# Patient Record
Sex: FEMALE | Race: WHITE | Employment: UNEMPLOYED | URBAN - METROPOLITAN AREA
[De-identification: names, ages, dates, MRNs, and addresses within clinical notes are randomized per-mention and may not be internally consistent; named-entity substitution may affect disease eponyms.]

---

## 2017-02-13 ENCOUNTER — HOSPITAL ENCOUNTER (OUTPATIENT)
Dept: OTHER | Age: 37
Discharge: HOME | End: 2017-02-13
Attending: EMERGENCY MEDICINE | Admitting: EMERGENCY MEDICINE

## 2017-02-13 LAB
ALBUMIN SERPL-MCNC: 4.2 GM/DL (ref 3.4–5)
ALP BLD-CCNC: 173 IU/L (ref 40–129)
ALT SERPL-CCNC: 2429 U/L (ref 10–40)
ANION GAP SERPL CALCULATED.3IONS-SCNC: 11 MMOL/L (ref 4–16)
AST SERPL-CCNC: 897 IU/L (ref 15–37)
BACTERIA: NEGATIVE /HPF
BASOPHILS ABSOLUTE: 0 K/CU MM
BASOPHILS RELATIVE PERCENT: 0.7 % (ref 0–1)
BILIRUB SERPL-MCNC: 4.7 MG/DL (ref 0–1)
BILIRUBIN URINE: ABNORMAL MG/DL
BLOOD, URINE: NEGATIVE
BUN BLDV-MCNC: 8 MG/DL (ref 6–23)
CALCIUM OXALATE CRYSTALS: ABNORMAL /HPF
CALCIUM SERPL-MCNC: 9.4 MG/DL (ref 8.3–10.6)
CHLORIDE BLD-SCNC: 103 MMOL/L (ref 99–110)
CLARITY: ABNORMAL
CO2: 24 MMOL/L (ref 21–32)
COLOR: ABNORMAL
CREAT SERPL-MCNC: 0.6 MG/DL (ref 0.6–1.1)
DIFFERENTIAL TYPE: ABNORMAL
EOSINOPHILS ABSOLUTE: 0.1 K/CU MM
EOSINOPHILS RELATIVE PERCENT: 2 % (ref 0–3)
GFR AFRICAN AMERICAN: >60 ML/MIN/1.73M2
GFR NON-AFRICAN AMERICAN: >60 ML/MIN/1.73M2
GLUCOSE FASTING: 107 MG/DL (ref 70–99)
GLUCOSE, URINE: NEGATIVE MG/DL
HCT VFR BLD CALC: 41.2 % (ref 37–47)
HEMOGLOBIN: 13.5 GM/DL (ref 12.5–16)
ICTOTEST: POSITIVE
IMMATURE NEUTROPHIL %: 0 % (ref 0–0.43)
KETONES, URINE: NEGATIVE MG/DL
LEUKOCYTE ESTERASE, URINE: NEGATIVE
LYMPHOCYTES ABSOLUTE: 1.1 K/CU MM
LYMPHOCYTES RELATIVE PERCENT: 37.1 % (ref 24–44)
MCH RBC QN AUTO: 31 PG (ref 27–31)
MCHC RBC AUTO-ENTMCNC: 32.8 % (ref 32–36)
MCV RBC AUTO: 94.5 FL (ref 78–100)
MONOCYTES ABSOLUTE: 0.5 K/CU MM
MONOCYTES RELATIVE PERCENT: 16.7 % (ref 0–4)
MUCUS: ABNORMAL HPF
NITRITE URINE, QUANTITATIVE: NEGATIVE
NUCLEATED RBC %: 0 %
PDW BLD-RTO: 14.7 % (ref 11.7–14.9)
PH, URINE: 5 (ref 5–8)
PLATELET # BLD: 159 K/CU MM (ref 140–440)
PMV BLD AUTO: 11.9 FL (ref 7.5–11.1)
POTASSIUM SERPL-SCNC: 4.2 MMOL/L (ref 3.5–5.1)
PROTEIN UA: NEGATIVE MG/DL
RBC # BLD: 4.36 M/CU MM (ref 4.2–5.4)
RBC URINE: <1 /HPF (ref 0–6)
SEGMENTED NEUTROPHILS ABSOLUTE COUNT: 1.3 K/CU MM
SEGMENTED NEUTROPHILS RELATIVE PERCENT: 43.5 % (ref 36–66)
SODIUM BLD-SCNC: 138 MMOL/L (ref 135–145)
SPECIFIC GRAVITY UA: 1.02 (ref 1–1.03)
SQUAMOUS EPITHELIAL: 14 /HPF
TOTAL IMMATURE NEUTOROPHIL: 0 K/CU MM
TOTAL NUCLEATED RBC: 0 K/CU MM
TOTAL PROTEIN: 7.7 GM/DL (ref 6.4–8.2)
UROBILINOGEN, URINE: <2 EU/DL (ref 0.2–1)
WBC # BLD: 2.9 K/CU MM (ref 4–10.5)
WBC UA: 3 /HPF (ref 0–5)

## 2017-02-15 ENCOUNTER — TELEPHONE (OUTPATIENT)
Dept: GASTROENTEROLOGY | Age: 37
End: 2017-02-15

## 2017-02-15 LAB
CULTURE: NORMAL
REPORT STATUS: NORMAL
REQUEST PROBLEM: NORMAL
SPECIMEN: NORMAL

## 2017-02-16 ENCOUNTER — TELEPHONE (OUTPATIENT)
Dept: GASTROENTEROLOGY | Age: 37
End: 2017-02-16

## 2017-02-16 ENCOUNTER — OFFICE VISIT (OUTPATIENT)
Dept: GASTROENTEROLOGY | Age: 37
End: 2017-02-16

## 2017-02-16 VITALS
HEIGHT: 65 IN | DIASTOLIC BLOOD PRESSURE: 78 MMHG | WEIGHT: 190 LBS | BODY MASS INDEX: 31.65 KG/M2 | HEART RATE: 69 BPM | SYSTOLIC BLOOD PRESSURE: 108 MMHG | OXYGEN SATURATION: 98 %

## 2017-02-16 DIAGNOSIS — R17 JAUNDICE: ICD-10-CM

## 2017-02-16 DIAGNOSIS — R10.11 RIGHT UPPER QUADRANT ABDOMINAL PAIN: ICD-10-CM

## 2017-02-16 DIAGNOSIS — R74.8 ELEVATED LIVER ENZYMES: ICD-10-CM

## 2017-02-16 DIAGNOSIS — K75.9 HEPATITIS: Primary | ICD-10-CM

## 2017-02-16 PROCEDURE — 99204 OFFICE O/P NEW MOD 45 MIN: CPT | Performed by: NURSE PRACTITIONER

## 2017-02-16 RX ORDER — LACTULOSE 10 G/15ML
10 SOLUTION ORAL 2 TIMES DAILY
Qty: 1 BOTTLE | Refills: 1 | Status: SHIPPED | OUTPATIENT
Start: 2017-02-16 | End: 2019-09-28 | Stop reason: ALTCHOICE

## 2017-02-16 RX ORDER — LACTULOSE 10 G/15ML
10 SOLUTION ORAL 2 TIMES DAILY
Qty: 1 BOTTLE | Refills: 3 | Status: SHIPPED | OUTPATIENT
Start: 2017-02-16 | End: 2017-02-16 | Stop reason: SDUPTHER

## 2017-02-16 ASSESSMENT — ENCOUNTER SYMPTOMS
CONSTIPATION: 0
ABDOMINAL PAIN: 0
BLURRED VISION: 0
HEMOPTYSIS: 0
BLOOD IN STOOL: 0
NAUSEA: 0
HEARTBURN: 0
DIARRHEA: 0
COUGH: 0
ROS SKIN COMMENTS: JAUNDICED
ORTHOPNEA: 0
SHORTNESS OF BREATH: 0
SPUTUM PRODUCTION: 0
DOUBLE VISION: 0
PHOTOPHOBIA: 0
BACK PAIN: 0
EYE PAIN: 0
VOMITING: 0

## 2017-02-20 ENCOUNTER — HOSPITAL ENCOUNTER (OUTPATIENT)
Dept: OTHER | Age: 37
Discharge: HOME | End: 2017-02-20
Attending: EMERGENCY MEDICINE | Admitting: EMERGENCY MEDICINE

## 2017-02-20 LAB
ALBUMIN SERPL-MCNC: 4 GM/DL (ref 3.4–5)
ALP BLD-CCNC: 151 IU/L (ref 40–129)
ALT SERPL-CCNC: 1844 U/L (ref 10–40)
AST SERPL-CCNC: 772 IU/L (ref 15–37)
BILIRUB SERPL-MCNC: 5.9 MG/DL (ref 0–1)
BILIRUBIN DIRECT: 4.1 MG/DL (ref 0–0.3)
BILIRUBIN, INDIRECT: 1.8 MG/DL (ref 0–0.7)
HAV IGM SER IA-ACNC: NON REACTIVE
HEPATITIS B CORE IGM ANTIBODY: ABNORMAL
HEPATITIS B SURFACE ANTIGEN: ABNORMAL
HEPATITIS C ANTIBODY: ABNORMAL
INR BLD: 1.08 INDEX
PROTHROMBIN TIME: 12.4 SECONDS (ref 9.12–12.5)
TOTAL PROTEIN: 7.6 GM/DL (ref 6.4–8.2)

## 2017-02-22 LAB — HEPATITIS B SURFACE ANTIGEN CONFIRMATION: POSITIVE

## 2017-02-23 ENCOUNTER — TELEPHONE (OUTPATIENT)
Dept: GASTROENTEROLOGY | Age: 37
End: 2017-02-23

## 2019-09-28 ENCOUNTER — HOSPITAL ENCOUNTER (EMERGENCY)
Age: 39
Discharge: HOME | End: 2019-09-28
Payer: MEDICAID

## 2019-09-28 ENCOUNTER — APPOINTMENT (OUTPATIENT)
Dept: GENERAL RADIOLOGY | Age: 39
End: 2019-09-28
Payer: MEDICAID

## 2019-09-28 VITALS
OXYGEN SATURATION: 100 % | TEMPERATURE: 98.7 F | SYSTOLIC BLOOD PRESSURE: 108 MMHG | HEIGHT: 65 IN | RESPIRATION RATE: 14 BRPM | HEART RATE: 89 BPM | BODY MASS INDEX: 29.99 KG/M2 | DIASTOLIC BLOOD PRESSURE: 81 MMHG | WEIGHT: 180 LBS

## 2019-09-28 DIAGNOSIS — S61.419A LACERATION OF MULTIPLE SITES OF HAND AND FINGERS WITHOUT COMPLICATION, INITIAL ENCOUNTER: ICD-10-CM

## 2019-09-28 DIAGNOSIS — S61.219A LACERATION OF MULTIPLE SITES OF HAND AND FINGERS WITHOUT COMPLICATION, INITIAL ENCOUNTER: ICD-10-CM

## 2019-09-28 DIAGNOSIS — S61.421A LACERATION OF RIGHT HAND WITH FOREIGN BODY, INITIAL ENCOUNTER: Primary | ICD-10-CM

## 2019-09-28 PROCEDURE — 73130 X-RAY EXAM OF HAND: CPT

## 2019-09-28 PROCEDURE — 6360000002 HC RX W HCPCS: Performed by: PHYSICIAN ASSISTANT

## 2019-09-28 PROCEDURE — 4500000027

## 2019-09-28 PROCEDURE — 99283 EMERGENCY DEPT VISIT LOW MDM: CPT

## 2019-09-28 PROCEDURE — 2500000003 HC RX 250 WO HCPCS: Performed by: PHYSICIAN ASSISTANT

## 2019-09-28 PROCEDURE — 6370000000 HC RX 637 (ALT 250 FOR IP): Performed by: PHYSICIAN ASSISTANT

## 2019-09-28 PROCEDURE — 90715 TDAP VACCINE 7 YRS/> IM: CPT | Performed by: PHYSICIAN ASSISTANT

## 2019-09-28 PROCEDURE — 90471 IMMUNIZATION ADMIN: CPT | Performed by: PHYSICIAN ASSISTANT

## 2019-09-28 RX ORDER — CEPHALEXIN 500 MG/1
500 CAPSULE ORAL 2 TIMES DAILY
Qty: 14 CAPSULE | Refills: 0 | Status: SHIPPED | OUTPATIENT
Start: 2019-09-28 | End: 2019-10-05

## 2019-09-28 RX ORDER — TRAMADOL HYDROCHLORIDE 50 MG/1
50 TABLET ORAL EVERY 6 HOURS PRN
Qty: 12 TABLET | Refills: 0 | Status: SHIPPED | OUTPATIENT
Start: 2019-09-28 | End: 2019-10-03

## 2019-09-28 RX ORDER — BUPIVACAINE HYDROCHLORIDE 5 MG/ML
10 INJECTION, SOLUTION EPIDURAL; INTRACAUDAL ONCE
Status: COMPLETED | OUTPATIENT
Start: 2019-09-28 | End: 2019-09-28

## 2019-09-28 RX ORDER — HYDROCODONE BITARTRATE AND ACETAMINOPHEN 5; 325 MG/1; MG/1
2 TABLET ORAL ONCE
Status: COMPLETED | OUTPATIENT
Start: 2019-09-28 | End: 2019-09-28

## 2019-09-28 RX ADMIN — BUPIVACAINE HYDROCHLORIDE 50 MG: 5 INJECTION, SOLUTION EPIDURAL; INTRACAUDAL; PERINEURAL at 15:16

## 2019-09-28 RX ADMIN — Medication 3 ML: at 14:27

## 2019-09-28 RX ADMIN — TETANUS TOXOID, REDUCED DIPHTHERIA TOXOID AND ACELLULAR PERTUSSIS VACCINE, ADSORBED 0.5 ML: 5; 2.5; 8; 8; 2.5 SUSPENSION INTRAMUSCULAR at 14:26

## 2019-09-28 RX ADMIN — HYDROCODONE BITARTRATE AND ACETAMINOPHEN 2 TABLET: 5; 325 TABLET ORAL at 14:19

## 2019-09-28 ASSESSMENT — PAIN SCALES - GENERAL
PAINLEVEL_OUTOF10: 9
PAINLEVEL_OUTOF10: 10

## 2019-09-28 ASSESSMENT — PAIN DESCRIPTION - LOCATION: LOCATION: FINGER (COMMENT WHICH ONE)

## 2019-09-28 ASSESSMENT — PAIN DESCRIPTION - PAIN TYPE: TYPE: ACUTE PAIN

## 2019-09-29 ENCOUNTER — HOSPITAL ENCOUNTER (EMERGENCY)
Age: 39
Discharge: HOME | End: 2019-09-29
Payer: MEDICAID

## 2019-09-29 VITALS
RESPIRATION RATE: 16 BRPM | SYSTOLIC BLOOD PRESSURE: 116 MMHG | TEMPERATURE: 98.4 F | OXYGEN SATURATION: 99 % | DIASTOLIC BLOOD PRESSURE: 81 MMHG | WEIGHT: 180 LBS | BODY MASS INDEX: 29.99 KG/M2 | HEART RATE: 87 BPM | HEIGHT: 65 IN

## 2019-09-29 DIAGNOSIS — S31.811A LACERATION OF RIGHT BUTTOCK, INITIAL ENCOUNTER: Primary | ICD-10-CM

## 2019-09-29 PROCEDURE — 6370000000 HC RX 637 (ALT 250 FOR IP): Performed by: PHYSICIAN ASSISTANT

## 2019-09-29 PROCEDURE — 99283 EMERGENCY DEPT VISIT LOW MDM: CPT

## 2019-09-29 RX ORDER — CEPHALEXIN 250 MG/1
500 CAPSULE ORAL ONCE
Status: COMPLETED | OUTPATIENT
Start: 2019-09-29 | End: 2019-09-29

## 2019-09-29 RX ORDER — IBUPROFEN 600 MG/1
600 TABLET ORAL EVERY 6 HOURS PRN
Qty: 30 TABLET | Refills: 0 | Status: ON HOLD | OUTPATIENT
Start: 2019-09-29 | End: 2020-08-14 | Stop reason: HOSPADM

## 2019-09-29 RX ORDER — SULFAMETHOXAZOLE AND TRIMETHOPRIM 800; 160 MG/1; MG/1
1 TABLET ORAL ONCE
Status: COMPLETED | OUTPATIENT
Start: 2019-09-29 | End: 2019-09-29

## 2019-09-29 RX ORDER — SULFAMETHOXAZOLE AND TRIMETHOPRIM 800; 160 MG/1; MG/1
1 TABLET ORAL 2 TIMES DAILY
Qty: 20 TABLET | Refills: 0 | Status: SHIPPED | OUTPATIENT
Start: 2019-09-29 | End: 2019-10-09

## 2019-09-29 RX ORDER — IBUPROFEN 200 MG
TABLET ORAL ONCE
Status: COMPLETED | OUTPATIENT
Start: 2019-09-29 | End: 2019-09-29

## 2019-09-29 RX ORDER — IBUPROFEN 600 MG/1
600 TABLET ORAL ONCE
Status: COMPLETED | OUTPATIENT
Start: 2019-09-29 | End: 2019-09-29

## 2019-09-29 RX ADMIN — CEPHALEXIN 500 MG: 250 CAPSULE ORAL at 18:19

## 2019-09-29 RX ADMIN — IBUPROFEN 600 MG: 600 TABLET, FILM COATED ORAL at 18:20

## 2019-09-29 RX ADMIN — SULFAMETHOXAZOLE AND TRIMETHOPRIM 1 TABLET: 800; 160 TABLET ORAL at 18:20

## 2019-09-29 RX ADMIN — BACITRACIN ZINC, NEOMYCIN SULFATE, POLYMYXIN B SULFATE 1 G: 3.5; 5000; 4 OINTMENT TOPICAL at 18:19

## 2019-09-29 ASSESSMENT — PAIN DESCRIPTION - ORIENTATION: ORIENTATION: LEFT

## 2019-09-29 ASSESSMENT — PAIN DESCRIPTION - FREQUENCY: FREQUENCY: CONTINUOUS

## 2019-09-29 ASSESSMENT — PAIN SCALES - GENERAL
PAINLEVEL_OUTOF10: 7
PAINLEVEL_OUTOF10: 8

## 2019-09-29 ASSESSMENT — PAIN DESCRIPTION - LOCATION: LOCATION: HAND

## 2019-09-29 ASSESSMENT — PAIN DESCRIPTION - DESCRIPTORS: DESCRIPTORS: THROBBING

## 2019-09-29 ASSESSMENT — PAIN DESCRIPTION - PAIN TYPE: TYPE: ACUTE PAIN

## 2019-11-16 ENCOUNTER — HOSPITAL ENCOUNTER (EMERGENCY)
Age: 39
Discharge: LEFT BEFORE DISCHARGE | End: 2019-11-17
Attending: EMERGENCY MEDICINE
Payer: MEDICAID

## 2019-11-16 ENCOUNTER — APPOINTMENT (OUTPATIENT)
Dept: GENERAL RADIOLOGY | Age: 39
End: 2019-11-16
Payer: MEDICAID

## 2019-11-16 VITALS
HEART RATE: 108 BPM | DIASTOLIC BLOOD PRESSURE: 85 MMHG | SYSTOLIC BLOOD PRESSURE: 128 MMHG | BODY MASS INDEX: 29.99 KG/M2 | OXYGEN SATURATION: 98 % | HEIGHT: 65 IN | RESPIRATION RATE: 18 BRPM | WEIGHT: 180 LBS | TEMPERATURE: 98.7 F

## 2019-11-16 DIAGNOSIS — M86.9 OSTEOMYELITIS OF RIGHT HAND, UNSPECIFIED TYPE (HCC): Primary | ICD-10-CM

## 2019-11-16 DIAGNOSIS — R10.31 RIGHT LOWER QUADRANT ABDOMINAL PAIN: ICD-10-CM

## 2019-11-16 LAB
BASOPHILS ABSOLUTE: 0 K/CU MM
BASOPHILS RELATIVE PERCENT: 0.4 % (ref 0–1)
DIFFERENTIAL TYPE: ABNORMAL
EOSINOPHILS ABSOLUTE: 0.2 K/CU MM
EOSINOPHILS RELATIVE PERCENT: 2.4 % (ref 0–3)
HCT VFR BLD CALC: 36.7 % (ref 37–47)
HEMOGLOBIN: 11.8 GM/DL (ref 12.5–16)
IMMATURE NEUTROPHIL %: 0.4 % (ref 0–0.43)
LACTATE: 1.6 MMOL/L (ref 0.4–2)
LYMPHOCYTES ABSOLUTE: 1.7 K/CU MM
LYMPHOCYTES RELATIVE PERCENT: 22.9 % (ref 24–44)
MCH RBC QN AUTO: 28.8 PG (ref 27–31)
MCHC RBC AUTO-ENTMCNC: 32.2 % (ref 32–36)
MCV RBC AUTO: 89.5 FL (ref 78–100)
MONOCYTES ABSOLUTE: 0.6 K/CU MM
MONOCYTES RELATIVE PERCENT: 7.8 % (ref 0–4)
NUCLEATED RBC %: 0 %
PDW BLD-RTO: 12.3 % (ref 11.7–14.9)
PLATELET # BLD: 209 K/CU MM (ref 140–440)
PMV BLD AUTO: 9.6 FL (ref 7.5–11.1)
RBC # BLD: 4.1 M/CU MM (ref 4.2–5.4)
REASON FOR REJECTION: NORMAL
REJECTED TEST: NORMAL
REJECTED TEST: NORMAL
SEGMENTED NEUTROPHILS ABSOLUTE COUNT: 5 K/CU MM
SEGMENTED NEUTROPHILS RELATIVE PERCENT: 66.1 % (ref 36–66)
TOTAL IMMATURE NEUTOROPHIL: 0.03 K/CU MM
TOTAL NUCLEATED RBC: 0 K/CU MM
WBC # BLD: 7.6 K/CU MM (ref 4–10.5)

## 2019-11-16 PROCEDURE — 96365 THER/PROPH/DIAG IV INF INIT: CPT

## 2019-11-16 PROCEDURE — 2580000003 HC RX 258: Performed by: PHYSICIAN ASSISTANT

## 2019-11-16 PROCEDURE — 84703 CHORIONIC GONADOTROPIN ASSAY: CPT

## 2019-11-16 PROCEDURE — 73130 X-RAY EXAM OF HAND: CPT

## 2019-11-16 PROCEDURE — 87040 BLOOD CULTURE FOR BACTERIA: CPT

## 2019-11-16 PROCEDURE — 6360000002 HC RX W HCPCS: Performed by: PHYSICIAN ASSISTANT

## 2019-11-16 PROCEDURE — 85025 COMPLETE CBC W/AUTO DIFF WBC: CPT

## 2019-11-16 PROCEDURE — 83605 ASSAY OF LACTIC ACID: CPT

## 2019-11-16 PROCEDURE — 99284 EMERGENCY DEPT VISIT MOD MDM: CPT

## 2019-11-16 RX ORDER — VANCOMYCIN HYDROCHLORIDE 1 G/200ML
1000 INJECTION, SOLUTION INTRAVENOUS ONCE
Status: COMPLETED | OUTPATIENT
Start: 2019-11-16 | End: 2019-11-17

## 2019-11-16 RX ADMIN — CEFTRIAXONE SODIUM 2 G: 2 INJECTION, POWDER, FOR SOLUTION INTRAMUSCULAR; INTRAVENOUS at 23:40

## 2019-11-16 ASSESSMENT — PAIN DESCRIPTION - PAIN TYPE: TYPE: ACUTE PAIN

## 2019-11-16 ASSESSMENT — PAIN DESCRIPTION - LOCATION: LOCATION: ABDOMEN

## 2019-11-16 ASSESSMENT — PAIN SCALES - GENERAL: PAINLEVEL_OUTOF10: 8

## 2019-11-17 ENCOUNTER — APPOINTMENT (OUTPATIENT)
Dept: CT IMAGING | Age: 39
End: 2019-11-17
Payer: MEDICAID

## 2019-11-17 LAB
ALBUMIN SERPL-MCNC: 3.4 GM/DL (ref 3.4–5)
ALP BLD-CCNC: 72 IU/L (ref 40–129)
ALT SERPL-CCNC: 20 U/L (ref 10–40)
ANION GAP SERPL CALCULATED.3IONS-SCNC: 11 MMOL/L (ref 4–16)
AST SERPL-CCNC: 19 IU/L (ref 15–37)
BACTERIA: ABNORMAL /HPF
BILIRUB SERPL-MCNC: 0.2 MG/DL (ref 0–1)
BILIRUBIN URINE: NEGATIVE MG/DL
BLOOD, URINE: NEGATIVE
BUN BLDV-MCNC: 9 MG/DL (ref 6–23)
CALCIUM SERPL-MCNC: 9 MG/DL (ref 8.3–10.6)
CHLORIDE BLD-SCNC: 99 MMOL/L (ref 99–110)
CLARITY: CLEAR
CO2: 25 MMOL/L (ref 21–32)
COLOR: YELLOW
CREAT SERPL-MCNC: 0.7 MG/DL (ref 0.6–1.1)
GFR AFRICAN AMERICAN: >60 ML/MIN/1.73M2
GFR NON-AFRICAN AMERICAN: >60 ML/MIN/1.73M2
GLUCOSE BLD-MCNC: 114 MG/DL (ref 70–99)
GLUCOSE, URINE: NEGATIVE MG/DL
HCG QUALITATIVE: NEGATIVE
KETONES, URINE: NEGATIVE MG/DL
LEUKOCYTE ESTERASE, URINE: ABNORMAL
LIPASE: 25 IU/L (ref 13–60)
MUCUS: ABNORMAL HPF
NITRITE URINE, QUANTITATIVE: NEGATIVE
NON SQUAM EPI CELLS: <1 /HPF
PH, URINE: 6 (ref 5–8)
POTASSIUM SERPL-SCNC: 3.9 MMOL/L (ref 3.5–5.1)
PROTEIN UA: NEGATIVE MG/DL
RBC URINE: 3 /HPF (ref 0–6)
SODIUM BLD-SCNC: 135 MMOL/L (ref 135–145)
SPECIFIC GRAVITY UA: 1.01 (ref 1–1.03)
SQUAMOUS EPITHELIAL: <1 /HPF
TOTAL PROTEIN: 7 GM/DL (ref 6.4–8.2)
TRICHOMONAS: ABNORMAL /HPF
UROBILINOGEN, URINE: NORMAL MG/DL (ref 0.2–1)
WBC UA: 3 /HPF (ref 0–5)

## 2019-11-17 PROCEDURE — 74177 CT ABD & PELVIS W/CONTRAST: CPT

## 2019-11-17 PROCEDURE — 81001 URINALYSIS AUTO W/SCOPE: CPT

## 2019-11-17 PROCEDURE — 96366 THER/PROPH/DIAG IV INF ADDON: CPT

## 2019-11-17 PROCEDURE — 6360000004 HC RX CONTRAST MEDICATION: Performed by: EMERGENCY MEDICINE

## 2019-11-17 PROCEDURE — 6360000002 HC RX W HCPCS: Performed by: PHYSICIAN ASSISTANT

## 2019-11-17 PROCEDURE — 96367 TX/PROPH/DG ADDL SEQ IV INF: CPT

## 2019-11-17 PROCEDURE — 83690 ASSAY OF LIPASE: CPT

## 2019-11-17 PROCEDURE — 2580000003 HC RX 258: Performed by: EMERGENCY MEDICINE

## 2019-11-17 PROCEDURE — 84703 CHORIONIC GONADOTROPIN ASSAY: CPT

## 2019-11-17 PROCEDURE — 80053 COMPREHEN METABOLIC PANEL: CPT

## 2019-11-17 RX ORDER — NICOTINE 21 MG/24HR
1 PATCH, TRANSDERMAL 24 HOURS TRANSDERMAL ONCE
Status: DISCONTINUED | OUTPATIENT
Start: 2019-11-17 | End: 2019-11-17 | Stop reason: HOSPADM

## 2019-11-17 RX ORDER — SODIUM CHLORIDE 0.9 % (FLUSH) 0.9 %
10 SYRINGE (ML) INJECTION 2 TIMES DAILY
Status: DISCONTINUED | OUTPATIENT
Start: 2019-11-17 | End: 2019-11-17 | Stop reason: HOSPADM

## 2019-11-17 RX ADMIN — IOPAMIDOL 80 ML: 755 INJECTION, SOLUTION INTRAVENOUS at 01:30

## 2019-11-17 RX ADMIN — VANCOMYCIN HYDROCHLORIDE 1000 MG: 1 INJECTION, SOLUTION INTRAVENOUS at 00:15

## 2019-11-17 RX ADMIN — Medication 10 ML: at 01:30

## 2019-11-22 LAB
CULTURE: NORMAL
CULTURE: NORMAL
Lab: NORMAL
Lab: NORMAL
SPECIMEN: NORMAL
SPECIMEN: NORMAL

## 2020-01-22 ENCOUNTER — HOSPITAL ENCOUNTER (EMERGENCY)
Age: 40
Discharge: HOME | End: 2020-01-22
Payer: MEDICAID

## 2020-01-22 VITALS
SYSTOLIC BLOOD PRESSURE: 121 MMHG | OXYGEN SATURATION: 100 % | DIASTOLIC BLOOD PRESSURE: 62 MMHG | RESPIRATION RATE: 14 BRPM | WEIGHT: 190 LBS | TEMPERATURE: 98.4 F | HEIGHT: 65 IN | HEART RATE: 108 BPM | BODY MASS INDEX: 31.65 KG/M2

## 2020-01-22 PROCEDURE — 93005 ELECTROCARDIOGRAM TRACING: CPT | Performed by: PHYSICIAN ASSISTANT

## 2020-01-22 PROCEDURE — 93010 ELECTROCARDIOGRAM REPORT: CPT | Performed by: INTERNAL MEDICINE

## 2020-01-22 PROCEDURE — 99284 EMERGENCY DEPT VISIT MOD MDM: CPT

## 2020-01-22 NOTE — ED PROVIDER NOTES
12 lead EKG as interpreted by me reveals sinus tachycardia. Axis is normal. There are no ischemic ST elevations or other suspicious ST changes;  QRS interval is narrow, QT interval is not prolonged.  Final interpretation: Sinus tachycardia        Selam Castillo MD  01/22/20 3996

## 2020-01-22 NOTE — ED PROVIDER NOTES
Triage Chief Complaint:   Drug Overdose    [place]:  Chris Orr is a 42 y.o. female that presents after opiate overdose. The overdose was not intentional.  Pt snorted what she believed to be methamphetamine. She the woke up with her boyfriend standing over her. Per ems her boryfriend stated she was apneic and he gave her 14 narcan intranasal before she began breathing again spontaneously. uppon arrival of ems pt was sitting on the porch a&ox4.      ROS:  General:  No fevers, no chills, no weakness  Eyes:  No recent vison changes, no discharge  ENT:  No sore throat, no nasal congestion, no hearing changes  Cardiovascular:  No chest pain, no palpitations  Respiratory:  No shortness of breath, no cough  Gastrointestinal:  No pain, no nausea, no vomiting  Musculoskeletal:  No muscle pain, no joint pain  Skin:  No rash, no pruritis, no easy bruising  Neurologic:  No speech problems, no headache  Psychiatric:  No anxiety  Genitourinary:  No dysuria, no hematuria  Extremities:  no edema, no pain    Past Medical History:   Diagnosis Date    Liver disease     hepatitis    No significant medical problems      Past Surgical History:   Procedure Laterality Date     SECTION  , 2007    x 2    CHOLECYSTECTOMY      HYSTERECTOMY  2008    MARLIN    OTHER SURGICAL HISTORY  2013    lap sera     Family History   Problem Relation Age of Onset    Obesity Brother     Mental Illness Maternal Aunt     COPD Paternal Grandmother     Dementia Paternal Grandfather     Depression Son     Mental Illness Son         Bipole, ADHD, Adjustment disorder    Colon Cancer Neg Hx      Social History     Socioeconomic History    Marital status: Single     Spouse name: Not on file    Number of children: 2    Years of education: Not on file    Highest education level: Not on file   Occupational History    Occupation:    Social Needs    Financial resource strain: Not on file    Food insecurity:     Worry: Not on file Inability: Not on file    Transportation needs:     Medical: Not on file     Non-medical: Not on file   Tobacco Use    Smoking status: Former Smoker     Packs/day: 1.00     Years: 12.00     Pack years: 12.00     Types: Cigarettes    Smokeless tobacco: Never Used    Tobacco comment: last smoked Nov 23, 2016   Substance and Sexual Activity    Alcohol use: No    Drug use: Yes     Types: IV, Cocaine    Sexual activity: Yes     Partners: Male   Lifestyle    Physical activity:     Days per week: Not on file     Minutes per session: Not on file    Stress: Not on file   Relationships    Social connections:     Talks on phone: Not on file     Gets together: Not on file     Attends Jain service: Not on file     Active member of club or organization: Not on file     Attends meetings of clubs or organizations: Not on file     Relationship status: Not on file    Intimate partner violence:     Fear of current or ex partner: Not on file     Emotionally abused: Not on file     Physically abused: Not on file     Forced sexual activity: Not on file   Other Topics Concern    Not on file   Social History Narrative    Do you donate blood or plasma? Yes    Caffeine intake? Moderate    Advance directive? No    Is blood transfusion acceptable in an emergency? Yes             No current facility-administered medications for this encounter. Current Outpatient Medications   Medication Sig Dispense Refill    ibuprofen (ADVIL;MOTRIN) 600 MG tablet Take 1 tablet by mouth every 6 hours as needed for Pain 30 tablet 0     No Known Allergies    Nursing Notes Reviewed    Physical Exam:  ED Triage Vitals [01/22/20 0299]   Enc Vitals Group      BP       Pulse       Resp       Temp       Temp src       SpO2       Weight 190 lb (86.2 kg)      Height 5' 5\" (1.651 m)      Head Circumference       Peak Flow       Pain Score       Pain Loc       Pain Edu? Excl. in [address]?         My pulse ox interpretation is - normal    General appearance:  No acute distress. Skin:  Warm. Dry. Eye:  Extraocular movements intact. Pupils equal round react to light. Ears, nose, mouth and throat:  Oral mucosa moist.  No cephalohematoma, coto sign or raccoon eyes. Neck:  Trachea midline. No midline bony cervical tenderness palpation. Extremity:  Normal ROM. No gross deformity ×4 extremities. Heart:  Regular  Perfusion:  intact  Respiratory:  Respirations nonlabored. Speaking clearly in full sentences. No respiratory distress. Abdominal:   Non distended. Neurological:  Alert and oriented times 3. Sensation intact to light touch to distal upper/lower extremities; 5/5 and symmetric  and dorsi/plantar flexion. And the toilet steady gait. I have reviewed and interpreted all of the currently available lab results from this visit (if applicable):  Results for orders placed or performed during the hospital encounter of 01/22/20   EKG 12 Lead   Result Value Ref Range    Ventricular Rate 108 BPM    Atrial Rate 108 BPM    P-R Interval 124 ms    QRS Duration 78 ms    Q-T Interval 360 ms    QTc Calculation (Bazett) 482 ms    P Axis 62 degrees    R Axis 62 degrees    T Axis 37 degrees    Diagnosis       Sinus tachycardia  Otherwise normal ECG  No previous ECGs available        Radiographs (if obtained):  [] The following radiograph was interpreted by myself in the absence of a radiologist:   [] Radiologist's Report Reviewed:  No orders to display         EKG (if obtained): (All EKG's are interpreted by myself in the absence of a cardiologist)    Chart review shows recent radiographs:  No results found. MDM:  Patient here for opiate overdose, received Narcan in route was awake on arrival, has no complaints. Patient was monitored for approximately one hour and continued to have no symptoms. Patient will be discharged to follow-up as an outpatient, patient referrals given, they understand and agree, return warnings given.     Clinical Impression:  1. Opiate overdose, undetermined intent, initial encounter [facility])      Disposition referral (if applicable):  [place], [address] [facility]  [phone]  In 1 day      Disposition medications (if applicable):  New Prescriptions    No medications on file       Comment: Please note this report has been produced using speech recognition software and may contain errors related to that system including errors in grammar, punctuation, and spelling, as well as words and phrases that may be inappropriate. If there are any questions or concerns please feel free to contact the dictating provider for clarification.         Santi Flaherty PA-C  01/22/20 0534

## 2020-01-27 LAB
EKG ATRIAL RATE: 108 BPM
EKG DIAGNOSIS: NORMAL
EKG P AXIS: 62 DEGREES
EKG P-R INTERVAL: 124 MS
EKG Q-T INTERVAL: 360 MS
EKG QRS DURATION: 78 MS
EKG QTC CALCULATION (BAZETT): 482 MS
EKG R AXIS: 62 DEGREES
EKG T AXIS: 37 DEGREES
EKG VENTRICULAR RATE: 108 BPM

## 2020-06-01 ENCOUNTER — APPOINTMENT (OUTPATIENT)
Dept: CT IMAGING | Age: 40
End: 2020-06-01
Payer: MEDICAID

## 2020-06-01 ENCOUNTER — HOSPITAL ENCOUNTER (EMERGENCY)
Age: 40
Discharge: LEFT BEFORE DISCHARGE | End: 2020-06-01
Attending: EMERGENCY MEDICINE
Payer: MEDICAID

## 2020-06-01 VITALS
HEART RATE: 68 BPM | DIASTOLIC BLOOD PRESSURE: 66 MMHG | TEMPERATURE: 98.1 F | WEIGHT: 190 LBS | OXYGEN SATURATION: 96 % | SYSTOLIC BLOOD PRESSURE: 101 MMHG | BODY MASS INDEX: 31.65 KG/M2 | RESPIRATION RATE: 16 BRPM | HEIGHT: 65 IN

## 2020-06-01 PROCEDURE — 72128 CT CHEST SPINE W/O DYE: CPT

## 2020-06-01 PROCEDURE — 70450 CT HEAD/BRAIN W/O DYE: CPT

## 2020-06-01 PROCEDURE — 99284 EMERGENCY DEPT VISIT MOD MDM: CPT

## 2020-06-01 PROCEDURE — 72131 CT LUMBAR SPINE W/O DYE: CPT

## 2020-06-01 PROCEDURE — 72125 CT NECK SPINE W/O DYE: CPT

## 2020-06-01 RX ORDER — BUPRENORPHINE AND NALOXONE 8; 2 MG/1; MG/1
1 FILM, SOLUBLE BUCCAL; SUBLINGUAL 2 TIMES DAILY
Status: ON HOLD | COMMUNITY
End: 2020-10-21

## 2020-06-01 ASSESSMENT — PAIN SCALES - GENERAL: PAINLEVEL_OUTOF10: 8

## 2020-06-01 ASSESSMENT — PAIN DESCRIPTION - PAIN TYPE: TYPE: ACUTE PAIN

## 2020-06-01 ASSESSMENT — PAIN DESCRIPTION - FREQUENCY: FREQUENCY: CONTINUOUS

## 2020-06-01 ASSESSMENT — PAIN DESCRIPTION - LOCATION: LOCATION: BACK

## 2020-06-01 ASSESSMENT — PAIN DESCRIPTION - DESCRIPTORS: DESCRIPTORS: ACHING

## 2020-06-01 ASSESSMENT — PAIN DESCRIPTION - ORIENTATION: ORIENTATION: LOWER
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
## 2020-06-01 NOTE — ED NOTES
Cassi Mary from lab called out stating pt was climbing out of bed, trying to get dressed. Dr. Kavita Juarez notified. Deepti Jaquez.  MONROE Hart  06/01/20 4022

## 2020-08-04 ENCOUNTER — APPOINTMENT (OUTPATIENT)
Dept: CT IMAGING | Age: 40
DRG: 710 | End: 2020-08-04
Payer: MEDICAID

## 2020-08-04 ENCOUNTER — HOSPITAL ENCOUNTER (INPATIENT)
Age: 40
LOS: 12 days | Discharge: ANOTHER HOSPITAL | DRG: 710 | End: 2020-08-16
Attending: EMERGENCY MEDICINE | Admitting: INTERNAL MEDICINE
Payer: MEDICAID

## 2020-08-04 ENCOUNTER — ANESTHESIA (OUTPATIENT)
Dept: OPERATING ROOM | Age: 40
DRG: 710 | End: 2020-08-04
Payer: MEDICAID

## 2020-08-04 ENCOUNTER — APPOINTMENT (OUTPATIENT)
Dept: GENERAL RADIOLOGY | Age: 40
DRG: 710 | End: 2020-08-04
Payer: MEDICAID

## 2020-08-04 ENCOUNTER — ANESTHESIA EVENT (OUTPATIENT)
Dept: OPERATING ROOM | Age: 40
DRG: 710 | End: 2020-08-04
Payer: MEDICAID

## 2020-08-04 VITALS
TEMPERATURE: 96.3 F | OXYGEN SATURATION: 100 % | SYSTOLIC BLOOD PRESSURE: 89 MMHG | RESPIRATION RATE: 12 BRPM | DIASTOLIC BLOOD PRESSURE: 60 MMHG

## 2020-08-04 PROBLEM — A41.9 SEPSIS (HCC): Status: ACTIVE | Noted: 2020-08-04

## 2020-08-04 LAB
ABO/RH: NORMAL
ACETAMINOPHEN LEVEL: <5 UG/ML (ref 15–30)
ALBUMIN SERPL-MCNC: 2 GM/DL (ref 3.4–5)
ALBUMIN SERPL-MCNC: 2.3 GM/DL (ref 3.4–5)
ALCOHOL SCREEN SERUM: <0.01 %WT/VOL
ALP BLD-CCNC: 127 IU/L (ref 40–129)
ALT SERPL-CCNC: 32 U/L (ref 10–40)
AMPHETAMINES: ABNORMAL
ANION GAP SERPL CALCULATED.3IONS-SCNC: 19 MMOL/L (ref 4–16)
ANION GAP SERPL CALCULATED.3IONS-SCNC: 20 MMOL/L (ref 4–16)
ANION GAP SERPL CALCULATED.3IONS-SCNC: 25 MMOL/L (ref 4–16)
ANTIBODY SCREEN: NEGATIVE
APTT: 34.6 SECONDS (ref 25.1–37.1)
AST SERPL-CCNC: 28 IU/L (ref 15–37)
BANDED NEUTROPHILS ABSOLUTE COUNT: 1.79 K/CU MM
BANDED NEUTROPHILS RELATIVE PERCENT: 17 % (ref 5–11)
BARBITURATE SCREEN URINE: NEGATIVE
BENZODIAZEPINE SCREEN, URINE: NEGATIVE
BILIRUB SERPL-MCNC: 0.7 MG/DL (ref 0–1)
BUN BLDV-MCNC: 71 MG/DL (ref 6–23)
BUN BLDV-MCNC: 72 MG/DL (ref 6–23)
BUN BLDV-MCNC: 73 MG/DL (ref 6–23)
CALCIUM SERPL-MCNC: 7.5 MG/DL (ref 8.3–10.6)
CALCIUM SERPL-MCNC: 7.8 MG/DL (ref 8.3–10.6)
CALCIUM SERPL-MCNC: 8.1 MG/DL (ref 8.3–10.6)
CANNABINOID SCREEN URINE: NEGATIVE
CHLORIDE BLD-SCNC: 80 MMOL/L (ref 99–110)
CHLORIDE BLD-SCNC: 84 MMOL/L (ref 99–110)
CHLORIDE BLD-SCNC: 87 MMOL/L (ref 99–110)
CO2: 14 MMOL/L (ref 21–32)
CO2: 19 MMOL/L (ref 21–32)
CO2: 19 MMOL/L (ref 21–32)
COCAINE METABOLITE: NEGATIVE
CREAT SERPL-MCNC: 3.7 MG/DL (ref 0.6–1.1)
CREAT SERPL-MCNC: 4 MG/DL (ref 0.6–1.1)
CREAT SERPL-MCNC: 4.2 MG/DL (ref 0.6–1.1)
D DIMER: 3980 NG/ML(DDU)
DIFFERENTIAL TYPE: ABNORMAL
DOSE AMOUNT: ABNORMAL
DOSE AMOUNT: ABNORMAL
DOSE TIME: ABNORMAL
DOSE TIME: ABNORMAL
ERYTHROCYTE SEDIMENTATION RATE: 99 MM/HR (ref 0–20)
FIBRINOGEN LEVEL: 447 MG/DL (ref 196.9–442.1)
GFR AFRICAN AMERICAN: 14 ML/MIN/1.73M2
GFR AFRICAN AMERICAN: 15 ML/MIN/1.73M2
GFR AFRICAN AMERICAN: 17 ML/MIN/1.73M2
GFR NON-AFRICAN AMERICAN: 12 ML/MIN/1.73M2
GFR NON-AFRICAN AMERICAN: 13 ML/MIN/1.73M2
GFR NON-AFRICAN AMERICAN: 14 ML/MIN/1.73M2
GLUCOSE BLD-MCNC: 101 MG/DL (ref 70–99)
GLUCOSE BLD-MCNC: 113 MG/DL (ref 70–99)
GLUCOSE BLD-MCNC: 119 MG/DL (ref 70–99)
HCT VFR BLD CALC: 30.2 % (ref 37–47)
HEMOGLOBIN: 10.1 GM/DL (ref 12.5–16)
HIGH SENSITIVE C-REACTIVE PROTEIN: 201.7 MG/L
INR BLD: 1.32 INDEX
LACTATE: 1.7 MMOL/L (ref 0.4–2)
LACTIC ACID, SEPSIS: 0.8 MMOL/L (ref 0.5–1.9)
LYMPHOCYTES ABSOLUTE: 0.2 K/CU MM
LYMPHOCYTES RELATIVE PERCENT: 2 % (ref 24–44)
MAGNESIUM: 1.9 MG/DL (ref 1.8–2.4)
MAGNESIUM: 2 MG/DL (ref 1.8–2.4)
MCH RBC QN AUTO: 27.6 PG (ref 27–31)
MCHC RBC AUTO-ENTMCNC: 33.4 % (ref 32–36)
MCV RBC AUTO: 82.5 FL (ref 78–100)
MONOCYTES ABSOLUTE: 0.5 K/CU MM
MONOCYTES RELATIVE PERCENT: 5 % (ref 0–4)
OPIATES, URINE: NEGATIVE
OXYCODONE: NEGATIVE
PDW BLD-RTO: 13.9 % (ref 11.7–14.9)
PHENCYCLIDINE, URINE: NEGATIVE
PHOSPHORUS: 3.7 MG/DL (ref 2.5–4.9)
PLATELET # BLD: 169 K/CU MM (ref 140–440)
PMV BLD AUTO: 11.4 FL (ref 7.5–11.1)
POTASSIUM SERPL-SCNC: 3.2 MMOL/L (ref 3.5–5.1)
POTASSIUM SERPL-SCNC: 3.4 MMOL/L (ref 3.5–5.1)
POTASSIUM SERPL-SCNC: 3.5 MMOL/L (ref 3.5–5.1)
PRO-BNP: 3359 PG/ML
PROTHROMBIN TIME: 16 SECONDS (ref 11.7–14.5)
RBC # BLD: 3.66 M/CU MM (ref 4.2–5.4)
SALICYLATE LEVEL: <0.3 MG/DL (ref 15–30)
SARS-COV-2, NAAT: NOT DETECTED
SEGMENTED NEUTROPHILS ABSOLUTE COUNT: 8 K/CU MM
SEGMENTED NEUTROPHILS RELATIVE PERCENT: 76 % (ref 36–66)
SODIUM BLD-SCNC: 119 MMOL/L (ref 135–145)
SODIUM BLD-SCNC: 123 MMOL/L (ref 135–145)
SODIUM BLD-SCNC: 125 MMOL/L (ref 135–145)
TOTAL CK: 83 IU/L (ref 26–140)
TOTAL PROTEIN: 7.3 GM/DL (ref 6.4–8.2)
TOXIC GRANULATION: PRESENT
TROPONIN T: <0.01 NG/ML
WBC # BLD: 10.5 K/CU MM (ref 4–10.5)

## 2020-08-04 PROCEDURE — 2000000000 HC ICU R&B

## 2020-08-04 PROCEDURE — 96367 TX/PROPH/DG ADDL SEQ IV INF: CPT

## 2020-08-04 PROCEDURE — 74176 CT ABD & PELVIS W/O CONTRAST: CPT

## 2020-08-04 PROCEDURE — 0J9R0ZZ DRAINAGE OF LEFT FOOT SUBCUTANEOUS TISSUE AND FASCIA, OPEN APPROACH: ICD-10-PCS | Performed by: SURGERY

## 2020-08-04 PROCEDURE — 87040 BLOOD CULTURE FOR BACTERIA: CPT

## 2020-08-04 PROCEDURE — 6360000002 HC RX W HCPCS: Performed by: INTERNAL MEDICINE

## 2020-08-04 PROCEDURE — 2580000003 HC RX 258: Performed by: SURGERY

## 2020-08-04 PROCEDURE — 73700 CT LOWER EXTREMITY W/O DYE: CPT

## 2020-08-04 PROCEDURE — 71045 X-RAY EXAM CHEST 1 VIEW: CPT

## 2020-08-04 PROCEDURE — 6370000000 HC RX 637 (ALT 250 FOR IP): Performed by: SURGERY

## 2020-08-04 PROCEDURE — 87070 CULTURE OTHR SPECIMN AEROBIC: CPT

## 2020-08-04 PROCEDURE — 83880 ASSAY OF NATRIURETIC PEPTIDE: CPT

## 2020-08-04 PROCEDURE — 86901 BLOOD TYPING SEROLOGIC RH(D): CPT

## 2020-08-04 PROCEDURE — 96375 TX/PRO/DX INJ NEW DRUG ADDON: CPT

## 2020-08-04 PROCEDURE — 87205 SMEAR GRAM STAIN: CPT

## 2020-08-04 PROCEDURE — 0JBP0ZZ EXCISION OF LEFT LOWER LEG SUBCUTANEOUS TISSUE AND FASCIA, OPEN APPROACH: ICD-10-PCS | Performed by: SURGERY

## 2020-08-04 PROCEDURE — U0002 COVID-19 LAB TEST NON-CDC: HCPCS

## 2020-08-04 PROCEDURE — 3700000000 HC ANESTHESIA ATTENDED CARE: Performed by: SURGERY

## 2020-08-04 PROCEDURE — 96365 THER/PROPH/DIAG IV INF INIT: CPT

## 2020-08-04 PROCEDURE — G0480 DRUG TEST DEF 1-7 CLASSES: HCPCS

## 2020-08-04 PROCEDURE — 83010 ASSAY OF HAPTOGLOBIN QUANT: CPT

## 2020-08-04 PROCEDURE — 99231 SBSQ HOSP IP/OBS SF/LOW 25: CPT | Performed by: SURGERY

## 2020-08-04 PROCEDURE — 87075 CULTR BACTERIA EXCEPT BLOOD: CPT

## 2020-08-04 PROCEDURE — 94761 N-INVAS EAR/PLS OXIMETRY MLT: CPT

## 2020-08-04 PROCEDURE — 11403 EXC TR-EXT B9+MARG 2.1-3CM: CPT | Performed by: SURGERY

## 2020-08-04 PROCEDURE — 85027 COMPLETE CBC AUTOMATED: CPT

## 2020-08-04 PROCEDURE — 2720000010 HC SURG SUPPLY STERILE: Performed by: SURGERY

## 2020-08-04 PROCEDURE — 99285 EMERGENCY DEPT VISIT HI MDM: CPT

## 2020-08-04 PROCEDURE — 80048 BASIC METABOLIC PNL TOTAL CA: CPT

## 2020-08-04 PROCEDURE — 0JBP0ZX EXCISION OF LEFT LOWER LEG SUBCUTANEOUS TISSUE AND FASCIA, OPEN APPROACH, DIAGNOSTIC: ICD-10-PCS | Performed by: SURGERY

## 2020-08-04 PROCEDURE — 2580000003 HC RX 258: Performed by: EMERGENCY MEDICINE

## 2020-08-04 PROCEDURE — 83605 ASSAY OF LACTIC ACID: CPT

## 2020-08-04 PROCEDURE — 0J9Q0ZZ DRAINAGE OF RIGHT FOOT SUBCUTANEOUS TISSUE AND FASCIA, OPEN APPROACH: ICD-10-PCS | Performed by: SURGERY

## 2020-08-04 PROCEDURE — 93010 ELECTROCARDIOGRAM REPORT: CPT | Performed by: INTERNAL MEDICINE

## 2020-08-04 PROCEDURE — 0JBM0ZZ EXCISION OF LEFT UPPER LEG SUBCUTANEOUS TISSUE AND FASCIA, OPEN APPROACH: ICD-10-PCS | Performed by: SURGERY

## 2020-08-04 PROCEDURE — 85384 FIBRINOGEN ACTIVITY: CPT

## 2020-08-04 PROCEDURE — 85610 PROTHROMBIN TIME: CPT

## 2020-08-04 PROCEDURE — 85652 RBC SED RATE AUTOMATED: CPT

## 2020-08-04 PROCEDURE — 3600000003 HC SURGERY LEVEL 3 BASE: Performed by: SURGERY

## 2020-08-04 PROCEDURE — 51702 INSERT TEMP BLADDER CATH: CPT

## 2020-08-04 PROCEDURE — 93005 ELECTROCARDIOGRAM TRACING: CPT | Performed by: EMERGENCY MEDICINE

## 2020-08-04 PROCEDURE — 7100000000 HC PACU RECOVERY - FIRST 15 MIN

## 2020-08-04 PROCEDURE — 87186 SC STD MICRODIL/AGAR DIL: CPT

## 2020-08-04 PROCEDURE — 2709999900 HC NON-CHARGEABLE SUPPLY: Performed by: SURGERY

## 2020-08-04 PROCEDURE — 84484 ASSAY OF TROPONIN QUANT: CPT

## 2020-08-04 PROCEDURE — 83735 ASSAY OF MAGNESIUM: CPT

## 2020-08-04 PROCEDURE — 86141 C-REACTIVE PROTEIN HS: CPT

## 2020-08-04 PROCEDURE — 2580000003 HC RX 258: Performed by: NURSE PRACTITIONER

## 2020-08-04 PROCEDURE — 86900 BLOOD TYPING SEROLOGIC ABO: CPT

## 2020-08-04 PROCEDURE — 80069 RENAL FUNCTION PANEL: CPT

## 2020-08-04 PROCEDURE — 3700000001 HC ADD 15 MINUTES (ANESTHESIA): Performed by: SURGERY

## 2020-08-04 PROCEDURE — 02HV33Z INSERTION OF INFUSION DEVICE INTO SUPERIOR VENA CAVA, PERCUTANEOUS APPROACH: ICD-10-PCS | Performed by: SURGERY

## 2020-08-04 PROCEDURE — 84145 PROCALCITONIN (PCT): CPT

## 2020-08-04 PROCEDURE — 87147 CULTURE TYPE IMMUNOLOGIC: CPT

## 2020-08-04 PROCEDURE — 82550 ASSAY OF CK (CPK): CPT

## 2020-08-04 PROCEDURE — 6360000002 HC RX W HCPCS: Performed by: NURSE ANESTHETIST, CERTIFIED REGISTERED

## 2020-08-04 PROCEDURE — 85730 THROMBOPLASTIN TIME PARTIAL: CPT

## 2020-08-04 PROCEDURE — 0JBN0ZZ EXCISION OF RIGHT LOWER LEG SUBCUTANEOUS TISSUE AND FASCIA, OPEN APPROACH: ICD-10-PCS | Performed by: SURGERY

## 2020-08-04 PROCEDURE — 6360000002 HC RX W HCPCS: Performed by: EMERGENCY MEDICINE

## 2020-08-04 PROCEDURE — 88305 TISSUE EXAM BY PATHOLOGIST: CPT

## 2020-08-04 PROCEDURE — 86850 RBC ANTIBODY SCREEN: CPT

## 2020-08-04 PROCEDURE — 85007 BL SMEAR W/DIFF WBC COUNT: CPT

## 2020-08-04 PROCEDURE — 85379 FIBRIN DEGRADATION QUANT: CPT

## 2020-08-04 PROCEDURE — 0JBN0ZX EXCISION OF RIGHT LOWER LEG SUBCUTANEOUS TISSUE AND FASCIA, OPEN APPROACH, DIAGNOSTIC: ICD-10-PCS | Performed by: SURGERY

## 2020-08-04 PROCEDURE — 6360000002 HC RX W HCPCS: Performed by: NURSE PRACTITIONER

## 2020-08-04 PROCEDURE — 2580000003 HC RX 258: Performed by: INTERNAL MEDICINE

## 2020-08-04 PROCEDURE — 3600000013 HC SURGERY LEVEL 3 ADDTL 15MIN: Performed by: SURGERY

## 2020-08-04 PROCEDURE — 80053 COMPREHEN METABOLIC PANEL: CPT

## 2020-08-04 PROCEDURE — 6360000002 HC RX W HCPCS: Performed by: SURGERY

## 2020-08-04 PROCEDURE — 6370000000 HC RX 637 (ALT 250 FOR IP): Performed by: EMERGENCY MEDICINE

## 2020-08-04 PROCEDURE — 80307 DRUG TEST PRSMV CHEM ANLYZR: CPT

## 2020-08-04 PROCEDURE — 11042 DBRDMT SUBQ TIS 1ST 20SQCM/<: CPT | Performed by: SURGERY

## 2020-08-04 PROCEDURE — 99223 1ST HOSP IP/OBS HIGH 75: CPT | Performed by: INTERNAL MEDICINE

## 2020-08-04 PROCEDURE — 87077 CULTURE AEROBIC IDENTIFY: CPT

## 2020-08-04 PROCEDURE — P9045 ALBUMIN (HUMAN), 5%, 250 ML: HCPCS | Performed by: EMERGENCY MEDICINE

## 2020-08-04 PROCEDURE — 2500000003 HC RX 250 WO HCPCS: Performed by: NURSE ANESTHETIST, CERTIFIED REGISTERED

## 2020-08-04 RX ORDER — CALCIUM GLUCONATE 94 MG/ML
2 INJECTION, SOLUTION INTRAVENOUS ONCE
Status: DISCONTINUED | OUTPATIENT
Start: 2020-08-04 | End: 2020-08-04 | Stop reason: CLARIF

## 2020-08-04 RX ORDER — PROMETHAZINE HYDROCHLORIDE 25 MG/ML
6.25 INJECTION, SOLUTION INTRAMUSCULAR; INTRAVENOUS EVERY 6 HOURS PRN
Status: DISCONTINUED | OUTPATIENT
Start: 2020-08-04 | End: 2020-08-16 | Stop reason: HOSPADM

## 2020-08-04 RX ORDER — FENTANYL CITRATE 50 UG/ML
50 INJECTION, SOLUTION INTRAMUSCULAR; INTRAVENOUS EVERY 5 MIN PRN
Status: DISCONTINUED | OUTPATIENT
Start: 2020-08-04 | End: 2020-08-04 | Stop reason: HOSPADM

## 2020-08-04 RX ORDER — ACETAMINOPHEN 650 MG/1
650 SUPPOSITORY RECTAL EVERY 6 HOURS PRN
Status: DISCONTINUED | OUTPATIENT
Start: 2020-08-04 | End: 2020-08-16 | Stop reason: HOSPADM

## 2020-08-04 RX ORDER — KETAMINE HYDROCHLORIDE 10 MG/ML
INJECTION, SOLUTION INTRAMUSCULAR; INTRAVENOUS PRN
Status: DISCONTINUED | OUTPATIENT
Start: 2020-08-04 | End: 2020-08-04 | Stop reason: SDUPTHER

## 2020-08-04 RX ORDER — SODIUM CHLORIDE, SODIUM LACTATE, POTASSIUM CHLORIDE, AND CALCIUM CHLORIDE .6; .31; .03; .02 G/100ML; G/100ML; G/100ML; G/100ML
1000 INJECTION, SOLUTION INTRAVENOUS ONCE
Status: COMPLETED | OUTPATIENT
Start: 2020-08-04 | End: 2020-08-04

## 2020-08-04 RX ORDER — SODIUM CHLORIDE AND POTASSIUM CHLORIDE .9; .15 G/100ML; G/100ML
SOLUTION INTRAVENOUS CONTINUOUS
Status: DISCONTINUED | OUTPATIENT
Start: 2020-08-04 | End: 2020-08-07

## 2020-08-04 RX ORDER — TRAZODONE HYDROCHLORIDE 50 MG/1
50 TABLET ORAL NIGHTLY PRN
Status: DISCONTINUED | OUTPATIENT
Start: 2020-08-04 | End: 2020-08-16 | Stop reason: HOSPADM

## 2020-08-04 RX ORDER — POTASSIUM CHLORIDE 7.45 MG/ML
10 INJECTION INTRAVENOUS PRN
Status: DISCONTINUED | OUTPATIENT
Start: 2020-08-04 | End: 2020-08-05

## 2020-08-04 RX ORDER — SODIUM CHLORIDE AND POTASSIUM CHLORIDE .9; .15 G/100ML; G/100ML
SOLUTION INTRAVENOUS
Status: DISPENSED
Start: 2020-08-04 | End: 2020-08-05

## 2020-08-04 RX ORDER — SODIUM CHLORIDE 0.9 % (FLUSH) 0.9 %
10 SYRINGE (ML) INJECTION PRN
Status: DISCONTINUED | OUTPATIENT
Start: 2020-08-04 | End: 2020-08-16 | Stop reason: HOSPADM

## 2020-08-04 RX ORDER — 0.9 % SODIUM CHLORIDE 0.9 %
1000 INTRAVENOUS SOLUTION INTRAVENOUS ONCE
Status: COMPLETED | OUTPATIENT
Start: 2020-08-04 | End: 2020-08-04

## 2020-08-04 RX ORDER — CLONIDINE HYDROCHLORIDE 0.1 MG/1
0.1 TABLET ORAL PRN
Status: DISCONTINUED | OUTPATIENT
Start: 2020-08-04 | End: 2020-08-16 | Stop reason: HOSPADM

## 2020-08-04 RX ORDER — ONDANSETRON 2 MG/ML
4 INJECTION INTRAMUSCULAR; INTRAVENOUS
Status: DISCONTINUED | OUTPATIENT
Start: 2020-08-04 | End: 2020-08-04 | Stop reason: HOSPADM

## 2020-08-04 RX ORDER — PHENYLEPHRINE HYDROCHLORIDE 10 MG/ML
INJECTION INTRAVENOUS PRN
Status: DISCONTINUED | OUTPATIENT
Start: 2020-08-04 | End: 2020-08-04 | Stop reason: SDUPTHER

## 2020-08-04 RX ORDER — POLYETHYLENE GLYCOL 3350 17 G/17G
17 POWDER, FOR SOLUTION ORAL DAILY PRN
Status: DISCONTINUED | OUTPATIENT
Start: 2020-08-04 | End: 2020-08-16 | Stop reason: HOSPADM

## 2020-08-04 RX ORDER — SODIUM CHLORIDE 9 MG/ML
INJECTION, SOLUTION INTRAVENOUS CONTINUOUS
Status: DISCONTINUED | OUTPATIENT
Start: 2020-08-04 | End: 2020-08-04

## 2020-08-04 RX ORDER — NICOTINE 21 MG/24HR
1 PATCH, TRANSDERMAL 24 HOURS TRANSDERMAL DAILY
Status: DISCONTINUED | OUTPATIENT
Start: 2020-08-04 | End: 2020-08-16 | Stop reason: HOSPADM

## 2020-08-04 RX ORDER — PROMETHAZINE HYDROCHLORIDE 12.5 MG/1
12.5 TABLET ORAL EVERY 6 HOURS PRN
Status: DISCONTINUED | OUTPATIENT
Start: 2020-08-04 | End: 2020-08-16 | Stop reason: HOSPADM

## 2020-08-04 RX ORDER — TRAMADOL HYDROCHLORIDE 50 MG/1
25 TABLET ORAL PRN
Status: DISPENSED | OUTPATIENT
Start: 2020-08-04 | End: 2020-08-07

## 2020-08-04 RX ORDER — LABETALOL HYDROCHLORIDE 5 MG/ML
5 INJECTION, SOLUTION INTRAVENOUS EVERY 10 MIN PRN
Status: DISCONTINUED | OUTPATIENT
Start: 2020-08-04 | End: 2020-08-04 | Stop reason: HOSPADM

## 2020-08-04 RX ORDER — SODIUM CHLORIDE, SODIUM LACTATE, POTASSIUM CHLORIDE, CALCIUM CHLORIDE 600; 310; 30; 20 MG/100ML; MG/100ML; MG/100ML; MG/100ML
INJECTION, SOLUTION INTRAVENOUS ONCE
Status: DISCONTINUED | OUTPATIENT
Start: 2020-08-04 | End: 2020-08-04

## 2020-08-04 RX ORDER — ROCURONIUM BROMIDE 10 MG/ML
INJECTION, SOLUTION INTRAVENOUS PRN
Status: DISCONTINUED | OUTPATIENT
Start: 2020-08-04 | End: 2020-08-04 | Stop reason: SDUPTHER

## 2020-08-04 RX ORDER — MORPHINE SULFATE 2 MG/ML
2 INJECTION, SOLUTION INTRAMUSCULAR; INTRAVENOUS EVERY 4 HOURS PRN
Status: DISCONTINUED | OUTPATIENT
Start: 2020-08-04 | End: 2020-08-09

## 2020-08-04 RX ORDER — SODIUM CHLORIDE 0.9 % (FLUSH) 0.9 %
10 SYRINGE (ML) INJECTION EVERY 12 HOURS SCHEDULED
Status: DISCONTINUED | OUTPATIENT
Start: 2020-08-04 | End: 2020-08-16 | Stop reason: HOSPADM

## 2020-08-04 RX ORDER — FENTANYL CITRATE 50 UG/ML
25 INJECTION, SOLUTION INTRAMUSCULAR; INTRAVENOUS EVERY 5 MIN PRN
Status: DISCONTINUED | OUTPATIENT
Start: 2020-08-04 | End: 2020-08-04 | Stop reason: HOSPADM

## 2020-08-04 RX ORDER — ALBUMIN, HUMAN INJ 5% 5 %
25 SOLUTION INTRAVENOUS ONCE
Status: COMPLETED | OUTPATIENT
Start: 2020-08-04 | End: 2020-08-04

## 2020-08-04 RX ORDER — LINEZOLID 2 MG/ML
600 INJECTION, SOLUTION INTRAVENOUS ONCE
Status: COMPLETED | OUTPATIENT
Start: 2020-08-04 | End: 2020-08-04

## 2020-08-04 RX ORDER — PROPOFOL 10 MG/ML
INJECTION, EMULSION INTRAVENOUS PRN
Status: DISCONTINUED | OUTPATIENT
Start: 2020-08-04 | End: 2020-08-04 | Stop reason: SDUPTHER

## 2020-08-04 RX ORDER — ONDANSETRON 2 MG/ML
4 INJECTION INTRAMUSCULAR; INTRAVENOUS EVERY 6 HOURS PRN
Status: DISCONTINUED | OUTPATIENT
Start: 2020-08-04 | End: 2020-08-04 | Stop reason: ALTCHOICE

## 2020-08-04 RX ORDER — HYDRALAZINE HYDROCHLORIDE 20 MG/ML
5 INJECTION INTRAMUSCULAR; INTRAVENOUS EVERY 10 MIN PRN
Status: DISCONTINUED | OUTPATIENT
Start: 2020-08-04 | End: 2020-08-04 | Stop reason: HOSPADM

## 2020-08-04 RX ORDER — HYDROMORPHONE HCL 110MG/55ML
0.5 PATIENT CONTROLLED ANALGESIA SYRINGE INTRAVENOUS ONCE
Status: COMPLETED | OUTPATIENT
Start: 2020-08-04 | End: 2020-08-04

## 2020-08-04 RX ORDER — HEPARIN SODIUM 5000 [USP'U]/ML
5000 INJECTION, SOLUTION INTRAVENOUS; SUBCUTANEOUS EVERY 8 HOURS SCHEDULED
Status: DISCONTINUED | OUTPATIENT
Start: 2020-08-04 | End: 2020-08-16 | Stop reason: HOSPADM

## 2020-08-04 RX ORDER — POTASSIUM CHLORIDE 7.45 MG/ML
10 INJECTION INTRAVENOUS ONCE
Status: COMPLETED | OUTPATIENT
Start: 2020-08-04 | End: 2020-08-04

## 2020-08-04 RX ORDER — ONDANSETRON 2 MG/ML
INJECTION INTRAMUSCULAR; INTRAVENOUS PRN
Status: DISCONTINUED | OUTPATIENT
Start: 2020-08-04 | End: 2020-08-04 | Stop reason: SDUPTHER

## 2020-08-04 RX ORDER — LINEZOLID 2 MG/ML
600 INJECTION, SOLUTION INTRAVENOUS EVERY 12 HOURS
Status: DISCONTINUED | OUTPATIENT
Start: 2020-08-04 | End: 2020-08-04

## 2020-08-04 RX ORDER — DEXAMETHASONE SODIUM PHOSPHATE 4 MG/ML
INJECTION, SOLUTION INTRA-ARTICULAR; INTRALESIONAL; INTRAMUSCULAR; INTRAVENOUS; SOFT TISSUE PRN
Status: DISCONTINUED | OUTPATIENT
Start: 2020-08-04 | End: 2020-08-04 | Stop reason: SDUPTHER

## 2020-08-04 RX ORDER — FENTANYL CITRATE 50 UG/ML
25 INJECTION, SOLUTION INTRAMUSCULAR; INTRAVENOUS ONCE
Status: COMPLETED | OUTPATIENT
Start: 2020-08-04 | End: 2020-08-04

## 2020-08-04 RX ORDER — LIDOCAINE HYDROCHLORIDE 20 MG/ML
INJECTION, SOLUTION INTRAVENOUS PRN
Status: DISCONTINUED | OUTPATIENT
Start: 2020-08-04 | End: 2020-08-04 | Stop reason: SDUPTHER

## 2020-08-04 RX ORDER — FENTANYL CITRATE 50 UG/ML
INJECTION, SOLUTION INTRAMUSCULAR; INTRAVENOUS PRN
Status: DISCONTINUED | OUTPATIENT
Start: 2020-08-04 | End: 2020-08-04 | Stop reason: SDUPTHER

## 2020-08-04 RX ORDER — ACETAMINOPHEN 325 MG/1
650 TABLET ORAL EVERY 6 HOURS PRN
Status: DISCONTINUED | OUTPATIENT
Start: 2020-08-04 | End: 2020-08-16 | Stop reason: HOSPADM

## 2020-08-04 RX ORDER — PROMETHAZINE HYDROCHLORIDE 25 MG/1
12.5 TABLET ORAL EVERY 6 HOURS PRN
Status: DISCONTINUED | OUTPATIENT
Start: 2020-08-04 | End: 2020-08-04 | Stop reason: ALTCHOICE

## 2020-08-04 RX ORDER — ACETAMINOPHEN 500 MG
1000 TABLET ORAL ONCE
Status: COMPLETED | OUTPATIENT
Start: 2020-08-04 | End: 2020-08-04

## 2020-08-04 RX ADMIN — POTASSIUM CHLORIDE 10 MEQ: 7.46 INJECTION, SOLUTION INTRAVENOUS at 15:20

## 2020-08-04 RX ADMIN — ALBUMIN (HUMAN) 25 G: 12.5 INJECTION, SOLUTION INTRAVENOUS at 06:01

## 2020-08-04 RX ADMIN — TRAMADOL HYDROCHLORIDE 25 MG: 50 TABLET, FILM COATED ORAL at 16:46

## 2020-08-04 RX ADMIN — MORPHINE SULFATE 2 MG: 2 INJECTION, SOLUTION INTRAMUSCULAR; INTRAVENOUS at 14:22

## 2020-08-04 RX ADMIN — PHENYLEPHRINE HYDROCHLORIDE 100 MCG: 10 INJECTION INTRAVENOUS at 09:35

## 2020-08-04 RX ADMIN — SODIUM CHLORIDE: 900 INJECTION INTRAVENOUS at 10:03

## 2020-08-04 RX ADMIN — SUGAMMADEX 200 MG: 100 INJECTION, SOLUTION INTRAVENOUS at 09:54

## 2020-08-04 RX ADMIN — FENTANYL CITRATE 100 MCG: 50 INJECTION INTRAMUSCULAR; INTRAVENOUS at 09:55

## 2020-08-04 RX ADMIN — TRAMADOL HYDROCHLORIDE 25 MG: 50 TABLET, FILM COATED ORAL at 20:33

## 2020-08-04 RX ADMIN — PROPOFOL 120 MG: 10 INJECTION, EMULSION INTRAVENOUS at 09:16

## 2020-08-04 RX ADMIN — ACETAMINOPHEN 1000 MG: 500 TABLET ORAL at 04:59

## 2020-08-04 RX ADMIN — PHENYLEPHRINE HYDROCHLORIDE 100 MCG: 10 INJECTION INTRAVENOUS at 09:48

## 2020-08-04 RX ADMIN — POTASSIUM CHLORIDE 10 MEQ: 7.46 INJECTION, SOLUTION INTRAVENOUS at 06:08

## 2020-08-04 RX ADMIN — PHENYLEPHRINE HYDROCHLORIDE 100 MCG: 10 INJECTION INTRAVENOUS at 09:54

## 2020-08-04 RX ADMIN — MEROPENEM 1 G: 1 INJECTION, POWDER, FOR SOLUTION INTRAVENOUS at 10:37

## 2020-08-04 RX ADMIN — CALCIUM GLUCONATE 2 G: 98 INJECTION, SOLUTION INTRAVENOUS at 06:07

## 2020-08-04 RX ADMIN — VANCOMYCIN HYDROCHLORIDE 1750 MG: 5 INJECTION, POWDER, LYOPHILIZED, FOR SOLUTION INTRAVENOUS at 12:36

## 2020-08-04 RX ADMIN — POTASSIUM CHLORIDE 10 MEQ: 7.46 INJECTION, SOLUTION INTRAVENOUS at 18:00

## 2020-08-04 RX ADMIN — MORPHINE SULFATE 2 MG: 2 INJECTION, SOLUTION INTRAMUSCULAR; INTRAVENOUS at 23:16

## 2020-08-04 RX ADMIN — POTASSIUM CHLORIDE 10 MEQ: 7.46 INJECTION, SOLUTION INTRAVENOUS at 17:00

## 2020-08-04 RX ADMIN — LINEZOLID 600 MG: 600 INJECTION, SOLUTION INTRAVENOUS at 01:40

## 2020-08-04 RX ADMIN — FENTANYL CITRATE 100 MCG: 50 INJECTION INTRAMUSCULAR; INTRAVENOUS at 09:12

## 2020-08-04 RX ADMIN — ALBUMIN (HUMAN) 25 G: 12.5 INJECTION, SOLUTION INTRAVENOUS at 04:26

## 2020-08-04 RX ADMIN — PHENYLEPHRINE HYDROCHLORIDE 100 MCG: 10 INJECTION INTRAVENOUS at 09:29

## 2020-08-04 RX ADMIN — LIDOCAINE HYDROCHLORIDE 100 MG: 20 INJECTION, SOLUTION INTRAVENOUS at 09:16

## 2020-08-04 RX ADMIN — CLONIDINE HYDROCHLORIDE 0.1 MG: 0.1 TABLET ORAL at 16:46

## 2020-08-04 RX ADMIN — HYDROMORPHONE HYDROCHLORIDE 0.5 MG: 2 INJECTION INTRAMUSCULAR; INTRAVENOUS; SUBCUTANEOUS at 04:59

## 2020-08-04 RX ADMIN — HEPARIN SODIUM 5000 UNITS: 5000 INJECTION, SOLUTION INTRAVENOUS; SUBCUTANEOUS at 10:37

## 2020-08-04 RX ADMIN — KETAMINE HYDROCHLORIDE 30 MG: 10 INJECTION INTRAMUSCULAR; INTRAVENOUS at 09:16

## 2020-08-04 RX ADMIN — CEFEPIME HYDROCHLORIDE 1 G: 2 INJECTION, POWDER, FOR SOLUTION INTRAVENOUS at 09:51

## 2020-08-04 RX ADMIN — FENTANYL CITRATE 25 MCG: 50 INJECTION INTRAMUSCULAR; INTRAVENOUS at 03:12

## 2020-08-04 RX ADMIN — SODIUM CHLORIDE, POTASSIUM CHLORIDE, SODIUM LACTATE AND CALCIUM CHLORIDE 1000 ML: 600; 310; 30; 20 INJECTION, SOLUTION INTRAVENOUS at 03:12

## 2020-08-04 RX ADMIN — POTASSIUM CHLORIDE 10 MEQ: 7.46 INJECTION, SOLUTION INTRAVENOUS at 16:11

## 2020-08-04 RX ADMIN — SODIUM CHLORIDE, PRESERVATIVE FREE 10 ML: 5 INJECTION INTRAVENOUS at 20:36

## 2020-08-04 RX ADMIN — SODIUM CHLORIDE: 900 INJECTION INTRAVENOUS at 05:08

## 2020-08-04 RX ADMIN — PHENYLEPHRINE HYDROCHLORIDE 100 MCG: 10 INJECTION INTRAVENOUS at 09:59

## 2020-08-04 RX ADMIN — ONDANSETRON 4 MG: 2 INJECTION INTRAMUSCULAR; INTRAVENOUS at 09:52

## 2020-08-04 RX ADMIN — ROCURONIUM BROMIDE 50 MG: 10 INJECTION INTRAVENOUS at 09:16

## 2020-08-04 RX ADMIN — PROMETHAZINE HYDROCHLORIDE 6.25 MG: 25 INJECTION INTRAMUSCULAR; INTRAVENOUS at 14:22

## 2020-08-04 RX ADMIN — HEPARIN SODIUM 5000 UNITS: 5000 INJECTION, SOLUTION INTRAVENOUS; SUBCUTANEOUS at 14:30

## 2020-08-04 RX ADMIN — TRAZODONE HYDROCHLORIDE 50 MG: 50 TABLET ORAL at 20:33

## 2020-08-04 RX ADMIN — SODIUM CHLORIDE 1000 ML: 9 INJECTION, SOLUTION INTRAVENOUS at 01:18

## 2020-08-04 RX ADMIN — CLONIDINE HYDROCHLORIDE 0.1 MG: 0.1 TABLET ORAL at 20:33

## 2020-08-04 RX ADMIN — MEROPENEM 1 G: 1 INJECTION, POWDER, FOR SOLUTION INTRAVENOUS at 23:17

## 2020-08-04 RX ADMIN — MEROPENEM 1 G: 1 INJECTION, POWDER, FOR SOLUTION INTRAVENOUS at 01:20

## 2020-08-04 RX ADMIN — PHENYLEPHRINE HYDROCHLORIDE 100 MCG: 10 INJECTION INTRAVENOUS at 09:16

## 2020-08-04 RX ADMIN — PHENYLEPHRINE HYDROCHLORIDE 100 MCG: 10 INJECTION INTRAVENOUS at 09:43

## 2020-08-04 RX ADMIN — DEXAMETHASONE SODIUM PHOSPHATE 8 MG: 4 INJECTION, SOLUTION INTRAMUSCULAR; INTRAVENOUS at 09:21

## 2020-08-04 ASSESSMENT — PULMONARY FUNCTION TESTS
PIF_VALUE: 22
PIF_VALUE: 8
PIF_VALUE: 23
PIF_VALUE: 1
PIF_VALUE: 23
PIF_VALUE: 22
PIF_VALUE: 23
PIF_VALUE: 22
PIF_VALUE: 22
PIF_VALUE: 14
PIF_VALUE: 1
PIF_VALUE: 21
PIF_VALUE: 22
PIF_VALUE: 23
PIF_VALUE: 22
PIF_VALUE: 23
PIF_VALUE: 1
PIF_VALUE: 23
PIF_VALUE: 6
PIF_VALUE: 1
PIF_VALUE: 23
PIF_VALUE: 20
PIF_VALUE: 17
PIF_VALUE: 23
PIF_VALUE: 22
PIF_VALUE: 23
PIF_VALUE: 23
PIF_VALUE: 4
PIF_VALUE: 1
PIF_VALUE: 23
PIF_VALUE: 23
PIF_VALUE: 1
PIF_VALUE: 0
PIF_VALUE: 23
PIF_VALUE: 25
PIF_VALUE: 4
PIF_VALUE: 23
PIF_VALUE: 23
PIF_VALUE: 1
PIF_VALUE: 6
PIF_VALUE: 31
PIF_VALUE: 2
PIF_VALUE: 22
PIF_VALUE: 23
PIF_VALUE: 1
PIF_VALUE: 23
PIF_VALUE: 22
PIF_VALUE: 23
PIF_VALUE: 22
PIF_VALUE: 23

## 2020-08-04 ASSESSMENT — PAIN SCALES - GENERAL
PAINLEVEL_OUTOF10: 10
PAINLEVEL_OUTOF10: 7
PAINLEVEL_OUTOF10: 10
PAINLEVEL_OUTOF10: 6
PAINLEVEL_OUTOF10: 0
PAINLEVEL_OUTOF10: 9
PAINLEVEL_OUTOF10: 10
PAINLEVEL_OUTOF10: 10
PAINLEVEL_OUTOF10: 5
PAINLEVEL_OUTOF10: 10
PAINLEVEL_OUTOF10: 5

## 2020-08-04 ASSESSMENT — ENCOUNTER SYMPTOMS
COUGH: 0
CHEST TIGHTNESS: 0
ABDOMINAL PAIN: 0
ABDOMINAL DISTENTION: 0
SHORTNESS OF BREATH: 0
BACK PAIN: 1
COLOR CHANGE: 0

## 2020-08-04 ASSESSMENT — PAIN DESCRIPTION - LOCATION
LOCATION: LEG
LOCATION: GENERALIZED

## 2020-08-04 ASSESSMENT — PAIN DESCRIPTION - PAIN TYPE
TYPE: ACUTE PAIN
TYPE: ACUTE PAIN;SURGICAL PAIN
TYPE: ACUTE PAIN
TYPE: ACUTE PAIN

## 2020-08-04 ASSESSMENT — LIFESTYLE VARIABLES: SMOKING_STATUS: 1

## 2020-08-04 ASSESSMENT — PAIN DESCRIPTION - ORIENTATION
ORIENTATION: RIGHT
ORIENTATION: RIGHT;LEFT
ORIENTATION: RIGHT;LEFT

## 2020-08-04 NOTE — PROGRESS NOTES
Patient admitted to 2124. Patient up dated on ICU routine and call light system. Admission questions reviewed. Patient's belongings reviewed and cataloged. Patient repositioned for comfort. Assessment per head to toe  flow-sheet. Admitting physician called and orders reviewed.      Electronically signed by Aarti Avilez RN on 8/4/2020 at 8:41 AM

## 2020-08-04 NOTE — PROGRESS NOTES
Patient was seen and evaluated bedside. Agree with current management. Surgery and ID on board. Additional recommendation as per hospital course.

## 2020-08-04 NOTE — ED PROVIDER NOTES
[facility]    OTHER SURGICAL HISTORY  07 03 2013    lap sera     CURRENT MEDICATIONS  Previous Medications    BUPRENORPHINE-NALOXONE (SUBOXONE) 8-2 MG FILM SL FILM    Place 1 Film under the tongue daily. IBUPROFEN (ADVIL;MOTRIN) 600 MG TABLET    Take 1 tablet by mouth every 6 hours as needed for Pain     ALLERGIES  No Known Allergies  PHYSICAL EXAM  VITAL SIGNS:   ED Triage Vitals   Enc Vitals Group      BP 08/04/20 0045 121/79      Pulse 08/04/20 0045 135      Resp 08/04/20 0045 20      Temp --       Temp Source 08/04/20 0045 Oral      SpO2 08/04/20 0045 97 %      Weight --       Height 08/04/20 0039 5' 5\" (1.651 m)      Head Circumference --       Peak Flow --       Pain Score --       Pain Loc --       Pain Edu? --       Excl. in [address]? --      Constitutional: Well developed, disheveled nontoxic-appearing  HENT: Normocephalic, Atraumatic, Bilateral external ears normal, Oropharynx moist, No oral exudates, Nose normal.   Eyes: PERRL, EOMI, Conjunctiva normal, No discharge. No scleral icterus. Neck: Normal range of motion, No tenderness, Supple. Lymphatic: No lymphadenopathy noted. Cardiovascular: Tachycardic, Normal rhythm, No murmurs, gallops or rubs. Thorax & Lungs: Normal breath sounds, No respiratory distress, No wheezing. Abdomen: Soft, No tenderness, No masses, No pulsatile masses, No distention, Normal bowel sounds  Skin: Warm, Dry   Back: No tenderness, No CVA tenderness. Extremities: Symmetrical 1+ edema to bilateral lower extremities with coalescing petechial/purpuric rash to bilateral lower extremities. She has 2 small circular areas of necrosis to the left lower extremity below the level of the knee and to the right posterior lateral thigh she has an ulcerated area of soft tissue defect with surrounding necrosis as well as surrounding erythema. The area is foul-smelling. Musculoskeletal: Good range of motion in all major joints as observed.    Neurologic: Alert & oriented x 3, Normal motor GFR Non- 13 (*)     GFR  15 (*)     Alb 2.0 (*)     All other components within normal limits   BRAIN NATRIURETIC PEPTIDE - Abnormal; Notable for the following components:    Pro-BNP 3,359 (*)     All other components within normal limits   CULTURE, BLOOD 1   CULTURE, BLOOD 2   CULTURE, WOUND   LACTIC ACID, PLASMA   APTT   TROPONIN   CK   MAGNESIUM   ETHANOL   C-REACTIVE PROTEIN   URINE DRUG SCREEN   HAPTOGLOBIN   TYPE AND SCREEN     I personally reviewed the images. The radiologist's interpretation reveals:  Last Imaging results   XR CHEST PORTABLE   Final Result   Moderate pulmonary interstitial edema with suggestion of multifocal bilateral   lung overlying alveolar edema. Differential diagnostic considerations   include overlying pneumonia. CT FEMUR RIGHT WO CONTRAST   Final Result   1. Ulceration in the posterolateral proximal right thigh with minimal   adjacent cellulitis. No associated findings of abscess, necrotizing   fasciitis, myositis, or osteomyelitis. 2. Patchy stranding deep to the skin in the lateral, anterior, in the medial   right thigh potentially related to additional injection sites. 3. Moderate right hip joint effusion potentially related to mild to moderate   osteoarthritic changes of the right hip. Procedures  Central Line Placement Procedure Note    Indication: vascular access and need for frequent blood draws    Consent: The patient provided verbal consent for this procedure. Procedure: The patient was positioned appropriately and the skin over the right internal jugular vein was prepped with Chloraprep. Local anesthesia was obtained by infiltration using 1% Lidocaine without epinephrine. A large bore needle was used to identify the vein. A guide wire was then inserted into the vein through the needle. A triple lumen catheter was then inserted into the vessel over the guide wire using the Seldinger technique.   All ports showed good, free flowing blood return and were flushed with saline solution. The catheter was then securely fastened to the skin with sutures and with an adhesive dressing and covered with a sterile dressing. A post procedure X-ray was ordered and showed good line position. The patient tolerated the procedure well. Complications: None        MEDS GIVEN IN ED:  Medications   potassium chloride 10 mEq/100 mL IVPB (Peripheral Line) (has no administration in time range)   0.9 % sodium chloride infusion ( Intravenous New Bag 8/4/20 0508)   calcium gluconate 10 % injection 2 g (has no administration in time range)   albumin human 5 % IV solution 25 g (has no administration in time range)   calcium gluconate 2 g in dextrose 5 % 100 mL IVPB (has no administration in time range)   linezolid (ZYVOX) IVPB 600 mg (0 mg Intravenous Stopped 8/4/20 0240)   0.9 % sodium chloride bolus (0 mLs Intravenous Stopped 8/4/20 0148)   meropenem (MERREM) 1 g in sodium chloride 0.9 % 100 mL IVPB (mini-bag) (0 g Intravenous Stopped 8/4/20 0150)   lactated ringers bolus (0 mLs Intravenous Stopped 8/4/20 0444)   fentaNYL (SUBLIMAZE) injection 25 mcg (25 mcg Intravenous Given 8/4/20 0312)   albumin human 5 % IV solution 25 g (0 g Intravenous Stopped 8/4/20 0456)   acetaminophen (TYLENOL) tablet 1,000 mg (1,000 mg Oral Given 8/4/20 0459)   HYDROmorphone (DILAUDID) injection 0.5 mg (0.5 mg Intravenous Given 8/4/20 0459)     COURSE & MEDICAL DECISION MAKING  57-year-old female presents the emergency department via EMS with complaints of wound to her right thigh. Admits to IV drug abuse. On exam she is disheveled nontoxic appearing. Initial vital signs remarkable for fever with temperature of 102.2 °F and tachycardic heart rate of 135. Initial blood pressure is reassuring at 121/79. On exam she is tachycardic. She has 2 small areas of ulcerated lesions to the left lower leg.   To the posterior right lateral thigh she has an area of soft tissue defect with surrounding necrosis and erythema that is foul-smelling. She also has petechial/purpuric rash to bilateral lower extremities. At this time I have concern for necrotizing soft tissue infection with potential DIC. I am also concerned for bacteremia in this patient due to her obvious soft tissue infection although she is at high risk for other sources of infection such as endocarditis and epidural abscess/discitis in addition to the obvious soft tissue infection she has. We will provide her with IV fluid bolus and initiate her on Zyvox and meropenem for broad-spectrum coverage and the Zyvox will provide some anti-toxin effect as well. We will obtain CBC, CMP, lactic acid, blood cultures, ESR, CRP, fibrinogen level, haptoglobin level, magnesium level, CK level, and CT of the right lower extremity. In the interim, I do anticipate she will have prolonged hospital stay with need for frequent blood draws and I do worry about possible decompensation while here. Therefore right internal jugular central line was placed. Please see procedure portion of this note for full details. Chest x-ray following this shows appropriate placement of the line but shows evidence of what looks to be bilateral septic pulmonary emboli. I suspect as well that the rash we are seeing in the lower extremities is secondary to micro emboli. I have high suspicion for endocarditis in addition to the patient's obvious soft tissue infection. CBC shows anemia with a hemoglobin of 10.1. Also noted to have 17% bands. ESR is elevated at 99. Fibrinogen level is elevated at 447. Lactic acid is nonelevated. CMP shows hyponatremia with a sodium of 119, hypocalcemia with a calcium of 7.8, and elevated creatinine of 4.2 with BUN of 71. Magnesium level is within normal limits.   CT of the right lower extremity was unfortunately obtained without contrast to the patient's renal function but shows ulceration the posterior lateral proximal right thigh with minimal adjacent cellulitis. No obvious findings of abscess or necrotizing fasciitis in the area although she does have areas of patchy stranding deep to the skin in the lateral, anterior, and medial right thigh. At this time I did call and speak with Dr. Evelyn Escobar of general surgery about my concern for early necrotizing soft tissue infection given the findings today. She agrees with resuscitation with fluids and IV antibiotics and will see the patient in consultation. In addition, Angel catheter was placed with immediate return of 200 cc of urine. Following this I spoke with Dr. Jordan Bonilla on-call for nephrology. We discussed the patient significant acute kidney injury as well as fluid resuscitation preferences. At this time Dr. Jordan Bonilla recommended obtaining a repeat sodium level for further decision making on patient's fluid resuscitation as she is currently hemodynamically stable following 2 L of crystalloid. In the interim she did have a soft blood pressure of 99 systolic and therefore a bolus of albumin was ordered. Renal function panel was ordered as well. I discussed the case with the hospitalist team who agreed to hospitalize the patient for further management. Of note a renal function panel was obtained and the repeat sodium was 123. In speaking with nephrology, they have requested normal saline maintenance fluid at 50 cc/h. Blood pressure did appear somewhat soft and therefore a second bolus of albumin was ordered for the patient for further resuscitation in the setting of sepsis. I also ordered calcium gluconate for hypocalcemia indicated by renal function panel and IV potassium.     Critical Care Time: I have personally provided 60 minutes of critical care time (excluding separately listed billable procedures) through obtaining a history, examining the patient, reviewing relevant medical records, discussing care with family and/or other providers, performing multiple reassessments and directing care. Appropriate PPE utilized as indicated for entire patient encounter? Time of Disposition: See timeline  ? New Prescriptions    No medications on file     FINAL IMPRESSION  1. Sepsis, due to unspecified organism, unspecified whether acute organ dysfunction present ([facility] [address]    2. Infected skin ulcer with necrosis of muscle (HCC)    3. Cellulitis of right lower extremity    4. IV drug abuse ([facility] [address]    5. Acute kidney injury ([facility] [address]    6. Hyponatremia    7. Acute septic pulmonary embolism, unspecified whether acute cor pulmonale present ([facility] [address]    8. Hypoalbuminemia    9. Hypokalemia        Electronically signed by:  [address], DO, 8/4/2020         [address] Jose, DO  08/04/20 Mark 12, DO  08/04/20 [id number]

## 2020-08-04 NOTE — ANESTHESIA PRE PROCEDURE
Department of Anesthesiology  Preprocedure Note       Name:  Orin Santoro   Age:  47 y.o.  :  1980                                          MRN:  [id number]         Date:  2020      Surgeon: Jane Alejandre):  Bridget Han MD    Procedure: Procedure(s):  RIGHT UPPER THIGH INCISION AND DRAINAGE    Medications prior to admission:   Prior to Admission medications    Medication Sig Start Date End Date Taking? Authorizing Provider   buprenorphine-naloxone (SUBOXONE) 8-2 MG FILM SL film Place 1 Film under the tongue daily.     Historical Provider, MD   ibuprofen (ADVIL;MOTRIN) 600 MG tablet Take 1 tablet by mouth every 6 hours as needed for Pain 19   Krystin Bennett PA-C       Current medications:    Current Facility-Administered Medications   Medication Dose Route Frequency Provider Last Rate Last Dose    sodium chloride flush 0.9 % injection 10 mL  10 mL Intravenous 2 times per day Shannan Mckeon APRN - CNP        sodium chloride flush 0.9 % injection 10 mL  10 mL Intravenous PRN Frida Dapilah, APRN - CNP        acetaminophen (TYLENOL) tablet 650 mg  650 mg Oral Q6H PRN Frida Dapilah, APRN - CNP        Or    acetaminophen (TYLENOL) suppository 650 mg  650 mg Rectal Q6H PRN Frida Dapilah, APRN - CNP        polyethylene glycol (GLYCOLAX) packet 17 g  17 g Oral Daily PRN Frida Dapilah, APRN - CNP        promethazine (PHENERGAN) tablet 12.5 mg  12.5 mg Oral Q6H PRN Frida Dapilah, APRN - CNP        Or    ondansetron (ZOFRAN) injection 4 mg  4 mg Intravenous Q6H PRN Frida Dapilah, APRN - CNP        nicotine (NICODERM CQ) 21 MG/24HR 1 patch  1 patch Transdermal Daily Frida Nathanaelh, APRN - CNP        heparin (porcine) injection 5,000 Units  5,000 Units Subcutaneous 3 times per day Frida Jimbo, APRN - CNP        linezolid (ZYVOX) IVPB 600 mg  600 mg Intravenous Q12H Frida Nathanaelh, APRN - CNP        morphine (PF) injection 2 mg  2 mg Intravenous Q4H PRN Frida Dapilah, BMI:   Wt Readings from Last 3 Encounters:   08/04/20 188 lb 4.4 oz (85.4 kg)   06/01/20 190 lb (86.2 kg)   01/22/20 190 lb (86.2 kg)     Body mass index is 31.33 kg/m². CBC:   Lab Results   Component Value Date    WBC 10.5 08/04/2020    RBC 3.66 08/04/2020    HGB 10.1 08/04/2020    HCT 30.2 08/04/2020    MCV 82.5 08/04/2020    RDW 13.9 08/04/2020     08/04/2020       CMP:   Lab Results   Component Value Date     08/04/2020    K 3.2 08/04/2020    CL 84 08/04/2020    CO2 19 08/04/2020    BUN 73 08/04/2020    CREATININE 4.0 08/04/2020    GFRAA 15 08/04/2020    LABGLOM 13 08/04/2020    GLUCOSE 119 08/04/2020    PROT 7.3 08/04/2020    PROT 7.1 10/19/2012    CALCIUM 7.5 08/04/2020    BILITOT 0.7 08/04/2020    ALKPHOS 127 08/04/2020    AST 28 08/04/2020    ALT 32 08/04/2020       POC Tests: No results for input(s): POCGLU, POCNA, POCK, POCCL, POCBUN, POCHEMO, POCHCT in the last 72 hours.     Coags:   Lab Results   Component Value Date    PROTIME 16.0 08/04/2020    INR 1.32 08/04/2020    APTT 34.6 08/04/2020       HCG (If Applicable):   Lab Results   Component Value Date    PREGTESTUR NEGATIVE 06/20/2013        ABGs: No results found for: PHART, PO2ART, ORQ4QQO, SCP7PNX, BEART, W1BCKNUZ     Type & Screen (If Applicable):  No results found for: LABABO, LABRH    Drug/Infectious Status (If Applicable):  Lab Results   Component Value Date    HEPCAB REPEATEDLY REACTIVE 02/18/2017       COVID-19 Screening (If Applicable): No results found for: COVID19      Anesthesia Evaluation  Patient summary reviewed and Nursing notes reviewed no history of anesthetic complications:   Airway: Mallampati: II  TM distance: >3 FB   Neck ROM: full  Mouth opening: > = 3 FB Dental:          Pulmonary:   (+) current smoker                           Cardiovascular:          ECG reviewed               Beta Blocker:  Not on Beta Blocker         Neuro/Psych:                ROS comment: IV drug abuse, UDS + for amphetamines GI/Hepatic/Renal:   (+) hepatitis:, liver disease:,           Endo/Other:    (+) electrolyte abnormalities, . Abdominal:           Vascular:                                        Anesthesia Plan      general     ASA 3 - emergent       Induction: intravenous. MIPS: Postoperative opioids intended, Prophylactic antiemetics administered and Postoperative ventilation. Anesthetic plan and risks discussed with patient.                       LEIGHTON Carson - CRNA   8/4/2020

## 2020-08-04 NOTE — PROGRESS NOTES
-Full note to follow  -Recheck sodium level s/p boluses before maintenance fluid to prevent sodium overcorrection  -Will follow    Thank you

## 2020-08-04 NOTE — CONSULTS
Infectious Disease Consult Note  2020   Patient Name: Magalys Singh : 1980   Impression   Sepsis  o Intravenous drug use, chronic hepatitis B, chronic hepatitis C, with skin petechiae and septic pulmonary emboli. Open wound to the right thigh, and as shown on the left thigh secondary to subcutaneous \"skin popping \"of amphetamine. o Strongly suspected endocarditis   Chronic hepatitis B   Chronic hepatitis C   Multi-morbidity: per PMHx  Plan:   Therapeutic: Discontinue linezolid, cefepime and start vancomycin and meropenem   Diagnostic: Echocardiogram, chlamydia and gonorrhea NAAT, RPR, HIV screen   F/u test: Blood cultures, wound cultures, surgical cultures    Thank you for allowing me to consult in the care of this patient.  ------------------------  REASON FOR CONSULT: Infective syndrome   Requested by: Josey Moore is a 47 y.o.  female intravenous drug use, multiple abscesses, osteomyelitis who was admitted 2020 for further evaluation and management of fever, chills, generalized weakness, joint aches, wound to the right thigh. Currently injected into the right thigh 1 week prior to admission. She continues to have fevers at home. Chest x-ray has shown multiple densities, septic pulmonary emboli and DIC was suspected. Patient received linezolid and meropenem. Evaluated by Dr. Melvi Jain and wound is going to be debrided. ? Infectious diseases service was consulted to evaluate the pt, and recommend further investigative and therapeutic measures. Review and summary of old records:  ROS: Other systems reviewed Including eyes, ENT, respiratory, cardiovascular, GI, , dermatologic, neurologic, psych, hem/lymphatic, musculoskeletal and endocrine were negative other than what is mentioned above.      Patient Active Problem List    Diagnosis Date Noted    Sepsis ([address] 2020    [facility] (chronic calculous cholecystitis) 2013     Past Medical History:   Diagnosis Date    Liver disease     hepatitis    No significant medical problems       Past Surgical History:   Procedure Laterality Date     SECTION  , 2007    x 2    CHOLECYSTECTOMY      HYSTERECTOMY  2008    [facility]    OTHER SURGICAL HISTORY  2013    lap sera      Family History   Problem Relation Age of Onset    Obesity Brother     Mental Illness Maternal Aunt     COPD Paternal Grandmother     Dementia Paternal Grandfather     Depression Son     Mental Illness Son         Bipole, ADHD, Adjustment disorder    Colon Cancer Neg Hx       Infectious disease related family history - not contibutory. SOCIAL HISTORY  Social History     Tobacco Use    Smoking status: Current Every Day Smoker     Packs/day: 0.50     Years: 12.00     Pack years: 6.00     Types: Cigarettes    Smokeless tobacco: Never Used   Substance Use Topics    Alcohol use: No       Born:  [address] Occupation:   No recent travel of significance.  No recent unusual exposures.  NO pets    ? ALLERGIES  No Known Allergies   MEDICATIONS  Reviewed and are per the chart/EMR. ? Antibiotics:  Linezolid  Meropenem  Cefepime  ?  -------------------------------------------------------------------------------------------------------------------    Vital Signs:  Vitals:    20 0826   BP:    Pulse:    Resp: 30   Temp:    SpO2: 98%         Exam:    VS: noted; wt 85.4 kg  Gen: alert and oriented X3, no distress  Skin: Bilateral leg non-blanchable petechiae  Wounds: Right lateral thigh, left medial leg C/D/I  HEMT: AT/NC Oropharynx pink, moist, and without lesions or exudates; dentition in good state of repair  Eyes: PERRLA, EOMI, conjunctiva pink, sclera anicteric. Neck: Supple. Trachea midline. No LAD. Chest: no distress and CTA. Good air movement. Heart: RRR and no MRG. Abd: soft, non-distended, no tenderness, no hepatomegaly. Normoactive bowel sounds.   Ext: no clubbing, cyanosis, or edema  Catheter Site: without erythema or tenderness  Neuro: Mental status intact. CN 2-12 intact and no focal sensory or motor deficits    ? Diagnostic Studies: reviewed  ? ? I have examined this patient and available medical records on this date and have made the above observations, conclusions and recommendations.   Electronically signed by: Electronically signed by Armida Santos MD on 8/4/2020 at 8:36 AM

## 2020-08-04 NOTE — CARE COORDINATION
Chart screened. Patient is from home; independent prior to admission. She has a PCP listed but he is a ER doctor. She has insurance that assists with Rx when needed. PCP is needed for follow up; will add [facility] and [facility] as PCP resources. Will add substance abuse treatment information to AVS for discharge. CM will remain available should any other needs arise.   Adam Wallis RN

## 2020-08-04 NOTE — CONSULTS
Department of Christine Roberson MD   Consult Note      Reason for Consult:  Abscess of right thigh and leg    Referring Physician:  Dr. Kymberly Roberts:    Chief Complaint   Patient presents with    Wound Check     R upper thigh        History Obtained From:  patient    HISTORY OF PRESENTILLNESS:                The patient is a 47 y.o. female who presents with abscess x 2 of the right thigh and leg. 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 She began to get sicker and got to the point she could not move due to pain and came to the ER. A CT of the right thigh shows the abscess with some soft tissue stranding. This is in the thigh. Other areas of stranding are present at old popping sites. She c/o pain all over and especially in her back. She also has a petechial rash over both lower extremities that started a few days ago. Past Medical History:    Past Medical History:   Diagnosis Date    Liver disease     hepatitis    No significant medical problems      Past Surgical History:    Past Surgical History:   Procedure Laterality Date     SECTION  , 2007    x 2    CHOLECYSTECTOMY      HYSTERECTOMY      MARLIN    OTHER SURGICAL HISTORY  2013    lap sera     Current Medications:   Current Facility-Administered Medications   Medication Dose Route Frequency Provider Last Rate Last Dose    0.9 % sodium chloride infusion   Intravenous Continuous Michael Sadlerd, DO 50 mL/hr at 20 0508      calcium gluconate 2 g in dextrose 5 % 100 mL IVPB  2 g Intravenous Once Michael Shaila, DO 60 mL/hr at 20 0607 2 g at 20 0607     Current Outpatient Medications   Medication Sig Dispense Refill    buprenorphine-naloxone (SUBOXONE) 8-2 MG FILM SL film Place 1 Film under the tongue daily.       ibuprofen (ADVIL;MOTRIN) 600 MG tablet Take 1 tablet by mouth every 6 hours as needed for Pain 30 tablet 0      Allergies:  Patient has no known allergies. Social History:   Social History     Socioeconomic History    Marital status: Single     Spouse name: Not on file    Number of children: 2    Years of education: Not on file    Highest education level: Not on file   Occupational History    Occupation:    Social Needs    Financial resource strain: Not on file    Food insecurity     Worry: Not on file     Inability: Not on file   [employer] needs     Medical: Not on file     Non-medical: Not on file   Tobacco Use    Smoking status: Current Every Day Smoker     Packs/day: 0.50     Years: 12.00     Pack years: 6.00     Types: Cigarettes    Smokeless tobacco: Never Used   Substance and Sexual Activity    Alcohol use: No    Drug use: Yes     Types: IV, Cocaine, Opiates      Comment: pt denies any drug use    Sexual activity: Yes     Partners: Male   Lifestyle    Physical activity     Days per week: Not on file     Minutes per session: Not on file    Stress: Not on file   Relationships    Social connections     Talks on phone: Not on file     Gets together: Not on file     Attends Buddhist service: Not on file     Active member of club or organization: Not on file     Attends meetings of clubs or organizations: Not on file     Relationship status: Not on file    Intimate partner violence     Fear of current or ex partner: Not on file     Emotionally abused: Not on file     Physically abused: Not on file     Forced sexual activity: Not on file   Other Topics Concern    Not on file   Social History Narrative    Do you donate blood or plasma? Yes    Caffeine intake? Moderate    Advance directive? No    Is blood transfusion acceptable in an emergency?  Yes             Family History:   Family History   Problem Relation Age of Onset    Obesity Brother     Mental Illness Maternal Aunt     COPD Paternal Grandmother     Dementia Paternal Grandfather     Depression Son     Mental Illness Son         Bipole, ADHD, Adjustment disorder  Colon Cancer Neg Hx         REVIEW OF SYSTEMS:    Review of Systems   Constitutional: Positive for appetite change, chills and fever. Respiratory: Negative for cough, chest tightness and shortness of breath. Cardiovascular: Positive for leg swelling. Negative for chest pain. Gastrointestinal: Negative for abdominal distention and abdominal pain. Musculoskeletal: Positive for arthralgias and back pain. Skin: Positive for rash and wound. Negative for color change and pallor. Neurological: Negative for dizziness and headaches. Hematological: Negative for adenopathy. Does not bruise/bleed easily. Psychiatric/Behavioral: Negative for agitation and confusion. PHYSICALEXAM:    BP (!) 112/59   Pulse 111   Temp 102.2 °F (39 °C) (Oral)   Resp 21   Ht 5' 5\" (1.651 m)   SpO2 97%   BMI 31.62 kg/m²    Physical Exam  Vitals signs reviewed. Constitutional:       General: She is in acute distress. Appearance: Normal appearance. She is ill-appearing. HENT:      Head: Normocephalic and atraumatic. Neck:      Musculoskeletal: Normal range of motion and neck supple. Cardiovascular:      Rate and Rhythm: Regular rhythm. Tachycardia present. Pulmonary:      Effort: Pulmonary effort is normal. No respiratory distress. Abdominal:      General: There is no distension. Palpations: There is no mass. Tenderness: There is no abdominal tenderness. There is no guarding. Hernia: No hernia is present. Musculoskeletal:         General: No swelling or deformity. Skin:     General: Skin is warm and dry. Findings: Rash present. Comments: There is an abscess on the upper outer right thigh that is tender to palpation. There is some cellulitis. She also has an ulcer on the medial right lower leg with some cellulitis. She has a diffuse petechial rash over both lower legs. Neurological:      General: No focal deficit present.       Mental Status: She is alert and oriented to person, place, and time. Psychiatric:         Mood and Affect: Mood normal.         Behavior: Behavior normal.       DATA:    CBC:   Lab Results   Component Value Date    WBC 10.5 08/04/2020    RBC 3.66 08/04/2020    HGB 10.1 08/04/2020    HCT 30.2 08/04/2020    MCV 82.5 08/04/2020    MCH 27.6 08/04/2020    MCHC 33.4 08/04/2020    RDW 13.9 08/04/2020     08/04/2020    MPV 11.4 08/04/2020     CMP:    Lab Results   Component Value Date     08/04/2020    K 3.2 08/04/2020    CL 84 08/04/2020    CO2 19 08/04/2020    BUN 73 08/04/2020    CREATININE 4.0 08/04/2020    GFRAA 15 08/04/2020    LABGLOM 13 08/04/2020    GLUCOSE 119 08/04/2020    PROT 7.3 08/04/2020    PROT 7.1 10/19/2012    LABALBU 2.0 08/04/2020    CALCIUM 7.5 08/04/2020    BILITOT 0.7 08/04/2020    ALKPHOS 127 08/04/2020    AST 28 08/04/2020    ALT 32 08/04/2020       ASSESSMENT/RECOMMENDATIONS:  Abscesses right thigh and leg from skin popping, infected. Petechial rash over both lower legs. History of IVDA and recent skin popping. Hyponatremia. History of hepatitis. Will plan debridement of the abscesses and will biopsy the rash for pathology and culture. Will get Infectious disease consult as well. The patient was counseled at length about the risks of dedra Covid-19 during their perioperative period and any recovery window from their procedure. The patient was made aware that dedra Covid-19  may worsen their prognosis for recovering from their procedure  and lend to a higher morbidity and/or mortality risk. All material risks, benefits, and reasonable alternatives including postponing the procedure were discussed. The patient does wish to proceed with the procedure at this time.     Robinson Smith

## 2020-08-04 NOTE — PROGRESS NOTES
[facility]  consulted by Dr. Gilberto Chiu for monitoring and adjustment. Indication for treatment: Leg abscesses, r/o endocarditis  Goal trough: 15 mcg/mL     Pertinent Laboratory Values:   Temp Readings from Last 3 Encounters:   08/04/20 98.5 °F (36.9 °C) (Oral)   08/04/20 96.3 °F (35.7 °C)   06/01/20 98.1 °F (36.7 °C) (Oral)     Recent Labs     08/04/20  0100 08/04/20  0116   WBC 10.5  --    LACTATE  --  1.7     Recent Labs     08/04/20  0100 08/04/20  0252 08/04/20  0825   BUN 71* 73* 72*   CREATININE 4.2* 4.0* 3.7*     Estimated Creatinine Clearance: 22 mL/min (A) (based on SCr of 3.7 mg/dL (H)). Intake/Output Summary (Last 24 hours) at 8/4/2020 1105  Last data filed at 8/4/2020 1003  Gross per 24 hour   Intake 2890 ml   Output --   Net 2890 ml       Pertinent Cultures:  Date    Source    Results  8/4   Blood    Sent  8/4   Surgical abscess cx  Sent    Vancomycin level:   TROUGH:  No results for input(s): VANCOTROUGH in the last 72 hours. RANDOM:  No results for input(s): VANCORANDOM in the last 72 hours. Assessment:  · WBC and temperature: WNL, afebrile  · SCr, BUN, and urine output: LISBETH   · Day(s) of therapy: 1   · Vancomycin level: to be collected    Plan:  · Intermittent dosing based on levels 2/2 LISBETH   · Vancomycin 1750 mg x 1 (20 mg/kg)   · Random level tomorrow AM, anticipate renal function recovery   · Pharmacy will continue to monitor patient and adjust therapy as indicated    Sy 8/5 @0600    Thank you for the consult.   Andree Dawson RPh  8/4/2020 11:05 AM

## 2020-08-04 NOTE — ANESTHESIA POSTPROCEDURE EVALUATION
Department of Anesthesiology  Postprocedure Note    Patient: Armando Celeste  MRN: [id number]  YOB: 1980  Date of evaluation: 8/4/2020  Time:  10:13 AM     Procedure Summary     Date:  08/04/20 Room / Location:  [address]    Anesthesia Start:  0722 Anesthesia Stop:  9612    Procedure:  RIGHT UPPER THIGH INCISION AND DRAINAGE (Right Leg Upper) Diagnosis:  (RIGHT UPPER THIGH ABSCESS)    Surgeon:  Oriana Hill MD Responsible Provider:  LEIGHTON Kay CRNA    Anesthesia Type:  general ASA Status:  3 - Emergent          Anesthesia Type: general    Dominga Phase I:      Dominga Phase II:      Last vitals: Reviewed and per EMR flowsheets.        Anesthesia Post Evaluation    Patient location during evaluation: ICU  Patient participation: complete - patient participated  Level of consciousness: awake and alert  Pain score: 0  Airway patency: patent  Nausea & Vomiting: no nausea and no vomiting  Complications: no  Cardiovascular status: blood pressure returned to baseline  Respiratory status: acceptable, spontaneous ventilation, nonlabored ventilation and face mask

## 2020-08-04 NOTE — H&P
HISTORY AND PHYSICAL  (Hospitalist, Internal Medicine)  IDENTIFYING INFORMATION   PATIENT:  Cat Cohen  MRN:  [id number]  ADMIT DATE: 8/4/2020  TIME OF EVALUATION: 8/4/2020 4:38 AM    CHIEF COMPLAINT     Worsening right thigh wound with BLE swelling and redness. HISTORY OF PRESENT ILLNESS   Cat Cohen is a 47 y.o. female with a past medical history of IV drug use, hepatitis B/C and nicotine dependence. She presented to the ER with complaints of worsening right thigh wound with bilateral lower extremity edema and redness. Patient stated that she injected heroin on her right thigh about a week ago. She started noticing pain on the right thigh which progressed to her bilateral lower extremities. The patient reported the pain got worse today and she started having ascending redness on her BLE. She described the pain as constant, stabbing and rated it at 9/10. The patient also reported some fever/chills which did not improve with Tylenol and Motrin. She denies chest pain, shortness of breath, cough, nausea, vomiting, diarrhea but reports poor appetite in the past week. The patient was noted to be tachycardic at 125 with a temperature of 102.2 with SBP of 99 at the ER. Lab work significant for sodium of 119, BUN of 71, creatinine of 4.2, WBC of 10.5, hemoglobin of 10.1, PT of 16, with fibrinogen of 447, ESR of 99. Initial lactate normal.  CT of the right femur revealed ulceration in the posterior lateral proximal right thigh with minimal adjacent cellulitis without abscesses necrotizing fasciitis or osteomyelitis, moderate right hip joint effusion with mild to moderate osteoarthritic changes of the right hip. Chest x-ray showed moderate pulmonary interstitial edema suggestive of multifocal bilateral edema/possible pneumonia. The patient started on IV NS, Merrem and Zyvox at the ER. Surgery Dr. Blanca Rivas contacted by ER and no surgical interventions planned.   Nephrology Dr. ADAME also contacted by ER who recommends NS at 50 cc/h to avoid overcorrection of sodium. At time of this assessment, patient lying on bed, appeared to be in severe pain and discomfort. She is maintaining her O2 sats on room air. She rates her pain at 9/10. Patient lives with her boyfriend and denies exposure to sick contacts.       PMH listed below:    PAST MEDICAL, SURGICAL, FAMILY, and SOCIAL HISTORY     Past Medical History:   Diagnosis Date    Liver disease     hepatitis    No significant medical problems      Past Surgical History:   Procedure Laterality Date     SECTION  , 2007    x 2    CHOLECYSTECTOMY      HYSTERECTOMY  2008    MARLIN    OTHER SURGICAL HISTORY  2013    lap sera     Family History   Problem Relation Age of Onset    Obesity Brother     Mental Illness Maternal Aunt     COPD Paternal Grandmother     Dementia Paternal Grandfather     Depression Son     Mental Illness Son         Bipole, ADHD, Adjustment disorder    Colon Cancer Neg Hx      Family Hx of HTN  Family Hx as reviewed above, otherwise non-contributory  Social History     Socioeconomic History    Marital status: Single     Spouse name: Not on file    Number of children: 2    Years of education: Not on file    Highest education level: Not on file   Occupational History    Occupation:    Social Needs    Financial resource strain: Not on file    Food insecurity     Worry: Not on file     Inability: Not on file   [employer] needs     Medical: Not on file     Non-medical: Not on file   Tobacco Use    Smoking status: Current Every Day Smoker     Packs/day: 0.50     Years: 12.00     Pack years: 6.00     Types: Cigarettes    Smokeless tobacco: Never Used   Substance and Sexual Activity    Alcohol use: No    Drug use: Yes     Types: IV, Cocaine, Opiates      Comment: pt denies any drug use    Sexual activity: Yes     Partners: Male   Lifestyle    Physical activity     Days per week: Not on file     Minutes per session: Not on file    Stress: Not on file   Relationships    Social connections     Talks on phone: Not on file     Gets together: Not on file     Attends Jain service: Not on file     Active member of club or organization: Not on file     Attends meetings of clubs or organizations: Not on file     Relationship status: Not on file    Intimate partner violence     Fear of current or ex partner: Not on file     Emotionally abused: Not on file     Physically abused: Not on file     Forced sexual activity: Not on file   Other Topics Concern    Not on file   Social History Narrative    Do you donate blood or plasma? Yes    Caffeine intake? Moderate    Advance directive? No    Is blood transfusion acceptable in an emergency? Yes               MEDICATIONS   Medications Prior to Admission  Not in a hospital admission. Current Medications  Current Facility-Administered Medications   Medication Dose Route Frequency Provider Last Rate Last Dose    lactated ringers bolus  1,000 mL Intravenous Once Michael Sadlerd,  mL/hr at 08/04/20 0312 1,000 mL at 08/04/20 0312    albumin human 5 % IV solution 25 g  25 g Intravenous Once Michael Shaila, DO 1,000 mL/hr at 08/04/20 0426 25 g at 08/04/20 0426     Current Outpatient Medications   Medication Sig Dispense Refill    buprenorphine-naloxone (SUBOXONE) 8-2 MG FILM SL film Place 1 Film under the tongue daily.  ibuprofen (ADVIL;MOTRIN) 600 MG tablet Take 1 tablet by mouth every 6 hours as needed for Pain 30 tablet 0         Allergies  No Known Allergies    REVIEW OF SYSTEMS   Within above limitations. 14 point review of systems reviewed. Pertinent positive or negative as per HPI or otherwise negative per 14 point systems review.       PHYSICAL EXAM     Wt Readings from Last 3 Encounters:   06/01/20 190 lb (86.2 kg)   01/22/20 190 lb (86.2 kg)   11/16/19 180 lb (81.6 kg)       Blood pressure 121/79, pulse 135, temperature 102.2 °F (39 °C), temperature source Oral, resp. rate 20, height 5' 5\" (1.651 m), SpO2 97 %, not currently breastfeeding. General - AAO x 3, appears anxious and in moderate to severe distress. Psych - Appropriate affect/speech. Eyes - Eye lids intact. No scleral icterus  ENT - Lips wnl. External ear clear/dry/intact. No thyromegaly on inspection  Neuro - No gross peripheral or central neuro deficits on inspection  Heart - Sinus. RRR. Tachycardic in 120s. S1 and S2 present. No added HS/murmurs appreciated. No elevated JVD appreciated  Lung - Adequate air entry b/l, No crackles/wheezes appreciated  GI - Soft. No guarding/rigidity. No hepatosplenomegaly/ascites. BS+   - No CVA/suprapubic tenderness or palpable bladder distension  Skin -right posterior thigh deep wound with purulent drainage, multiple BLE scattered open areas with necrotic tissues, BLE ascending petechia/ecchymosis. Warm extremities  MSK - Joints with normal ROM. BLE 1+ nonpitting edema. LABS AND IMAGING   CBC  [unfilled]    Last 3 Hemoglobin  Lab Results   Component Value Date    HGB 10.1 08/04/2020    HGB 11.8 11/16/2019    HGB 13.5 02/13/2017     Last 3 WBC/ANC  Lab Results   Component Value Date    WBC 10.5 08/04/2020    WBC 7.6 11/16/2019    WBC 2.9 02/13/2017     No components found for: GRNLOCTYABS  Last 3 Platelets  No results found for: PLATELET  Chemistry  [unfilled]  [unfilled]  No results found for: LDH  Coagulation Studies  Lab Results   Component Value Date    INR 1.32 08/04/2020     Liver Function Studies  Lab Results   Component Value Date    ALT 32 08/04/2020    AST 28 08/04/2020    ALKPHOS 127 08/04/2020       Recent Imaging    EXAMINATION:    CT OF THE RIGHT FEMUR WITH CONTRAST         8/4/2020 2:10 am         TECHNIQUE:    CT of the right femur was performed with the administration of intravenous    contrast.  Multiplanar reformatted images are provided for review.  Dose    modulation, iterative reconstruction, and/or weight based adjustment of the mA/kV was utilized to reduce the radiation dose to as low as reasonably    achievable.         COMPARISON:    Abdomen and pelvis CT 11/17/2019, pelvis radiograph 09/02/2009         HISTORY    ORDERING SYSTEM PROVIDED HISTORY: Concern for necrotizing soft tissue    infection of posterior lateral thigh, renal failure cannot tolerate contrast    TECHNOLOGIST PROVIDED HISTORY:    Reason for exam:->Concern for necrotizing soft tissue infection of posterior    lateral thigh, renal failure cannot tolerate contrast    Is the patient pregnant?->No    Reason for Exam: concern for necrotizing infection, renal failure         FINDINGS:    BONES:  No acute fractures nor suspicious osseous lesions.  No areas of    abnormal cortical disruption.         JOINTS:  Joints maintain anatomic alignment.  Mild to moderate degenerative    changes of the hip and pubic symphysis.  Moderate hip and trace knee joint    effusions.         SOFT TISSUES:  Surgically absent uterus.  Nonenlarged inguinal lymph nodes,    potentially reactive.  Ulceration in the posterolateral proximal thigh with    minimal adjacent subcutaneous stranding.  No associated loculated fluid. Patchy stranding in the subcutaneous tissues just deep to the skin elsewhere    in the lateral, anterior, and medial thigh.  No loculated fluid nor soft    tissue gas.  Normal appearance of muscle and fasciae.              Impression    1. Ulceration in the posterolateral proximal right thigh with minimal    adjacent cellulitis.  No associated findings of abscess, necrotizing    fasciitis, myositis, or osteomyelitis. 2. Patchy stranding deep to the skin in the lateral, anterior, in the medial    right thigh potentially related to additional injection sites. 3. Moderate right hip joint effusion potentially related to mild to moderate    osteoarthritic changes of the right hip.         Relevant labs and imaging reviewed    ASSESSMENT AND PLAN     Ms. Yao Rodriguez is a 29-year-old female who presented to the ED with worsening right thigh wound,BLE swelling with redness and pain. A/P as follows; Sepsis secondary to multiple skin wounds from IV heroin injections. Noted with ascending petechiae on BLE. Right posterior thigh deep wound with cellulitis-PER ct Rt thigh.    -Admit to ICU  -Temp of 102.2, hr 125, SBP in 90s. -Received 1 L NS bolus at the ER and 50 g albumin. Unable to give IV fluids per sepsis guidelines 2/2 hyponatremia. Nephro recommends to continue with NS at 50 cc/h to avoid overcorrection of sodium.  -Culture sent by ER. -Given Zyvox AND meropenem at the ER.   -Stat vanc, cefepime and flagyl for broad spectrum coverage pending cultures.   -Consult ID. Hyponatremia with Acute kidney injury. BUN/CREA of 71/4.2. Na of 119 then 123 post Ns bolus. Likely 2/2 volume depletion.   -Continue NS at 50 cc/h  -BMP q4hrs  -Nephrology consulted. Moderate pulmonary intestitial edema. DDX: Multifocal Yefri pneumonia. Current therapy not hypoxic but has potential for respiratory compromise. -Gentle hydration  -On broad spectrum coverage. Right hip joint effusion.-Per ct thigh  -Consult ortho    Hypokalemia. K of 3.2  -Replace    Hypocalcemia  -Replace    Opiate dependence. Uses IV heroin. Did not report her last use. -COWS monitoring.  -Urine drug screen pending. History of hepatitis B/C  -Liver enzymes unremarkable. Continue to monitor. No complaints of abdominal pain. -DVT Prophylaxis: hepatin    -GI Prophylaxis: protonix    Prognosis: Patient is very ill. Case d/w ED physician and Attending Dr. Candy Bruno.     Louis Crespo, CNP  8/4/2020 at 4:38 AM

## 2020-08-04 NOTE — CONSULTS
Nephrology Service Consultation        [address], [address] [address]  Phone: [phone]  Office Hours: 8:30AM - 4:30PM  Monday - Friday           Patient:  Sterling Martinez  MRN: [id number]  Consulting physician:  Dean Hope MD  Reason for Consult: elevated cr, electrolyte derangements      PCP: Dorys Valdez DO    HISTORY OF PRESENT ILLNESS:   The patient is a 47 y.o. female with substance abuse hx presented with right thigh wound and erythema. Renal consult for cr 4, it was 0.7 in 2019. Serum sodium was also 119 and serum bicarb was 14. She received 1L NS and 1L LR in the ER then started on NS at 50ml/hr  She is being taken to the OR for I&D of the right thigh wound. Med list shows ibuprofen q6hrs. REVIEW OF SYSTEMS:  Can not obtain    Past Medical History:        Diagnosis Date    Liver disease     hepatitis    No significant medical problems        Past Surgical History:        Procedure Laterality Date     SECTION  , 2007    x 2    CHOLECYSTECTOMY      HYSTERECTOMY      MARLIN    OTHER SURGICAL HISTORY  2013    lap sera       Medications:   Prior to Admission medications    Medication Sig Start Date End Date Taking? Authorizing Provider   buprenorphine-naloxone (SUBOXONE) 8-2 MG FILM SL film Place 1 Film under the tongue daily. Historical Provider, MD   ibuprofen (ADVIL;MOTRIN) 600 MG tablet Take 1 tablet by mouth every 6 hours as needed for Pain 19   Nilson Queen PA-C        Allergies:  Patient has no known allergies. Social History:   TOBACCO:   reports that she has been smoking cigarettes. She has a 6.00 pack-year smoking history. She has never used smokeless tobacco.  ETOH:   reports no history of alcohol use.   OCCUPATION:      Family History:       Problem Relation Age of Onset    Obesity Brother     Mental Illness Maternal Aunt     COPD Paternal Grandmother     Dementia Paternal Grandfather     Depression Son  Mental Illness Son         Bipole, ADHD, Adjustment disorder    Colon Cancer Neg Hx            Physical Exam:    Vitals: /69   Pulse 90   Temp 99.1 °F (37.3 °C) (Oral)   Resp 30   Ht 5' 5\" (1.651 m)   Wt 188 lb 4.4 oz (85.4 kg)   SpO2 98%   BMI 31.33 kg/m²   General appearance: in no acute distress, appears older than stated age  Skin: Skin color, texture, turgor normal. No rashes or lesions  HEENT: normocephalic, atraumatic  Neck: supple, trachea midline  Lungs:  breathing comfortably  Heart[de-identified] regular rate and rhythm,   Abdomen: non distended  Extremities: right thigh redness  Neurologic: Mental status: alert, oriented, interactive, following commands  Psychiatric: mood and affect inappropriate    CBC:   Recent Labs     08/04/20  0100   WBC 10.5   HGB 10.1*        BMP:    Recent Labs     08/04/20  0100 08/04/20  0252 08/04/20  0825   * 123* 125*   K 3.5 3.2* 3.4*   CL 80* 84* 87*   CO2 14* 19* 19*   BUN 71* 73* 72*   CREATININE 4.2* 4.0* 3.7*   GLUCOSE 101* 119* 113*     Hepatic:   Recent Labs     08/04/20  0100   AST 28   ALT 32   BILITOT 0.7   ALKPHOS 127     Troponin: No results for input(s): TROPONINI in the last 72 hours. BNP: No results for input(s): BNP in the last 72 hours. Lipids: No results for input(s): CHOL, HDL in the last 72 hours. Invalid input(s): LDLCALCU  ABGs: No results found for: PHART, PO2ART, OUT0YWJ  INR:   Recent Labs     08/04/20  0115   INR 1.32       I/O last 3 completed shifts: In: 1890 [IV Piggyback:1890]  Out: -       No intake/output data recorded.      -----------------------------------------------------------------      Assessment and Recommendations     Patient Active Problem List   Diagnosis    CCC (chronic calculous cholecystitis)    Sepsis ([facility] [address]     - LISBETH : cr 4 on admission from baseline of 0.9//dx;prerenal vs ATN vs obstruction//ct a/p shows no HN  - Hyponatremia: sodium 119 on admission  - Non anion gap metabolic acidosis; negative salicylate and acetaminophen levels  - Sepsis   - Substance abuse: Amphetamine positive UDS  - Right thigh infection  - elevated probnp, suspected pulm edema    Suggest  Replete K per sliding scale  Give NS at 50ml/hr for now and monitor sodium level, will adjust ivf rate as needed  Avoid nephrotoxins and renally dose meds  Will follow  Critically ill  Will monitor volume status, will benefit from an echo        Electronically signed by Michael Bose DO on 8/4/2020 at 9:33 AM    MD Sandy Graham DO [place] ,  [facility] [address]  PHONE: [phone]  FAX: [phone]

## 2020-08-05 PROBLEM — I26.90 ACUTE SEPTIC PULMONARY EMBOLISM WITHOUT ACUTE COR PULMONALE (HCC): Status: ACTIVE | Noted: 2020-08-05

## 2020-08-05 PROBLEM — B18.1 CHRONIC HEPATITIS B (HCC): Status: ACTIVE | Noted: 2020-08-05

## 2020-08-05 PROBLEM — B95.62 MRSA BACTEREMIA: Status: ACTIVE | Noted: 2020-08-05

## 2020-08-05 PROBLEM — F15.90 AMPHETAMINE USER: Status: ACTIVE | Noted: 2020-08-05

## 2020-08-05 PROBLEM — R78.81 MRSA BACTEREMIA: Status: ACTIVE | Noted: 2020-08-05

## 2020-08-05 PROBLEM — B18.2 CHRONIC HEPATITIS C WITHOUT HEPATIC COMA (HCC): Status: ACTIVE | Noted: 2020-08-05

## 2020-08-05 LAB
ALBUMIN SERPL-MCNC: 2.4 GM/DL (ref 3.4–5)
ALP BLD-CCNC: 83 IU/L (ref 40–128)
ALT SERPL-CCNC: 17 U/L (ref 10–40)
ANION GAP SERPL CALCULATED.3IONS-SCNC: 16 MMOL/L (ref 4–16)
ANION GAP SERPL CALCULATED.3IONS-SCNC: 17 MMOL/L (ref 4–16)
ANION GAP SERPL CALCULATED.3IONS-SCNC: 17 MMOL/L (ref 4–16)
ANION GAP SERPL CALCULATED.3IONS-SCNC: 18 MMOL/L (ref 4–16)
AST SERPL-CCNC: 11 IU/L (ref 15–37)
BASOPHILS ABSOLUTE: 0 K/CU MM
BASOPHILS RELATIVE PERCENT: 0.1 % (ref 0–1)
BILIRUB SERPL-MCNC: 0.5 MG/DL (ref 0–1)
BUN BLDV-MCNC: 64 MG/DL (ref 6–23)
BUN BLDV-MCNC: 66 MG/DL (ref 6–23)
BUN BLDV-MCNC: 67 MG/DL (ref 6–23)
CALCIUM SERPL-MCNC: 8.2 MG/DL (ref 8.3–10.6)
CALCIUM SERPL-MCNC: 8.3 MG/DL (ref 8.3–10.6)
CALCIUM SERPL-MCNC: 8.3 MG/DL (ref 8.3–10.6)
CHLORIDE BLD-SCNC: 89 MMOL/L (ref 99–110)
CHLORIDE BLD-SCNC: 93 MMOL/L (ref 99–110)
CHLORIDE BLD-SCNC: 95 MMOL/L (ref 99–110)
CHLORIDE BLD-SCNC: 95 MMOL/L (ref 99–110)
CO2: 18 MMOL/L (ref 21–32)
CO2: 19 MMOL/L (ref 21–32)
CO2: 20 MMOL/L (ref 21–32)
CO2: 21 MMOL/L (ref 21–32)
CREAT SERPL-MCNC: 2.5 MG/DL (ref 0.6–1.1)
CREAT SERPL-MCNC: 2.6 MG/DL (ref 0.6–1.1)
CREAT SERPL-MCNC: 2.8 MG/DL (ref 0.6–1.1)
DIFFERENTIAL TYPE: ABNORMAL
DOSE AMOUNT: NORMAL
DOSE TIME: NORMAL
EOSINOPHILS ABSOLUTE: 0 K/CU MM
EOSINOPHILS RELATIVE PERCENT: 0 % (ref 0–3)
GFR AFRICAN AMERICAN: 23 ML/MIN/1.73M2
GFR AFRICAN AMERICAN: 25 ML/MIN/1.73M2
GFR AFRICAN AMERICAN: 26 ML/MIN/1.73M2
GFR NON-AFRICAN AMERICAN: 19 ML/MIN/1.73M2
GFR NON-AFRICAN AMERICAN: 21 ML/MIN/1.73M2
GFR NON-AFRICAN AMERICAN: 22 ML/MIN/1.73M2
GLUCOSE BLD-MCNC: 119 MG/DL (ref 70–99)
GLUCOSE BLD-MCNC: 126 MG/DL (ref 70–99)
GLUCOSE BLD-MCNC: 136 MG/DL (ref 70–99)
HAPTOGLOBIN: 322 MG/DL (ref 30–200)
HBV SURFACE AB TITR SER: 120.4 {TITER}
HCT VFR BLD CALC: 23.9 % (ref 37–47)
HEMOGLOBIN: 7.9 GM/DL (ref 12.5–16)
HIGH SENSITIVE C-REACTIVE PROTEIN: 205.9 MG/L
HIV SCREEN: NON REACTIVE
IMMATURE NEUTROPHIL %: 1.6 % (ref 0–0.43)
LV EF: 53 %
LVEF MODALITY: NORMAL
LYMPHOCYTES ABSOLUTE: 0.6 K/CU MM
LYMPHOCYTES RELATIVE PERCENT: 6.3 % (ref 24–44)
MAGNESIUM: 2.2 MG/DL (ref 1.8–2.4)
MAGNESIUM: 2.3 MG/DL (ref 1.8–2.4)
MAGNESIUM: 2.4 MG/DL (ref 1.8–2.4)
MCH RBC QN AUTO: 27 PG (ref 27–31)
MCHC RBC AUTO-ENTMCNC: 33.1 % (ref 32–36)
MCV RBC AUTO: 81.6 FL (ref 78–100)
MONOCYTES ABSOLUTE: 0.3 K/CU MM
MONOCYTES RELATIVE PERCENT: 3 % (ref 0–4)
NUCLEATED RBC %: 0 %
PDW BLD-RTO: 13.9 % (ref 11.7–14.9)
PLATELET # BLD: 170 K/CU MM (ref 140–440)
PMV BLD AUTO: 10.6 FL (ref 7.5–11.1)
POTASSIUM SERPL-SCNC: 3.4 MMOL/L (ref 3.5–5.1)
POTASSIUM SERPL-SCNC: 3.5 MMOL/L (ref 3.5–5.1)
POTASSIUM SERPL-SCNC: 3.5 MMOL/L (ref 3.5–5.1)
POTASSIUM SERPL-SCNC: 3.7 MMOL/L (ref 3.5–5.1)
PROCALCITONIN: 7.73
PROCALCITONIN: 9.12
RBC # BLD: 2.93 M/CU MM (ref 4.2–5.4)
RPR: NON REACTIVE
SEGMENTED NEUTROPHILS ABSOLUTE COUNT: 8.3 K/CU MM
SEGMENTED NEUTROPHILS RELATIVE PERCENT: 89 % (ref 36–66)
SODIUM BLD-SCNC: 125 MMOL/L (ref 135–145)
SODIUM BLD-SCNC: 130 MMOL/L (ref 135–145)
SODIUM BLD-SCNC: 131 MMOL/L (ref 135–145)
SODIUM BLD-SCNC: 132 MMOL/L (ref 135–145)
TOTAL IMMATURE NEUTOROPHIL: 0.15 K/CU MM
TOTAL NUCLEATED RBC: 0 K/CU MM
TOTAL PROTEIN: 6.1 GM/DL (ref 6.4–8.2)
VANCOMYCIN RANDOM: 17.2 UG/ML
WBC # BLD: 9.3 K/CU MM (ref 4–10.5)

## 2020-08-05 PROCEDURE — 93306 TTE W/DOPPLER COMPLETE: CPT

## 2020-08-05 PROCEDURE — 86592 SYPHILIS TEST NON-TREP QUAL: CPT

## 2020-08-05 PROCEDURE — 80048 BASIC METABOLIC PNL TOTAL CA: CPT

## 2020-08-05 PROCEDURE — 84145 PROCALCITONIN (PCT): CPT

## 2020-08-05 PROCEDURE — 86141 C-REACTIVE PROTEIN HS: CPT

## 2020-08-05 PROCEDURE — 85025 COMPLETE CBC W/AUTO DIFF WBC: CPT

## 2020-08-05 PROCEDURE — 6370000000 HC RX 637 (ALT 250 FOR IP): Performed by: SURGERY

## 2020-08-05 PROCEDURE — 2000000000 HC ICU R&B

## 2020-08-05 PROCEDURE — 87389 HIV-1 AG W/HIV-1&-2 AB AG IA: CPT

## 2020-08-05 PROCEDURE — 80051 ELECTROLYTE PANEL: CPT

## 2020-08-05 PROCEDURE — 86706 HEP B SURFACE ANTIBODY: CPT

## 2020-08-05 PROCEDURE — 99233 SBSQ HOSP IP/OBS HIGH 50: CPT | Performed by: INTERNAL MEDICINE

## 2020-08-05 PROCEDURE — 2580000003 HC RX 258: Performed by: INTERNAL MEDICINE

## 2020-08-05 PROCEDURE — 83735 ASSAY OF MAGNESIUM: CPT

## 2020-08-05 PROCEDURE — 2580000003 HC RX 258: Performed by: SURGERY

## 2020-08-05 PROCEDURE — 6360000002 HC RX W HCPCS: Performed by: INTERNAL MEDICINE

## 2020-08-05 PROCEDURE — 94761 N-INVAS EAR/PLS OXIMETRY MLT: CPT

## 2020-08-05 PROCEDURE — 86481 TB AG RESPONSE T-CELL SUSP: CPT

## 2020-08-05 PROCEDURE — 80202 ASSAY OF VANCOMYCIN: CPT

## 2020-08-05 PROCEDURE — 6360000002 HC RX W HCPCS: Performed by: SURGERY

## 2020-08-05 PROCEDURE — 80053 COMPREHEN METABOLIC PANEL: CPT

## 2020-08-05 RX ADMIN — TRAMADOL HYDROCHLORIDE 25 MG: 50 TABLET, FILM COATED ORAL at 21:38

## 2020-08-05 RX ADMIN — HEPARIN SODIUM 5000 UNITS: 5000 INJECTION, SOLUTION INTRAVENOUS; SUBCUTANEOUS at 06:32

## 2020-08-05 RX ADMIN — POTASSIUM CHLORIDE AND SODIUM CHLORIDE: 900; 150 INJECTION, SOLUTION INTRAVENOUS at 14:58

## 2020-08-05 RX ADMIN — CLONIDINE HYDROCHLORIDE 0.1 MG: 0.1 TABLET ORAL at 16:04

## 2020-08-05 RX ADMIN — CLONIDINE HYDROCHLORIDE 0.1 MG: 0.1 TABLET ORAL at 21:37

## 2020-08-05 RX ADMIN — MORPHINE SULFATE 2 MG: 2 INJECTION, SOLUTION INTRAMUSCULAR; INTRAVENOUS at 18:55

## 2020-08-05 RX ADMIN — POTASSIUM CHLORIDE AND SODIUM CHLORIDE: 900; 150 INJECTION, SOLUTION INTRAVENOUS at 00:16

## 2020-08-05 RX ADMIN — TRAMADOL HYDROCHLORIDE 25 MG: 50 TABLET, FILM COATED ORAL at 16:06

## 2020-08-05 RX ADMIN — SODIUM CHLORIDE, PRESERVATIVE FREE 10 ML: 5 INJECTION INTRAVENOUS at 08:44

## 2020-08-05 RX ADMIN — MORPHINE SULFATE 2 MG: 2 INJECTION, SOLUTION INTRAMUSCULAR; INTRAVENOUS at 10:06

## 2020-08-05 RX ADMIN — HEPARIN SODIUM 5000 UNITS: 5000 INJECTION, SOLUTION INTRAVENOUS; SUBCUTANEOUS at 14:59

## 2020-08-05 RX ADMIN — MORPHINE SULFATE 2 MG: 2 INJECTION, SOLUTION INTRAMUSCULAR; INTRAVENOUS at 05:21

## 2020-08-05 RX ADMIN — VANCOMYCIN HYDROCHLORIDE 1500 MG: 5 INJECTION, POWDER, LYOPHILIZED, FOR SOLUTION INTRAVENOUS at 15:57

## 2020-08-05 RX ADMIN — MORPHINE SULFATE 2 MG: 2 INJECTION, SOLUTION INTRAMUSCULAR; INTRAVENOUS at 14:15

## 2020-08-05 RX ADMIN — HEPARIN SODIUM 5000 UNITS: 5000 INJECTION, SOLUTION INTRAVENOUS; SUBCUTANEOUS at 21:29

## 2020-08-05 RX ADMIN — SODIUM CHLORIDE, PRESERVATIVE FREE 10 ML: 5 INJECTION INTRAVENOUS at 21:30

## 2020-08-05 ASSESSMENT — PAIN DESCRIPTION - PAIN TYPE
TYPE: SURGICAL PAIN

## 2020-08-05 ASSESSMENT — PAIN DESCRIPTION - ORIENTATION
ORIENTATION: RIGHT;LEFT
ORIENTATION: LEFT;RIGHT
ORIENTATION: RIGHT;LEFT

## 2020-08-05 ASSESSMENT — PAIN DESCRIPTION - PROGRESSION
CLINICAL_PROGRESSION: NOT CHANGED

## 2020-08-05 ASSESSMENT — PAIN SCALES - GENERAL
PAINLEVEL_OUTOF10: 6
PAINLEVEL_OUTOF10: 9
PAINLEVEL_OUTOF10: 7
PAINLEVEL_OUTOF10: 7
PAINLEVEL_OUTOF10: 6
PAINLEVEL_OUTOF10: 6
PAINLEVEL_OUTOF10: 0

## 2020-08-05 ASSESSMENT — PAIN DESCRIPTION - ONSET
ONSET: ON-GOING

## 2020-08-05 ASSESSMENT — PAIN DESCRIPTION - DESCRIPTORS
DESCRIPTORS: STABBING

## 2020-08-05 ASSESSMENT — PAIN DESCRIPTION - FREQUENCY
FREQUENCY: CONTINUOUS

## 2020-08-05 ASSESSMENT — PAIN DESCRIPTION - LOCATION
LOCATION: LEG;INCISION
LOCATION: INCISION;LEG
LOCATION: LEG;INCISION

## 2020-08-05 NOTE — PROGRESS NOTES
[address] [facility]  consulted by Dr. Damon Lyons for monitoring and adjustment. Indication for treatment: MRSA bacteremia 2/2 leg abscess, r/o endocarditis   Goal trough: 15 mcg/mL     Pertinent Laboratory Values:   Temp Readings from Last 3 Encounters:   08/05/20 98 °F (36.7 °C) (Oral)   08/04/20 96.3 °F (35.7 °C)   06/01/20 98.1 °F (36.7 °C) (Oral)     Recent Labs     08/04/20  0100 08/04/20  0116 08/05/20  0520   WBC 10.5  --  9.3   LACTATE  --  1.7  --      Recent Labs     08/04/20  2040 08/05/20  0010 08/05/20  0520   BUN 64* 66* 67*   CREATININE 2.8* 2.6* 2.5*     Estimated Creatinine Clearance: 33 mL/min (A) (based on SCr of 2.5 mg/dL (H)). Intake/Output Summary (Last 24 hours) at 8/5/2020 1427  Last data filed at 8/5/2020 0920  Gross per 24 hour   Intake 720 ml   Output 2650 ml   Net -1930 ml       Pertinent Cultures:  Date    Source    Results  8/4   Surgical abscess cx  Staph aureus, pseudomonas, e coli  8/4   Surgical abscess cx  MRSA    Vancomycin level:   TROUGH:  No results for input(s): VANCOTROUGH in the last 72 hours. RANDOM:    Recent Labs     08/05/20  0520   VANCORANDOM 17.2       Assessment:  · WBC and temperature: WNL, afebrile  · SCr, BUN, and urine output: LISBETH   · Day(s) of therapy: 2   · Vancomycin level: 17.2, ok to redose     Plan:  · Intermittent dosing based on levels 2/2 LISBETH   · Vancomycin 1500 mg x 1   · Random level tomorrow AM, anticipate renal function recovery   · Pharmacy will continue to monitor patient and adjust therapy as indicated    Sy 8/6 @0600    Thank you for the consult.   Jt Sifuentes  8/5/2020 2:27 PM

## 2020-08-05 NOTE — PROGRESS NOTES
hours. Lipids: No results for input(s): CHOL, HDL in the last 72 hours.     Invalid input(s): LDLCALCU  ABGs: No results found for: PHART, PO2ART, QOL0RWT  INR:   Recent Labs     08/04/20  0115   INR 1.32       Objective:   Vitals: BP 97/63   Pulse 85   Temp 98 °F (36.7 °C) (Oral)   Resp 23   Ht 5' 5\" (1.651 m)   Wt 188 lb 4.4 oz (85.4 kg)   SpO2 95%   BMI 31.33 kg/m²   General appearance: alert and cooperative with exam, in no acute distress  HEENT: normocephalic, atraumatic,   Neck: supple, trachea midline  Lungs: , breathing comfortably   Heart[de-identified] regular rate and rhythm,   Abdomen: ; non distended,   Extremities:left leg dressing,no edema  Neurologic: alert, oriented, follows commands, interactive    Assessment and Plan:     Patient Active Problem List   Diagnosis    CCC (chronic calculous cholecystitis)    [facility]     - LISBETH from volume depletion : cr 4 on admission from baseline of 0.9////ct a/p shows no HN  - Hyponatremia from volume depletion: sodium 119 on admission  - Non anion gap metabolic acidosis; negative salicylate and acetaminophen levels  - Sepsis   - Substance abuse: Amphetamine positive UDS  - Right thigh infection  - elevated probnp, suspected pulm edema  - staph endocarditis     Suggest  Cr down to 2.6 from 4  - sodium up to 132  Continue NS+K  Avoid nephrotoxins and renally dose meds  Will follow  Critically ill  Will monitor volume status, will benefit from an echo                          Electronically signed by Segundo Harvey DO on 8/5/2020 at 9:40 AM    MD Larry Graham DO [address],  [facility]  [address], [place] [address]  PHONE: [phone]  FAX: [phone]

## 2020-08-05 NOTE — PROGRESS NOTES
Nutrition Assessment     Type and Reason for Visit: Initial, Positive Nutrition Screen    Nutrition Assessment:  Pt fell out due to a positive screen for wounds. Per the chart, the pt wounds are now surgical sites following a debridement. Will order wound healing supplements to help with the surgical sites.  Pt weight is stable and she is at low nutritional risk    Electronically signed by Corina Tran RD, LD on 8/8/82 at 11:43 AM EDT    Contact: [phone]

## 2020-08-05 NOTE — PROGRESS NOTES
Hospitalist Progress Note         Admit Date: 8/4/2020    PCP: Adrianna Peterson DO     Chief Complaint   Patient presents with   Garima Zach Wound Check     R upper thigh         Assessment and Plan:      Sepsis (HCC)Abscess of right thigh/MRSA bacteremia  Continue vancomycin. S/p I&D bedside. Rule out endocarditis? ??  RAFI. ID on board   Hyponatremia/acute kidney injury improving creatinine with IVF. Nephrology following.  Anemia likely due to anemia of chronic disease. Check anemia panel. Monitor H&H  Current Hb 7.9 which could be her baseline. No evidence of acute blood loss.  Polysubstance drug abuse including heroine IV counseling provided. On Ultram, clonidine, Restoril to prevent withdrawal symptoms. Checking for RPR, HIV, chlamydia and gonorrhea. ID on board   Chronic hepatitis B ([facility] [address]   Chronic hepatitis C without hepatic coma (HCC)   Acute septic pulmonary embolism without acute cor pulmonale (HCC)  Tobacco abuse smoking cessation counseling provided. On NicoDerm patch        VTE prophylaxis LMWH.     Current Facility-Administered Medications   Medication Dose Route Frequency Provider Last Rate Last Dose    vancomycin (VANCOCIN) 1,500 mg in dextrose 5 % 500 mL IVPB  1,500 mg Intravenous Once Ventura Harrison MD        sodium chloride flush 0.9 % injection 10 mL  10 mL Intravenous 2 times per day Brandi Obando MD   10 mL at 08/05/20 0844    sodium chloride flush 0.9 % injection 10 mL  10 mL Intravenous PRN Brandi Obando MD        acetaminophen (TYLENOL) tablet 650 mg  650 mg Oral Q6H PRN Brandi Obando MD        Or   Garima Serrano acetaminophen (TYLENOL) suppository 650 mg  650 mg Rectal Q6H PRN Brandi Obando MD        polyethylene glycol [facility]) packet 17 g  17 g Oral Daily PRN Brandi Obando MD        nicotine (NICODERM CQ) 21 MG/24HR 1 patch  1 patch Transdermal Daily Brandi Obando MD   1 patch at 08/05/20 0844    heparin (porcine) injection 5,000 Units  5,000 Units Subcutaneous 3 times per day Bridget Han MD   5,000 Units at 08/05/20 1459    morphine (PF) injection 2 mg  2 mg Intravenous Q4H PRN Bridget Han MD   2 mg at 08/05/20 1415    traMADol (ULTRAM) tablet 25 mg  25 mg Oral PRN Bridget Han MD   25 mg at 08/04/20 2033    And    cloNIDine (CATAPRES) tablet 0.1 mg  0.1 mg Oral PRN Bridget Han MD   0.1 mg at 08/04/20 2033    traZODone (DESYREL) tablet 50 mg  50 mg Oral Nightly PRN Bridget Han MD   50 mg at 08/04/20 2033    promethazine (PHENERGAN) tablet 12.5 mg  12.5 mg Oral Q6H PRN Bridget Han MD        Or    promethazine (PHENERGAN) injection 6.25 mg  6.25 mg Intravenous Q6H PRN Bridget Han MD   6.25 mg at 08/04/20 1422    vancomycin (VANCOCIN) intermittent dosing (placeholder)   Other Amna Coelho MD   Stopped at 08/04/20 1233    0.9% NaCl with KCl 20 mEq infusion   Intravenous Continuous Dolores Castro DO 75 mL/hr at 08/05/20 1458         Subjective:     Patient reports generalized body aches and pains. Reports withdrawal symptoms of heroin. Not eating much. Did not sleep well last night      Objective:        Intake/Output Summary (Last 24 hours) at 8/5/2020 1509  Last data filed at 8/5/2020 0920  Gross per 24 hour   Intake 720 ml   Output 2050 ml   Net -1330 ml      Vitals:   Vitals:    08/05/20 1402   BP: 97/67   Pulse: 87   Resp: 24   Temp:    SpO2: 95%     Physical Exam:  General Appearance:    Alert, cooperative, mild distress due to pain  Head:      Normocephalic, without obvious abnormality, atraumatic  Eyes:       Conjunctiva/corneas clear, EOM's intact  Lungs:    Clear to auscultation bilaterally, respirations unlabored  Heart:                Regular rate and rhythm, S1 and S2 normal, no murmur,   rub or gallop  Abdomen:     Soft, non-tender, bowel sounds active, no masses, no organomegaly  Extremities: Extremities normal, atraumatic, no cyanosis or edema  Neurological:   Grossly Intact. Skin                    erythematous rash involving B/L legs up to ankles    Significant Diagnostic Studies:   DATA:    CBC   Recent Labs     08/04/20  0100 08/05/20  0520   WBC 10.5 9.3   HGB 10.1* 7.9*   HCT 30.2* 23.9*    170      BMP   Recent Labs     08/04/20  0252  08/04/20  2040 08/05/20  0010 08/05/20  0520 08/05/20  0952   *   < > 125* 130* 132* 131*   K 3.2*   < > 3.4* 3.5 3.7 3.5   CL 84*   < > 89* 93* 95* 95*   CO2 19*   < > 18* 21 20* 19*   PHOS 3.7  --   --   --   --   --    BUN 73*   < > 64* 66* 67*  --    CREATININE 4.0*   < > 2.8* 2.6* 2.5*  --     < > = values in this interval not displayed. LFT'S   Recent Labs     08/04/20  0100 08/05/20  0520   AST 28 11*   ALT 32 17   BILITOT 0.7 0.5   ALKPHOS 127 83     COAG   Recent Labs     08/04/20  0115   INR 1.32     POC: No results found for: POCGLU  [id number]:No results found for: [address]     08/04/20  0245   CKTOTAL 83     Troponin:   Recent Labs     08/04/20  0255   TROPONINT <0.010     BNP:   Recent Labs     08/04/20  0422   PROBNP 3,359*     U/A:    Lab Results   Component Value Date    COLORU YELLOW 11/17/2019    WBCUA 3 11/17/2019    RBCUA 3 11/17/2019    MUCUS RARE 11/17/2019    BACTERIA RARE 11/17/2019    CLARITYU CLEAR 11/17/2019    SPECGRAV 1.009 11/17/2019    LEUKOCYTESUR TRACE 11/17/2019    BLOODU NEGATIVE 11/17/2019    GLUCOSEU neg 06/20/2013    GLUCOSEU NEGATIVE 06/20/2013       Ct Abdomen Pelvis Wo Contrast Additional Contrast? None    Result Date: 8/4/2020  EXAMINATION: CT OF THE ABDOMEN AND PELVIS WITHOUT CONTRAST 8/4/2020 8:08 am TECHNIQUE: CT of the abdomen and pelvis was performed without the administration of intravenous contrast. Multiplanar reformatted images are provided for review.  Dose modulation, iterative reconstruction, and/or weight based adjustment of the mA/kV was utilized to reduce the radiation dose to as low as reasonably achievable. COMPARISON: None. HISTORY: ORDERING SYSTEM PROVIDED HISTORY: abscess? TECHNOLOGIST PROVIDED HISTORY: Reason for exam:->abscess? Additional Contrast?->None Is the patient pregnant?->No Reason for Exam: abscess? Acuity: Acute Type of Exam: Initial FINDINGS: Lower Chest: There are early patchy infiltrate seen in the lung bases may suggest early pneumonia. There is a small trace right-sided effusion. Organs: The liver appears unremarkable. The gallbladder has been removed. The pancreas is normal.  Spleen is mildly prominent. The left kidney appears enlarged with some mild stranding in the perinephric fat. I do not see any evidence for stone or obstruction this may be secondary to pyelonephritis. The left kidney is normal. GI/Bowel: The large and small bowel of the abdomen is normal. There is no evidence for free air or free fluid noted to be present. Pelvis: A Angel catheter is seen in the bladder is no free air free fluid Peritoneum/Retroperitoneum: The aorta normal in caliber there is no pathologically enlarged retroperitoneal adenopathy. Bones/Soft Tissues: The osseous structures is soft tissues normal.     The right kidney appears mildly enlarged with some stranding around the perinephric fat region. I do not see any evidence for stone or obstruction these findings may be related to a pyelonephritis please clinically correlate     Xr Chest Portable    Result Date: 8/4/2020  EXAMINATION: ONE XRAY VIEW OF THE CHEST 8/4/2020 2:54 am COMPARISON: None. HISTORY: ORDERING SYSTEM PROVIDED HISTORY: Post right IJ placement TECHNOLOGIST PROVIDED HISTORY: Reason for exam:->Post right IJ placement Reason for Exam: Post right IJ placement Acuity: Acute Type of Exam: Initial FINDINGS: Right internal jugular approach central venous catheter is noted with distal tip located within the SVC near the superior atrial caval junction.  The heart is normal in size and configuration. The mediastinal contours are within normal limits. The pulmonary arterial is are prominent and indistinct diffusely. Multifocal mild bilateral lung consolidation is identified. The pleural surfaces are normal and no evidence of a pleural effusion is seen. Bones and soft tissues are unremarkable. Moderate pulmonary interstitial edema with suggestion of multifocal bilateral lung overlying alveolar edema. Differential diagnostic considerations include overlying pneumonia. Ct Femur Right Wo Contrast    Result Date: 8/4/2020  EXAMINATION: CT OF THE RIGHT FEMUR WITH CONTRAST 8/4/2020 2:10 am TECHNIQUE: CT of the right femur was performed with the administration of intravenous contrast.  Multiplanar reformatted images are provided for review. Dose modulation, iterative reconstruction, and/or weight based adjustment of the mA/kV was utilized to reduce the radiation dose to as low as reasonably achievable. COMPARISON: Abdomen and pelvis CT 11/17/2019, pelvis radiograph 09/02/2009 HISTORY ORDERING SYSTEM PROVIDED HISTORY: Concern for necrotizing soft tissue infection of posterior lateral thigh, renal failure cannot tolerate contrast TECHNOLOGIST PROVIDED HISTORY: Reason for exam:->Concern for necrotizing soft tissue infection of posterior lateral thigh, renal failure cannot tolerate contrast Is the patient pregnant?->No Reason for Exam: concern for necrotizing infection, renal failure FINDINGS: BONES:  No acute fractures nor suspicious osseous lesions. No areas of abnormal cortical disruption. JOINTS:  Joints maintain anatomic alignment. Mild to moderate degenerative changes of the hip and pubic symphysis. Moderate hip and trace knee joint effusions. SOFT TISSUES:  Surgically absent uterus. Nonenlarged inguinal lymph nodes, potentially reactive. Ulceration in the posterolateral proximal thigh with minimal adjacent subcutaneous stranding. No associated loculated fluid.  Patchy stranding in the subcutaneous tissues just deep to the skin elsewhere in the lateral, anterior, and medial thigh. No loculated fluid nor soft tissue gas. Normal appearance of muscle and fasciae. 1. Ulceration in the posterolateral proximal right thigh with minimal adjacent cellulitis. No associated findings of abscess, necrotizing fasciitis, myositis, or osteomyelitis. 2. Patchy stranding deep to the skin in the lateral, anterior, in the medial right thigh potentially related to additional injection sites. 3. Moderate right hip joint effusion potentially related to mild to moderate osteoarthritic changes of the right hip.            [place] Hospitalist

## 2020-08-05 NOTE — PROGRESS NOTES
Infectious Disease Progress Note  2020   Patient Name: Xochitl Moreira : 1980       Reason for visit: F/u ? History:? Interval history noted. S/p debridement of right upper thigh, right lower leg, left lower leg abscesses, incisional biopsy of petechial rash of lower leg on 2020  Denies n/v/d/f or untoward effects of antibiotics  Physical Exam:  Vital Signs: BP 96/67   Pulse 77   Temp 97.6 °F (36.4 °C) (Oral)   Resp 22   Ht 5' 5\" (1.651 m)   Wt 188 lb 4.4 oz (85.4 kg)   SpO2 97%   BMI 31.33 kg/m²     Gen: alert and oriented X3,  Skin: Bilateral leg non-blanchable petechiae  Wounds: Right lateral thigh, left medial leg C/D/I  HEMT: AT/NC Oropharynx pink, moist, and without lesions or exudates; dentition in good state of repair  Eyes: PERRLA, EOMI, conjunctiva pink, sclera anicteric. Neck: Supple. Trachea midline. No LAD. Chest: no distress and CTA. Good air movement. Heart: RRR and no MRG. Abd: soft, non-distended, no tenderness, no hepatomegaly. Normoactive bowel sounds. Ext: no clubbing, cyanosis, or edema  Catheter Site: without erythema or tenderness  Neuro: Mental status intact. CN 2-12 intact and no focal sensory or motor deficits     Radiologic / Imaging / TESTING  CT Right femur  1. Ulceration in the posterolateral proximal right thigh with minimal adjacent cellulitis. No associated findings of abscess, necrotizing fasciitis, myositis, or osteomyelitis. 2. Patchy stranding deep to the skin in the lateral, anterior, in the medial right thigh potentially related to additional injection sites. 3. Moderate right hip joint effusion potentially related to mild to moderate osteoarthritic changes of the right hip.          Labs:    Recent Results (from the past 24 hour(s))   D-dimer, quantitative    Collection Time: 20  8:25 AM   Result Value Ref Range    D-Dimer, Quant 3980 (H) <230 NG/mL(DDU)   Lactate, Sepsis    Collection Time: 20  8:25 AM   Result Value Ref Range    Lactic Acid, Sepsis 0.8 0.5 - 1.9 mMOL/L   Basic Metabolic Panel w/ Reflex to MG    Collection Time: 08/04/20  8:25 AM   Result Value Ref Range    Sodium 125 (L) 135 - 145 MMOL/L    Potassium 3.4 (L) 3.5 - 5.1 MMOL/L    Chloride 87 (L) 99 - 110 mMol/L    CO2 19 (L) 21 - 32 MMOL/L    Anion Gap 19 (H) 4 - 16    BUN 72 (H) 6 - 23 MG/DL    CREATININE 3.7 (H) 0.6 - 1.1 MG/DL    Glucose 113 (H) 70 - 99 MG/DL    Calcium 8.1 (L) 8.3 - 10.6 MG/DL    GFR Non- 14 (L) >60 mL/min/1.73m2    GFR  17 (L) >60 mL/min/1.73m2   Magnesium    Collection Time: 08/04/20  8:25 AM   Result Value Ref Range    Magnesium 2.0 1.8 - 2.4 mg/dl   Procalcitonin    Collection Time: 08/04/20  8:25 AM   Result Value Ref Range    Procalcitonin 9.12    COVID-19    Collection Time: 08/04/20  8:51 AM    Specimen: Nasopharynx/Oropharynx   Result Value Ref Range    SARS-CoV-2, NAAT NOT DETECTED    Culture, Surgical    Collection Time: 08/04/20 10:00 AM    Specimen: Tissue   Result Value Ref Range    Specimen TISSUE     Special Requests Right Thigh     Gram Smear RARE  NECROTIC POLYS       Gram Smear RARE  RED BLOOD CELLS       Gram Smear RARE  GRAM POSITIVE COCCI      Culture, Surgical    Collection Time: 08/04/20 11:00 AM    Specimen: Tissue   Result Value Ref Range    Specimen TISSUE     Special Requests Right Calf Wound     Gram Smear MODERATE  NECROTIC POLYS       Gram Smear FEW  RED BLOOD CELLS       Gram Smear MODERATE  GRAM POSITIVE COCCI      Culture, Tissue    Collection Time: 08/04/20 11:00 AM    Specimen: Tissue   Result Value Ref Range    Specimen TISSUE     Special Requests Biopsy of Petechiae Right Lower Leg     Gram Smear NO  NECROTIC POLYS       Gram Smear NO ORGANISMS SEEN     Gram Smear RARE  RED BLOOD CELLS      Basic Metabolic Panel w/ Reflex to MG    Collection Time: 08/04/20  8:40 PM   Result Value Ref Range    Sodium 125 (L) 135 - 145 MMOL/L    Potassium 3.4 (L) 3.5 - 5.1 MMOL/L    Chloride 89 (L) 99 - 110 5. 4 M/CU MM    Hemoglobin 7.9 (L) 12.5 - 16.0 GM/DL    Hematocrit 23.9 (L) 37 - 47 %    MCV 81.6 78 - 100 FL    MCH 27.0 27 - 31 PG    MCHC 33.1 32.0 - 36.0 %    RDW 13.9 11.7 - 14.9 %    Platelets 554 402 - 969 K/CU MM    MPV 10.6 7.5 - 11.1 FL    Differential Type AUTOMATED DIFFERENTIAL     Segs Relative 89.0 (H) 36 - 66 %    Lymphocytes % 6.3 (L) 24 - 44 %    Monocytes % 3.0 0 - 4 %    Eosinophils % 0.0 0 - 3 %    Basophils % 0.1 0 - 1 %    Segs Absolute 8.3 K/CU MM    Lymphocytes Absolute 0.6 K/CU MM    Monocytes Absolute 0.3 K/CU MM    Eosinophils Absolute 0.0 K/CU MM    Basophils Absolute 0.0 K/CU MM    Nucleated RBC % 0.0 %    Total Nucleated RBC 0.0 K/CU MM    Total Immature Neutrophil 0.15 K/CU MM    Immature Neutrophil % 1.6 (H) 0 - 0.43 %   Vancomycin, random    Collection Time: 08/05/20  5:20 AM   Result Value Ref Range    Vancomycin Rm 17.2 UG/ML    DOSE AMOUNT DOSE AMT.  GIVEN - =     DOSE TIME DOSE TIME GIVEN - =      CULTURE results: Invalid input(s): BLOOD CULTURE,  URINE CULTURE, SURGICAL CULTURE  Specimens:   ID Type Source Tests Collected by Time Destination   1 : Right Thigh Tissue Tissue CULTURE, SURGICAL Marylee Beckers, MD 8/4/2020 0932     2 : Right Calf Wound Tissue Tissue CULTURE, SURGICAL Marylee Beckers, MD 8/4/2020 5800     3 : Biopsy of Petechiae Right Lower Leg Tissue Tissue CULTURE, TISSUE Marylee Beckers, MD 8/4/2020 0326     A : Biopsy of Petechiae Right Lower Leg Tissue Tissue SURGICAL PATHOLOGY Marylee Beckers, MD 8/4/2020 4070        Diagnosis:  Patient Active Problem List   Diagnosis    CCC (chronic calculous cholecystitis)    Sepsis ([address]    Abscess of right thigh    MRSA bacteremia    Amphetamine user ([address]    Chronic hepatitis B ([address]    Chronic [facility] C without hepatic coma [address]    Acute septic pulmonary embolism without acute cor pulmonale (HCC)       Active Problems  Active Problems:    Sepsis (HCC)    Abscess of right thigh MRSA bacteremia    Amphetamine user ([facility]    Chronic hepatitis B ([facility]    Chronic hepatitis C without hepatic coma (HCC)    Acute septic pulmonary embolism without acute cor pulmonale (HCC)  Resolved Problems:    * No resolved hospital problems.  *      Impression and plan   Clinical status: Improving, pct and Cr are down,    Therapeutic:d/c meropenem, continue vancomycin    Diagnostic: echocardiogram, blood cx q 48h until negative    F/u: HIV screen, Tspot, PRP, CRP, tissue cx and histopathology    Other: monitor right hip effusion, check HBV DNA pcr    Summary:      Electronically signed by: Electronically signed by Qing Dickey MD on 8/5/2020 at 7:56 AM

## 2020-08-06 ENCOUNTER — APPOINTMENT (OUTPATIENT)
Dept: INTERVENTIONAL RADIOLOGY/VASCULAR | Age: 40
DRG: 710 | End: 2020-08-06
Payer: MEDICAID

## 2020-08-06 PROBLEM — I33.0 ENDOCARDITIS DUE TO STAPHYLOCOCCUS: Status: ACTIVE | Noted: 2020-08-06

## 2020-08-06 PROBLEM — B95.8 ENDOCARDITIS DUE TO STAPHYLOCOCCUS: Status: ACTIVE | Noted: 2020-08-06

## 2020-08-06 LAB
ANION GAP SERPL CALCULATED.3IONS-SCNC: 13 MMOL/L (ref 4–16)
APTT: 27.3 SECONDS (ref 25.1–37.1)
BANDED NEUTROPHILS ABSOLUTE COUNT: 1.18 K/CU MM
BANDED NEUTROPHILS RELATIVE PERCENT: 11 % (ref 5–11)
BUN BLDV-MCNC: 65 MG/DL (ref 6–23)
CALCIUM SERPL-MCNC: 8.5 MG/DL (ref 8.3–10.6)
CHLORIDE BLD-SCNC: 99 MMOL/L (ref 99–110)
CO2: 21 MMOL/L (ref 21–32)
CREAT SERPL-MCNC: 1.6 MG/DL (ref 0.6–1.1)
DIFFERENTIAL TYPE: ABNORMAL
DOSE AMOUNT: NORMAL
DOSE TIME: NORMAL
FERRITIN: 869 NG/ML (ref 15–150)
FOLATE: 6.5 NG/ML (ref 3.1–17.5)
GFR AFRICAN AMERICAN: 44 ML/MIN/1.73M2
GFR NON-AFRICAN AMERICAN: 36 ML/MIN/1.73M2
GLUCOSE BLD-MCNC: 96 MG/DL (ref 70–99)
HCT VFR BLD CALC: 25.6 % (ref 37–47)
HCT VFR BLD CALC: 25.7 % (ref 37–47)
HEMOGLOBIN: 8.4 GM/DL (ref 12.5–16)
HEMOGLOBIN: 8.4 GM/DL (ref 12.5–16)
HEPATITIS B SURFACE ANTIGEN: NON REACTIVE
HIGH SENSITIVE C-REACTIVE PROTEIN: 83.9 MG/L
INR BLD: 1.15 INDEX
IRON: 66 UG/DL (ref 37–145)
LYMPHOCYTES ABSOLUTE: 1.2 K/CU MM
LYMPHOCYTES RELATIVE PERCENT: 11 % (ref 24–44)
MCH RBC QN AUTO: 27.1 PG (ref 27–31)
MCH RBC QN AUTO: 27.3 PG (ref 27–31)
MCHC RBC AUTO-ENTMCNC: 32.7 % (ref 32–36)
MCHC RBC AUTO-ENTMCNC: 32.8 % (ref 32–36)
MCV RBC AUTO: 82.9 FL (ref 78–100)
MCV RBC AUTO: 83.1 FL (ref 78–100)
MONOCYTES ABSOLUTE: 1 K/CU MM
MONOCYTES RELATIVE PERCENT: 9 % (ref 0–4)
PCT TRANSFERRIN: 46 % (ref 10–44)
PDW BLD-RTO: 14.5 % (ref 11.7–14.9)
PDW BLD-RTO: 14.5 % (ref 11.7–14.9)
PLATELET # BLD: 196 K/CU MM (ref 140–440)
PLATELET # BLD: 206 K/CU MM (ref 140–440)
PLT MORPHOLOGY: ABNORMAL
PMV BLD AUTO: 10.2 FL (ref 7.5–11.1)
PMV BLD AUTO: 9.8 FL (ref 7.5–11.1)
POLYCHROMASIA: ABNORMAL
POTASSIUM SERPL-SCNC: 3.7 MMOL/L (ref 3.5–5.1)
PROTHROMBIN TIME: 13.9 SECONDS (ref 11.7–14.5)
RBC # BLD: 3.08 M/CU MM (ref 4.2–5.4)
RBC # BLD: 3.1 M/CU MM (ref 4.2–5.4)
RETICULOCYTE COUNT PCT: 1.3 % (ref 0.2–2.2)
SEGMENTED NEUTROPHILS ABSOLUTE COUNT: 7.3 K/CU MM
SEGMENTED NEUTROPHILS RELATIVE PERCENT: 69 % (ref 36–66)
SODIUM BLD-SCNC: 133 MMOL/L (ref 135–145)
TOTAL IRON BINDING CAPACITY: 144 UG/DL (ref 250–450)
TOXIC GRANULATION: PRESENT
UNSATURATED IRON BINDING CAPACITY: 78 UG/DL (ref 110–370)
VANCOMYCIN RANDOM: 23.8 UG/ML
VITAMIN B-12: >2000 PG/ML (ref 211–911)
WBC # BLD: 10.2 K/CU MM (ref 4–10.5)
WBC # BLD: 10.7 K/CU MM (ref 4–10.5)

## 2020-08-06 PROCEDURE — 2580000003 HC RX 258: Performed by: SURGERY

## 2020-08-06 PROCEDURE — 6360000002 HC RX W HCPCS: Performed by: SURGERY

## 2020-08-06 PROCEDURE — 93312 ECHO TRANSESOPHAGEAL: CPT | Performed by: INTERNAL MEDICINE

## 2020-08-06 PROCEDURE — 7100000000 HC PACU RECOVERY - FIRST 15 MIN

## 2020-08-06 PROCEDURE — 94761 N-INVAS EAR/PLS OXIMETRY MLT: CPT

## 2020-08-06 PROCEDURE — 86141 C-REACTIVE PROTEIN HS: CPT

## 2020-08-06 PROCEDURE — 85027 COMPLETE CBC AUTOMATED: CPT

## 2020-08-06 PROCEDURE — 87040 BLOOD CULTURE FOR BACTERIA: CPT

## 2020-08-06 PROCEDURE — 99233 SBSQ HOSP IP/OBS HIGH 50: CPT | Performed by: INTERNAL MEDICINE

## 2020-08-06 PROCEDURE — 6360000002 HC RX W HCPCS: Performed by: INTERNAL MEDICINE

## 2020-08-06 PROCEDURE — 80202 ASSAY OF VANCOMYCIN: CPT

## 2020-08-06 PROCEDURE — 82728 ASSAY OF FERRITIN: CPT

## 2020-08-06 PROCEDURE — 93312 ECHO TRANSESOPHAGEAL: CPT

## 2020-08-06 PROCEDURE — 2000000000 HC ICU R&B

## 2020-08-06 PROCEDURE — 99221 1ST HOSP IP/OBS SF/LOW 40: CPT | Performed by: INTERNAL MEDICINE

## 2020-08-06 PROCEDURE — 87340 HEPATITIS B SURFACE AG IA: CPT

## 2020-08-06 PROCEDURE — 2580000003 HC RX 258: Performed by: INTERNAL MEDICINE

## 2020-08-06 PROCEDURE — 93017 CV STRESS TEST TRACING ONLY: CPT

## 2020-08-06 PROCEDURE — 82607 VITAMIN B-12: CPT

## 2020-08-06 PROCEDURE — 85610 PROTHROMBIN TIME: CPT

## 2020-08-06 PROCEDURE — 85007 BL SMEAR W/DIFF WBC COUNT: CPT

## 2020-08-06 PROCEDURE — 85730 THROMBOPLASTIN TIME PARTIAL: CPT

## 2020-08-06 PROCEDURE — 6370000000 HC RX 637 (ALT 250 FOR IP): Performed by: SURGERY

## 2020-08-06 PROCEDURE — 83540 ASSAY OF IRON: CPT

## 2020-08-06 PROCEDURE — 82746 ASSAY OF FOLIC ACID SERUM: CPT

## 2020-08-06 PROCEDURE — 80048 BASIC METABOLIC PNL TOTAL CA: CPT

## 2020-08-06 PROCEDURE — 85025 COMPLETE CBC W/AUTO DIFF WBC: CPT

## 2020-08-06 PROCEDURE — 85045 AUTOMATED RETICULOCYTE COUNT: CPT

## 2020-08-06 PROCEDURE — 83550 IRON BINDING TEST: CPT

## 2020-08-06 PROCEDURE — 7100000001 HC PACU RECOVERY - ADDTL 15 MIN

## 2020-08-06 RX ORDER — MORPHINE SULFATE 2 MG/ML
2 INJECTION, SOLUTION INTRAMUSCULAR; INTRAVENOUS ONCE
Status: COMPLETED | OUTPATIENT
Start: 2020-08-06 | End: 2020-08-06

## 2020-08-06 RX ADMIN — TRAMADOL HYDROCHLORIDE 25 MG: 50 TABLET, FILM COATED ORAL at 18:34

## 2020-08-06 RX ADMIN — MORPHINE SULFATE 2 MG: 2 INJECTION, SOLUTION INTRAMUSCULAR; INTRAVENOUS at 16:51

## 2020-08-06 RX ADMIN — MORPHINE SULFATE 2 MG: 2 INJECTION, SOLUTION INTRAMUSCULAR; INTRAVENOUS at 08:31

## 2020-08-06 RX ADMIN — CLONIDINE HYDROCHLORIDE 0.1 MG: 0.1 TABLET ORAL at 03:35

## 2020-08-06 RX ADMIN — HEPARIN SODIUM 5000 UNITS: 5000 INJECTION, SOLUTION INTRAVENOUS; SUBCUTANEOUS at 05:26

## 2020-08-06 RX ADMIN — MORPHINE SULFATE 2 MG: 2 INJECTION, SOLUTION INTRAMUSCULAR; INTRAVENOUS at 11:49

## 2020-08-06 RX ADMIN — MORPHINE SULFATE 2 MG: 2 INJECTION, SOLUTION INTRAMUSCULAR; INTRAVENOUS at 21:15

## 2020-08-06 RX ADMIN — POTASSIUM CHLORIDE AND SODIUM CHLORIDE: 900; 150 INJECTION, SOLUTION INTRAVENOUS at 21:20

## 2020-08-06 RX ADMIN — VANCOMYCIN HYDROCHLORIDE 1500 MG: 5 INJECTION, POWDER, LYOPHILIZED, FOR SOLUTION INTRAVENOUS at 14:53

## 2020-08-06 RX ADMIN — SODIUM CHLORIDE, PRESERVATIVE FREE 10 ML: 5 INJECTION INTRAVENOUS at 21:23

## 2020-08-06 RX ADMIN — MORPHINE SULFATE 2 MG: 2 INJECTION, SOLUTION INTRAMUSCULAR; INTRAVENOUS at 01:25

## 2020-08-06 RX ADMIN — HEPARIN SODIUM 5000 UNITS: 5000 INJECTION, SOLUTION INTRAVENOUS; SUBCUTANEOUS at 21:24

## 2020-08-06 RX ADMIN — TRAMADOL HYDROCHLORIDE 25 MG: 50 TABLET, FILM COATED ORAL at 03:35

## 2020-08-06 RX ADMIN — HEPARIN SODIUM 5000 UNITS: 5000 INJECTION, SOLUTION INTRAVENOUS; SUBCUTANEOUS at 14:53

## 2020-08-06 RX ADMIN — MORPHINE SULFATE 2 MG: 2 INJECTION, SOLUTION INTRAMUSCULAR; INTRAVENOUS at 05:27

## 2020-08-06 RX ADMIN — CEFEPIME HYDROCHLORIDE 2 G: 2 INJECTION, POWDER, FOR SOLUTION INTRAVENOUS at 20:00

## 2020-08-06 RX ADMIN — COLLAGENASE SANTYL: 250 OINTMENT TOPICAL at 12:06

## 2020-08-06 RX ADMIN — CEFEPIME HYDROCHLORIDE 2 G: 2 INJECTION, POWDER, FOR SOLUTION INTRAVENOUS at 08:35

## 2020-08-06 RX ADMIN — POTASSIUM CHLORIDE AND SODIUM CHLORIDE: 900; 150 INJECTION, SOLUTION INTRAVENOUS at 05:38

## 2020-08-06 RX ADMIN — CLONIDINE HYDROCHLORIDE 0.1 MG: 0.1 TABLET ORAL at 18:34

## 2020-08-06 RX ADMIN — SODIUM CHLORIDE, PRESERVATIVE FREE 10 ML: 5 INJECTION INTRAVENOUS at 08:34

## 2020-08-06 ASSESSMENT — PAIN DESCRIPTION - LOCATION
LOCATION: LEG
LOCATION: LEG
LOCATION: BACK;CHEST;LEG

## 2020-08-06 ASSESSMENT — PAIN SCALES - GENERAL
PAINLEVEL_OUTOF10: 7
PAINLEVEL_OUTOF10: 7
PAINLEVEL_OUTOF10: 2
PAINLEVEL_OUTOF10: 8
PAINLEVEL_OUTOF10: 10
PAINLEVEL_OUTOF10: 9
PAINLEVEL_OUTOF10: 7
PAINLEVEL_OUTOF10: 8
PAINLEVEL_OUTOF10: 7
PAINLEVEL_OUTOF10: 4

## 2020-08-06 ASSESSMENT — PAIN - FUNCTIONAL ASSESSMENT
PAIN_FUNCTIONAL_ASSESSMENT: PREVENTS OR INTERFERES SOME ACTIVE ACTIVITIES AND ADLS
PAIN_FUNCTIONAL_ASSESSMENT: PREVENTS OR INTERFERES SOME ACTIVE ACTIVITIES AND ADLS

## 2020-08-06 ASSESSMENT — PAIN DESCRIPTION - PAIN TYPE
TYPE: ACUTE PAIN;SURGICAL PAIN

## 2020-08-06 ASSESSMENT — PAIN DESCRIPTION - PROGRESSION
CLINICAL_PROGRESSION: GRADUALLY WORSENING
CLINICAL_PROGRESSION: NOT CHANGED

## 2020-08-06 ASSESSMENT — PAIN DESCRIPTION - FREQUENCY
FREQUENCY: CONTINUOUS

## 2020-08-06 ASSESSMENT — PAIN DESCRIPTION - DESCRIPTORS: DESCRIPTORS: ACHING;CRAMPING;SHARP

## 2020-08-06 ASSESSMENT — PAIN DESCRIPTION - ORIENTATION
ORIENTATION: RIGHT;LEFT
ORIENTATION: RIGHT;LEFT

## 2020-08-06 ASSESSMENT — PAIN DESCRIPTION - ONSET
ONSET: ON-GOING
ONSET: ON-GOING

## 2020-08-06 NOTE — PROGRESS NOTES
[address] [facility]  consulted by Dr. Gilberto Chiu for monitoring and adjustment. Indication for treatment: MRSA bacteremia 2/2 leg abscess, r/o endocarditis   Goal trough: 15 mcg/mL     Pertinent Laboratory Values:   Temp Readings from Last 3 Encounters:   08/06/20 97.8 °F (36.6 °C) (Oral)   08/04/20 96.3 °F (35.7 °C)   06/01/20 98.1 °F (36.7 °C) (Oral)     Recent Labs     08/04/20  0100 08/04/20  0116 08/05/20  0520 08/06/20  0500   WBC 10.5  --  9.3 10.7*   LACTATE  --  1.7  --   --      Recent Labs     08/05/20  0010 08/05/20  0520 08/06/20  0500   BUN 66* 67* 65*   CREATININE 2.6* 2.5* 1.6*     Estimated Creatinine Clearance: 51 mL/min (A) (based on SCr of 1.6 mg/dL (H)). Intake/Output Summary (Last 24 hours) at 8/6/2020 0944  Last data filed at 8/5/2020 1845  Gross per 24 hour   Intake 1407.5 ml   Output 1100 ml   Net 307.5 ml       Pertinent Cultures:  Date    Source    Results  8/4   Surgical abscess cx  Staph aureus, pseudomonas, e coli  8/4   Surgical abscess cx  MRSA    Vancomycin level:   TROUGH:  No results for input(s): VANCOTROUGH in the last 72 hours. RANDOM:    Recent Labs     08/05/20  0520 08/06/20  0500   VANCORANDOM 17.2 23.8       Assessment:  · WBC and temperature: WBC slightly elevated, afebrile  · SCr, BUN, and urine output: LISBETH improving   · Day(s) of therapy: 2   · Vancomycin level:   · 8/6: 23.8, 13h post-dose, extrapolated level at 24h expected to be <15      Plan:  · Intermittent dosing based on levels 2/2 LISBETH   · Vancomycin 1500 mg x 1   · Random level tomorrow 24h post-dose, anticipate renal function recovery   · Pharmacy will continue to monitor patient and adjust therapy as indicated    Sy 8/7 @1400    Thank you for the consult.   Andree Dawson RPh  8/6/2020 9:44 AM

## 2020-08-06 NOTE — PROGRESS NOTES
Infectious Disease Progress Note  2020   Patient Name: Magalys Singh : 1980       Reason for visit: F/u ? History:? Interval history noted. S/p debridement of right upper thigh, right lower leg, left lower leg abscesses, incisional biopsy of petechial rash of lower leg on 2020. Wound culture was polymicrobial: MRSA, pseudomonas aeruginosa, E. coli. Blood cultures remain mono microbial: MRSA  Denies n/v/d/f or untoward effects of antibiotics  Physical Exam:  Vital Signs: /81   Pulse 109   Temp 98.3 °F (36.8 °C) (Oral)   Resp (!) 37   Ht 5' 5\" (1.651 m)   Wt 188 lb 4.4 oz (85.4 kg)   SpO2 96%   BMI 31.33 kg/m²     Gen: alert and oriented X3,  Skin: Bilateral leg non-blanchable petechiae  Wounds: Right lateral thigh, left medial leg C/D/I  HEMT: AT/NC Oropharynx pink, moist, and without lesions or exudates; dentition in good state of repair  Eyes: PERRLA, EOMI, conjunctiva pink, sclera anicteric. Neck: Supple. Trachea midline. No LAD. Chest: no distress and CTA. Good air movement. Heart: RRR and no MRG. Abd: soft, non-distended, no tenderness, no hepatomegaly. Normoactive bowel sounds. Ext: no clubbing, cyanosis, or edema  Catheter Site: without erythema or tenderness  Neuro: Mental status intact. CN 2-12 intact and no focal sensory or motor deficits     Radiologic / Imaging / TESTING  CT Right femur  1. Ulceration in the posterolateral proximal right thigh with minimal adjacent cellulitis. No associated findings of abscess, necrotizing fasciitis, myositis, or osteomyelitis. 2. Patchy stranding deep to the skin in the lateral, anterior, in the medial right thigh potentially related to additional injection sites. 3. Moderate right hip joint effusion potentially related to mild to moderate osteoarthritic changes of the right hip. RAFI 2020  Summary    Central line catheter tip visualized terminating within the right atrium.     Vegetation noted above the tricuspid valve Pseudomonas aeruginosa (3)     Antibiotic  Interpretation  RICCARDO  Status     cefepime  Sensitive  <=2  Preliminary     ciprofloxacin  Sensitive  <=1  Preliminary     gentamicin  Sensitive  <=4  Preliminary     meropenem  Sensitive  <=1  Preliminary     piperacillin-tazobactam  Sensitive  <=16  Preliminary     tobramycin  Sensitive  <=4  Preliminary     Escherichia coli (4)     Antibiotic  Interpretation  RICCARDO  Status     ampicillin  Sensitive  <=8  Preliminary     ceFAZolin  Sensitive  <=2  Preliminary     cefepime  Sensitive  <=2  Preliminary     cefuroxime  Sensitive  8  Preliminary     ciprofloxacin  Sensitive  <=1  Preliminary     ertapenem  Sensitive  <=0.5  Preliminary     gentamicin  Sensitive  <=4  Preliminary     meropenem  Sensitive  <=1  Preliminary     piperacillin-tazobactam  Sensitive  <=16  Preliminary     trimethoprim-sulfamethoxazole  Sensitive  <=2/38  Preliminary         Diagnosis:  Patient Active Problem List   Diagnosis    CCC (chronic calculous cholecystitis)    Sepsis (HCC)    Abscess of right thigh    MRSA bacteremia    Amphetamine user ([address]    Chronic hepatitis B ([address]    Chronic hepatitis C without hepatic coma (HCC)    Acute septic pulmonary embolism without acute cor pulmonale (HCC)    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  *      Impression and plan   Clinical status: Improving, pct and Cr are down,    Therapeutic: continue vancomycin and cefepime   Diagnostic:  blood cx q 48h until negative: 8/6/2020, hepatitis B surface antigen   F/u:  Tspot, CRP, tissue cx and histopathology    Other: HIV and RPR screens are

## 2020-08-06 NOTE — PROGRESS NOTES
hospitalist Progress Note         Admit Date: 8/4/2020    PCP: Dorys Valdez DO     Chief Complaint   Patient presents with   [facility] Wound Check     R upper thigh         Assessment and Plan:      Sepsis (HCC)Abscess of right thigh/MRSA bacteremia   Continue vancomycin. S/p I&D bedside. RAFI showing large vegetation above tricuspid valve   Hyponatremia/acute kidney injury improving creatinine with IVF. Nephrology following.  Anemia likely due to anemia of chronic disease. Anemia panel confirming AOCD   Current Hb 8.4 which could be her baseline. No evidence of acute blood loss.  Polysubstance drug abuse including heroine IV counseling provided. On Ultram, clonidine, Restoril to prevent withdrawal symptoms. Checking for RPR, HIV, chlamydia and gonorrhea. ID on board   Chronic hepatitis B ([facility]   Chronic hepatitis C without hepatic coma (HCC)   Acute septic pulmonary embolism without acute cor pulmonale (HCC)  Tobacco abuse smoking cessation counseling provided. On NicoDerm patch        VTE prophylaxis LMWH.     Current Facility-Administered Medications   Medication Dose Route Frequency Provider Last Rate Last Dose    cefepime (MAXIPIME) 2 g IVPB minibag  2 g Intravenous Q12H Martha Santiago MD   Stopped at 08/06/20 0910    vancomycin (VANCOCIN) 1,500 mg in dextrose 5 % 500 mL IVPB  1,500 mg Intravenous Once Martha Santiago  mL/hr at 08/06/20 1453 1,500 mg at 08/06/20 1453    collagenase ointment   Topical Daily Frederico Ormond, MD        sodium chloride flush 0.9 % injection 10 mL  10 mL Intravenous 2 times per day Frederico Ormond, MD   10 mL at 08/06/20 0834    sodium chloride flush 0.9 % injection 10 mL  10 mL Intravenous PRN Frederico Ormond, MD        acetaminophen (TYLENOL) tablet 650 mg  650 mg Oral Q6H PRN Frederico Ormond, MD        Or   [facility] acetaminophen (TYLENOL) suppository 650 mg  650 mg Rectal Q6H PRN Frederico Ormond, MD        polyethylene glycol (GLYCOLAX) packet 17 g  17 g Oral Daily PRN Oriana Hill MD        nicotine (NICODERM CQ) 21 MG/24HR 1 patch  1 patch Transdermal Daily Oriana Hill MD   1 patch at 08/06/20 4856    heparin (porcine) injection 5,000 Units  5,000 Units Subcutaneous 3 times per day Oriana Hill MD   5,000 Units at 08/06/20 1453    morphine (PF) injection 2 mg  2 mg Intravenous Q4H PRN Oriana Hill MD   2 mg at 08/06/20 1149    traMADol (ULTRAM) tablet 25 mg  25 mg Oral PRN Oriana Hill MD   25 mg at 08/06/20 0335    And    cloNIDine (CATAPRES) tablet 0.1 mg  0.1 mg Oral PRN Oriana Hill MD   0.1 mg at 08/06/20 0335    traZODone (DESYREL) tablet 50 mg  50 mg Oral Nightly PRN Oriana Hill MD   50 mg at 08/04/20 2033    promethazine (PHENERGAN) tablet 12.5 mg  12.5 mg Oral Q6H PRN Oriana Hill MD        Or    promethazine (PHENERGAN) injection 6.25 mg  6.25 mg Intravenous Q6H PRN Oriana Hill MD   6.25 mg at 08/04/20 1422    vancomycin (VANCOCIN) intermittent dosing (placeholder)   Other RX Kristina Winter MD   Stopped at 08/04/20 1233    0.9% NaCl with KCl 20 mEq infusion   Intravenous Continuous Dolores Castro DO 75 mL/hr at 08/06/20 3613         Subjective:     Patient reports generalized body aches and pains. Reports withdrawal symptoms of heroin. N.p.o. for RAFI    Objective:        Intake/Output Summary (Last 24 hours) at 8/6/2020 1504  Last data filed at 8/6/2020 1207  Gross per 24 hour   Intake 1407.5 ml   Output 2300 ml   Net -892.5 ml      Vitals:   Vitals:    08/06/20 1400   BP: 113/78   Pulse: 102   Resp: 22   Temp:    SpO2:      Physical Exam:  General Appearance:    Alert, cooperative, moderate distress due to pain  Head:      Normocephalic, without obvious abnormality, atraumatic  Eyes:       Conjunctiva/corneas clear, EOM's intact  Lungs:    Clear to auscultation bilaterally, respirations unlabored  Heart:                Regular rate and rhythm, S1 and S2 normal, no murmur,   rub or gallop  Abdomen:     Soft, non-tender, bowel sounds active, no masses, no organomegaly  Extremities:   Extremities normal, atraumatic, no cyanosis or edema  Neurological:   Grossly Intact. Skin                    erythematous rash involving B/L legs up to ankles    Significant Diagnostic Studies:   DATA:    CBC   Recent Labs     08/05/20  0520 08/06/20  0500 08/06/20  1015   WBC 9.3 10.7* 10.2   HGB 7.9* 8.4* 8.4*   HCT 23.9* 25.6* 25.7*    206 196      BMP   Recent Labs     08/04/20  0252  08/05/20  0010 08/05/20  0520 08/05/20  0952 08/06/20  0500   *   < > 130* 132* 131* 133*   K 3.2*   < > 3.5 3.7 3.5 3.7   CL 84*   < > 93* 95* 95* 99   CO2 19*   < > 21 20* 19* 21   PHOS 3.7  --   --   --   --   --    BUN 73*   < > 66* 67*  --  65*   CREATININE 4.0*   < > 2.6* 2.5*  --  1.6*    < > = values in this interval not displayed.      LFT'S   Recent Labs     08/04/20  0100 08/05/20  0520   AST 28 11*   ALT 32 17   BILITOT 0.7 0.5   ALKPHOS 127 83     COAG   Recent Labs     08/04/20  0115 08/06/20  1015   INR 1.32 1.15     POC: No results found for: POCGLU  [id number]:No results found for: [address]     08/04/20  0245   CKTOTAL 83     Troponin:   Recent Labs     08/04/20  0255   TROPONINT <0.010     BNP:   Recent Labs     08/04/20  0422   PROBNP 3,359*     U/A:    Lab Results   Component Value Date    COLORU YELLOW 11/17/2019    WBCUA 3 11/17/2019    RBCUA 3 11/17/2019    MUCUS RARE 11/17/2019    BACTERIA RARE 11/17/2019    CLARITYU CLEAR 11/17/2019    SPECGRAV 1.009 11/17/2019    LEUKOCYTESUR TRACE 11/17/2019    BLOODU NEGATIVE 11/17/2019    GLUCOSEU neg 06/20/2013    GLUCOSEU NEGATIVE 06/20/2013       Ct Abdomen Pelvis Wo Contrast Additional Contrast? None    Result Date: 8/4/2020  EXAMINATION: CT OF THE ABDOMEN AND PELVIS WITHOUT CONTRAST 8/4/2020 8:08 am TECHNIQUE: Exam: Post right IJ placement Acuity: Acute Type of Exam: Initial FINDINGS: Right internal jugular approach central venous catheter is noted with distal tip located within the SVC near the superior atrial caval junction. The heart is normal in size and configuration. The mediastinal contours are within normal limits. The pulmonary arterial is are prominent and indistinct diffusely. Multifocal mild bilateral lung consolidation is identified. The pleural surfaces are normal and no evidence of a pleural effusion is seen. Bones and soft tissues are unremarkable. Moderate pulmonary interstitial edema with suggestion of multifocal bilateral lung overlying alveolar edema. Differential diagnostic considerations include overlying pneumonia. Ct Femur Right Wo Contrast    Result Date: 8/4/2020  EXAMINATION: CT OF THE RIGHT FEMUR WITH CONTRAST 8/4/2020 2:10 am TECHNIQUE: CT of the right femur was performed with the administration of intravenous contrast.  Multiplanar reformatted images are provided for review. Dose modulation, iterative reconstruction, and/or weight based adjustment of the mA/kV was utilized to reduce the radiation dose to as low as reasonably achievable. COMPARISON: Abdomen and pelvis CT 11/17/2019, pelvis radiograph 09/02/2009 HISTORY ORDERING SYSTEM PROVIDED HISTORY: Concern for necrotizing soft tissue infection of posterior lateral thigh, renal failure cannot tolerate contrast TECHNOLOGIST PROVIDED HISTORY: Reason for exam:->Concern for necrotizing soft tissue infection of posterior lateral thigh, renal failure cannot tolerate contrast Is the patient pregnant?->No Reason for Exam: concern for necrotizing infection, renal failure FINDINGS: BONES:  No acute fractures nor suspicious osseous lesions. No areas of abnormal cortical disruption. JOINTS:  Joints maintain anatomic alignment. Mild to moderate degenerative changes of the hip and pubic symphysis.   Moderate hip and trace knee joint effusions. SOFT TISSUES:  Surgically absent uterus. Nonenlarged inguinal lymph nodes, potentially reactive. Ulceration in the posterolateral proximal thigh with minimal adjacent subcutaneous stranding. No associated loculated fluid. Patchy stranding in the subcutaneous tissues just deep to the skin elsewhere in the lateral, anterior, and medial thigh. No loculated fluid nor soft tissue gas. Normal appearance of muscle and fasciae. 1. Ulceration in the posterolateral proximal right thigh with minimal adjacent cellulitis. No associated findings of abscess, necrotizing fasciitis, myositis, or osteomyelitis. 2. Patchy stranding deep to the skin in the lateral, anterior, in the medial right thigh potentially related to additional injection sites. 3. Moderate right hip joint effusion potentially related to mild to moderate osteoarthritic changes of the right hip.            [facility]ist

## 2020-08-06 NOTE — CONSULTS
Chart reviewed  Full note to follow                      Name:  Tahira Waggoner /Age/Sex: 1980  (47 y.o. female)   MRN & CSN:  [id number] & [id number] Admission Date/Time: 2020 12:42 AM   Location:  -A PCP: Dipak Nugent Day: 3          Referring physician:  Maribel Knox MD         Reason for consultation:  RAFI        Thanks for referral.    Information source: patient    CC;  BLE swelling      HPI:   Thank you for involving me in taking  care of Tahira Waggoner who  is a 47 y. o.year  Old female  Presents with  history of IV drug use, hepatitis B/C and nicotine dependence. She presented to the ER with complaints of worsening right thigh wound with bilateral lower extremity edema and redness. Patient stated that she injected heroin on her right thigh about a week ago. She started noticing pain on the right thigh which progressed to her bilateral lower extremities. The patient reported the pain got worse today and she started having ascending redness on her BLE. She described the pain as constant, stabbing and rated it at 9/10. Has pos Cx , RAFI requested. Past medical history:    has a past medical history of Liver disease and No significant medical problems. Past surgical history:   has a past surgical history that includes  section (, ); Hysterectomy (); Cholecystectomy; and other surgical history (2013). Social History:   reports that she has been smoking cigarettes. She has a 6.00 pack-year smoking history. She has never used smokeless tobacco. She reports current drug use. Drugs: IV, Cocaine, and Opiates . She reports that she does not drink alcohol. Family history:  family history includes COPD in her paternal grandmother; Dementia in her paternal grandfather; Depression in her son; Mental Illness in her maternal aunt and son; Obesity in her brother.     No Known Allergies    sodium chloride flush 0.9 % injection 10 mL, 2 times per day  sodium chloride flush 0.9 % injection 10 mL, PRN  acetaminophen (TYLENOL) tablet 650 mg, Q6H PRN    Or  acetaminophen (TYLENOL) suppository 650 mg, Q6H PRN  polyethylene glycol (GLYCOLAX) packet 17 g, Daily PRN  nicotine (NICODERM CQ) 21 MG/24HR 1 patch, Daily  heparin (porcine) injection 5,000 Units, 3 times per day  morphine (PF) injection 2 mg, Q4H PRN  traMADol (ULTRAM) tablet 25 mg, PRN    And  cloNIDine (CATAPRES) tablet 0.1 mg, PRN  traZODone (DESYREL) tablet 50 mg, Nightly PRN  promethazine (PHENERGAN) tablet 12.5 mg, Q6H PRN    Or  promethazine (PHENERGAN) injection 6.25 mg, Q6H PRN  vancomycin (VANCOCIN) intermittent dosing (placeholder), RX Placeholder  0.9% NaCl with KCl 20 mEq infusion, Continuous      Current Facility-Administered Medications   Medication Dose Route Frequency Provider Last Rate Last Dose    sodium chloride flush 0.9 % injection 10 mL  10 mL Intravenous 2 times per day Radha Tapia MD   10 mL at 08/05/20 2130    sodium chloride flush 0.9 % injection 10 mL  10 mL Intravenous PRN Radha Tapia MD        acetaminophen (TYLENOL) tablet 650 mg  650 mg Oral Q6H PRN Radha Tapia MD        Or    acetaminophen (TYLENOL) suppository 650 mg  650 mg Rectal Q6H PRN Radha Tapia MD        polyethylene glycol (GLYCOLAX) packet 17 g  17 g Oral Daily PRN Radha Tapia MD        nicotine (NICODERM CQ) 21 MG/24HR 1 patch  1 patch Transdermal Daily aRdha Tapia MD   1 patch at 08/05/20 0844    heparin (porcine) injection 5,000 Units  5,000 Units Subcutaneous 3 times per day Radha Tapia MD   5,000 Units at 08/06/20 0526    morphine (PF) injection 2 mg  2 mg Intravenous Q4H PRN Radha Tapia MD   2 mg at 08/06/20 4545    traMADol (ULTRAM) tablet 25 mg  25 mg Oral PRN Radha Tapia MD   25 mg at 08/06/20 0335    And    cloNIDine (CATAPRES) tablet 0.1 mg  0.1 mg Oral PRN 08/05/20  0952   *   < > 132* 131*   K 3.2*   < > 3.7 3.5   CL 84*   < > 95* 95*   CO2 19*   < > 20* 19*   PHOS 3.7  --   --   --    BUN 73*   < > 67*  --    CREATININE 4.0*   < > 2.5*  --     < > = values in this interval not displayed. Recent Labs     08/05/20  0520   AST 11*   ALT 17   BILITOT 0.5   ALKPHOS 83     No results for input(s): TROPONINI in the last 72 hours. No results found for: BNP  Lab Results   Component Value Date    INR 1.32 08/04/2020    PROTIME 16.0 (H) 08/04/2020           Assessment/Recommendations:     - ?  IE , pos Bld Cx: RAFI  - Drug abuse         Hola Ramirez MD, 8/6/2020 6:26 AM

## 2020-08-06 NOTE — OP NOTE
LARGE VEG IN RA  NORMAL LVEF  Asa 2  mallapati 2
8/4/2020 1187    3 : Biopsy of Petechiae Right Lower Leg Tissue Tissue CULTURE, TISSUE Dony Schmidt MD 8/4/2020 7897    A : Biopsy of Petechiae Right Lower Leg Tissue Tissue SURGICAL PATHOLOGY Dony Schmidt MD 8/4/2020 6980        Implants:  * No implants in log *      Drains: * No LDAs found *    Findings: Purulent material in abscesses    Electronically signed by Dony Schmidt MD on 8/4/2020 at 9:56 AM

## 2020-08-06 NOTE — PROGRESS NOTES
Nephrology Progress Note        [address], [address] [address]  Phone: [phone]  Office Hours: 8:30AM - 4:30PM  Monday - Friday       [facility]  Marcy Shone, MD Margueritte Clubs, DO  [facility] [address],  [place]  [facility]  PHONE: [phone]  FAX: [phone]    8/6/2020 7:07 AM  Subjective:   Admit Date: 8/4/2020  PCP: Merna Olivo DO  Interval History: nonoliguric  Doing ok    Diet: Diet NPO Effective Now      Data:   Scheduled Meds:   sodium chloride flush  10 mL Intravenous 2 times per day    nicotine  1 patch Transdermal Daily    heparin (porcine)  5,000 Units Subcutaneous 3 times per day    vancomycin (VANCOCIN) intermittent dosing (placeholder)   Other RX Placeholder     Continuous Infusions:   0.9% NaCl with KCl 20 mEq 75 mL/hr at 08/06/20 0538     PRN Meds:sodium chloride flush, acetaminophen **OR** acetaminophen, polyethylene glycol, morphine, traMADol **AND** cloNIDine, traZODone, promethazine **OR** promethazine  I/O last 3 completed shifts: In: 1647.5 [P.O.:240; I.V.:907.5; IV Piggyback:500]  Out: 1100 [Urine:1100]  No intake/output data recorded. Intake/Output Summary (Last 24 hours) at 8/6/2020 0707  Last data filed at 8/5/2020 1845  Gross per 24 hour   Intake 1647.5 ml   Output 1100 ml   Net 547.5 ml       CBC:   Recent Labs     08/04/20  0100 08/05/20  0520 08/06/20  0500   WBC 10.5 9.3 10.7*   HGB 10.1* 7.9* 8.4*    170 206       BMP:    Recent Labs     08/04/20  2040 08/05/20  0010 08/05/20  0520 08/05/20  0952   * 130* 132* 131*   K 3.4* 3.5 3.7 3.5   CL 89* 93* 95* 95*   CO2 18* 21 20* 19*   BUN 64* 66* 67*  --    CREATININE 2.8* 2.6* 2.5*  --    GLUCOSE 126* 136* 119*  --      Hepatic:   Recent Labs     08/04/20  0100 08/05/20  0520   AST 28 11*   ALT 32 17   BILITOT 0.7 0.5   ALKPHOS 127 83     Troponin: No results for input(s): TROPONINI in the last 72 hours.   BNP: No results for input(s): BNP in the last 72 hours. Lipids: No results for input(s): CHOL, HDL in the last 72 hours.     Invalid input(s): LDLCALCU  ABGs: No results found for: PHART, PO2ART, ANE3LXI  INR:   Recent Labs     08/04/20  0115   INR 1.32       Objective:   Vitals: /83   Pulse 95   Temp 97.8 °F (36.6 °C) (Tympanic)   Resp 20   Ht 5' 5\" (1.651 m)   Wt 188 lb 4.4 oz (85.4 kg)   SpO2 95%   BMI 31.33 kg/m²   General appearance: , in no acute distress  HEENT: normocephalic, atraumatic,   Neck: supple, trachea midline  Lungs:, breathing comfortably   Heart[de-identified] regular rate and rhythm  Abdomen: ; non distended,  Extremities: trace edema      Assessment and Plan:       Patient Active Problem List   Diagnosis    CCC (chronic calculous cholecystitis)    Sepsis (HCC)      - LISBETH from volume depletion : cr 4 on admission from baseline of 0.9////ct a/p shows no HN  - Hyponatremia from volume depletion: sodium 119 on admission  - Non anion gap metabolic acidosis; negative salicylate and acetaminophen levels  - Sepsis   - Substance abuse: Amphetamine positive UDS  - Right thigh infection  - elevated probnp, suspected pulm edema  - staph bacteremia and thigh infection     Suggest  Cr 2.5  Continue current ivf for now  Avoid nephrotoxins and renally dose meds  Critically ill  Will monitor volume status,                                        Electronically signed by Michael Bose DO on 8/6/2020 at 7:07 AM    MD Sandy Graham DO [place] [address] [place] [address] [address]  PHONE: [phone]  FAX: [phone]

## 2020-08-07 ENCOUNTER — APPOINTMENT (OUTPATIENT)
Dept: INTERVENTIONAL RADIOLOGY/VASCULAR | Age: 40
DRG: 710 | End: 2020-08-07
Payer: MEDICAID

## 2020-08-07 PROBLEM — B18.1 CHRONIC HEPATITIS B (HCC): Status: RESOLVED | Noted: 2020-08-05 | Resolved: 2020-08-07

## 2020-08-07 LAB
ANION GAP SERPL CALCULATED.3IONS-SCNC: 11 MMOL/L (ref 4–16)
BUN BLDV-MCNC: 28 MG/DL (ref 6–23)
CALCIUM SERPL-MCNC: 8.2 MG/DL (ref 8.3–10.6)
CHLORIDE BLD-SCNC: 99 MMOL/L (ref 99–110)
CO2: 22 MMOL/L (ref 21–32)
CREAT SERPL-MCNC: 0.7 MG/DL (ref 0.6–1.1)
CULTURE: ABNORMAL
DOSE AMOUNT: ABNORMAL
DOSE AMOUNT: NORMAL
DOSE TIME: ABNORMAL
DOSE TIME: NORMAL
GFR AFRICAN AMERICAN: >60 ML/MIN/1.73M2
GFR NON-AFRICAN AMERICAN: >60 ML/MIN/1.73M2
GLUCOSE BLD-MCNC: 99 MG/DL (ref 70–99)
GRAM SMEAR: ABNORMAL
HEPATITIS B SURFACE ANTIGEN: NON REACTIVE
HIGH SENSITIVE C-REACTIVE PROTEIN: 114.9 MG/L
Lab: ABNORMAL
POTASSIUM SERPL-SCNC: 4.2 MMOL/L (ref 3.5–5.1)
PROCALCITONIN: 1.32
SODIUM BLD-SCNC: 132 MMOL/L (ref 135–145)
SPECIMEN: ABNORMAL
VANCOMYCIN RANDOM: 15.8 UG/ML
VANCOMYCIN TROUGH: 6.5 UG/ML (ref 10–20)

## 2020-08-07 PROCEDURE — 6360000002 HC RX W HCPCS: Performed by: SURGERY

## 2020-08-07 PROCEDURE — 36592 COLLECT BLOOD FROM PICC: CPT

## 2020-08-07 PROCEDURE — 86141 C-REACTIVE PROTEIN HS: CPT

## 2020-08-07 PROCEDURE — 0S993ZX DRAINAGE OF RIGHT HIP JOINT, PERCUTANEOUS APPROACH, DIAGNOSTIC: ICD-10-PCS | Performed by: RADIOLOGY

## 2020-08-07 PROCEDURE — 20610 DRAIN/INJ JOINT/BURSA W/O US: CPT

## 2020-08-07 PROCEDURE — 6370000000 HC RX 637 (ALT 250 FOR IP): Performed by: SURGERY

## 2020-08-07 PROCEDURE — 87075 CULTR BACTERIA EXCEPT BLOOD: CPT

## 2020-08-07 PROCEDURE — 80048 BASIC METABOLIC PNL TOTAL CA: CPT

## 2020-08-07 PROCEDURE — 2580000003 HC RX 258: Performed by: INTERNAL MEDICINE

## 2020-08-07 PROCEDURE — 99232 SBSQ HOSP IP/OBS MODERATE 35: CPT | Performed by: INTERNAL MEDICINE

## 2020-08-07 PROCEDURE — 2000000000 HC ICU R&B

## 2020-08-07 PROCEDURE — 2709999900 HC NON-CHARGEABLE SUPPLY

## 2020-08-07 PROCEDURE — 77002 NEEDLE LOCALIZATION BY XRAY: CPT

## 2020-08-07 PROCEDURE — 2580000003 HC RX 258: Performed by: SURGERY

## 2020-08-07 PROCEDURE — 87340 HEPATITIS B SURFACE AG IA: CPT

## 2020-08-07 PROCEDURE — 87070 CULTURE OTHR SPECIMN AEROBIC: CPT

## 2020-08-07 PROCEDURE — 80202 ASSAY OF VANCOMYCIN: CPT

## 2020-08-07 PROCEDURE — 6360000002 HC RX W HCPCS: Performed by: INTERNAL MEDICINE

## 2020-08-07 PROCEDURE — 84145 PROCALCITONIN (PCT): CPT

## 2020-08-07 PROCEDURE — 6370000000 HC RX 637 (ALT 250 FOR IP): Performed by: INTERNAL MEDICINE

## 2020-08-07 PROCEDURE — 94761 N-INVAS EAR/PLS OXIMETRY MLT: CPT

## 2020-08-07 PROCEDURE — 87205 SMEAR GRAM STAIN: CPT

## 2020-08-07 RX ORDER — HYDROCODONE BITARTRATE AND ACETAMINOPHEN 5; 325 MG/1; MG/1
1 TABLET ORAL EVERY 6 HOURS PRN
Status: DISCONTINUED | OUTPATIENT
Start: 2020-08-07 | End: 2020-08-09

## 2020-08-07 RX ADMIN — HEPARIN SODIUM 5000 UNITS: 5000 INJECTION, SOLUTION INTRAVENOUS; SUBCUTANEOUS at 20:53

## 2020-08-07 RX ADMIN — TRAMADOL HYDROCHLORIDE 25 MG: 50 TABLET, FILM COATED ORAL at 00:30

## 2020-08-07 RX ADMIN — MORPHINE SULFATE 2 MG: 2 INJECTION, SOLUTION INTRAMUSCULAR; INTRAVENOUS at 04:59

## 2020-08-07 RX ADMIN — MORPHINE SULFATE 2 MG: 2 INJECTION, SOLUTION INTRAMUSCULAR; INTRAVENOUS at 13:17

## 2020-08-07 RX ADMIN — SODIUM CHLORIDE, PRESERVATIVE FREE 10 ML: 5 INJECTION INTRAVENOUS at 09:21

## 2020-08-07 RX ADMIN — SODIUM CHLORIDE, PRESERVATIVE FREE 10 ML: 5 INJECTION INTRAVENOUS at 20:52

## 2020-08-07 RX ADMIN — CEFEPIME HYDROCHLORIDE 2 G: 2 INJECTION, POWDER, FOR SOLUTION INTRAVENOUS at 09:21

## 2020-08-07 RX ADMIN — ACETAMINOPHEN 650 MG: 325 TABLET ORAL at 11:05

## 2020-08-07 RX ADMIN — VANCOMYCIN HYDROCHLORIDE 1250 MG: 5 INJECTION, POWDER, LYOPHILIZED, FOR SOLUTION INTRAVENOUS at 22:14

## 2020-08-07 RX ADMIN — CEFEPIME 2 G: 2 INJECTION, POWDER, FOR SOLUTION INTRAVENOUS at 17:39

## 2020-08-07 RX ADMIN — MORPHINE SULFATE 2 MG: 2 INJECTION, SOLUTION INTRAMUSCULAR; INTRAVENOUS at 22:01

## 2020-08-07 RX ADMIN — MORPHINE SULFATE 2 MG: 2 INJECTION, SOLUTION INTRAMUSCULAR; INTRAVENOUS at 17:39

## 2020-08-07 RX ADMIN — COLLAGENASE SANTYL: 250 OINTMENT TOPICAL at 09:30

## 2020-08-07 RX ADMIN — MORPHINE SULFATE 2 MG: 2 INJECTION, SOLUTION INTRAMUSCULAR; INTRAVENOUS at 09:19

## 2020-08-07 RX ADMIN — MORPHINE SULFATE 2 MG: 2 INJECTION, SOLUTION INTRAMUSCULAR; INTRAVENOUS at 01:04

## 2020-08-07 RX ADMIN — CLONIDINE HYDROCHLORIDE 0.1 MG: 0.1 TABLET ORAL at 00:29

## 2020-08-07 RX ADMIN — HYDROCODONE BITARTRATE AND ACETAMINOPHEN 1 TABLET: 5; 325 TABLET ORAL at 14:59

## 2020-08-07 RX ADMIN — HYDROCODONE BITARTRATE AND ACETAMINOPHEN 1 TABLET: 5; 325 TABLET ORAL at 20:52

## 2020-08-07 ASSESSMENT — PAIN - FUNCTIONAL ASSESSMENT
PAIN_FUNCTIONAL_ASSESSMENT: ACTIVITIES ARE NOT PREVENTED

## 2020-08-07 ASSESSMENT — PAIN DESCRIPTION - DESCRIPTORS
DESCRIPTORS: ACHING;CONSTANT;SORE
DESCRIPTORS: ACHING;CONSTANT;SORE
DESCRIPTORS: ACHING;SORE
DESCRIPTORS: ACHING;CONSTANT;DISCOMFORT

## 2020-08-07 ASSESSMENT — PAIN DESCRIPTION - PROGRESSION
CLINICAL_PROGRESSION: NOT CHANGED

## 2020-08-07 ASSESSMENT — PAIN DESCRIPTION - FREQUENCY
FREQUENCY: CONTINUOUS

## 2020-08-07 ASSESSMENT — PAIN SCALES - GENERAL
PAINLEVEL_OUTOF10: 7
PAINLEVEL_OUTOF10: 7
PAINLEVEL_OUTOF10: 0
PAINLEVEL_OUTOF10: 7
PAINLEVEL_OUTOF10: 6
PAINLEVEL_OUTOF10: 6
PAINLEVEL_OUTOF10: 2
PAINLEVEL_OUTOF10: 7
PAINLEVEL_OUTOF10: 7
PAINLEVEL_OUTOF10: 4
PAINLEVEL_OUTOF10: 6
PAINLEVEL_OUTOF10: 3
PAINLEVEL_OUTOF10: 6
PAINLEVEL_OUTOF10: 6
PAINLEVEL_OUTOF10: 3

## 2020-08-07 ASSESSMENT — PAIN DESCRIPTION - ORIENTATION
ORIENTATION: RIGHT;LEFT
ORIENTATION: RIGHT;LEFT;MID;LOWER

## 2020-08-07 ASSESSMENT — PAIN DESCRIPTION - PAIN TYPE
TYPE: SURGICAL PAIN
TYPE: ACUTE PAIN;SURGICAL PAIN

## 2020-08-07 ASSESSMENT — PAIN DESCRIPTION - LOCATION
LOCATION: LEG
LOCATION: BACK;LEG

## 2020-08-07 ASSESSMENT — PAIN DESCRIPTION - ONSET
ONSET: ON-GOING

## 2020-08-07 NOTE — PROGRESS NOTES
hospitalist Progress Note         Admit Date: 8/4/2020    PCP: Neva Ellis DO     Chief Complaint   Patient presents with   Ossineke Croak Wound Check     R upper thigh         Assessment and Plan:      Sepsis (HCC)Abscess of right thigh/MRSA bacteremia   Continue vancomycin and cefepime added per ID. S/p I&D bedside. RAFI showing large vegetation above tricuspid valve. Monitor   Hyponatremia/acute kidney injury improved with IVF. Monitor volume status   Anemia likely due to anemia of chronic disease. Anemia panel confirming AOCD   Current Hb 8.4 which could be her baseline. No evidence of acute blood loss.  Polysubstance drug abuse including heroine IV counseling provided. On Ultram, clonidine, Restoril to prevent withdrawal symptoms. Checking for RPR, HIV, chlamydia and gonorrhea. ID on board   Chronic hepatitis B ([facility] [address]   Chronic hepatitis C without hepatic coma (HCC)   Acute septic pulmonary embolism without acute cor pulmonale (HCC)  Tobacco abuse smoking cessation counseling provided. On NicoDerm patch      VTE prophylaxis LMWH.     Current Facility-Administered Medications   Medication Dose Route Frequency Provider Last Rate Last Dose    cefepime (MAXIPIME) 2 g IVPB minibag  2 g Intravenous Q8H Hafsa Vargas MD        collagenase ointment   Topical Daily Bridget Han MD        sodium chloride flush 0.9 % injection 10 mL  10 mL Intravenous 2 times per day Bridget Han MD   10 mL at 08/07/20 3133    sodium chloride flush 0.9 % injection 10 mL  10 mL Intravenous PRN Bridget Han MD        acetaminophen (TYLENOL) tablet 650 mg  650 mg Oral Q6H PRN Bridget Han MD   650 mg at 08/07/20 1105    Or    acetaminophen (TYLENOL) suppository 650 mg  650 mg Rectal Q6H PRN Bridget Han MD        polyethylene glycol (GLYCOLAX) packet 17 g  17 g Oral Daily PRN Bridget Han MD        nicotine (NICODERM CQ) 21 MG/24HR 1 patch  1 patch Significant Diagnostic Studies:   DATA:    CBC   Recent Labs     08/05/20  0520 08/06/20  0500 08/06/20  1015   WBC 9.3 10.7* 10.2   HGB 7.9* 8.4* 8.4*   HCT 23.9* 25.6* 25.7*    206 196      BMP   Recent Labs     08/05/20  0520 08/05/20  0952 08/06/20  0500 08/07/20  0515   * 131* 133* 132*   K 3.7 3.5 3.7 4.2   CL 95* 95* 99 99   CO2 20* 19* 21 22   BUN 67*  --  65* 28*   CREATININE 2.5*  --  1.6* 0.7     LFT'S   Recent Labs     08/05/20  0520   AST 11*   ALT 17   BILITOT 0.5   ALKPHOS 83     COAG   Recent Labs     08/06/20  1015   INR 1.15     POC: No results found for: POCGLU  NsrnmcdexfA7C:No results found for: LABA1C  CARDIAC ENZYMES    No results for input(s): CKTOTAL, CKMB, CKMBINDEX, TROPONINI in the last 72 hours. Troponin:   No results for input(s): TROPONINT in the last 72 hours. BNP:   No results for input(s): PROBNP in the last 72 hours. U/A:    Lab Results   Component Value Date    COLORU YELLOW 11/17/2019    WBCUA 3 11/17/2019    RBCUA 3 11/17/2019    MUCUS RARE 11/17/2019    BACTERIA RARE 11/17/2019    CLARITYU CLEAR 11/17/2019    SPECGRAV 1.009 11/17/2019    LEUKOCYTESUR TRACE 11/17/2019    BLOODU NEGATIVE 11/17/2019    GLUCOSEU neg 06/20/2013    GLUCOSEU NEGATIVE 06/20/2013       Ct Abdomen Pelvis Wo Contrast Additional Contrast? None    Result Date: 8/4/2020  EXAMINATION: CT OF THE ABDOMEN AND PELVIS WITHOUT CONTRAST 8/4/2020 8:08 am TECHNIQUE: CT of the abdomen and pelvis was performed without the administration of intravenous contrast. Multiplanar reformatted images are provided for review. Dose modulation, iterative reconstruction, and/or weight based adjustment of the mA/kV was utilized to reduce the radiation dose to as low as reasonably achievable. COMPARISON: None. HISTORY: ORDERING SYSTEM PROVIDED HISTORY: abscess? TECHNOLOGIST PROVIDED HISTORY: Reason for exam:->abscess? Additional Contrast?->None Is the patient pregnant?->No Reason for Exam: abscess?  Acuity: Acute Type of Exam: Initial FINDINGS: Lower Chest: There are early patchy infiltrate seen in the lung bases may suggest early pneumonia. There is a small trace right-sided effusion. Organs: The liver appears unremarkable. The gallbladder has been removed. The pancreas is normal.  Spleen is mildly prominent. The left kidney appears enlarged with some mild stranding in the perinephric fat. I do not see any evidence for stone or obstruction this may be secondary to pyelonephritis. The left kidney is normal. GI/Bowel: The large and small bowel of the abdomen is normal. There is no evidence for free air or free fluid noted to be present. Pelvis: A Angel catheter is seen in the bladder is no free air free fluid Peritoneum/Retroperitoneum: The aorta normal in caliber there is no pathologically enlarged retroperitoneal adenopathy. Bones/Soft Tissues: The osseous structures is soft tissues normal.     The right kidney appears mildly enlarged with some stranding around the perinephric fat region. I do not see any evidence for stone or obstruction these findings may be related to a pyelonephritis please clinically correlate     Xr Chest Portable    Result Date: 8/4/2020  EXAMINATION: ONE XRAY VIEW OF THE CHEST 8/4/2020 2:54 am COMPARISON: None. HISTORY: ORDERING SYSTEM PROVIDED HISTORY: Post right IJ placement TECHNOLOGIST PROVIDED HISTORY: Reason for exam:->Post right IJ placement Reason for Exam: Post right IJ placement Acuity: Acute Type of Exam: Initial FINDINGS: Right internal jugular approach central venous catheter is noted with distal tip located within the SVC near the superior atrial caval junction. The heart is normal in size and configuration. The mediastinal contours are within normal limits. The pulmonary arterial is are prominent and indistinct diffusely. Multifocal mild bilateral lung consolidation is identified. The pleural surfaces are normal and no evidence of a pleural effusion is seen.  Bones and soft tissues are unremarkable. Moderate pulmonary interstitial edema with suggestion of multifocal bilateral lung overlying alveolar edema. Differential diagnostic considerations include overlying pneumonia. Ct Femur Right Wo Contrast    Result Date: 8/4/2020  EXAMINATION: CT OF THE RIGHT FEMUR WITH CONTRAST 8/4/2020 2:10 am TECHNIQUE: CT of the right femur was performed with the administration of intravenous contrast.  Multiplanar reformatted images are provided for review. Dose modulation, iterative reconstruction, and/or weight based adjustment of the mA/kV was utilized to reduce the radiation dose to as low as reasonably achievable. COMPARISON: Abdomen and pelvis CT 11/17/2019, pelvis radiograph 09/02/2009 HISTORY ORDERING SYSTEM PROVIDED HISTORY: Concern for necrotizing soft tissue infection of posterior lateral thigh, renal failure cannot tolerate contrast TECHNOLOGIST PROVIDED HISTORY: Reason for exam:->Concern for necrotizing soft tissue infection of posterior lateral thigh, renal failure cannot tolerate contrast Is the patient pregnant?->No Reason for Exam: concern for necrotizing infection, renal failure FINDINGS: BONES:  No acute fractures nor suspicious osseous lesions. No areas of abnormal cortical disruption. JOINTS:  Joints maintain anatomic alignment. Mild to moderate degenerative changes of the hip and pubic symphysis. Moderate hip and trace knee joint effusions. SOFT TISSUES:  Surgically absent uterus. Nonenlarged inguinal lymph nodes, potentially reactive. Ulceration in the posterolateral proximal thigh with minimal adjacent subcutaneous stranding. No associated loculated fluid. Patchy stranding in the subcutaneous tissues just deep to the skin elsewhere in the lateral, anterior, and medial thigh. No loculated fluid nor soft tissue gas. Normal appearance of muscle and fasciae. 1. Ulceration in the posterolateral proximal right thigh with minimal adjacent cellulitis.   No associated findings of abscess, necrotizing fasciitis, myositis, or osteomyelitis. 2. Patchy stranding deep to the skin in the lateral, anterior, in the medial right thigh potentially related to additional injection sites. 3. Moderate right hip joint effusion potentially related to mild to moderate osteoarthritic changes of the right hip.            [facility]

## 2020-08-07 NOTE — PROGRESS NOTES
Infectious Disease Progress Note  2020   Patient Name: Magalys Singh : 1980       Reason for visit: F/u MRSA bacteremia? History:? Interval history noted. S/p debridement of right upper thigh, right lower leg, left lower leg abscesses, incisional biopsy of petechial rash of lower leg on 2020. Wound culture was polymicrobial: MRSA, pseudomonas aeruginosa, E. coli. Blood cultures remain mono microbial: MRSA  Denies n/v/d/f or untoward effects of antibiotics  Physical Exam:  Vital Signs: BP (!) 114/90   Pulse 113   Temp 98.3 °F (36.8 °C) (Oral)   Resp (!) 31   Ht 5' 5\" (1.651 m)   Wt 200 lb 2.8 oz (90.8 kg)   SpO2 96%   BMI 33.31 kg/m²     Gen: alert and oriented X3,  Skin: Bilateral leg non-blanchable petechiae  Wounds: Right lateral thigh, left medial leg C/D/I  HEMT: AT/NC Oropharynx pink, moist, and without lesions or exudates; dentition in good state of repair  Eyes: PERRLA, EOMI, conjunctiva pink, sclera anicteric. Neck: Supple. Trachea midline. No LAD. Chest: no distress and CTA. Good air movement. Heart: RRR and no MRG. Abd: soft, non-distended, no tenderness, no hepatomegaly. Normoactive bowel sounds. Ext: no clubbing, cyanosis, or edema  Catheter Site: without erythema or tenderness  Neuro: Mental status intact. CN 2-12 intact and no focal sensory or motor deficits     Radiologic / Imaging / TESTING  CT Right femur  1. Ulceration in the posterolateral proximal right thigh with minimal adjacent cellulitis. No associated findings of abscess, necrotizing fasciitis, myositis, or osteomyelitis. 2. Patchy stranding deep to the skin in the lateral, anterior, in the medial right thigh potentially related to additional injection sites. 3. Moderate right hip joint effusion potentially related to mild to moderate osteoarthritic changes of the right hip. RAFI 2020  Summary    Central line catheter tip visualized terminating within the right atrium.     Vegetation noted above the tricuspid valve measuring 1.35 cm x 1. 25 cm; it    appears to be attached to the interatrial septum or tricuspid annulus    (septal leaflet origin). There was no thrombus noted in the left atrial appendage. Negative bubble study. No PFO or ASD noted.            Labs:    Recent Results (from the past 24 hour(s))   Procalcitonin    Collection Time: 08/07/20 12:30 AM   Result Value Ref Range    Procalcitonin 1.32    C-Reactive Protein    Collection Time: 08/07/20  5:15 AM   Result Value Ref Range    CRP, High Sensitivity 114.9 mg/L   Basic Metabolic Panel    Collection Time: 08/07/20  5:15 AM   Result Value Ref Range    Sodium 132 (L) 135 - 145 MMOL/L    Potassium 4.2 3.5 - 5.1 MMOL/L    Chloride 99 99 - 110 mMol/L    CO2 22 21 - 32 MMOL/L    Anion Gap 11 4 - 16    BUN 28 (H) 6 - 23 MG/DL    CREATININE 0.7 0.6 - 1.1 MG/DL    Glucose 99 70 - 99 MG/DL    Calcium 8.2 (L) 8.3 - 10.6 MG/DL    GFR Non-African American >60 >60 mL/min/1.73m2    GFR African American >60 >60 mL/min/1.73m2   Hepatitis B Surface Antigen    Collection Time: 08/07/20  5:15 AM   Result Value Ref Range    Hepatitis B Surface Ag NON REACTIVE NON REACTIVE     CULTURE results: Invalid input(s): BLOOD CULTURE,  URINE CULTURE, SURGICAL CULTURE  Specimens:   ID Type Source Tests Collected by Time Destination   1 : Right Thigh Tissue Tissue CULTURE, SURGICAL Anastacio Foster MD 8/4/2020 0932     2 : Right Calf Wound Tissue Tissue CULTURE, SURGICAL Anastacio Foster MD 8/4/2020 2046     3 : Biopsy of Petechiae Right Lower Leg Tissue Tissue CULTURE, TISSUE Anastacio Foster MD 8/4/2020 3885     A : Biopsy of Petechiae Right Lower Leg Tissue Tissue SURGICAL PATHOLOGY Anastacio Foster MD 8/4/2020 1410      Narrative   [facility]   SETUP DATE/TIME:  08/04/2020 8308    Susceptibility     Staph aureus mrsa (2)     Antibiotic  Interpretation  RICCARDO  Status     erythromycin  Resistant  >4  Preliminary     gentamicin Sensitive  <=4  Preliminary     moxifloxacin  Sensitive  <=0.5  Preliminary     oxacillin  Resistant  >2  Preliminary     tetracycline  Sensitive  <=4  Preliminary     trimethoprim-sulfamethoxazole  Sensitive  <=0.5/9.5  Preliminary     vancomycin  Sensitive  1  Preliminary     ceFAZolin  Resistant  >16  Preliminary     Pseudomonas aeruginosa (3)     Antibiotic  Interpretation  RICCARDO  Status     cefepime  Sensitive  <=2  Preliminary     ciprofloxacin  Sensitive  <=1  Preliminary     gentamicin  Sensitive  <=4  Preliminary     meropenem  Sensitive  <=1  Preliminary     piperacillin-tazobactam  Sensitive  <=16  Preliminary     tobramycin  Sensitive  <=4  Preliminary     Escherichia coli (4)     Antibiotic  Interpretation  RICCARDO  Status     ampicillin  Sensitive  <=8  Preliminary     ceFAZolin  Sensitive  <=2  Preliminary     cefepime  Sensitive  <=2  Preliminary     cefuroxime  Sensitive  8  Preliminary     ciprofloxacin  Sensitive  <=1  Preliminary     ertapenem  Sensitive  <=0.5  Preliminary     gentamicin  Sensitive  <=4  Preliminary     meropenem  Sensitive  <=1  Preliminary     piperacillin-tazobactam  Sensitive  <=16  Preliminary     trimethoprim-sulfamethoxazole  Sensitive  <=2/38  Preliminary         Diagnosis:  Patient Active Problem List   Diagnosis    CCC (chronic calculous cholecystitis)    Sepsis (HCC)    Abscess of right thigh    MRSA bacteremia    Amphetamine user ([address]    Chronic hepatitis C without hepatic coma (HCC)    Acute septic pulmonary embolism without acute cor pulmonale (HCC)    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 Indeed creatinine is back to normal and LISBETH is resolving.  Therapeutic: continue vancomycin and cefepime   Diagnostic:  blood cx q 48h until negative: 8/6/2020-1 of 2 GPC, repeat blood culture on 8/8   F/u:  Tspot, CRP, tissue cx and histopathology    Other: HIV and RPR screens are negative.   Monitor right hip effusion, previous hepatitis B has cleared infection   Summary:Native TV endocarditis      Electronically signed by: Electronically signed by Martha Santiago MD on 8/7/2020 at 11:12 AM

## 2020-08-07 NOTE — PROGRESS NOTES
Nephrology Progress Note        [address], [address] [address]  Phone: [phone]  Office Hours: 8:30AM - 4:30PM  Monday - Friday       [facility]  MD Mauricio Pulido DO  [facility],  [address]  [facility] [address]  PHONE: [phone]  FAX: [phone]    8/7/2020 6:34 AM  Subjective:   Admit Date: 8/4/2020  PCP: Ana Wilhelm DO  Interval History: good uop  On room air  Found to have a tricuspid vegetation    Diet: DIET GENERAL;      Data:   Scheduled Meds:   cefepime  2 g Intravenous Q12H    collagenase   Topical Daily    sodium chloride flush  10 mL Intravenous 2 times per day    nicotine  1 patch Transdermal Daily    heparin (porcine)  5,000 Units Subcutaneous 3 times per day    vancomycin (VANCOCIN) intermittent dosing (placeholder)   Other RX Placeholder     Continuous Infusions:  PRN Meds:sodium chloride flush, acetaminophen **OR** acetaminophen, polyethylene glycol, morphine, traMADol **AND** cloNIDine, traZODone, promethazine **OR** promethazine  I/O last 3 completed shifts:   In: 2153 [I.V.:1603; IV Piggyback:550]  Out: 1296 [Urine:2450]  I/O this shift:  In: 8044 [P.O.:480; I.V.:846]  Out: 2350 [Urine:2350]    Intake/Output Summary (Last 24 hours) at 8/7/2020 0634  Last data filed at 8/7/2020 0515  Gross per 24 hour   Intake 3479 ml   Output 4800 ml   Net -1321 ml       CBC:   Recent Labs     08/05/20  0520 08/06/20  0500 08/06/20  1015   WBC 9.3 10.7* 10.2   HGB 7.9* 8.4* 8.4*    206 196       BMP:    Recent Labs     08/05/20  0010 08/05/20  0520 08/05/20  0952 08/06/20  0500   * 132* 131* 133*   K 3.5 3.7 3.5 3.7   CL 93* 95* 95* 99   CO2 21 20* 19* 21   BUN 66* 67*  --  65*   CREATININE 2.6* 2.5*  --  1.6*   GLUCOSE 136* 119*  --  96     Hepatic:   Recent Labs     08/05/20  0520   AST 11*   ALT 17   BILITOT 0.5   ALKPHOS 83     Troponin: No results for input(s): TROPONINI in the last 72 hours.  BNP: No results for input(s): BNP in the last 72 hours. Lipids: No results for input(s): CHOL, HDL in the last 72 hours.     Invalid input(s): LDLCALCU  ABGs: No results found for: PHART, PO2ART, VQD0KJD  INR:   Recent Labs     08/06/20  1015   INR 1.15       Objective:   Vitals: /77   Pulse 95   Temp 98.7 °F (37.1 °C) (Oral)   Resp 23   Ht 5' 5\" (1.651 m)   Wt 200 lb 2.8 oz (90.8 kg)   SpO2 94%   BMI 33.31 kg/m²   General appearance:  in no acute distress  HEENT: normocephalic, atraumatic,   Neck: supple, trachea midline  Lungs:breathing comfortably on room air  Heart[de-identified] regular rate and rhythm,  Abdomen:  non distended,   Extremities: trace ble edema      Assessment and Plan:       Patient Active Problem List   Diagnosis    CCC (chronic calculous cholecystitis)    Sepsis (HCC)      - LISBETH from volume depletion : cr 4 on admission from baseline of 0.9////ct a/p shows no HN  - Hyponatremia from volume depletion: sodium 119 on admission  - Non anion gap metabolic acidosis; negative salicylate and acetaminophen levels  - Sepsis   - Substance abuse: Amphetamine positive UDS  - Right thigh infection  - elevated probnp, suspected pulm edema  - staph bacteremia and thigh infection  - TV vegetation     Suggest  Cr 1.6, sodium level is better  Stop NS, needs to eat  Avoid nephrotoxins and renally dose meds                    Electronically signed by Conchita Quarles DO on 8/7/2020 at 6:34 AM    MD Janki Graham DO [place] [address] [place] [address] [address]  PHONE: [phone]  FAX: [phone]

## 2020-08-08 LAB
ANION GAP SERPL CALCULATED.3IONS-SCNC: 10 MMOL/L (ref 4–16)
BUN BLDV-MCNC: 15 MG/DL (ref 6–23)
CALCIUM SERPL-MCNC: 7.9 MG/DL (ref 8.3–10.6)
CHLORIDE BLD-SCNC: 97 MMOL/L (ref 99–110)
CO2: 24 MMOL/L (ref 21–32)
CREAT SERPL-MCNC: 0.6 MG/DL (ref 0.6–1.1)
GFR AFRICAN AMERICAN: >60 ML/MIN/1.73M2
GFR NON-AFRICAN AMERICAN: >60 ML/MIN/1.73M2
GLUCOSE BLD-MCNC: 108 MG/DL (ref 70–99)
HCT VFR BLD CALC: 22.7 % (ref 37–47)
HEMOGLOBIN: 7.2 GM/DL (ref 12.5–16)
MCH RBC QN AUTO: 27 PG (ref 27–31)
MCHC RBC AUTO-ENTMCNC: 31.7 % (ref 32–36)
MCV RBC AUTO: 85 FL (ref 78–100)
PDW BLD-RTO: 14.1 % (ref 11.7–14.9)
PLATELET # BLD: 109 K/CU MM (ref 140–440)
PMV BLD AUTO: 9.5 FL (ref 7.5–11.1)
POTASSIUM SERPL-SCNC: 3.9 MMOL/L (ref 3.5–5.1)
RBC # BLD: 2.67 M/CU MM (ref 4.2–5.4)
SODIUM BLD-SCNC: 131 MMOL/L (ref 135–145)
WBC # BLD: 11.2 K/CU MM (ref 4–10.5)

## 2020-08-08 PROCEDURE — 87040 BLOOD CULTURE FOR BACTERIA: CPT

## 2020-08-08 PROCEDURE — 6370000000 HC RX 637 (ALT 250 FOR IP): Performed by: INTERNAL MEDICINE

## 2020-08-08 PROCEDURE — 2580000003 HC RX 258: Performed by: INTERNAL MEDICINE

## 2020-08-08 PROCEDURE — 6360000002 HC RX W HCPCS: Performed by: SURGERY

## 2020-08-08 PROCEDURE — 6360000002 HC RX W HCPCS: Performed by: INTERNAL MEDICINE

## 2020-08-08 PROCEDURE — 6370000000 HC RX 637 (ALT 250 FOR IP): Performed by: SURGERY

## 2020-08-08 PROCEDURE — 94761 N-INVAS EAR/PLS OXIMETRY MLT: CPT

## 2020-08-08 PROCEDURE — 2580000003 HC RX 258: Performed by: SURGERY

## 2020-08-08 PROCEDURE — 85027 COMPLETE CBC AUTOMATED: CPT

## 2020-08-08 PROCEDURE — 1200000000 HC SEMI PRIVATE

## 2020-08-08 PROCEDURE — 80048 BASIC METABOLIC PNL TOTAL CA: CPT

## 2020-08-08 RX ORDER — LORAZEPAM 1 MG/1
1 TABLET ORAL EVERY 6 HOURS PRN
Status: DISCONTINUED | OUTPATIENT
Start: 2020-08-08 | End: 2020-08-16 | Stop reason: HOSPADM

## 2020-08-08 RX ORDER — SODIUM CHLORIDE 1000 MG
1 TABLET, SOLUBLE MISCELLANEOUS
Status: COMPLETED | OUTPATIENT
Start: 2020-08-08 | End: 2020-08-10

## 2020-08-08 RX ADMIN — CEFEPIME 2 G: 2 INJECTION, POWDER, FOR SOLUTION INTRAVENOUS at 02:13

## 2020-08-08 RX ADMIN — MORPHINE SULFATE 2 MG: 2 INJECTION, SOLUTION INTRAMUSCULAR; INTRAVENOUS at 18:25

## 2020-08-08 RX ADMIN — CEFEPIME 2 G: 2 INJECTION, POWDER, FOR SOLUTION INTRAVENOUS at 18:25

## 2020-08-08 RX ADMIN — HEPARIN SODIUM 5000 UNITS: 5000 INJECTION, SOLUTION INTRAVENOUS; SUBCUTANEOUS at 13:32

## 2020-08-08 RX ADMIN — MORPHINE SULFATE 2 MG: 2 INJECTION, SOLUTION INTRAMUSCULAR; INTRAVENOUS at 22:27

## 2020-08-08 RX ADMIN — MORPHINE SULFATE 2 MG: 2 INJECTION, SOLUTION INTRAMUSCULAR; INTRAVENOUS at 13:31

## 2020-08-08 RX ADMIN — HYDROCODONE BITARTRATE AND ACETAMINOPHEN 1 TABLET: 5; 325 TABLET ORAL at 21:22

## 2020-08-08 RX ADMIN — HEPARIN SODIUM 5000 UNITS: 5000 INJECTION, SOLUTION INTRAVENOUS; SUBCUTANEOUS at 05:29

## 2020-08-08 RX ADMIN — Medication 1 G: at 13:32

## 2020-08-08 RX ADMIN — MORPHINE SULFATE 2 MG: 2 INJECTION, SOLUTION INTRAMUSCULAR; INTRAVENOUS at 02:13

## 2020-08-08 RX ADMIN — METOPROLOL TARTRATE 12.5 MG: 25 TABLET, FILM COATED ORAL at 10:03

## 2020-08-08 RX ADMIN — SODIUM CHLORIDE, PRESERVATIVE FREE 10 ML: 5 INJECTION INTRAVENOUS at 10:03

## 2020-08-08 RX ADMIN — SODIUM CHLORIDE, PRESERVATIVE FREE 10 ML: 5 INJECTION INTRAVENOUS at 21:25

## 2020-08-08 RX ADMIN — CEFEPIME 2 G: 2 INJECTION, POWDER, FOR SOLUTION INTRAVENOUS at 10:03

## 2020-08-08 RX ADMIN — HYDROCODONE BITARTRATE AND ACETAMINOPHEN 1 TABLET: 5; 325 TABLET ORAL at 11:47

## 2020-08-08 RX ADMIN — HEPARIN SODIUM 5000 UNITS: 5000 INJECTION, SOLUTION INTRAVENOUS; SUBCUTANEOUS at 21:34

## 2020-08-08 RX ADMIN — VANCOMYCIN HYDROCHLORIDE 1250 MG: 5 INJECTION, POWDER, LYOPHILIZED, FOR SOLUTION INTRAVENOUS at 13:41

## 2020-08-08 RX ADMIN — MORPHINE SULFATE 2 MG: 2 INJECTION, SOLUTION INTRAMUSCULAR; INTRAVENOUS at 06:19

## 2020-08-08 RX ADMIN — METOPROLOL TARTRATE 12.5 MG: 25 TABLET, FILM COATED ORAL at 21:22

## 2020-08-08 RX ADMIN — LORAZEPAM 1 MG: 1 TABLET ORAL at 10:03

## 2020-08-08 RX ADMIN — Medication 1 G: at 18:26

## 2020-08-08 RX ADMIN — COLLAGENASE SANTYL: 250 OINTMENT TOPICAL at 14:53

## 2020-08-08 RX ADMIN — LORAZEPAM 1 MG: 1 TABLET ORAL at 21:22

## 2020-08-08 ASSESSMENT — PAIN SCALES - GENERAL
PAINLEVEL_OUTOF10: 5
PAINLEVEL_OUTOF10: 7
PAINLEVEL_OUTOF10: 4
PAINLEVEL_OUTOF10: 7
PAINLEVEL_OUTOF10: 8
PAINLEVEL_OUTOF10: 7
PAINLEVEL_OUTOF10: 6
PAINLEVEL_OUTOF10: 6
PAINLEVEL_OUTOF10: 5
PAINLEVEL_OUTOF10: 7
PAINLEVEL_OUTOF10: 7
PAINLEVEL_OUTOF10: 5
PAINLEVEL_OUTOF10: 5
PAINLEVEL_OUTOF10: 7
PAINLEVEL_OUTOF10: 5
PAINLEVEL_OUTOF10: 7
PAINLEVEL_OUTOF10: 4
PAINLEVEL_OUTOF10: 7
PAINLEVEL_OUTOF10: 5
PAINLEVEL_OUTOF10: 5
PAINLEVEL_OUTOF10: 7

## 2020-08-08 ASSESSMENT — PAIN DESCRIPTION - PAIN TYPE
TYPE: ACUTE PAIN;SURGICAL PAIN
TYPE: SURGICAL PAIN
TYPE: SURGICAL PAIN
TYPE: ACUTE PAIN;SURGICAL PAIN
TYPE: SURGICAL PAIN

## 2020-08-08 ASSESSMENT — PAIN DESCRIPTION - ORIENTATION
ORIENTATION: RIGHT;LEFT

## 2020-08-08 ASSESSMENT — PAIN DESCRIPTION - FREQUENCY
FREQUENCY: CONTINUOUS

## 2020-08-08 ASSESSMENT — PAIN DESCRIPTION - PROGRESSION

## 2020-08-08 ASSESSMENT — PAIN DESCRIPTION - DESCRIPTORS
DESCRIPTORS: ACHING;CONSTANT
DESCRIPTORS: SHARP;STABBING
DESCRIPTORS: STABBING;SHARP
DESCRIPTORS: ACHING;CONSTANT;DISCOMFORT
DESCRIPTORS: ACHING;CONSTANT;DISCOMFORT

## 2020-08-08 ASSESSMENT — PAIN DESCRIPTION - ONSET
ONSET: ON-GOING

## 2020-08-08 ASSESSMENT — PAIN DESCRIPTION - LOCATION
LOCATION: LEG

## 2020-08-08 ASSESSMENT — PAIN - FUNCTIONAL ASSESSMENT
PAIN_FUNCTIONAL_ASSESSMENT: PREVENTS OR INTERFERES SOME ACTIVE ACTIVITIES AND ADLS

## 2020-08-08 NOTE — PROGRESS NOTES
[address] [facility]  consulted by Dr. Melba Stephens for monitoring and adjustment. Indication for treatment: MRSA bacteremia 2/2 leg abscess, r/o endocarditis   Goal trough: 15 mcg/mL     Pertinent Laboratory Values:   Temp Readings from Last 3 Encounters:   08/07/20 97.8 °F (36.6 °C) (Oral)   08/04/20 96.3 °F (35.7 °C)   06/01/20 98.1 °F (36.7 °C) (Oral)     Recent Labs     08/05/20  0520 08/06/20  0500 08/06/20  1015   WBC 9.3 10.7* 10.2     Recent Labs     08/05/20  0520 08/06/20  0500 08/07/20  0515   BUN 67* 65* 28*   CREATININE 2.5* 1.6* 0.7     Estimated Creatinine Clearance: 121 mL/min (based on SCr of 0.7 mg/dL). Intake/Output Summary (Last 24 hours) at 8/7/2020 2105  Last data filed at 8/7/2020 2002  Gross per 24 hour   Intake 2972 ml   Output 4600 ml   Net -1628 ml       Pertinent Cultures:  Date                             Source                                     Results  8/4                               Surgical abscess cx                Staph aureus, pseudomonas, e coli  8/4                               Surgical abscess cx                MRSA    Vancomycin level:   TROUGH:    Recent Labs     08/07/20  1830   VANCOTROUGH 6.5*     RANDOM:    Recent Labs     08/05/20  0520 08/06/20  0500 08/07/20  0545   VANCORANDOM 17.2 23.8 15.8       Assessment:  WBC and temperature: No leukocytosis with 24-hr TMax 98.7F  SCr, BUN, and urine output: LISBETH resolved and renal function at baseline  Day(s) of therapy: #4  Vancomycin level: Sub-therapeutic    Plan:  Dosing comments: There were two vancomycin levels collected today with the second level being sub-therapeutic. Vancomycin levels of 15.8mg/dL & 6.5 mg/dL were collected appprox 12.5-hours apart. Based on two-point kinetics, the patient's specific kinetics are as follows: Half-life: 9.8-hrs with a Ke of 0.071. With LISBETH resolved, the patient should be able to tolerate a q12h vancomycin regimen.    Will initiate vancomycin 1,250mg (14mg/kg) q12h  Pharmacy will continue to monitor patient and adjust therapy as indicated    REPEAT VANCOMYCIN TROUGH SCHEDULED FOR 8/9/2020 @ 6093    Thank you for the consult.   Rasta Schmidt RPh  8/7/2020 9:05 PM

## 2020-08-08 NOTE — PROGRESS NOTES
hospitalist Progress Note         Admit Date: 8/4/2020    PCP: Neva Ellis DO     Chief Complaint   Patient presents with   Crosby Croak Wound Check     R upper thigh         Assessment and Plan:      Sepsis (HCC)Abscess of right thigh/MRSA bacteremia   Continue vancomycin and cefepime added per ID. S/p I&D bedside. RAFI showing large vegetation above tricuspid valve. Pain control with morphine and Percocet. Start metoprolol for tachycardia.  Hyponatremia/acute kidney injury improved with IVF. Monitor volume status   Anemia likely due to anemia of chronic disease. Anemia panel confirming AOCD   Current Hb 8.4 which could be her baseline. No evidence of acute blood loss.  Polysubstance drug abuse including heroine IV counseling provided. On Ultram, clonidine, Restoril to prevent withdrawal symptoms. Negative for RPR, HIV, chlamydia and gonorrhea. ID on board   Chronic hepatitis B ([facility] [address]   Chronic hepatitis C without hepatic coma (HCC)   Acute septic pulmonary embolism without acute cor pulmonale (HCC)  Tobacco abuse smoking cessation counseling provided. On NicoDerm patch      VTE prophylaxis LMWH.     Current Facility-Administered Medications   Medication Dose Route Frequency Provider Last Rate Last Dose    sodium chloride tablet 1 g  1 g Oral TID  Dolores Castro DO        LORazepam (ATIVAN) tablet 1 mg  1 mg Oral Q6H PRN Carol Arreguin MD   1 mg at 08/08/20 1003    metoprolol tartrate (LOPRESSOR) tablet 12.5 mg  12.5 mg Oral BID Carol Arreguin MD   12.5 mg at 08/08/20 1003    cefepime (MAXIPIME) 2 g IVPB minibag  2 g Intravenous Q8H Hafsa Vargas  mL/hr at 08/08/20 1003 2 g at 08/08/20 1003    HYDROcodone-acetaminophen (NORCO) 5-325 MG per tablet 1 tablet  1 tablet Oral Q6H PRN Carol Arreguin MD   1 tablet at 08/07/20 2052    vancomycin (VANCOCIN) 1,250 mg in dextrose 5 % 250 mL IVPB  1,250 mg Intravenous Q12H Hafsa Vargas MD   Stopped at 08/08/20 0015    collagenase ointment   Topical Daily Roxanne Edward MD        sodium chloride flush 0.9 % injection 10 mL  10 mL Intravenous 2 times per day Roxanne Edward MD   10 mL at 08/08/20 1003    sodium chloride flush 0.9 % injection 10 mL  10 mL Intravenous PRN Roxanne Edward MD        acetaminophen (TYLENOL) tablet 650 mg  650 mg Oral Q6H PRN Roxanne Edward MD   650 mg at 08/07/20 1105    Or    acetaminophen (TYLENOL) suppository 650 mg  650 mg Rectal Q6H PRN Roxanne Edward MD        polyethylene glycol (GLYCOLAX) packet 17 g  17 g Oral Daily PRN Roxanne Edward MD        nicotine (NICODERM CQ) 21 MG/24HR 1 patch  1 patch Transdermal Daily Roxanne Edward MD   1 patch at 08/07/20 0071    heparin (porcine) injection 5,000 Units  5,000 Units Subcutaneous 3 times per day Roxanne Edward MD   5,000 Units at 08/08/20 0529    morphine (PF) injection 2 mg  2 mg Intravenous Q4H PRN Roxanne Edward MD   2 mg at 08/08/20 8818    cloNIDine (CATAPRES) tablet 0.1 mg  0.1 mg Oral PRN Roxanne Ewdard MD   0.1 mg at 08/07/20 0029    traZODone (DESYREL) tablet 50 mg  50 mg Oral Nightly PRN Roxanne Edward MD   50 mg at 08/04/20 2033    promethazine (PHENERGAN) tablet 12.5 mg  12.5 mg Oral Q6H PRN Roxanne Edward MD        Or    promethazine (PHENERGAN) injection 6.25 mg  6.25 mg Intravenous Q6H PRN Roxanne Edward MD   6.25 mg at 08/04/20 1422       Subjective:     Patient reports bilateral leg pain. While at resting her heart rate is around 130s. Did not sleep well last night. Objective:        Intake/Output Summary (Last 24 hours) at 8/8/2020 1051  Last data filed at 8/8/2020 1003  Gross per 24 hour   Intake 2202 ml   Output 3400 ml   Net -1198 ml      Vitals:   Vitals:    08/08/20 1003   BP: (!) 106/93   Pulse: 114   Resp:    Temp:    SpO2:      Physical Exam:  General Appearance:    Alert, cooperative, moderate distress due to pain  Head:      Normocephalic, without obvious abnormality, atraumatic  Eyes:       Conjunctiva/corneas clear, EOM's intact  Lungs:    Clear to auscultation bilaterally, respirations unlabored  Heart:                Tachycardia with regular rate and rhythm, S1 and S2 normal,   Abdomen:     Soft, non-tender, bowel sounds active, no masses, no organomegaly  Extremities:   Extremities normal, atraumatic, no cyanosis or edema  Neurological:   Grossly Intact. Skin                   stable erythematous rash involving B/L legs     Significant Diagnostic Studies:   DATA:    CBC   Recent Labs     08/06/20  0500 08/06/20  1015   WBC 10.7* 10.2   HGB 8.4* 8.4*   HCT 25.6* 25.7*    196      BMP   Recent Labs     08/06/20  0500 08/07/20  0515 08/08/20  0717   * 132* 131*   K 3.7 4.2 3.9   CL 99 99 97*   CO2 21 22 24   BUN 65* 28* 15   CREATININE 1.6* 0.7 0.6     LFT'S   No results for input(s): AST, ALT, ALB, BILIDIR, BILITOT, ALKPHOS in the last 72 hours. COAG   Recent Labs     08/06/20  1015   INR 1.15     POC: No results found for: POCGLU  VplldkecseK2J:No results found for: LABA1C  CARDIAC ENZYMES    No results for input(s): CKTOTAL, CKMB, CKMBINDEX, TROPONINI in the last 72 hours. Troponin:   No results for input(s): TROPONINT in the last 72 hours. BNP:   No results for input(s): PROBNP in the last 72 hours.   U/A:    Lab Results   Component Value Date    COLORU YELLOW 11/17/2019    WBCUA 3 11/17/2019    RBCUA 3 11/17/2019    MUCUS RARE 11/17/2019    BACTERIA RARE 11/17/2019    CLARITYU CLEAR 11/17/2019    SPECGRAV 1.009 11/17/2019    LEUKOCYTESUR TRACE 11/17/2019    BLOODU NEGATIVE 11/17/2019    GLUCOSEU neg 06/20/2013    GLUCOSEU NEGATIVE 06/20/2013       Ct Abdomen Pelvis Wo Contrast Additional Contrast? None    Result Date: 8/4/2020  EXAMINATION: CT OF THE ABDOMEN AND PELVIS WITHOUT CONTRAST 8/4/2020 8:08 am TECHNIQUE: CT of the abdomen and pelvis was performed without the administration of intravenous contrast. Multiplanar reformatted images are provided for review. Dose modulation, iterative reconstruction, and/or weight based adjustment of the mA/kV was utilized to reduce the radiation dose to as low as reasonably achievable. COMPARISON: None. HISTORY: ORDERING SYSTEM PROVIDED HISTORY: abscess? TECHNOLOGIST PROVIDED HISTORY: Reason for exam:->abscess? Additional Contrast?->None Is the patient pregnant?->No Reason for Exam: abscess? Acuity: Acute Type of Exam: Initial FINDINGS: Lower Chest: There are early patchy infiltrate seen in the lung bases may suggest early pneumonia. There is a small trace right-sided effusion. Organs: The liver appears unremarkable. The gallbladder has been removed. The pancreas is normal.  Spleen is mildly prominent. The left kidney appears enlarged with some mild stranding in the perinephric fat. I do not see any evidence for stone or obstruction this may be secondary to pyelonephritis. The left kidney is normal. GI/Bowel: The large and small bowel of the abdomen is normal. There is no evidence for free air or free fluid noted to be present. Pelvis: A Angel catheter is seen in the bladder is no free air free fluid Peritoneum/Retroperitoneum: The aorta normal in caliber there is no pathologically enlarged retroperitoneal adenopathy. Bones/Soft Tissues: The osseous structures is soft tissues normal.     The right kidney appears mildly enlarged with some stranding around the perinephric fat region. I do not see any evidence for stone or obstruction these findings may be related to a pyelonephritis please clinically correlate     Xr Chest Portable    Result Date: 8/4/2020  EXAMINATION: ONE XRAY VIEW OF THE CHEST 8/4/2020 2:54 am COMPARISON: None.  HISTORY: ORDERING SYSTEM PROVIDED HISTORY: Post right IJ placement TECHNOLOGIST PROVIDED HISTORY: Reason for exam:->Post right IJ placement Reason for Exam: Post right IJ placement Acuity: Acute Type of Exam: Initial FINDINGS: Right internal jugular approach central venous catheter is noted with distal tip located within the SVC near the superior atrial caval junction. The heart is normal in size and configuration. The mediastinal contours are within normal limits. The pulmonary arterial is are prominent and indistinct diffusely. Multifocal mild bilateral lung consolidation is identified. The pleural surfaces are normal and no evidence of a pleural effusion is seen. Bones and soft tissues are unremarkable. Moderate pulmonary interstitial edema with suggestion of multifocal bilateral lung overlying alveolar edema. Differential diagnostic considerations include overlying pneumonia. Ct Femur Right Wo Contrast    Result Date: 8/4/2020  EXAMINATION: CT OF THE RIGHT FEMUR WITH CONTRAST 8/4/2020 2:10 am TECHNIQUE: CT of the right femur was performed with the administration of intravenous contrast.  Multiplanar reformatted images are provided for review. Dose modulation, iterative reconstruction, and/or weight based adjustment of the mA/kV was utilized to reduce the radiation dose to as low as reasonably achievable. COMPARISON: Abdomen and pelvis CT 11/17/2019, pelvis radiograph 09/02/2009 HISTORY ORDERING SYSTEM PROVIDED HISTORY: Concern for necrotizing soft tissue infection of posterior lateral thigh, renal failure cannot tolerate contrast TECHNOLOGIST PROVIDED HISTORY: Reason for exam:->Concern for necrotizing soft tissue infection of posterior lateral thigh, renal failure cannot tolerate contrast Is the patient pregnant?->No Reason for Exam: concern for necrotizing infection, renal failure FINDINGS: BONES:  No acute fractures nor suspicious osseous lesions. No areas of abnormal cortical disruption. JOINTS:  Joints maintain anatomic alignment. Mild to moderate degenerative changes of the hip and pubic symphysis. Moderate hip and trace knee joint effusions. SOFT TISSUES:  Surgically absent uterus.   Nonenlarged inguinal lymph nodes, potentially reactive. Ulceration in the posterolateral proximal thigh with minimal adjacent subcutaneous stranding. No associated loculated fluid. Patchy stranding in the subcutaneous tissues just deep to the skin elsewhere in the lateral, anterior, and medial thigh. No loculated fluid nor soft tissue gas. Normal appearance of muscle and fasciae. 1. Ulceration in the posterolateral proximal right thigh with minimal adjacent cellulitis. No associated findings of abscess, necrotizing fasciitis, myositis, or osteomyelitis. 2. Patchy stranding deep to the skin in the lateral, anterior, in the medial right thigh potentially related to additional injection sites. 3. Moderate right hip joint effusion potentially related to mild to moderate osteoarthritic changes of the right hip.            [place] Hospitalist

## 2020-08-08 NOTE — PROGRESS NOTES
Nephrology Progress Note        [address], [address] [address]  Phone: [phone]  Office Hours: 8:30AM - 4:30PM  Monday - Friday       [facility]  MD Merle Hawthorne, DO [facility]  [place], [place] [address]  PHONE: [phone]  FAX: [phone]    2020 10:07 AM  Subjective:   Admit Date: 2020  PCP: Radha Villegas DO  Interval History: complaining of leg pain  On room air  Nonoliguric     Diet: DIET GENERAL; Daily Fluid Restriction: 1800 ml      Data:   Scheduled Meds:   sodium chloride  1 g Oral TID WC    metoprolol tartrate  12.5 mg Oral BID    cefepime  2 g Intravenous Q8H    vancomycin  1,250 mg Intravenous Q12H    collagenase   Topical Daily    sodium chloride flush  10 mL Intravenous 2 times per day    nicotine  1 patch Transdermal Daily    heparin (porcine)  5,000 Units Subcutaneous 3 times per day     Continuous Infusions:  PRN Meds:LORazepam, HYDROcodone 5 mg - acetaminophen, sodium chloride flush, acetaminophen **OR** acetaminophen, polyethylene glycol, morphine, [] traMADol **AND** cloNIDine, traZODone, promethazine **OR** promethazine  I/O last 3 completed shifts: In: 2596 [P.O.:2146; IV Piggyback:450]  Out: 3400 [Urine:3400]  I/O this shift:  In: 10 [I.V.:10]  Out: -     Intake/Output Summary (Last 24 hours) at 2020 1007  Last data filed at 2020 1003  Gross per 24 hour   Intake 2202 ml   Output 3400 ml   Net -1198 ml       CBC:   Recent Labs     20  0500 20  1015   WBC 10.7* 10.2   HGB 8.4* 8.4*    196       BMP:    Recent Labs     20  0500 20  0515 20  0717   * 132* 131*   K 3.7 4.2 3.9   CL 99 99 97*   CO2 21 22 24   BUN 65* 28* 15   CREATININE 1.6* 0.7 0.6   GLUCOSE 96 99 108*     Hepatic: No results for input(s): AST, ALT, ALB, BILITOT, ALKPHOS in the last 72 hours.   Troponin: No results for input(s): TROPONINI in the last 72 hours.  BNP: No results for input(s): BNP in the last 72 hours. Lipids: No results for input(s): CHOL, HDL in the last 72 hours.     Invalid input(s): LDLCALCU  ABGs: No results found for: PHART, PO2ART, [id number]  INR:   Recent Labs     08/06/20  1015   INR 1.15       Objective:   Vitals: BP (!) 106/93   Pulse 114   Temp 99.4 °F (37.4 °C) (Oral)   Resp 25   Ht 5' 5\" (1.651 m)   Wt 202 lb 13.2 oz (92 kg)   SpO2 96%   BMI 33.75 kg/m²   General appearance: alert and cooperative with exam, in no acute distress  HEENT: normocephalic, atraumatic,   Neck: supple, trachea midline  Lungs:breathing comfortably on room air  Heart[de-identified] tachycardic  Abdomen:; non distended,   Extremities: no ble edema  Neurologic: alert, oriented, follows commands, interactive    Assessment and Plan:       Patient Active Problem List   Diagnosis    CCC (chronic calculous cholecystitis)    Sepsis ([facility] [address]      - LISBETH from volume depletion : cr 4 on admission from baseline of 0.9////ct a/p shows no HN  - Hyponatremia from volume depletion: sodium 119 on admission, better  - Non anion gap metabolic acidosis; negative salicylate and acetaminophen levels  - Sepsis   - Substance abuse: Amphetamine positive UDS  - Right thigh infection  - elevated probnp,   - staph bacteremia and thigh infection  - TV vegetation     Suggest  - LISBETH has resolved an cr 0.6 but sodium level is trending down, needs to follow oral fluid restriction  - start salt tabs for now                                 Electronically signed by Maral Blanton DO on 8/8/2020 at 10:07 AM    [facility]  MD Andrew Kelley DO  [facility],  [address] [place] [address]  PHONE: [phone]  FAX: [phone]

## 2020-08-08 NOTE — PROGRESS NOTES
[address] [facility]  consulted by Dr. Gilberto Chiu for monitoring and adjustment. Indication for treatment: MRSA bacteremia 2/2 leg abscess, r/o endocarditis   Goal trough: 15 mcg/mL     Pertinent Laboratory Values:   Temp Readings from Last 3 Encounters:   08/08/20 98.3 °F (36.8 °C) (Oral)   08/04/20 96.3 °F (35.7 °C)   06/01/20 98.1 °F (36.7 °C) (Oral)     Recent Labs     08/06/20  0500 08/06/20  1015   WBC 10.7* 10.2     Recent Labs     08/06/20  0500 08/07/20  0515 08/08/20  0717   BUN 65* 28* 15   CREATININE 1.6* 0.7 0.6     Estimated Creatinine Clearance: 142 mL/min (based on SCr of 0.6 mg/dL). Intake/Output Summary (Last 24 hours) at 8/8/2020 1537  Last data filed at 8/8/2020 1003  Gross per 24 hour   Intake 1842 ml   Output 1825 ml   Net 17 ml       Pertinent Cultures:  Date                             Source                                     Results  8/4                               Surgical abscess cx                Staph aureus, pseudomonas, e coli  8/4                               Surgical abscess cx                MRSA    Vancomycin level:   TROUGH:    Recent Labs     08/07/20  1830   VANCOTROUGH 6.5*     RANDOM:    Recent Labs     08/06/20  0500 08/07/20  0545   VANCORANDOM 23.8 15.8       Assessment:  · WBC and temperature: No leukocytosis with 24-hr TMax 98.7F  · SCr, BUN, and urine output: LISBETH resolved and renal function at baseline  · Day(s) of therapy: #5  · Vancomycin level: Sub-therapeutic    Plan:  · Dosing comments: There were two vancomycin levels collected on 8/7 with the second level being sub-therapeutic. Vancomycin levels of 15.8mg/dL & 6.5 mg/dL were collected appprox 12.5-hours apart. · Based on two-point kinetics, the patient's specific kinetics are as follows: Half-life: 9.8-hrs with a Ke of 0.071. · With LISBETH resolved, the patient should be able to tolerate a q12h vancomycin regimen.    · Will continue Vancomycin 1,250mg (14mg/kg) q12h  · Pharmacy will continue to monitor patient and adjust therapy as indicated    REPEAT VANCOMYCIN TROUGH SCHEDULED FOR 8/9/2020 @ 0930    Thank you for the consult.   Blaine Jimenez, [address]  8/8/2020 3:37 PM

## 2020-08-09 LAB
ANION GAP SERPL CALCULATED.3IONS-SCNC: 9 MMOL/L (ref 4–16)
BUN BLDV-MCNC: 11 MG/DL (ref 6–23)
CALCIUM SERPL-MCNC: 7.8 MG/DL (ref 8.3–10.6)
CHLORIDE BLD-SCNC: 100 MMOL/L (ref 99–110)
CO2: 23 MMOL/L (ref 21–32)
CREAT SERPL-MCNC: 0.5 MG/DL (ref 0.6–1.1)
CULTURE: ABNORMAL
DOSE AMOUNT: NORMAL
DOSE TIME: NORMAL
GFR AFRICAN AMERICAN: >60 ML/MIN/1.73M2
GFR NON-AFRICAN AMERICAN: >60 ML/MIN/1.73M2
GLUCOSE BLD-MCNC: 143 MG/DL (ref 70–99)
GRAM SMEAR: ABNORMAL
HCT VFR BLD CALC: 21.9 % (ref 37–47)
HEMOGLOBIN: 7 GM/DL (ref 12.5–16)
Lab: ABNORMAL
MCH RBC QN AUTO: 27.3 PG (ref 27–31)
MCHC RBC AUTO-ENTMCNC: 32 % (ref 32–36)
MCV RBC AUTO: 85.5 FL (ref 78–100)
PDW BLD-RTO: 14.1 % (ref 11.7–14.9)
PLATELET # BLD: 121 K/CU MM (ref 140–440)
PMV BLD AUTO: 9.3 FL (ref 7.5–11.1)
POTASSIUM SERPL-SCNC: 3.5 MMOL/L (ref 3.5–5.1)
PROCALCITONIN: 0.32
RBC # BLD: 2.56 M/CU MM (ref 4.2–5.4)
SODIUM BLD-SCNC: 132 MMOL/L (ref 135–145)
SPECIMEN: ABNORMAL
VANCOMYCIN TROUGH: 12.6 UG/ML (ref 10–20)
WBC # BLD: 13.5 K/CU MM (ref 4–10.5)

## 2020-08-09 PROCEDURE — 86850 RBC ANTIBODY SCREEN: CPT

## 2020-08-09 PROCEDURE — 6370000000 HC RX 637 (ALT 250 FOR IP): Performed by: INTERNAL MEDICINE

## 2020-08-09 PROCEDURE — 6360000002 HC RX W HCPCS: Performed by: INTERNAL MEDICINE

## 2020-08-09 PROCEDURE — 85027 COMPLETE CBC AUTOMATED: CPT

## 2020-08-09 PROCEDURE — 6370000000 HC RX 637 (ALT 250 FOR IP): Performed by: SURGERY

## 2020-08-09 PROCEDURE — 6360000002 HC RX W HCPCS: Performed by: SURGERY

## 2020-08-09 PROCEDURE — 97116 GAIT TRAINING THERAPY: CPT

## 2020-08-09 PROCEDURE — 86922 COMPATIBILITY TEST ANTIGLOB: CPT

## 2020-08-09 PROCEDURE — 84145 PROCALCITONIN (PCT): CPT

## 2020-08-09 PROCEDURE — 80202 ASSAY OF VANCOMYCIN: CPT

## 2020-08-09 PROCEDURE — 2580000003 HC RX 258: Performed by: INTERNAL MEDICINE

## 2020-08-09 PROCEDURE — 97530 THERAPEUTIC ACTIVITIES: CPT

## 2020-08-09 PROCEDURE — 97162 PT EVAL MOD COMPLEX 30 MIN: CPT

## 2020-08-09 PROCEDURE — 94761 N-INVAS EAR/PLS OXIMETRY MLT: CPT

## 2020-08-09 PROCEDURE — 80048 BASIC METABOLIC PNL TOTAL CA: CPT

## 2020-08-09 PROCEDURE — 1200000000 HC SEMI PRIVATE

## 2020-08-09 PROCEDURE — 86901 BLOOD TYPING SEROLOGIC RH(D): CPT

## 2020-08-09 PROCEDURE — 36430 TRANSFUSION BLD/BLD COMPNT: CPT

## 2020-08-09 PROCEDURE — 97166 OT EVAL MOD COMPLEX 45 MIN: CPT

## 2020-08-09 PROCEDURE — P9016 RBC LEUKOCYTES REDUCED: HCPCS

## 2020-08-09 PROCEDURE — 36415 COLL VENOUS BLD VENIPUNCTURE: CPT

## 2020-08-09 PROCEDURE — 86900 BLOOD TYPING SEROLOGIC ABO: CPT

## 2020-08-09 PROCEDURE — 2580000003 HC RX 258: Performed by: SURGERY

## 2020-08-09 RX ORDER — 0.9 % SODIUM CHLORIDE 0.9 %
500 INTRAVENOUS SOLUTION INTRAVENOUS ONCE
Status: COMPLETED | OUTPATIENT
Start: 2020-08-09 | End: 2020-08-09

## 2020-08-09 RX ORDER — HYDROCODONE BITARTRATE AND ACETAMINOPHEN 5; 325 MG/1; MG/1
1 TABLET ORAL EVERY 4 HOURS PRN
Status: DISCONTINUED | OUTPATIENT
Start: 2020-08-09 | End: 2020-08-10

## 2020-08-09 RX ORDER — FUROSEMIDE 10 MG/ML
20 INJECTION INTRAMUSCULAR; INTRAVENOUS ONCE
Status: COMPLETED | OUTPATIENT
Start: 2020-08-09 | End: 2020-08-09

## 2020-08-09 RX ORDER — POTASSIUM CHLORIDE 20 MEQ/1
40 TABLET, EXTENDED RELEASE ORAL PRN
Status: DISCONTINUED | OUTPATIENT
Start: 2020-08-09 | End: 2020-08-16 | Stop reason: HOSPADM

## 2020-08-09 RX ORDER — POTASSIUM CHLORIDE 7.45 MG/ML
10 INJECTION INTRAVENOUS PRN
Status: DISCONTINUED | OUTPATIENT
Start: 2020-08-09 | End: 2020-08-16 | Stop reason: HOSPADM

## 2020-08-09 RX ORDER — 0.9 % SODIUM CHLORIDE 0.9 %
20 INTRAVENOUS SOLUTION INTRAVENOUS ONCE
Status: COMPLETED | OUTPATIENT
Start: 2020-08-09 | End: 2020-08-09

## 2020-08-09 RX ADMIN — HYDROMORPHONE HYDROCHLORIDE 0.5 MG: 1 INJECTION, SOLUTION INTRAMUSCULAR; INTRAVENOUS; SUBCUTANEOUS at 18:31

## 2020-08-09 RX ADMIN — HEPARIN SODIUM 5000 UNITS: 5000 INJECTION, SOLUTION INTRAVENOUS; SUBCUTANEOUS at 15:06

## 2020-08-09 RX ADMIN — HYDROMORPHONE HYDROCHLORIDE 0.5 MG: 1 INJECTION, SOLUTION INTRAMUSCULAR; INTRAVENOUS; SUBCUTANEOUS at 13:10

## 2020-08-09 RX ADMIN — HYDROMORPHONE HYDROCHLORIDE 0.5 MG: 1 INJECTION, SOLUTION INTRAMUSCULAR; INTRAVENOUS; SUBCUTANEOUS at 23:38

## 2020-08-09 RX ADMIN — COLLAGENASE SANTYL: 250 OINTMENT TOPICAL at 09:08

## 2020-08-09 RX ADMIN — SODIUM CHLORIDE, PRESERVATIVE FREE 10 ML: 5 INJECTION INTRAVENOUS at 09:06

## 2020-08-09 RX ADMIN — CEFEPIME 2 G: 2 INJECTION, POWDER, FOR SOLUTION INTRAVENOUS at 02:04

## 2020-08-09 RX ADMIN — HYDROCODONE BITARTRATE AND ACETAMINOPHEN 1 TABLET: 5; 325 TABLET ORAL at 15:05

## 2020-08-09 RX ADMIN — SODIUM CHLORIDE 20 ML: 9 INJECTION, SOLUTION INTRAVENOUS at 18:31

## 2020-08-09 RX ADMIN — FUROSEMIDE 20 MG: 10 INJECTION, SOLUTION INTRAVENOUS at 12:51

## 2020-08-09 RX ADMIN — Medication 1 G: at 09:06

## 2020-08-09 RX ADMIN — CEFEPIME 2 G: 2 INJECTION, POWDER, FOR SOLUTION INTRAVENOUS at 09:06

## 2020-08-09 RX ADMIN — METOPROLOL TARTRATE 12.5 MG: 25 TABLET, FILM COATED ORAL at 09:07

## 2020-08-09 RX ADMIN — HEPARIN SODIUM 5000 UNITS: 5000 INJECTION, SOLUTION INTRAVENOUS; SUBCUTANEOUS at 05:40

## 2020-08-09 RX ADMIN — MORPHINE SULFATE 2 MG: 2 INJECTION, SOLUTION INTRAMUSCULAR; INTRAVENOUS at 09:06

## 2020-08-09 RX ADMIN — ACETAMINOPHEN 650 MG: 325 TABLET ORAL at 21:20

## 2020-08-09 RX ADMIN — HYDROCODONE BITARTRATE AND ACETAMINOPHEN 1 TABLET: 5; 325 TABLET ORAL at 21:14

## 2020-08-09 RX ADMIN — LORAZEPAM 1 MG: 1 TABLET ORAL at 21:15

## 2020-08-09 RX ADMIN — VANCOMYCIN HYDROCHLORIDE 1250 MG: 5 INJECTION, POWDER, LYOPHILIZED, FOR SOLUTION INTRAVENOUS at 01:29

## 2020-08-09 RX ADMIN — Medication 1 G: at 18:32

## 2020-08-09 RX ADMIN — CEFEPIME 2 G: 2 INJECTION, POWDER, FOR SOLUTION INTRAVENOUS at 18:32

## 2020-08-09 RX ADMIN — SODIUM CHLORIDE 500 ML: 9 INJECTION, SOLUTION INTRAVENOUS at 12:50

## 2020-08-09 RX ADMIN — METOPROLOL TARTRATE 12.5 MG: 25 TABLET, FILM COATED ORAL at 21:15

## 2020-08-09 RX ADMIN — Medication 1 G: at 12:51

## 2020-08-09 RX ADMIN — HEPARIN SODIUM 5000 UNITS: 5000 INJECTION, SOLUTION INTRAVENOUS; SUBCUTANEOUS at 22:25

## 2020-08-09 RX ADMIN — MORPHINE SULFATE 2 MG: 2 INJECTION, SOLUTION INTRAMUSCULAR; INTRAVENOUS at 03:59

## 2020-08-09 RX ADMIN — SODIUM CHLORIDE, PRESERVATIVE FREE 10 ML: 5 INJECTION INTRAVENOUS at 21:16

## 2020-08-09 RX ADMIN — HYDROCODONE BITARTRATE AND ACETAMINOPHEN 1 TABLET: 5; 325 TABLET ORAL at 06:27

## 2020-08-09 RX ADMIN — VANCOMYCIN HYDROCHLORIDE 1500 MG: 5 INJECTION, POWDER, LYOPHILIZED, FOR SOLUTION INTRAVENOUS at 13:10

## 2020-08-09 ASSESSMENT — PAIN SCALES - GENERAL
PAINLEVEL_OUTOF10: 7
PAINLEVEL_OUTOF10: 5
PAINLEVEL_OUTOF10: 6
PAINLEVEL_OUTOF10: 7
PAINLEVEL_OUTOF10: 6
PAINLEVEL_OUTOF10: 7
PAINLEVEL_OUTOF10: 7
PAINLEVEL_OUTOF10: 5
PAINLEVEL_OUTOF10: 7
PAINLEVEL_OUTOF10: 6
PAINLEVEL_OUTOF10: 7

## 2020-08-09 ASSESSMENT — PAIN DESCRIPTION - LOCATION
LOCATION: LEG

## 2020-08-09 ASSESSMENT — PAIN DESCRIPTION - FREQUENCY
FREQUENCY: CONTINUOUS

## 2020-08-09 ASSESSMENT — PAIN DESCRIPTION - PROGRESSION
CLINICAL_PROGRESSION: NOT CHANGED

## 2020-08-09 ASSESSMENT — PAIN DESCRIPTION - DESCRIPTORS
DESCRIPTORS: SHARP;STABBING

## 2020-08-09 ASSESSMENT — PAIN DESCRIPTION - ORIENTATION
ORIENTATION: RIGHT;LEFT
ORIENTATION: LEFT;RIGHT
ORIENTATION: RIGHT;LEFT

## 2020-08-09 ASSESSMENT — PAIN DESCRIPTION - PAIN TYPE
TYPE: ACUTE PAIN;SURGICAL PAIN
TYPE: SURGICAL PAIN
TYPE: ACUTE PAIN;SURGICAL PAIN
TYPE: ACUTE PAIN;SURGICAL PAIN
TYPE: SURGICAL PAIN;ACUTE PAIN

## 2020-08-09 ASSESSMENT — PAIN DESCRIPTION - ONSET: ONSET: ON-GOING

## 2020-08-09 ASSESSMENT — PAIN - FUNCTIONAL ASSESSMENT: PAIN_FUNCTIONAL_ASSESSMENT: PREVENTS OR INTERFERES SOME ACTIVE ACTIVITIES AND ADLS

## 2020-08-09 NOTE — PROGRESS NOTES
Perfect serve sent to Dr. Josephine Hernadez notifying him of both blood cultures coming back gram positive cocci.

## 2020-08-09 NOTE — CONSULTS
[facility] 1126/1126-A, 8/9/2020    Discharge Recommendation: Wallace Filipe      History:  [place]:  The primary encounter diagnosis was Sepsis, due to unspecified organism, unspecified whether acute organ dysfunction present ([facility] [address] Diagnoses of Infected skin ulcer with necrosis of muscle ([facility] [address] Cellulitis of right lower extremity, IV drug abuse ([facility] [address] Acute kidney injury ([facility] [address] Hyponatremia, Acute septic pulmonary embolism, unspecified whether acute cor pulmonale present ([facility] [address] Hypoalbuminemia, Hypokalemia, and Right hip pain were also pertinent to this visit. Subjective:  Patient states: Agreeable to therapy. Pain: 7/10 BLE  Communication with other providers: PT, RN  Restrictions: General Precautions, Fall Risk    Home Setup/Prior level of function:  Social/Functional History  Lives With: Significant other  Type of Home: House  Home Layout: Two level  Home Access: Stairs to enter with rails  Entrance Stairs - Number of Steps: 6  Bathroom Shower/Tub: Tub/Shower unit  Bathroom Toilet: Standard  Bathroom Equipment: Grab bars in shower, Shower chair, Grab bars around toilet  Receives Help From: Family, Friend(s)  ADL Assistance: Independent  Homemaking Assistance: Independent  Homemaking Responsibilities: Yes  Ambulation Assistance: Independent  Transfer Assistance: Independent  Active : No  Occupation: Unemployed    Examination:  · Observation: Supine in bed upon arrival  · Vision: [facility]  · Hearing: WFL  · Vitals: Stable vitals throughout session    Body Systems and functions:  · ROM:   · PROM: Full range  · AROM: approx 80 degrees BUE shoulder flexion. · Strength: Not formally tested,  strength approx 3/5. 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     · Sensation: WFL  · Tone: Normal  · Coordination: WFL  · Perception: WNL    Activities of Daily Living (ADLs):  · Feeding: Liliana (increased time and setup)  · Grooming: Kevin (in seated, anticipate assist d/t decreased AROM of BUE, as well as increased fatigue this date). · UB bathing: Kevin (recommend pt complete in seated, assist d/t decreased AROM of BUE and increased fatigue this date). · LB bathing: ModA (pt limited by pain in BLE, recommend pt complete in seated). · UB dressing: CGA  · LB dressing: MaxA (in seated, pt required assist to bobbi B socks this date). · Toileting: ModA (anticipate assist for toilet transfers and assist while standing to perform felton care/clothing manipulation). Cognitive and Psychosocial Functioning:  · Overall cognitive status: WFL (self, time, location)   · Affect: Normal     Balance:   · Sitting: Supervision (pt sat EOB demo good dynamic sitting balance). · Standing: Kevin (pt demo fair standing balance with RW, rapid breathing related to increased pain in standing, VC to slow breathing and take deep breaths, limited standing tolerance). Functional Mobility:  · Bed Mobility: SBA (supine to seated bed mobility, demo increased time throughout, VC for sequencing. Seated to supine bed mobility with assist for leg positioning and scooting toward HOB). · Transfers: ModA (STS from EOB with RW). · Ambulation: ModA (pt able to ambulate approx 2 steps with RW, forward flexed posture, limited by pain, slow pace, VC for deep breathing). AM-PAC 6 click short form for inpatient daily activity:   How much help from another person does the patient currently need. .. Unable  Dep A Lot  Max A A Lot   Mod A A Little  Min A A Little   CGA  SBA None   Mod I  Indep  Sup   1. Putting on and taking off regular lower body clothing? [] 1    [x] 2   [] 2   [] 3   [] 3   [] 4      2. Bathing (including washing, rinsing, drying)? [] 1   [] 2   [x] 2 [] 3 [] 3 [] 4   3. Toileting, which includes using toilet, bedpan, or urinal? [] 1    [] 2   [x] 2   [] 3   [] 3   [] 4     4. Putting on and taking off regular upper body clothing?  [] 1   [] 2   [] 2   [] 3   [x] 3    [] 4      5. Taking care of personal grooming such as brushing teeth? [] 1   [] 2    [] 2 [x] 3    [] 3   [] 4      6. Eating meals? [] 1   [] 2   [] 2   [] 3   [] 3   [x] 4      Raw Score:  16     [24=0% impaired(CH), 23=1-19%(CI), 20-22=20-39%(CJ), 15-19=40-59%(CK), 10-14=60-79%(CL), 7-9=80-99%(CM), 6=100%(CN)]     Treatment:  Therapeutic Activity Training:   Therapeutic activity training was instructed today. Cues were given for safety, sequence, UE/LE placement, awareness, and balance. Activities performed today included bed mobility training, sup-sit, sit-stand, ambulation. Safety Measures: Gait belt used, Left in bed, Alarm in place    Assessment:  Assessment  Performance deficits / Impairments: Decreased functional mobility , Decreased ROM, Decreased balance, Decreased high-level IADLs, Decreased endurance, Decreased ADL status, Decreased strength, Decreased posture  Treatment Diagnosis: Sepsis  Prognosis: Good  Decision Making: Medium Complexity  REQUIRES OT FOLLOW UP: Yes  Discharge Recommendations: [address]      Pt is a 47year old F admitted for sepsis. Pt underwent debridement of right upper thigh, right lower leg and left lower leg abscesses, incisional biopsy of petechial rash right lower leg. Pt reports that prior to admission she was independent in all ADLs, IADLs, and functional mobility. This date, pt demo decreased strength and activity tolerance for functional mobility, as well as decreased ADL status and limitations d/t pain. Pt is functioning below baseline and would benefit from skilled OT services at SNF. Plan:  Plan  Times per week: 2+  Times per day: Daily      Goals:  1. Pt will complete all aspects of bed mobility for EOB/OOB ADLs with supervision. 2. Pt will complete UB/LB bathing with CGA. 3. Pt will complete all aspects of LB dressing with Kevin.    4. Pt will complete all functional transfers to and from bed, chair, toilet, shower chair with CGA and RW.   5. Pt will ambulate HH distance to bathroom for toileting with Kevin and RW. 6. Pt will complete all aspects of toileting task with Kevin. 7. Pt will complete oral hygiene/grooming routine in standing at sink with Kevin demo good dynamic standing balance for approx 8 minutes. 8. Pt will complete ther ex/ther act with focus on UB strengthening. Time:   Time in: 1040  Time out: 1118  Timed treatment minutes: 25  Total time: 38      Electronically signed by:       ALEJANDRA Jeffrey/L, [place], .[id number]

## 2020-08-09 NOTE — PROGRESS NOTES
Pt up to chair at this time. Only able to stay up for around 10min then had to get back in bed due to pain.

## 2020-08-09 NOTE — PROGRESS NOTES
Nephrology Progress Note         [address], [address] [address]  Phone: [phone]  Office Hours: 8:30AM - 4:30PM  Monday - Friday       [facility]  Syeda Haskins MD  [address]  [address],  [facility]  [place] [address]  PHONE: [phone]  FAX: [phone]    2020 11:34 AM  Subjective:   Admit Date: 2020  PCP: Debbi Barahona DO  Interval History: complaining of pain  Good uop    Diet: DIET GENERAL; Daily Fluid Restriction: 1800 ml      Data:   Scheduled Meds:   sodium chloride  20 mL Intravenous Once    sodium chloride  500 mL Intravenous Once    vancomycin  1,500 mg Intravenous Q12H    sodium chloride  1 g Oral TID WC    metoprolol tartrate  12.5 mg Oral BID    cefepime  2 g Intravenous Q8H    collagenase   Topical Daily    sodium chloride flush  10 mL Intravenous 2 times per day    nicotine  1 patch Transdermal Daily    heparin (porcine)  5,000 Units Subcutaneous 3 times per day     Continuous Infusions:  PRN Meds:HYDROmorphone, HYDROcodone 5 mg - acetaminophen, potassium chloride **OR** potassium alternative oral replacement **OR** potassium chloride, LORazepam, sodium chloride flush, acetaminophen **OR** acetaminophen, polyethylene glycol, [] traMADol **AND** cloNIDine, traZODone, promethazine **OR** promethazine  I/O last 3 completed shifts: In: 65 [P.O.:980; I.V.:308; IV Piggyback:300]  Out: 1504 [Urine:2325]  I/O this shift:  In: 130 [P.O.:120;  I.V.:10]  Out: -     Intake/Output Summary (Last 24 hours) at 2020 1134  Last data filed at 2020 0920  Gross per 24 hour   Intake 1468 ml   Output 2325 ml   Net -857 ml       CBC:   Recent Labs     20  2209 20  0626   WBC 11.2* 13.5*   HGB 7.2* 7.0*   * 121*       BMP:    Recent Labs     20  0515 20  0717 20  0626   * 131* 132*   K 4.2 3.9 3.5   CL 99 97* 100   CO2 22 24 23   BUN 28* 15 11   CREATININE 0.7 0.6 0.5*   GLUCOSE 99 108* 143*     Hepatic: No results for input(s): AST, ALT, ALB, BILITOT, ALKPHOS in the last 72 hours. Troponin: No results for input(s): TROPONINI in the last 72 hours. BNP: No results for input(s): BNP in the last 72 hours. Lipids: No results for input(s): CHOL, HDL in the last 72 hours. Invalid input(s): LDLCALCU  ABGs: No results found for: PHART, PO2ART, CJE3BDN  INR: No results for input(s): INR in the last 72 hours.     Objective:   Vitals: /70   Pulse 112   Temp 98.1 °F (36.7 °C) (Oral)   Resp 29   Ht 5' 5\" (1.651 m)   Wt 205 lb 9.6 oz (93.3 kg)   SpO2 95%   BMI 34.21 kg/m²   General appearance: alert and cooperative with exam, in no acute distress  HEENT: normocephalic, atraumatic,   Neck: supple, trachea midline  Lungs: breathing comfortably   Abdomen: non distended,  Extremities: trace ble edema  Neurologic: alert, oriented, follows commands, interactive    Assessment and Plan:     Patient Active Problem List   Diagnosis    CCC (chronic calculous cholecystitis)    Sepsis ([facility] [address]      - LISBETH from volume depletion : cr 4 on admission from baseline of 0.9////ct a/p shows no HN  - Hyponatremia from volume depletion: sodium 119 on admission, better  - Non anion gap metabolic acidosis; negative salicylate and acetaminophen levels  - Sepsis   - Substance abuse: Amphetamine positive UDS  - Right thigh infection  - elevated probnp,   - staph bacteremia and thigh infection  - TV vegetation     Suggest  -sodium 132, give iv lasix today                         Electronically signed by Demetrice Suarez DO on 8/9/2020 at 11:34 AM    [address] Mimi Walton DO [address],  [address]  [place], [place] [address]  PHONE: [phone]  FAX: [phone]

## 2020-08-09 NOTE — PROGRESS NOTES
hospitalist Progress Note         Admit Date: 8/4/2020    PCP: Maico Arboleda DO     Chief Complaint   Patient presents with   Mary Valadez Wound Check     R upper thigh         Assessment and Plan:      Sepsis (HCC)Abscess of right thigh/MRSA bacteremia   Continue vancomycin and cefepime per ID. S/p I&D bedside. RAFI showing large vegetation above tricuspid valve. Pain control with Dilaudid IV and  Percocet. Continue metoprolol for tachycardia.  Hyponatremia/acute kidney injury improved with IVF. Monitor volume status   Anemia likely due to anemia of chronic disease. Anemia panel confirming AOCD   Current Hb 7 that slowly trending down. No evidence of acute blood loss. We will transfuse 1 unit PRBC while patient is still tachy.  Polysubstance drug abuse including heroine IV counseling provided. On Ultram, clonidine, Restoril to prevent withdrawal symptoms. Negative for RPR, HIV, chlamydia and gonorrhea. ID on board   Chronic hepatitis B ([facility] [address]   Chronic hepatitis C without hepatic coma (HCC)   Acute septic pulmonary embolism without acute cor pulmonale (HCC)  Tobacco abuse smoking cessation counseling provided. On NicoDerm patch      VTE prophylaxis LMWH.     Current Facility-Administered Medications   Medication Dose Route Frequency Provider Last Rate Last Dose    HYDROmorphone (DILAUDID) injection 0.5 mg  0.5 mg Intravenous Q4H PRN Chrystal Irizarry MD        HYDROcodone-acetaminophen [facility]) 5-325 MG per tablet 1 tablet  1 tablet Oral Q4H PRN Chrystal Irizarry MD        sodium chloride tablet 1 g  1 g Oral TID  Dolores Castro DO   1 g at 08/09/20 0906    LORazepam (ATIVAN) tablet 1 mg  1 mg Oral Q6H PRN Chrystal Irizarry MD   1 mg at 08/08/20 2122    metoprolol tartrate (LOPRESSOR) tablet 12.5 mg  12.5 mg Oral BID Chrystal Irizarry MD   12.5 mg at 08/09/20 1488    cefepime (MAXIPIME) 2 g IVPB minibag  2 g Intravenous Zoila Mcdaniels MD   Stopped at 08/09/20 0936    vancomycin (VANCOCIN) 1,250 mg in dextrose 5 % 250 mL IVPB  1,250 mg Intravenous Q12H Katy Boyd MD   Stopped at 08/09/20 0330    collagenase ointment   Topical Daily Dony Schmidt MD        sodium chloride flush 0.9 % injection 10 mL  10 mL Intravenous 2 times per day Dony Schmidt MD   10 mL at 08/09/20 0906    sodium chloride flush 0.9 % injection 10 mL  10 mL Intravenous PRN Dony Schmidt MD        acetaminophen (TYLENOL) tablet 650 mg  650 mg Oral Q6H PRN Dony Schmidt MD   650 mg at 08/07/20 1105    Or    acetaminophen (TYLENOL) suppository 650 mg  650 mg Rectal Q6H PRN Dony Schmidt MD        polyethylene glycol (GLYCOLAX) packet 17 g  17 g Oral Daily PRN Dony Schmidt MD        nicotine (NICODERM CQ) 21 MG/24HR 1 patch  1 patch Transdermal Daily Dony Schmidt MD   1 patch at 08/07/20 3263    heparin (porcine) injection 5,000 Units  5,000 Units Subcutaneous 3 times per day Dony Schmidt MD   5,000 Units at 08/09/20 0540    cloNIDine (CATAPRES) tablet 0.1 mg  0.1 mg Oral PRN Dony Schmidt MD   0.1 mg at 08/07/20 0029    traZODone (DESYREL) tablet 50 mg  50 mg Oral Nightly PRN Dony Schmidt MD   50 mg at 08/04/20 2033    promethazine (PHENERGAN) tablet 12.5 mg  12.5 mg Oral Q6H PRN Dony Schmidt MD        Or   Rola Maya promethazine (PHENERGAN) injection 6.25 mg  6.25 mg Intravenous Q6H PRN Dony Schmidt MD   6.25 mg at 08/04/20 1422       Subjective:     Patient still complains of significant B/L leg pain and unable to sleep last night. While at resting her heart rate is around 120s. Noted drop in hematocrit. Objective:        Intake/Output Summary (Last 24 hours) at 8/9/2020 1058  Last data filed at 8/9/2020 0906  Gross per 24 hour   Intake 1348 ml   Output 2325 ml   Net -977 ml      Vitals:   Vitals:    08/09/20 0920   BP: 112/70   Pulse: 112   Resp: 29   Temp: 98.1 °F (36.7 °C)   SpO2: 95%     Physical Exam:  General Appearance:    Alert, cooperative, moderate distress due to pain  Head:      Normocephalic, without obvious abnormality, atraumatic  Eyes:       Conjunctiva/corneas clear, EOM's intact  Lungs:    Clear to auscultation bilaterally, respirations unlabored  Heart:                Tachycardia with regular rate and rhythm, S1 and S2 normal,   Abdomen:     Soft, non-tender, bowel sounds active, no masses, no organomegaly  Extremities:   Extremities normal, atraumatic, no cyanosis or edema  Neurological:   Grossly Intact. Skin                   stable erythematous rash involving B/L legs     Significant Diagnostic Studies:   DATA:    CBC   Recent Labs     08/08/20  2209 08/09/20  0626   WBC 11.2* 13.5*   HGB 7.2* 7.0*   HCT 22.7* 21.9*   * 121*      BMP   Recent Labs     08/07/20  0515 08/08/20  0717 08/09/20  0626   * 131* 132*   K 4.2 3.9 3.5   CL 99 97* 100   CO2 22 24 23   BUN 28* 15 11   CREATININE 0.7 0.6 0.5*     LFT'S   No results for input(s): AST, ALT, ALB, BILIDIR, BILITOT, ALKPHOS in the last 72 hours. COAG   No results for input(s): INR in the last 72 hours. POC: No results found for: POCGLU  [id number]:No results found for: LABA1C  CARDIAC ENZYMES    No results for input(s): CKTOTAL, CKMB, CKMBINDEX, TROPONINI in the last 72 hours. Troponin:   No results for input(s): TROPONINT in the last 72 hours. BNP:   No results for input(s): PROBNP in the last 72 hours.   U/A:    Lab Results   Component Value Date    COLORU YELLOW 11/17/2019    WBCUA 3 11/17/2019    RBCUA 3 11/17/2019    MUCUS RARE 11/17/2019    BACTERIA RARE 11/17/2019    CLARITYU CLEAR 11/17/2019    SPECGRAV 1.009 11/17/2019    LEUKOCYTESUR TRACE 11/17/2019    BLOODU NEGATIVE 11/17/2019    GLUCOSEU neg 06/20/2013    GLUCOSEU NEGATIVE 06/20/2013       Ct Abdomen Pelvis Wo Contrast Additional Contrast? None    Result Date: 8/4/2020  EXAMINATION: CT OF THE ABDOMEN AND PELVIS WITHOUT CONTRAST 8/4/2020 8:08 am TECHNIQUE: CT of the abdomen and pelvis was performed without the administration of intravenous contrast. Multiplanar reformatted images are provided for review. Dose modulation, iterative reconstruction, and/or weight based adjustment of the mA/kV was utilized to reduce the radiation dose to as low as reasonably achievable. COMPARISON: None. HISTORY: ORDERING SYSTEM PROVIDED HISTORY: abscess? TECHNOLOGIST PROVIDED HISTORY: Reason for exam:->abscess? Additional Contrast?->None Is the patient pregnant?->No Reason for Exam: abscess? Acuity: Acute Type of Exam: Initial FINDINGS: Lower Chest: There are early patchy infiltrate seen in the lung bases may suggest early pneumonia. There is a small trace right-sided effusion. Organs: The liver appears unremarkable. The gallbladder has been removed. The pancreas is normal.  Spleen is mildly prominent. The left kidney appears enlarged with some mild stranding in the perinephric fat. I do not see any evidence for stone or obstruction this may be secondary to pyelonephritis. The left kidney is normal. GI/Bowel: The large and small bowel of the abdomen is normal. There is no evidence for free air or free fluid noted to be present. Pelvis: A Angel catheter is seen in the bladder is no free air free fluid Peritoneum/Retroperitoneum: The aorta normal in caliber there is no pathologically enlarged retroperitoneal adenopathy. Bones/Soft Tissues: The osseous structures is soft tissues normal.     The right kidney appears mildly enlarged with some stranding around the perinephric fat region. I do not see any evidence for stone or obstruction these findings may be related to a pyelonephritis please clinically correlate     Xr Chest Portable    Result Date: 8/4/2020  EXAMINATION: ONE XRAY VIEW OF THE CHEST 8/4/2020 2:54 am COMPARISON: None.  HISTORY: ORDERING SYSTEM PROVIDED HISTORY: Post right IJ placement TECHNOLOGIST PROVIDED HISTORY: Reason for exam:->Post right IJ placement Reason for Exam: Post right IJ placement Acuity: Acute Type of Exam: Initial FINDINGS: Right internal jugular approach central venous catheter is noted with distal tip located within the SVC near the superior atrial caval junction. The heart is normal in size and configuration. The mediastinal contours are within normal limits. The pulmonary arterial is are prominent and indistinct diffusely. Multifocal mild bilateral lung consolidation is identified. The pleural surfaces are normal and no evidence of a pleural effusion is seen. Bones and soft tissues are unremarkable. Moderate pulmonary interstitial edema with suggestion of multifocal bilateral lung overlying alveolar edema. Differential diagnostic considerations include overlying pneumonia. Ct Femur Right Wo Contrast    Result Date: 8/4/2020  EXAMINATION: CT OF THE RIGHT FEMUR WITH CONTRAST 8/4/2020 2:10 am TECHNIQUE: CT of the right femur was performed with the administration of intravenous contrast.  Multiplanar reformatted images are provided for review. Dose modulation, iterative reconstruction, and/or weight based adjustment of the mA/kV was utilized to reduce the radiation dose to as low as reasonably achievable. COMPARISON: Abdomen and pelvis CT 11/17/2019, pelvis radiograph 09/02/2009 HISTORY ORDERING SYSTEM PROVIDED HISTORY: Concern for necrotizing soft tissue infection of posterior lateral thigh, renal failure cannot tolerate contrast TECHNOLOGIST PROVIDED HISTORY: Reason for exam:->Concern for necrotizing soft tissue infection of posterior lateral thigh, renal failure cannot tolerate contrast Is the patient pregnant?->No Reason for Exam: concern for necrotizing infection, renal failure FINDINGS: BONES:  No acute fractures nor suspicious osseous lesions. No areas of abnormal cortical disruption. JOINTS:  Joints maintain anatomic alignment. Mild to moderate degenerative changes of the hip and pubic symphysis.   Moderate hip and trace knee joint effusions. SOFT TISSUES:  Surgically absent uterus. Nonenlarged inguinal lymph nodes, potentially reactive. Ulceration in the posterolateral proximal thigh with minimal adjacent subcutaneous stranding. No associated loculated fluid. Patchy stranding in the subcutaneous tissues just deep to the skin elsewhere in the lateral, anterior, and medial thigh. No loculated fluid nor soft tissue gas. Normal appearance of muscle and fasciae. 1. Ulceration in the posterolateral proximal right thigh with minimal adjacent cellulitis. No associated findings of abscess, necrotizing fasciitis, myositis, or osteomyelitis. 2. Patchy stranding deep to the skin in the lateral, anterior, in the medial right thigh potentially related to additional injection sites. 3. Moderate right hip joint effusion potentially related to mild to moderate osteoarthritic changes of the right hip.            Mariaa Bates  [facility] Hospitalist

## 2020-08-09 NOTE — PROGRESS NOTES
surface. · Vision:  [facility]  · Hearing:  [facility]  · Cardiopulmonary:  WFL  · Cognition: WFL, see OT/SLP note for further evaluation. Musculoskeletal  · ROM R/L:  WFL. · Strength R/L:  4/5, 4/5 in function and endurance. · Neuro:  [facility]. Mobility:  · Rolling L/R:  SBA  · Supine to sit:  SBA  · Transfers: Mod A  · Sitting balance:  good. · Standing balance:  fair. · Gait: Mod A with FWW for 1 step. Forward bent posturing, limited by pain. [facility] [address] [facility] 6 Clicks Inpatient Mobility:  AM-PAC Inpatient Mobility Raw Score : 12    Treatment: Mod A with FWW for 1 step. Forward bent posturing, limited by pain. Max encouragement needed for standing and sitting interventions. Sit to stands performed from bedside with cues for technique to improve efficiency. Pt requires Mod A from clinician and FWW. Safety: patient left in bed with alarm on, call light within reach, RN notified, gait belt used. Assessment:  Pt is a 47year old female with complaints on this date of BLE pain of insidious onset prior to hospital admission. Pt reports it is the entire leg that is painful and prevent hannah from doing much weight bearing activity. Pt demonstrates AROM WFL. Mild strength deficits noted for BLEs with no reported inc pain during MMT assessment. Pt is SBA for rolling and bed mobility. Pt is Mod A for transfers and ambulation with FWW for very short distances. Pt demonstrates forward bent posturing. Pt reports limitations to standing and ambulation due to inc pain in BLEs. Pt appears, utilizing facial expressions, to have decreased pain once sitting or supine. Pt will benefit from skilled therapy services in order to improve strength, stability, balance, and independence with ADL's. Complexity: Moderate  Prognosis: Good, no significant barriers to participation at this time. Plan Times per week: 2/week, 1 week.   Discharge Recommendations: Subacute/Skilled Nursing Facility  Equipment: FWW    Goals:  Short term goals  Short term goal 1: Pt will demonstrate transfers with min A and FWW. Short term goal 2: Pt will demosntrate ambulation with FWW for 10 ft with no more than Min A. Short term goal 3: Pt will perform bed mobility with supervision. Long term goals  Long term goal 1: Pt will demonstrate transfers with SBA and FWW. Long term goal 2: Pt will demosntrate ambulation with FWW for 50 ft with no more than SBA. Long term goal 3: Pt will perform bed mobility with independence. Treatment plan:  Bed mobility, transfers, balance, gait, TA, TX, modalities PRN. Recommendations for NURSING mobility: Mod A with FWW, very short distances.     Time:   Time in: 1040  Time out: 1118  Timed treatment minutes: 25  Total time: 38    Electronically signed by:    Alee Warren PT  8/9/2020, 12:28 PM

## 2020-08-09 NOTE — PROGRESS NOTES
[address] [facility]  consulted by Dr. Danielle Oviedo for monitoring and adjustment. Indication for treatment: MRSA bacteremia 2/2 leg abscess, r/o endocarditis   Goal trough: 15 mcg/mL     Pertinent Laboratory Values:   Temp Readings from Last 3 Encounters:   08/09/20 98.1 °F (36.7 °C) (Oral)   08/04/20 96.3 °F (35.7 °C)   06/01/20 98.1 °F (36.7 °C) (Oral)     Recent Labs     08/08/20  2209 08/09/20  0626   WBC 11.2* 13.5*     Recent Labs     08/07/20  0515 08/08/20  0717 08/09/20  0626   BUN 28* 15 11   CREATININE 0.7 0.6 0.5*     Estimated Creatinine Clearance: 172 mL/min (A) (based on SCr of 0.5 mg/dL (L)). Intake/Output Summary (Last 24 hours) at 8/9/2020 1026  Last data filed at 8/9/2020 0906  Gross per 24 hour   Intake 1348 ml   Output 2325 ml   Net -977 ml       Pertinent Cultures:  Date                             Source                                     Results  8/4                               Surgical abscess cx                Staph aureus, pseudomonas, e coli  8/4                               Surgical abscess cx                MRSA    Vancomycin level:   TROUGH:    Recent Labs     08/07/20  1830   VANCOTROUGH 6.5*     RANDOM:    Recent Labs     08/07/20  0545   VANCORANDOM 15.8       Assessment:  · WBC and temperature: WBC trending up @ 13.5 today, TMax = 99.9 F  · SCr, BUN, and urine output: LISBETH resolved and renal function at baseline  · Day(s) of therapy: #6  · Vancomycin level: 12.6 on 8/9 (8 hr post dose)    Plan:  · LISBETH resolved, renal at baseline  · Trough = 12.6, sub-therapeutic level. · Changed dose to Vancomycin 1500mg IV every 12 hours. · Repeat trough on 8/10 @ 1300. · Pharmacy will continue to monitor patient and adjust therapy as indicated    REPEAT VANCOMYCIN TROUGH SCHEDULED FOR 8/10/2020 @ 1300. Thank you for the consult.   Dominick Scott [place]  8/9/2020 10:26 AM

## 2020-08-10 PROBLEM — M00.051 STAPHYLOCOCCAL ARTHRITIS OF RIGHT HIP (HCC): Status: ACTIVE | Noted: 2020-08-10

## 2020-08-10 LAB
ANION GAP SERPL CALCULATED.3IONS-SCNC: 8 MMOL/L (ref 4–16)
BUN BLDV-MCNC: 9 MG/DL (ref 6–23)
CALCIUM SERPL-MCNC: 7.8 MG/DL (ref 8.3–10.6)
CHLORIDE BLD-SCNC: 100 MMOL/L (ref 99–110)
CO2: 27 MMOL/L (ref 21–32)
CREAT SERPL-MCNC: 0.5 MG/DL (ref 0.6–1.1)
DOSE AMOUNT: NORMAL
DOSE TIME: NORMAL
GFR AFRICAN AMERICAN: >60 ML/MIN/1.73M2
GFR NON-AFRICAN AMERICAN: >60 ML/MIN/1.73M2
GLUCOSE BLD-MCNC: 96 MG/DL (ref 70–99)
HCT VFR BLD CALC: 23.1 % (ref 37–47)
HEMOGLOBIN: 7.3 GM/DL (ref 12.5–16)
MCH RBC QN AUTO: 27.2 PG (ref 27–31)
MCHC RBC AUTO-ENTMCNC: 31.6 % (ref 32–36)
MCV RBC AUTO: 86.2 FL (ref 78–100)
PDW BLD-RTO: 14.3 % (ref 11.7–14.9)
PLATELET # BLD: 109 K/CU MM (ref 140–440)
PMV BLD AUTO: 9.6 FL (ref 7.5–11.1)
POTASSIUM SERPL-SCNC: 3.6 MMOL/L (ref 3.5–5.1)
RBC # BLD: 2.68 M/CU MM (ref 4.2–5.4)
SODIUM BLD-SCNC: 135 MMOL/L (ref 135–145)
VANCOMYCIN TROUGH: 12.2 UG/ML (ref 10–20)
WBC # BLD: 10.8 K/CU MM (ref 4–10.5)

## 2020-08-10 PROCEDURE — 2580000003 HC RX 258: Performed by: INTERNAL MEDICINE

## 2020-08-10 PROCEDURE — 85027 COMPLETE CBC AUTOMATED: CPT

## 2020-08-10 PROCEDURE — 6370000000 HC RX 637 (ALT 250 FOR IP): Performed by: INTERNAL MEDICINE

## 2020-08-10 PROCEDURE — 6360000002 HC RX W HCPCS: Performed by: INTERNAL MEDICINE

## 2020-08-10 PROCEDURE — 87040 BLOOD CULTURE FOR BACTERIA: CPT

## 2020-08-10 PROCEDURE — 80202 ASSAY OF VANCOMYCIN: CPT

## 2020-08-10 PROCEDURE — 1200000000 HC SEMI PRIVATE

## 2020-08-10 PROCEDURE — 6370000000 HC RX 637 (ALT 250 FOR IP): Performed by: SURGERY

## 2020-08-10 PROCEDURE — 6360000002 HC RX W HCPCS: Performed by: SURGERY

## 2020-08-10 PROCEDURE — 2580000003 HC RX 258: Performed by: SURGERY

## 2020-08-10 PROCEDURE — 80048 BASIC METABOLIC PNL TOTAL CA: CPT

## 2020-08-10 PROCEDURE — 99232 SBSQ HOSP IP/OBS MODERATE 35: CPT | Performed by: INTERNAL MEDICINE

## 2020-08-10 RX ORDER — OXYCODONE HYDROCHLORIDE 10 MG/1
10 TABLET ORAL EVERY 4 HOURS PRN
Status: DISCONTINUED | OUTPATIENT
Start: 2020-08-10 | End: 2020-08-16 | Stop reason: HOSPADM

## 2020-08-10 RX ADMIN — HEPARIN SODIUM 5000 UNITS: 5000 INJECTION, SOLUTION INTRAVENOUS; SUBCUTANEOUS at 13:28

## 2020-08-10 RX ADMIN — OXYCODONE HYDROCHLORIDE 10 MG: 10 TABLET ORAL at 20:17

## 2020-08-10 RX ADMIN — HYDROCODONE BITARTRATE AND ACETAMINOPHEN 1 TABLET: 5; 325 TABLET ORAL at 16:07

## 2020-08-10 RX ADMIN — SODIUM CHLORIDE, PRESERVATIVE FREE 10 ML: 5 INJECTION INTRAVENOUS at 08:50

## 2020-08-10 RX ADMIN — Medication 1 G: at 09:04

## 2020-08-10 RX ADMIN — LORAZEPAM 1 MG: 1 TABLET ORAL at 18:00

## 2020-08-10 RX ADMIN — METOPROLOL TARTRATE 12.5 MG: 25 TABLET, FILM COATED ORAL at 09:04

## 2020-08-10 RX ADMIN — HYDROMORPHONE HYDROCHLORIDE 0.5 MG: 1 INJECTION, SOLUTION INTRAMUSCULAR; INTRAVENOUS; SUBCUTANEOUS at 17:07

## 2020-08-10 RX ADMIN — CEFEPIME 2 G: 2 INJECTION, POWDER, FOR SOLUTION INTRAVENOUS at 11:08

## 2020-08-10 RX ADMIN — CEFEPIME 2 G: 2 INJECTION, POWDER, FOR SOLUTION INTRAVENOUS at 02:15

## 2020-08-10 RX ADMIN — CEFEPIME 2 G: 2 INJECTION, POWDER, FOR SOLUTION INTRAVENOUS at 17:14

## 2020-08-10 RX ADMIN — COLLAGENASE SANTYL: 250 OINTMENT TOPICAL at 13:58

## 2020-08-10 RX ADMIN — HYDROMORPHONE HYDROCHLORIDE 0.5 MG: 1 INJECTION, SOLUTION INTRAMUSCULAR; INTRAVENOUS; SUBCUTANEOUS at 08:50

## 2020-08-10 RX ADMIN — TRAZODONE HYDROCHLORIDE 50 MG: 50 TABLET ORAL at 20:19

## 2020-08-10 RX ADMIN — LORAZEPAM 1 MG: 1 TABLET ORAL at 04:54

## 2020-08-10 RX ADMIN — HYDROCODONE BITARTRATE AND ACETAMINOPHEN 1 TABLET: 5; 325 TABLET ORAL at 02:50

## 2020-08-10 RX ADMIN — METOPROLOL TARTRATE 12.5 MG: 25 TABLET, FILM COATED ORAL at 20:19

## 2020-08-10 RX ADMIN — HYDROCODONE BITARTRATE AND ACETAMINOPHEN 1 TABLET: 5; 325 TABLET ORAL at 11:08

## 2020-08-10 RX ADMIN — HYDROMORPHONE HYDROCHLORIDE 0.5 MG: 1 INJECTION, SOLUTION INTRAMUSCULAR; INTRAVENOUS; SUBCUTANEOUS at 13:28

## 2020-08-10 RX ADMIN — HYDROMORPHONE HYDROCHLORIDE 0.5 MG: 1 INJECTION, SOLUTION INTRAMUSCULAR; INTRAVENOUS; SUBCUTANEOUS at 04:50

## 2020-08-10 RX ADMIN — VANCOMYCIN HYDROCHLORIDE 1500 MG: 5 INJECTION, POWDER, LYOPHILIZED, FOR SOLUTION INTRAVENOUS at 16:35

## 2020-08-10 RX ADMIN — HYDROMORPHONE HYDROCHLORIDE 0.5 MG: 1 INJECTION, SOLUTION INTRAMUSCULAR; INTRAVENOUS; SUBCUTANEOUS at 21:05

## 2020-08-10 RX ADMIN — HEPARIN SODIUM 5000 UNITS: 5000 INJECTION, SOLUTION INTRAVENOUS; SUBCUTANEOUS at 20:22

## 2020-08-10 RX ADMIN — LORAZEPAM 1 MG: 1 TABLET ORAL at 11:08

## 2020-08-10 RX ADMIN — SODIUM CHLORIDE, PRESERVATIVE FREE 10 ML: 5 INJECTION INTRAVENOUS at 20:33

## 2020-08-10 RX ADMIN — HEPARIN SODIUM 5000 UNITS: 5000 INJECTION, SOLUTION INTRAVENOUS; SUBCUTANEOUS at 05:39

## 2020-08-10 RX ADMIN — VANCOMYCIN HYDROCHLORIDE 1500 MG: 5 INJECTION, POWDER, LYOPHILIZED, FOR SOLUTION INTRAVENOUS at 01:11

## 2020-08-10 ASSESSMENT — PAIN DESCRIPTION - PAIN TYPE
TYPE: ACUTE PAIN

## 2020-08-10 ASSESSMENT — PAIN SCALES - GENERAL
PAINLEVEL_OUTOF10: 7
PAINLEVEL_OUTOF10: 5
PAINLEVEL_OUTOF10: 8
PAINLEVEL_OUTOF10: 7
PAINLEVEL_OUTOF10: 8
PAINLEVEL_OUTOF10: 7
PAINLEVEL_OUTOF10: 7
PAINLEVEL_OUTOF10: 8
PAINLEVEL_OUTOF10: 8

## 2020-08-10 ASSESSMENT — PAIN DESCRIPTION - LOCATION
LOCATION: LEG

## 2020-08-10 ASSESSMENT — PAIN DESCRIPTION - ORIENTATION
ORIENTATION: RIGHT;LEFT

## 2020-08-10 ASSESSMENT — PAIN DESCRIPTION - ONSET
ONSET: ON-GOING
ONSET: ON-GOING

## 2020-08-10 ASSESSMENT — PAIN DESCRIPTION - PROGRESSION
CLINICAL_PROGRESSION: NOT CHANGED
CLINICAL_PROGRESSION: NOT CHANGED

## 2020-08-10 ASSESSMENT — PAIN DESCRIPTION - FREQUENCY
FREQUENCY: CONTINUOUS
FREQUENCY: CONTINUOUS

## 2020-08-10 ASSESSMENT — PAIN DESCRIPTION - DESCRIPTORS
DESCRIPTORS: ACHING;SHARP;STABBING
DESCRIPTORS: ACHING;SHARP

## 2020-08-10 NOTE — PROGRESS NOTES
Nephrology Progress Note         [address], [address] [address]  Phone: [phone]  Office Hours: 8:30AM - 4:30PM  Monday - Friday       [facility]  MD Merle Hawthorne, DO [facility],  [facility]  [place] [address]  PHONE: [phone]  FAX: [phone]    8/10/2020 7:58 AM  Subjective:   Admit Date: 2020  PCP: Radha Villegas DO  Interval History: on room air  Complaining of pain this am    Diet: DIET GENERAL; Daily Fluid Restriction: 1800 ml      Data:   Scheduled Meds:   vancomycin  1,500 mg Intravenous Q12H    sodium chloride  1 g Oral TID WC    metoprolol tartrate  12.5 mg Oral BID    cefepime  2 g Intravenous Q8H    collagenase   Topical Daily    sodium chloride flush  10 mL Intravenous 2 times per day    nicotine  1 patch Transdermal Daily    heparin (porcine)  5,000 Units Subcutaneous 3 times per day     Continuous Infusions:  PRN Meds:HYDROmorphone, HYDROcodone 5 mg - acetaminophen, potassium chloride **OR** potassium alternative oral replacement **OR** potassium chloride, LORazepam, sodium chloride flush, acetaminophen **OR** acetaminophen, polyethylene glycol, [] traMADol **AND** cloNIDine, traZODone, promethazine **OR** promethazine  I/O last 3 completed shifts: In: 1892 [P.O.:460; I.V.:1102; Blood:330]  Out: 3781 [Urine:4300]  No intake/output data recorded.     Intake/Output Summary (Last 24 hours) at 8/10/2020 0758  Last data filed at 8/10/2020 0459  Gross per 24 hour   Intake 1892 ml   Output 4300 ml   Net -2408 ml       CBC:   Recent Labs     20  0626 08/10/20  0550   WBC 11.2* 13.5* 10.8*   HGB 7.2* 7.0* 7.3*   * 121* 109*       BMP:    Recent Labs     20  0626 08/10/20  0550   * 132* 135   K 3.9 3.5 3.6   CL 97* 100 100   CO2 24 23 27   BUN 15 11 9   CREATININE 0.6 0.5* 0.5*   GLUCOSE 108* 143* 96     Hepatic: No results for input(s):

## 2020-08-10 NOTE — PROGRESS NOTES
[address] [facility]  consulted by Dr. Melinda Wellington for monitoring and adjustment. Indication for treatment: MRSA bacteremia 2/2 leg abscess, r/o endocarditis   Goal trough: 15 mcg/mL     Pertinent Laboratory Values:   Temp Readings from Last 3 Encounters:   08/10/20 98.4 °F (36.9 °C) (Oral)   08/04/20 96.3 °F (35.7 °C)   06/01/20 98.1 °F (36.7 °C) (Oral)     Recent Labs     08/08/20  2209 08/09/20  0626 08/10/20  0550   WBC 11.2* 13.5* 10.8*     Recent Labs     08/08/20  0717 08/09/20  0626 08/10/20  0550   BUN 15 11 9   CREATININE 0.6 0.5* 0.5*     Estimated Creatinine Clearance: 170 mL/min (A) (based on SCr of 0.5 mg/dL (L)). Intake/Output Summary (Last 24 hours) at 8/10/2020 1524  Last data filed at 8/10/2020 1333  Gross per 24 hour   Intake 1642 ml   Output 2400 ml   Net -758 ml       Pertinent Cultures:  Date                             Source                                     Results  8/4   Surgical abscess cx  Staph aureus, pseudomonas, e coli  8/4   Surgical abscess cx  MRSA  8/6   Blood    MRSA  8/7   Surgical abscess cx  MRSA  8/8   Blood    MRSA    Vancomycin level:   TROUGH:    Recent Labs     08/07/20  1830 08/09/20  0923 08/10/20  1342   VANCOTROUGH 6.5* 12.6 12.2     RANDOM:    No results for input(s): VANCORANDOM in the last 72 hours. Assessment:  · WBC and temperature: WBC improved @ 10.8; Tmax = 101F. · SCr, BUN, and urine output: LISBETH resolved and renal function at baseline  · Day(s) of therapy: #7  · Vancomycin level:  · 12.6, 8h post-dose (1250 mg IVPB q12h)  · 12.2, after x2 doses on new regimen, 12h post-dose    Plan:  · LISBETH resolved, renal at baseline  · Vancomycin dosing increased to 1500 mg IVPB q12h following sub-therapeutic trough on 08/09/20. · Repeat level today remains slightly sub-therapeutic but is not yet at steady state. Expect level ~15 when reached (>4 doses)  · Continue vancomycin 1500 mg IVPB q12h and repeat a level in 48h.   · Pharmacy will continue to monitor patient and adjust therapy as indicated.     VANCOMYCIN TROUGH SCHEDULED FOR 8/12/2020 @ 1300    Thank you for the consult,  Kerstin Burkitt, RPh  8/10/2020 3:24 PM

## 2020-08-10 NOTE — PROGRESS NOTES
hospitalist Progress Note         Admit Date: 8/4/2020    PCP: Neva Ellis DO     Chief Complaint   Patient presents with   Garduno Wound Check     R upper thigh         Assessment and Plan:      Sepsis (HCC)Abscess of right thigh/MRSA bacteremia   Continue vancomycin and cefepime per ID. S/p I&D bedside. RAFI showing large vegetation above tricuspid valve. Pain control with Dilaudid IV and  Percocet. Continue metoprolol for tachycardia.  Hyponatremia/acute kidney injury improved with IVF. Stable.  Anemia likely due to anemia of chronic disease. Anemia panel confirming AOCD   S/p 1 unit PRBC transfusion. Current Hb 7.3 with no evidence of active blood loss.  Polysubstance drug abuse including heroine IV counseling provided. On Ultram, clonidine, Restoril to prevent withdrawal symptoms. Negative for RPR, HIV, chlamydia and gonorrhea. ID on board   Chronic hepatitis B ([facility]   Chronic hepatitis C without hepatic coma (HCC)   Acute septic pulmonary embolism without acute cor pulmonale (HCC)  Tobacco abuse smoking cessation counseling provided. On NicoDerm patch      VTE prophylaxis LMWH.   CM working on placement to [place]    Current Facility-Administered Medications   Medication Dose Route Frequency Provider Last Rate Last Dose    HYDROmorphone (DILAUDID) injection 0.5 mg  0.5 mg Intravenous Q4H PRN MD David   0.5 mg at 08/10/20 1328    HYDROcodone-acetaminophen (NORCO) 5-325 MG per tablet 1 tablet  1 tablet Oral Q4H PRN MD David   1 tablet at 08/10/20 1108    potassium chloride (KLOR-CON M) extended release tablet 40 mEq  40 mEq Oral PRN MD David        Or    potassium bicarb-citric acid (EFFER-K) effervescent tablet 40 mEq  40 mEq Oral PRN MD David        Or    potassium chloride 10 mEq/100 mL IVPB (Peripheral Line)  10 mEq Intravenous PRN MD David        vancomycin (VANCOCIN) 1,500 mg in dextrose 5 % 500 mL IVPB  1,500 mg Intravenous Wade Urbano MD   Stopped at 08/10/20 0116    LORazepam (ATIVAN) tablet 1 mg  1 mg Oral Q6H PRN Chrystal Irizarry MD   1 mg at 08/10/20 1108    metoprolol tartrate (LOPRESSOR) tablet 12.5 mg  12.5 mg Oral BID Chrystal Irizarry MD   12.5 mg at 08/10/20 0904    cefepime (MAXIPIME) 2 g IVPB minibag  2 g Intravenous Q8H Marlene Canas MD   Stopped at 08/10/20 1138    collagenase ointment   Topical Daily Radha Tapia MD        sodium chloride flush 0.9 % injection 10 mL  10 mL Intravenous 2 times per day Radha Tapia MD   10 mL at 08/10/20 0850    sodium chloride flush 0.9 % injection 10 mL  10 mL Intravenous PRN Radha Tapia MD        acetaminophen (TYLENOL) tablet 650 mg  650 mg Oral Q6H PRN Radha Tapia MD   650 mg at 08/09/20 2120    Or    acetaminophen (TYLENOL) suppository 650 mg  650 mg Rectal Q6H PRN Radha Tapia MD        polyethylene glycol (GLYCOLAX) packet 17 g  17 g Oral Daily PRN Radha Tapia MD        nicotine (NICODERM CQ) 21 MG/24HR 1 patch  1 patch Transdermal Daily Radha Tapia MD   Stopped at 08/10/20 0850    heparin (porcine) injection 5,000 Units  5,000 Units Subcutaneous 3 times per day Radha Tapia MD   5,000 Units at 08/10/20 1328    cloNIDine (CATAPRES) tablet 0.1 mg  0.1 mg Oral PRN Radha Tapia MD   0.1 mg at 08/07/20 0029    traZODone (DESYREL) tablet 50 mg  50 mg Oral Nightly PRN Radha Tapia MD   50 mg at 08/04/20 2033    promethazine (PHENERGAN) tablet 12.5 mg  12.5 mg Oral Q6H PRN Radha Tapia MD        Or   Garduno promethazine (PHENERGAN) injection 6.25 mg  6.25 mg Intravenous Q6H PRN Radha Tapia MD   6.25 mg at 08/04/20 1422       Subjective:     Patient denied any new complaints other than B/L leg pain. No acute overnight events. Continued tachycardia to 120s. Objective:        Intake/Output Summary (Last 24 hours) at 8/10/2020 1436  Last data filed at 8/10/2020 1333  Gross per 24 hour   Intake 1642 ml   Output 5300 ml   Net -3658 ml      Vitals:   Vitals:    08/10/20 0850   BP: 109/73   Pulse: 113   Resp: 18   Temp: 98.4 °F (36.9 °C)   SpO2:      Physical Exam:  General Appearance:    Alert, cooperative, moderate distress due to pain  Head:      Normocephalic, without obvious abnormality, atraumatic  Eyes:       Conjunctiva/corneas clear, EOM's intact  Lungs:    Clear to auscultation bilaterally, respirations unlabored  Heart:                Tachycardia with regular rate and rhythm, S1 and S2 normal,   Abdomen:     Soft, non-tender, bowel sounds active, no masses, no organomegaly  Extremities:   Extremities normal, atraumatic, no cyanosis or edema  Neurological:   Grossly Intact. Skin                   stable erythematous rash involving B/L legs     Significant Diagnostic Studies:   DATA:    CBC   Recent Labs     08/08/20  2209 08/09/20  0626 08/10/20  0550   WBC 11.2* 13.5* 10.8*   HGB 7.2* 7.0* 7.3*   HCT 22.7* 21.9* 23.1*   * 121* 109*      BMP   Recent Labs     08/08/20  0717 08/09/20  0626 08/10/20  0550   * 132* 135   K 3.9 3.5 3.6   CL 97* 100 100   CO2 24 23 27   BUN 15 11 9   CREATININE 0.6 0.5* 0.5*     LFT'S   No results for input(s): AST, ALT, ALB, BILIDIR, BILITOT, ALKPHOS in the last 72 hours. COAG   No results for input(s): INR in the last 72 hours. POC: No results found for: POCGLU  ZrsoyubadaC2F:No results found for: LABA1C  CARDIAC ENZYMES    No results for input(s): CKTOTAL, CKMB, CKMBINDEX, TROPONINI in the last 72 hours. Troponin:   No results for input(s): TROPONINT in the last 72 hours. BNP:   No results for input(s): PROBNP in the last 72 hours.   U/A:    Lab Results   Component Value Date    COLORU YELLOW 11/17/2019    WBCUA 3 11/17/2019    RBCUA 3 11/17/2019    MUCUS RARE 11/17/2019    BACTERIA RARE 11/17/2019    CLARITYU CLEAR 11/17/2019    SPECGRAV 1.009 11/17/2019    LEUKOCYTESUR TRACE 11/17/2019    BLOODU NEGATIVE 11/17/2019    GLUCOSEU neg 06/20/2013    GLUCOSEU NEGATIVE 06/20/2013       Ct Abdomen Pelvis Wo Contrast Additional Contrast? None    Result Date: 8/4/2020  EXAMINATION: CT OF THE ABDOMEN AND PELVIS WITHOUT CONTRAST 8/4/2020 8:08 am TECHNIQUE: CT of the abdomen and pelvis was performed without the administration of intravenous contrast. Multiplanar reformatted images are provided for review. Dose modulation, iterative reconstruction, and/or weight based adjustment of the mA/kV was utilized to reduce the radiation dose to as low as reasonably achievable. COMPARISON: None. HISTORY: ORDERING SYSTEM PROVIDED HISTORY: abscess? TECHNOLOGIST PROVIDED HISTORY: Reason for exam:->abscess? Additional Contrast?->None Is the patient pregnant?->No Reason for Exam: abscess? Acuity: Acute Type of Exam: Initial FINDINGS: Lower Chest: There are early patchy infiltrate seen in the lung bases may suggest early pneumonia. There is a small trace right-sided effusion. Organs: The liver appears unremarkable. The gallbladder has been removed. The pancreas is normal.  Spleen is mildly prominent. The left kidney appears enlarged with some mild stranding in the perinephric fat. I do not see any evidence for stone or obstruction this may be secondary to pyelonephritis. The left kidney is normal. GI/Bowel: The large and small bowel of the abdomen is normal. There is no evidence for free air or free fluid noted to be present. Pelvis: A Angel catheter is seen in the bladder is no free air free fluid Peritoneum/Retroperitoneum: The aorta normal in caliber there is no pathologically enlarged retroperitoneal adenopathy. Bones/Soft Tissues: The osseous structures is soft tissues normal.     The right kidney appears mildly enlarged with some stranding around the perinephric fat region.   I do not see any evidence for stone or obstruction these findings may be related to a pyelonephritis please clinically renal failure FINDINGS: BONES:  No acute fractures nor suspicious osseous lesions. No areas of abnormal cortical disruption. JOINTS:  Joints maintain anatomic alignment. Mild to moderate degenerative changes of the hip and pubic symphysis. Moderate hip and trace knee joint effusions. SOFT TISSUES:  Surgically absent uterus. Nonenlarged inguinal lymph nodes, potentially reactive. Ulceration in the posterolateral proximal thigh with minimal adjacent subcutaneous stranding. No associated loculated fluid. Patchy stranding in the subcutaneous tissues just deep to the skin elsewhere in the lateral, anterior, and medial thigh. No loculated fluid nor soft tissue gas. Normal appearance of muscle and fasciae. 1. Ulceration in the posterolateral proximal right thigh with minimal adjacent cellulitis. No associated findings of abscess, necrotizing fasciitis, myositis, or osteomyelitis. 2. Patchy stranding deep to the skin in the lateral, anterior, in the medial right thigh potentially related to additional injection sites. 3. Moderate right hip joint effusion potentially related to mild to moderate osteoarthritic changes of the right hip.            Yulissa Alvarado  [facility]ist

## 2020-08-10 NOTE — CARE COORDINATION
Call to [facility] [phone]. Pt will need a precert to go to [place]. Pt meets [facility] criteria.

## 2020-08-10 NOTE — PROGRESS NOTES
Infectious Disease Progress Note  8/10/2020   Patient Name: Tahira Bailon : 1980       Reason for visit: F/u MRSA bacteremia? History:? Interval history noted. S/p debridement of right upper thigh, right lower leg, left lower leg abscesses, incisional biopsy of petechial rash of lower leg on 2020. Wound culture was polymicrobial: MRSA, pseudomonas aeruginosa, E. coli. Blood cultures remain mono microbial: MRSA  Denies n/v/d/f or untoward effects of antibiotics  Physical Exam:  Vital Signs: /69   Pulse 96   Temp 98.1 °F (36.7 °C) (Oral)   Resp 21   Ht 5' 5\" (1.651 m)   Wt 200 lb 6.4 oz (90.9 kg)   SpO2 95%   BMI 33.35 kg/m²     Gen: alert and oriented X3,  Skin: Bilateral leg non-blanchable petechiae  Wounds: Right lateral thigh, left medial leg C/D/I  HEMT: AT/NC Oropharynx pink, moist, and without lesions or exudates; dentition in good state of repair  Eyes: PERRLA, EOMI, conjunctiva pink, sclera anicteric. Neck: Supple. Trachea midline. No LAD. Chest: no distress and CTA. Good air movement. Heart: RRR and no MRG. Abd: soft, non-distended, no tenderness, no hepatomegaly. Normoactive bowel sounds. Ext: no clubbing, cyanosis, or edema  Catheter Site: without erythema or tenderness  Neuro: Mental status intact. CN 2-12 intact and no focal sensory or motor deficits     Radiologic / Imaging / TESTING  CT Right femur  1. Ulceration in the posterolateral proximal right thigh with minimal adjacent cellulitis. No associated findings of abscess, necrotizing fasciitis, myositis, or osteomyelitis. 2. Patchy stranding deep to the skin in the lateral, anterior, in the medial right thigh potentially related to additional injection sites. 3. Moderate right hip joint effusion potentially related to mild to moderate osteoarthritic changes of the right hip. RAFI 2020  Summary    Central line catheter tip visualized terminating within the right atrium.     Vegetation noted above the tricuspid (see comment). 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 Right hip septic arthritis. Vancomycin trough is sub-therapeutic   Therapeutic: continue vancomycin and cefepime   Diagnostic:  blood cx q 48h until negative: 8/6/2020-1 of 2 MRSA, repeat blood culture on 8/8-1 of 2 MRSA,   F/u:     Other: Orthopedic surgery consult for right hip septic arthritis.  Summary:Native TV endocarditis-MRSA. MRSA present in the right hip aspiration-may be responsible for persistent bacteremia. .  Wound culture has MRSA, Pseudomonas and E. coli.       Electronically signed by: Electronically signed by Juan Luis Ziegler MD on 8/10/2020 at 8:20 AM

## 2020-08-11 ENCOUNTER — APPOINTMENT (OUTPATIENT)
Dept: CT IMAGING | Age: 40
DRG: 710 | End: 2020-08-11
Payer: MEDICAID

## 2020-08-11 LAB
ANION GAP SERPL CALCULATED.3IONS-SCNC: 7 MMOL/L (ref 4–16)
BUN BLDV-MCNC: 10 MG/DL (ref 6–23)
CALCIUM SERPL-MCNC: 8.1 MG/DL (ref 8.3–10.6)
CHLORIDE BLD-SCNC: 101 MMOL/L (ref 99–110)
CO2: 28 MMOL/L (ref 21–32)
CREAT SERPL-MCNC: 0.6 MG/DL (ref 0.6–1.1)
CULTURE: ABNORMAL
CULTURE: NORMAL
GFR AFRICAN AMERICAN: >60 ML/MIN/1.73M2
GFR NON-AFRICAN AMERICAN: >60 ML/MIN/1.73M2
GLUCOSE BLD-MCNC: 101 MG/DL (ref 70–99)
HCT VFR BLD CALC: 22 % (ref 37–47)
HEMOGLOBIN: 7 GM/DL (ref 12.5–16)
Lab: ABNORMAL
Lab: ABNORMAL
Lab: NORMAL
MCH RBC QN AUTO: 27.7 PG (ref 27–31)
MCHC RBC AUTO-ENTMCNC: 31.8 % (ref 32–36)
MCV RBC AUTO: 87 FL (ref 78–100)
NIL (NEGATIVE) SPOT CONTROL: NORMAL
PANEL A SPOT COUNT: 0
PANEL B SPOT COUNT: 1
PDW BLD-RTO: 14.5 % (ref 11.7–14.9)
PLATELET # BLD: 116 K/CU MM (ref 140–440)
PMV BLD AUTO: 9.7 FL (ref 7.5–11.1)
POSITIVE CONTROL SPOT COUNT: NORMAL
POTASSIUM SERPL-SCNC: 4 MMOL/L (ref 3.5–5.1)
RBC # BLD: 2.53 M/CU MM (ref 4.2–5.4)
SODIUM BLD-SCNC: 136 MMOL/L (ref 135–145)
SPECIMEN: ABNORMAL
SPECIMEN: ABNORMAL
SPECIMEN: NORMAL
TB CELL IMMUNE MEASURE: NORMAL
WBC # BLD: 11.8 K/CU MM (ref 4–10.5)

## 2020-08-11 PROCEDURE — 85027 COMPLETE CBC AUTOMATED: CPT

## 2020-08-11 PROCEDURE — 87040 BLOOD CULTURE FOR BACTERIA: CPT

## 2020-08-11 PROCEDURE — 87075 CULTR BACTERIA EXCEPT BLOOD: CPT

## 2020-08-11 PROCEDURE — 1200000000 HC SEMI PRIVATE

## 2020-08-11 PROCEDURE — 6360000002 HC RX W HCPCS: Performed by: SURGERY

## 2020-08-11 PROCEDURE — 2580000003 HC RX 258: Performed by: SURGERY

## 2020-08-11 PROCEDURE — 6360000002 HC RX W HCPCS: Performed by: INTERNAL MEDICINE

## 2020-08-11 PROCEDURE — 6370000000 HC RX 637 (ALT 250 FOR IP): Performed by: INTERNAL MEDICINE

## 2020-08-11 PROCEDURE — 20610 DRAIN/INJ JOINT/BURSA W/O US: CPT

## 2020-08-11 PROCEDURE — 87070 CULTURE OTHR SPECIMN AEROBIC: CPT

## 2020-08-11 PROCEDURE — 6370000000 HC RX 637 (ALT 250 FOR IP): Performed by: SURGERY

## 2020-08-11 PROCEDURE — 87205 SMEAR GRAM STAIN: CPT

## 2020-08-11 PROCEDURE — 87150 DNA/RNA AMPLIFIED PROBE: CPT

## 2020-08-11 PROCEDURE — 99222 1ST HOSP IP/OBS MODERATE 55: CPT | Performed by: ORTHOPAEDIC SURGERY

## 2020-08-11 PROCEDURE — 36592 COLLECT BLOOD FROM PICC: CPT

## 2020-08-11 PROCEDURE — 2580000003 HC RX 258: Performed by: INTERNAL MEDICINE

## 2020-08-11 PROCEDURE — 80048 BASIC METABOLIC PNL TOTAL CA: CPT

## 2020-08-11 PROCEDURE — 87147 CULTURE TYPE IMMUNOLOGIC: CPT

## 2020-08-11 PROCEDURE — 99232 SBSQ HOSP IP/OBS MODERATE 35: CPT | Performed by: INTERNAL MEDICINE

## 2020-08-11 PROCEDURE — 87186 SC STD MICRODIL/AGAR DIL: CPT

## 2020-08-11 PROCEDURE — 94761 N-INVAS EAR/PLS OXIMETRY MLT: CPT

## 2020-08-11 PROCEDURE — 87077 CULTURE AEROBIC IDENTIFY: CPT

## 2020-08-11 RX ORDER — LORAZEPAM 2 MG/ML
0.5 INJECTION INTRAMUSCULAR ONCE
Status: DISCONTINUED | OUTPATIENT
Start: 2020-08-11 | End: 2020-08-16 | Stop reason: HOSPADM

## 2020-08-11 RX ADMIN — LORAZEPAM 1 MG: 1 TABLET ORAL at 06:24

## 2020-08-11 RX ADMIN — VANCOMYCIN HYDROCHLORIDE 1500 MG: 5 INJECTION, POWDER, LYOPHILIZED, FOR SOLUTION INTRAVENOUS at 13:14

## 2020-08-11 RX ADMIN — CEFEPIME 2 G: 2 INJECTION, POWDER, FOR SOLUTION INTRAVENOUS at 09:22

## 2020-08-11 RX ADMIN — OXYCODONE HYDROCHLORIDE 10 MG: 10 TABLET ORAL at 16:50

## 2020-08-11 RX ADMIN — OXYCODONE HYDROCHLORIDE 10 MG: 10 TABLET ORAL at 22:57

## 2020-08-11 RX ADMIN — VANCOMYCIN HYDROCHLORIDE 1500 MG: 5 INJECTION, POWDER, LYOPHILIZED, FOR SOLUTION INTRAVENOUS at 02:05

## 2020-08-11 RX ADMIN — CEFEPIME 2 G: 2 INJECTION, POWDER, FOR SOLUTION INTRAVENOUS at 16:49

## 2020-08-11 RX ADMIN — HYDROMORPHONE HYDROCHLORIDE 0.5 MG: 1 INJECTION, SOLUTION INTRAMUSCULAR; INTRAVENOUS; SUBCUTANEOUS at 06:24

## 2020-08-11 RX ADMIN — LORAZEPAM 1 MG: 1 TABLET ORAL at 00:00

## 2020-08-11 RX ADMIN — HEPARIN SODIUM 5000 UNITS: 5000 INJECTION, SOLUTION INTRAVENOUS; SUBCUTANEOUS at 22:57

## 2020-08-11 RX ADMIN — SODIUM CHLORIDE, PRESERVATIVE FREE 10 ML: 5 INJECTION INTRAVENOUS at 09:23

## 2020-08-11 RX ADMIN — HYDROMORPHONE HYDROCHLORIDE 0.5 MG: 1 INJECTION, SOLUTION INTRAMUSCULAR; INTRAVENOUS; SUBCUTANEOUS at 15:19

## 2020-08-11 RX ADMIN — METOPROLOL TARTRATE 12.5 MG: 25 TABLET, FILM COATED ORAL at 09:22

## 2020-08-11 RX ADMIN — COLLAGENASE SANTYL: 250 OINTMENT TOPICAL at 09:29

## 2020-08-11 RX ADMIN — HEPARIN SODIUM 5000 UNITS: 5000 INJECTION, SOLUTION INTRAVENOUS; SUBCUTANEOUS at 13:10

## 2020-08-11 RX ADMIN — LORAZEPAM 1 MG: 1 TABLET ORAL at 23:20

## 2020-08-11 RX ADMIN — CEFEPIME 2 G: 2 INJECTION, POWDER, FOR SOLUTION INTRAVENOUS at 02:05

## 2020-08-11 RX ADMIN — LORAZEPAM 1 MG: 1 TABLET ORAL at 15:19

## 2020-08-11 RX ADMIN — SODIUM CHLORIDE, PRESERVATIVE FREE 10 ML: 5 INJECTION INTRAVENOUS at 23:07

## 2020-08-11 RX ADMIN — METOPROLOL TARTRATE 12.5 MG: 25 TABLET, FILM COATED ORAL at 23:07

## 2020-08-11 RX ADMIN — ACETAMINOPHEN 650 MG: 325 TABLET ORAL at 09:22

## 2020-08-11 RX ADMIN — OXYCODONE HYDROCHLORIDE 10 MG: 10 TABLET ORAL at 09:22

## 2020-08-11 RX ADMIN — HYDROMORPHONE HYDROCHLORIDE 0.5 MG: 1 INJECTION, SOLUTION INTRAMUSCULAR; INTRAVENOUS; SUBCUTANEOUS at 10:42

## 2020-08-11 RX ADMIN — OXYCODONE HYDROCHLORIDE 10 MG: 10 TABLET ORAL at 04:06

## 2020-08-11 RX ADMIN — CLONIDINE HYDROCHLORIDE 0.1 MG: 0.1 TABLET ORAL at 09:22

## 2020-08-11 RX ADMIN — HYDROMORPHONE HYDROCHLORIDE 0.5 MG: 1 INJECTION, SOLUTION INTRAMUSCULAR; INTRAVENOUS; SUBCUTANEOUS at 02:05

## 2020-08-11 RX ADMIN — HYDROMORPHONE HYDROCHLORIDE 0.5 MG: 1 INJECTION, SOLUTION INTRAMUSCULAR; INTRAVENOUS; SUBCUTANEOUS at 19:39

## 2020-08-11 RX ADMIN — OXYCODONE HYDROCHLORIDE 10 MG: 10 TABLET ORAL at 00:00

## 2020-08-11 ASSESSMENT — PAIN SCALES - GENERAL
PAINLEVEL_OUTOF10: 6
PAINLEVEL_OUTOF10: 8
PAINLEVEL_OUTOF10: 8
PAINLEVEL_OUTOF10: 0
PAINLEVEL_OUTOF10: 10
PAINLEVEL_OUTOF10: 9
PAINLEVEL_OUTOF10: 8
PAINLEVEL_OUTOF10: 10
PAINLEVEL_OUTOF10: 8

## 2020-08-11 ASSESSMENT — PAIN DESCRIPTION - PAIN TYPE
TYPE: SURGICAL PAIN
TYPE: ACUTE PAIN

## 2020-08-11 ASSESSMENT — PAIN DESCRIPTION - FREQUENCY
FREQUENCY: CONTINUOUS
FREQUENCY: INTERMITTENT

## 2020-08-11 ASSESSMENT — PAIN DESCRIPTION - ONSET
ONSET: ON-GOING
ONSET: ON-GOING

## 2020-08-11 ASSESSMENT — PAIN DESCRIPTION - DIRECTION: RADIATING_TOWARDS: HIP

## 2020-08-11 ASSESSMENT — PAIN DESCRIPTION - PROGRESSION
CLINICAL_PROGRESSION: GRADUALLY WORSENING
CLINICAL_PROGRESSION: GRADUALLY WORSENING
CLINICAL_PROGRESSION: NOT CHANGED

## 2020-08-11 ASSESSMENT — PAIN DESCRIPTION - DESCRIPTORS
DESCRIPTORS: ACHING;CRAMPING
DESCRIPTORS: ACHING;CONSTANT

## 2020-08-11 ASSESSMENT — PAIN DESCRIPTION - LOCATION
LOCATION: LEG
LOCATION: LEG

## 2020-08-11 ASSESSMENT — PAIN DESCRIPTION - ORIENTATION
ORIENTATION: RIGHT
ORIENTATION: RIGHT

## 2020-08-11 NOTE — PROGRESS NOTES
Physical Therapy  Pt was off the floor for Right hip aspiration. Will cont. [facility].  Tory Rodriguez PTA

## 2020-08-11 NOTE — PROGRESS NOTES
25 cm; it    appears to be attached to the interatrial septum or tricuspid annulus    (septal leaflet origin). There was no thrombus noted in the left atrial appendage. Negative bubble study. No PFO or ASD noted. Labs:    Recent Results (from the past 24 hour(s))   Vancomycin, trough    Collection Time: 08/10/20  1:42 PM   Result Value Ref Range    Vancomycin Tr 12.2 10 - 20 UG/ML    DOSE AMOUNT DOSE AMT.  GIVEN - 1250mg     DOSE TIME DOSE TIME GIVEN - 1330    CBC    Collection Time: 08/11/20  4:39 AM   Result Value Ref Range    WBC 11.8 (H) 4.0 - 10.5 K/CU MM    RBC 2.53 (L) 4.2 - 5.4 M/CU MM    Hemoglobin 7.0 (L) 12.5 - 16.0 GM/DL    Hematocrit 22.0 (L) 37 - 47 %    MCV 87.0 78 - 100 FL    MCH 27.7 27 - 31 PG    MCHC 31.8 (L) 32.0 - 36.0 %    RDW 14.5 11.7 - 14.9 %    Platelets 430 (L) 962 - 440 K/CU MM    MPV 9.7 7.5 - 11.1 FL     CULTURE results: Invalid input(s): BLOOD CULTURE,  URINE CULTURE, SURGICAL CULTURE  Specimens:   ID Type Source Tests Collected by Time Destination   1 : Right Thigh Tissue Tissue CULTURE, SURGICAL Alfred Naqvi MD 8/4/2020 0932     2 : Right Calf Wound Tissue Tissue CULTURE, SURGICAL Alfred Naqvi MD 8/4/2020 0639     3 : Biopsy of Petechiae Right Lower Leg Tissue Tissue CULTURE, TISSUE Alfred Naqvi MD 8/4/2020 6858     A : Biopsy of Petechiae Right Lower Leg Tissue Tissue SURGICAL PATHOLOGY Alfred Naqvi MD 8/4/2020 4587      Narrative   [facility]   SETUP DATE/TIME:  08/04/2020 8685    Susceptibility     Staph aureus mrsa (2)     Antibiotic  Interpretation  RICCARDO  Status     erythromycin  Resistant  >4  Preliminary     gentamicin  Sensitive  <=4  Preliminary     moxifloxacin  Sensitive  <=0.5  Preliminary     oxacillin  Resistant  >2  Preliminary     tetracycline  Sensitive  <=4  Preliminary     trimethoprim-sulfamethoxazole  Sensitive  <=0.5/9.5  Preliminary     vancomycin  Sensitive  1  Preliminary     ceFAZolin Resistant  >16  Preliminary     Pseudomonas aeruginosa (3)     Antibiotic  Interpretation  RICCARDO  Status     cefepime  Sensitive  <=2  Preliminary     ciprofloxacin  Sensitive  <=1  Preliminary     gentamicin  Sensitive  <=4  Preliminary     meropenem  Sensitive  <=1  Preliminary     piperacillin-tazobactam  Sensitive  <=16  Preliminary     tobramycin  Sensitive  <=4  Preliminary     Escherichia coli (4)     Antibiotic  Interpretation  RICCARDO  Status     ampicillin  Sensitive  <=8  Preliminary     ceFAZolin  Sensitive  <=2  Preliminary     cefepime  Sensitive  <=2  Preliminary     cefuroxime  Sensitive  8  Preliminary     ciprofloxacin  Sensitive  <=1  Preliminary     ertapenem  Sensitive  <=0.5  Preliminary     gentamicin  Sensitive  <=4  Preliminary     meropenem  Sensitive  <=1  Preliminary     piperacillin-tazobactam  Sensitive  <=16  Preliminary     trimethoprim-sulfamethoxazole  Sensitive  <=2/38  Preliminary       Pathology report: 8/5/2020  Final Pathologic Diagnosis:   Skin, right lower leg, excision:   -  Benign skin containing focal acute inflammatory   infiltrates involving vessels suggestive of leukocytoclastic   vasculitis (see comment).       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)      Impression and plan   Clinical status: febrile, Right hip septic arthritis.  Therapeutic: continue vancomycin and cefepime   Diagnostic:  blood cx q 48h until negative: 8/6/2020-1 of 2 MRSA, repeat blood culture on 8/8-1 of 2 MRSA,   F/u:     Other: Orthopedic surgery consult for right hip septic arthritis: recommend.  Summary:Native TV endocarditis-MRSA. MRSA present in the right hip aspiration-may be responsible for persistent bacteremia. .  Wound culture has MRSA, Pseudomonas and E. coli.       Electronically signed by: Electronically signed by Altha Habermann, MD on 8/11/2020 at 8:26 AM

## 2020-08-11 NOTE — CONSULTS
ORTHOPEDIC CONSULT      2020    Patient name: Donovan Holt  : 1980    CHIEF COMPLAINT  CT HIP EFFUSION RIGHT    HPI  The patient was seen and examined. Donovan Holt is a 47 y.o. female who is admitted for the above chief complaint. Orthopedic service consulted for evaluation of RIGHT HIP EFFUSION SEEN ON HIP IMAGING  Date of injury/Duration of symptoms: PT SCAN 2020  Mechanism of injury: IV DRUG ABUSE  Severity: no groin pain     Character: post op pain from I and D    PAST MEDICAL HISTORY  Past Medical History:   Diagnosis Date    Liver disease     hepatitis    No significant medical problems        CURRENT MEDICATIONS  Prior to Admission medications    Medication Sig Start Date End Date Taking? Authorizing Provider   buprenorphine-naloxone (SUBOXONE) 8-2 MG FILM SL film Place 1 Film under the tongue daily.     Historical Provider, MD   ibuprofen (ADVIL;MOTRIN) 600 MG tablet Take 1 tablet by mouth every 6 hours as needed for Pain 19   Abigail Terrell PA-C       ALLERGIES  No Known Allergies    SURGICAL HISTORY  Past Surgical History:   Procedure Laterality Date     SECTION  , 2007    x 2    CHOLECYSTECTOMY      HYSTERECTOMY  2008    MARLIN    INCISION AND DRAINAGE Right 2020    RIGHT UPPER THIGH INCISION AND DRAINAGE performed by Luis Saucedo MD at [facility] 2013    lap sera       FAMILY HISTORY  Family History   Problem Relation Age of Onset    Obesity Brother     Mental Illness Maternal Aunt     COPD Paternal Grandmother     Dementia Paternal Grandfather     Depression Son     Mental Illness Son         Bipole, ADHD, Adjustment disorder    Colon Cancer Neg Hx        SOCIAL HISTORY  Social History     Socioeconomic History    Marital status: Single     Spouse name: Not on file    Number of children: 2    Years of education: Not on file    Highest education level: Not on file   Occupational History    Occupation: 100%   BMI 33.35 kg/m²   General Appearance Alert, well appearing, No acute distress   Eyes clear   Ears, Nose, Throat clear    Neck Supple, non tender   Respiratory Lungs clear breath sounds bilateral, unlabored   Cardiovascular Heart regular rate and rythm   Gastrointestinal Abdomen: soft, non-tender, non-distended   Lymphatics No adenopathy   Musculoskeletal Able to slr ble  Bandages right thigh and leg/bandages with serosang drainage   Skin Normal. No rash or lesions   Neurological Awake, alert and oriented. No focal deficits. Motor and sensory intact   Psychiatric Normal       LABS   Recent Labs     08/09/20  0626 08/10/20  0550 08/11/20  0439   WBC 13.5* 10.8* 11.8*   HCT 21.9* 23.1* 22.0*   * 109* 116*   * 135  --    K 3.5 3.6  --     100  --    CO2 23 27  --    BUN 11 9  --    CREATININE 0.5* 0.5*  --    CALCIUM 7.8* 7.8*  --      No components found for: HGBA1C    IMAGING  Imaging showed right hip OA and effusion on CT    ASSESSMENT     Patient Active Problem List   Diagnosis    CCC (chronic calculous cholecystitis)    Sepsis (HCC)    Abscess of right thigh    MRSA bacteremia    Amphetamine user (HCC)    Chronic hepatitis C without hepatic coma (HCC)    Acute septic pulmonary embolism without acute cor pulmonale (HCC)    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 [address]        47 y.o. female with ORTHO CONSULTING FOR RIGHT HIP EFFUSION  PLAN   1.  IR CONSULT FOR ASPIRATION AND CULTURE AND INSERT DRAIN  IR TECH PERFECT SERVED [address] [facility]    Electronically signed by: Eliazar Martinez MD, 8/11/2020 8:26 AM

## 2020-08-11 NOTE — PROGRESS NOTES
[facility]  consulted by Dr. Tacos Yusuf for monitoring and adjustment. Indication for treatment: MRSA bacteremia 2/2 leg abscess, r/o endocarditis   Goal trough: 15 mcg/mL     Pertinent Laboratory Values:   Temp Readings from Last 3 Encounters:   08/11/20 99.1 °F (37.3 °C) (Oral)   08/04/20 96.3 °F (35.7 °C)   06/01/20 98.1 °F (36.7 °C) (Oral)     Recent Labs     08/09/20  0626 08/10/20  0550 08/11/20  0439   WBC 13.5* 10.8* 11.8*     Recent Labs     08/09/20  0626 08/10/20  0550   BUN 11 9   CREATININE 0.5* 0.5*     Estimated Creatinine Clearance: 170 mL/min (A) (based on SCr of 0.5 mg/dL (L)). Intake/Output Summary (Last 24 hours) at 8/11/2020 1133  Last data filed at 8/11/2020 0929  Gross per 24 hour   Intake 2070 ml   Output 2600 ml   Net -530 ml       Pertinent Cultures:  Date                             Source                                     Results  8/4   Surgical abscess cx  Staph aureus, pseudomonas, e coli  8/4   Surgical abscess cx  MRSA  8/6   Blood    MRSA  8/7   Surgical abscess cx  MRSA  8/8   Blood    MRSA    Vancomycin level:   TROUGH:    Recent Labs     08/09/20  0923 08/10/20  1342   VANCOTROUGH 12.6 12.2     RANDOM:    No results for input(s): VANCORANDOM in the last 72 hours. Assessment:  · WBC and temperature: WBC @ 11.9; Tmax = 101.5F.  · SCr, BUN, and urine output: LISBETH resolved and renal function at baseline  · Day(s) of therapy: #8  · Vancomycin level:  · 12.6, 8h post-dose (1250 mg IVPB q12h)  · 12.2, after x2 doses on new regimen, 12h post-dose    Plan:  · LISBETH resolved, renal at baseline  · Vancomycin dosing increased to 1500 mg IVPB q12h following sub-therapeutic trough on 08/09/20. · Repeat level yesterday was sub-therapeutic but was not yet at steady state. Expect level ~15 when reached (>4 doses)  · Continue vancomycin 1500 mg IVPB q12h and repeat a level in 24h.   · Pharmacy will continue to monitor patient and adjust therapy as indicated.     VANCOMYCIN TROUGH SCHEDULED FOR 8/12/2020 @ 1300    Thank you for the consult,  Ye Covington RPh  8/11/2020 11:33 AM

## 2020-08-11 NOTE — PROGRESS NOTES
Patient consistently complains of pain this shift but falls asleep easily. Physician ordered one time dose of Ativan. This nurse did not give dose at that time because patient was in bed resting quietly despite family talking loudly in the room. Blood cultures ordered. Lab called to collect cultures. Patient has had multiple position changes this shift with added pillow support.

## 2020-08-11 NOTE — PROGRESS NOTES
Procedure: Right hip aspiration by Dr Marito Wolf    Pt arrived to CT for procedure, A&OX4, verbalizes understanding of procedure    Outcome: 10cc turben yellow fluid removed; sent to lab    Report given to Tasneem CHILDRESS

## 2020-08-12 LAB
CULTURE: ABNORMAL
CULTURE: ABNORMAL
GRAM SMEAR: ABNORMAL
HCT VFR BLD CALC: 21.5 % (ref 37–47)
HCT VFR BLD CALC: 24 % (ref 37–47)
HEMOGLOBIN: 6.7 GM/DL (ref 12.5–16)
HEMOGLOBIN: 7.3 GM/DL (ref 12.5–16)
Lab: ABNORMAL
MCH RBC QN AUTO: 27.2 PG (ref 27–31)
MCHC RBC AUTO-ENTMCNC: 31.2 % (ref 32–36)
MCV RBC AUTO: 87.4 FL (ref 78–100)
PDW BLD-RTO: 14.5 % (ref 11.7–14.9)
PLATELET # BLD: 116 K/CU MM (ref 140–440)
PMV BLD AUTO: 10.3 FL (ref 7.5–11.1)
PROCALCITONIN: 0.21
RBC # BLD: 2.46 M/CU MM (ref 4.2–5.4)
SPECIMEN: ABNORMAL
WBC # BLD: 9.7 K/CU MM (ref 4–10.5)

## 2020-08-12 PROCEDURE — 6370000000 HC RX 637 (ALT 250 FOR IP): Performed by: INTERNAL MEDICINE

## 2020-08-12 PROCEDURE — 6360000002 HC RX W HCPCS: Performed by: INTERNAL MEDICINE

## 2020-08-12 PROCEDURE — 94761 N-INVAS EAR/PLS OXIMETRY MLT: CPT

## 2020-08-12 PROCEDURE — 86922 COMPATIBILITY TEST ANTIGLOB: CPT

## 2020-08-12 PROCEDURE — 85018 HEMOGLOBIN: CPT

## 2020-08-12 PROCEDURE — 1200000000 HC SEMI PRIVATE

## 2020-08-12 PROCEDURE — 85014 HEMATOCRIT: CPT

## 2020-08-12 PROCEDURE — 87040 BLOOD CULTURE FOR BACTERIA: CPT

## 2020-08-12 PROCEDURE — 84145 PROCALCITONIN (PCT): CPT

## 2020-08-12 PROCEDURE — 80202 ASSAY OF VANCOMYCIN: CPT

## 2020-08-12 PROCEDURE — 2580000003 HC RX 258: Performed by: INTERNAL MEDICINE

## 2020-08-12 PROCEDURE — 86900 BLOOD TYPING SEROLOGIC ABO: CPT

## 2020-08-12 PROCEDURE — 2580000003 HC RX 258: Performed by: NURSE PRACTITIONER

## 2020-08-12 PROCEDURE — 6360000002 HC RX W HCPCS: Performed by: SURGERY

## 2020-08-12 PROCEDURE — 36430 TRANSFUSION BLD/BLD COMPNT: CPT

## 2020-08-12 PROCEDURE — 99232 SBSQ HOSP IP/OBS MODERATE 35: CPT | Performed by: INTERNAL MEDICINE

## 2020-08-12 PROCEDURE — 86901 BLOOD TYPING SEROLOGIC RH(D): CPT

## 2020-08-12 PROCEDURE — 86850 RBC ANTIBODY SCREEN: CPT

## 2020-08-12 PROCEDURE — P9016 RBC LEUKOCYTES REDUCED: HCPCS

## 2020-08-12 PROCEDURE — 2580000003 HC RX 258: Performed by: SURGERY

## 2020-08-12 PROCEDURE — 85027 COMPLETE CBC AUTOMATED: CPT

## 2020-08-12 RX ORDER — 0.9 % SODIUM CHLORIDE 0.9 %
250 INTRAVENOUS SOLUTION INTRAVENOUS ONCE
Status: COMPLETED | OUTPATIENT
Start: 2020-08-12 | End: 2020-08-12

## 2020-08-12 RX ADMIN — OXYCODONE HYDROCHLORIDE 10 MG: 10 TABLET ORAL at 13:22

## 2020-08-12 RX ADMIN — CEFEPIME 2 G: 2 INJECTION, POWDER, FOR SOLUTION INTRAVENOUS at 09:13

## 2020-08-12 RX ADMIN — HYDROMORPHONE HYDROCHLORIDE 0.5 MG: 1 INJECTION, SOLUTION INTRAMUSCULAR; INTRAVENOUS; SUBCUTANEOUS at 14:35

## 2020-08-12 RX ADMIN — OXYCODONE HYDROCHLORIDE 10 MG: 10 TABLET ORAL at 09:14

## 2020-08-12 RX ADMIN — HYDROMORPHONE HYDROCHLORIDE 0.5 MG: 1 INJECTION, SOLUTION INTRAMUSCULAR; INTRAVENOUS; SUBCUTANEOUS at 10:20

## 2020-08-12 RX ADMIN — COLLAGENASE SANTYL: 250 OINTMENT TOPICAL at 09:12

## 2020-08-12 RX ADMIN — HYDROMORPHONE HYDROCHLORIDE 0.5 MG: 1 INJECTION, SOLUTION INTRAMUSCULAR; INTRAVENOUS; SUBCUTANEOUS at 00:43

## 2020-08-12 RX ADMIN — VANCOMYCIN HYDROCHLORIDE 1500 MG: 5 INJECTION, POWDER, LYOPHILIZED, FOR SOLUTION INTRAVENOUS at 01:35

## 2020-08-12 RX ADMIN — CEFEPIME 2 G: 2 INJECTION, POWDER, FOR SOLUTION INTRAVENOUS at 17:19

## 2020-08-12 RX ADMIN — HEPARIN SODIUM 5000 UNITS: 5000 INJECTION, SOLUTION INTRAVENOUS; SUBCUTANEOUS at 06:15

## 2020-08-12 RX ADMIN — OXYCODONE HYDROCHLORIDE 10 MG: 10 TABLET ORAL at 17:19

## 2020-08-12 RX ADMIN — SODIUM CHLORIDE, PRESERVATIVE FREE 10 ML: 5 INJECTION INTRAVENOUS at 21:43

## 2020-08-12 RX ADMIN — HYDROMORPHONE HYDROCHLORIDE 0.5 MG: 1 INJECTION, SOLUTION INTRAMUSCULAR; INTRAVENOUS; SUBCUTANEOUS at 23:13

## 2020-08-12 RX ADMIN — LORAZEPAM 1 MG: 1 TABLET ORAL at 10:20

## 2020-08-12 RX ADMIN — HEPARIN SODIUM 5000 UNITS: 5000 INJECTION, SOLUTION INTRAVENOUS; SUBCUTANEOUS at 13:23

## 2020-08-12 RX ADMIN — SODIUM CHLORIDE 250 ML: 9 INJECTION, SOLUTION INTRAVENOUS at 05:33

## 2020-08-12 RX ADMIN — VANCOMYCIN HYDROCHLORIDE 1500 MG: 5 INJECTION, POWDER, LYOPHILIZED, FOR SOLUTION INTRAVENOUS at 13:28

## 2020-08-12 RX ADMIN — METOPROLOL TARTRATE 12.5 MG: 25 TABLET, FILM COATED ORAL at 09:12

## 2020-08-12 RX ADMIN — OXYCODONE HYDROCHLORIDE 10 MG: 10 TABLET ORAL at 03:47

## 2020-08-12 RX ADMIN — SODIUM CHLORIDE, PRESERVATIVE FREE 10 ML: 5 INJECTION INTRAVENOUS at 09:14

## 2020-08-12 RX ADMIN — METOPROLOL TARTRATE 12.5 MG: 25 TABLET, FILM COATED ORAL at 21:43

## 2020-08-12 RX ADMIN — HYDROMORPHONE HYDROCHLORIDE 0.5 MG: 1 INJECTION, SOLUTION INTRAMUSCULAR; INTRAVENOUS; SUBCUTANEOUS at 18:51

## 2020-08-12 RX ADMIN — HYDROMORPHONE HYDROCHLORIDE 0.5 MG: 1 INJECTION, SOLUTION INTRAMUSCULAR; INTRAVENOUS; SUBCUTANEOUS at 06:15

## 2020-08-12 RX ADMIN — HEPARIN SODIUM 5000 UNITS: 5000 INJECTION, SOLUTION INTRAVENOUS; SUBCUTANEOUS at 21:43

## 2020-08-12 RX ADMIN — OXYCODONE HYDROCHLORIDE 10 MG: 10 TABLET ORAL at 21:43

## 2020-08-12 RX ADMIN — CEFEPIME 2 G: 2 INJECTION, POWDER, FOR SOLUTION INTRAVENOUS at 05:34

## 2020-08-12 ASSESSMENT — PAIN SCALES - GENERAL
PAINLEVEL_OUTOF10: 6
PAINLEVEL_OUTOF10: 0
PAINLEVEL_OUTOF10: 8
PAINLEVEL_OUTOF10: 0
PAINLEVEL_OUTOF10: 8
PAINLEVEL_OUTOF10: 0
PAINLEVEL_OUTOF10: 7
PAINLEVEL_OUTOF10: 8
PAINLEVEL_OUTOF10: 8
PAINLEVEL_OUTOF10: 9

## 2020-08-12 ASSESSMENT — PAIN DESCRIPTION - LOCATION
LOCATION: LEG

## 2020-08-12 ASSESSMENT — PAIN DESCRIPTION - ONSET
ONSET: PROGRESSIVE
ONSET: PROGRESSIVE
ONSET: ON-GOING

## 2020-08-12 ASSESSMENT — PAIN DESCRIPTION - ORIENTATION
ORIENTATION: RIGHT;LEFT
ORIENTATION: RIGHT
ORIENTATION: RIGHT
ORIENTATION: RIGHT;LEFT

## 2020-08-12 ASSESSMENT — PAIN - FUNCTIONAL ASSESSMENT
PAIN_FUNCTIONAL_ASSESSMENT: PREVENTS OR INTERFERES SOME ACTIVE ACTIVITIES AND ADLS

## 2020-08-12 ASSESSMENT — PAIN DESCRIPTION - FREQUENCY
FREQUENCY: INTERMITTENT

## 2020-08-12 ASSESSMENT — PAIN DESCRIPTION - PAIN TYPE
TYPE: ACUTE PAIN
TYPE: ACUTE PAIN;SURGICAL PAIN
TYPE: SURGICAL PAIN
TYPE: ACUTE PAIN

## 2020-08-12 ASSESSMENT — PAIN DESCRIPTION - DESCRIPTORS
DESCRIPTORS: ACHING
DESCRIPTORS: ACHING
DESCRIPTORS: ACHING;CRAMPING
DESCRIPTORS: ACHING
DESCRIPTORS: ACHING
DESCRIPTORS: ACHING;CRAMPING

## 2020-08-12 ASSESSMENT — PAIN DESCRIPTION - PROGRESSION
CLINICAL_PROGRESSION: NOT CHANGED

## 2020-08-12 NOTE — PROGRESS NOTES
Critical Lab/HGB 6.7 perfect serve msg sent to hospitalist/[facility]. Awaiting orders.

## 2020-08-12 NOTE — PROGRESS NOTES
PRN  HYDROmorphone, 0.5 mg, Q4H PRN  potassium chloride, 40 mEq, PRN    Or  potassium alternative oral replacement, 40 mEq, PRN    Or  potassium chloride, 10 mEq, PRN  LORazepam, 1 mg, Q6H PRN  sodium chloride flush, 10 mL, PRN  acetaminophen, 650 mg, Q6H PRN    Or  acetaminophen, 650 mg, Q6H PRN  polyethylene glycol, 17 g, Daily PRN  cloNIDine, 0.1 mg, PRN  traZODone, 50 mg, Nightly PRN  promethazine, 12.5 mg, Q6H PRN    Or  promethazine, 6.25 mg, Q6H PRN          Electronically signed by Jordan Petersen MD on 8/12/2020 at 1:13 PM

## 2020-08-12 NOTE — PLAN OF CARE

## 2020-08-12 NOTE — PROGRESS NOTES
catheter tip visualized terminating within the right atrium. Vegetation noted above the tricuspid valve measuring 1.35 cm x 1. 25 cm; it    appears to be attached to the interatrial septum or tricuspid annulus    (septal leaflet origin). There was no thrombus noted in the left atrial appendage. Negative bubble study. No PFO or ASD noted.            Labs:    Recent Results (from the past 24 hour(s))   Culture, Surgical    Collection Time: 08/11/20  4:00 PM    Specimen: Aspirate   Result Value Ref Range    Specimen ASPIRATE     Special Requests Right hip abscess/aspirate     Gram Smear FEW  NECROTIC POLYS       Gram Smear FEW  RED BLOOD CELLS       Gram Smear NO ORGANISMS SEEN     Culture Prelim Report  No growth to date      CBC    Collection Time: 08/12/20 12:00 AM   Result Value Ref Range    WBC 9.7 4.0 - 10.5 K/CU MM    RBC 2.46 (L) 4.2 - 5.4 M/CU MM    Hemoglobin 6.7 (LL) 12.5 - 16.0 GM/DL    Hematocrit 21.5 (L) 37 - 47 %    MCV 87.4 78 - 100 FL    MCH 27.2 27 - 31 PG    MCHC 31.2 (L) 32.0 - 36.0 %    RDW 14.5 11.7 - 14.9 %    Platelets 413 (L) 807 - 440 K/CU MM    MPV 10.3 7.5 - 11.1 FL   TYPE AND SCREEN    Collection Time: 08/12/20  1:30 AM   Result Value Ref Range    ABO/Rh A NEGATIVE     Antibody Screen NEGATIVE    Hemoglobin and hematocrit, blood    Collection Time: 08/12/20  6:35 AM   Result Value Ref Range    Hemoglobin 7.3 (L) 12.5 - 16.0 GM/DL    Hematocrit 24.0 (L) 37 - 47 %     CULTURE results: Invalid input(s): BLOOD CULTURE,  URINE CULTURE, SURGICAL CULTURE  Specimens:   ID Type Source Tests Collected by Time Destination   1 : Right Thigh Tissue Tissue CULTURE, SURGICAL Sloan Tao MD 8/4/2020 9994     2 : Right Calf Wound Tissue Tissue CULTURE, SURGICAL Sloan Tao MD 8/4/2020 2143     3 : Biopsy of Petechiae Right Lower Leg Tissue Tissue CULTURE, TISSUE Sloan Tao MD 8/4/2020 9403     A : Biopsy of Petechiae Right Lower Leg Tissue Tissue SURGICAL PATHOLOGY Inder Pisano MD 8/4/2020 7369      Narrative   Performed by: Austen Zhang DATE/TIME:  08/04/2020 0838    Susceptibility     Staph aureus mrsa (2)     Antibiotic  Interpretation  RICCARDO  Status     erythromycin  Resistant  >4  Preliminary     gentamicin  Sensitive  <=4  Preliminary     moxifloxacin  Sensitive  <=0.5  Preliminary     oxacillin  Resistant  >2  Preliminary     tetracycline  Sensitive  <=4  Preliminary     trimethoprim-sulfamethoxazole  Sensitive  <=0.5/9.5  Preliminary     vancomycin  Sensitive  1  Preliminary     ceFAZolin  Resistant  >16  Preliminary     Pseudomonas aeruginosa (3)     Antibiotic  Interpretation  RICCARDO  Status     cefepime  Sensitive  <=2  Preliminary     ciprofloxacin  Sensitive  <=1  Preliminary     gentamicin  Sensitive  <=4  Preliminary     meropenem  Sensitive  <=1  Preliminary     piperacillin-tazobactam  Sensitive  <=16  Preliminary     tobramycin  Sensitive  <=4  Preliminary     Escherichia coli (4)     Antibiotic  Interpretation  RICCADRO  Status     ampicillin  Sensitive  <=8  Preliminary     ceFAZolin  Sensitive  <=2  Preliminary     cefepime  Sensitive  <=2  Preliminary     cefuroxime  Sensitive  8  Preliminary     ciprofloxacin  Sensitive  <=1  Preliminary     ertapenem  Sensitive  <=0.5  Preliminary     gentamicin  Sensitive  <=4  Preliminary     meropenem  Sensitive  <=1  Preliminary     piperacillin-tazobactam  Sensitive  <=16  Preliminary     trimethoprim-sulfamethoxazole  Sensitive  <=2/38  Preliminary       Pathology report: 8/5/2020  Final Pathologic Diagnosis:   Skin, right lower leg, excision:   -  Benign skin containing focal acute inflammatory   infiltrates involving vessels suggestive of leukocytoclastic   vasculitis (see comment).       Diagnosis:  Patient Active Problem List   Diagnosis    CCC (chronic calculous cholecystitis)    Sepsis ([facility] [address]    Abscess of right thigh    MRSA bacteremia    Amphetamine user ([facility] [address]    Chronic hepatitis C

## 2020-08-13 ENCOUNTER — APPOINTMENT (OUTPATIENT)
Dept: MRI IMAGING | Age: 40
DRG: 710 | End: 2020-08-13
Payer: MEDICAID

## 2020-08-13 LAB
ABO/RH: NORMAL
ANION GAP SERPL CALCULATED.3IONS-SCNC: 9 MMOL/L (ref 4–16)
ANTIBODY SCREEN: NEGATIVE
BUN BLDV-MCNC: 11 MG/DL (ref 6–23)
CALCIUM SERPL-MCNC: 8 MG/DL (ref 8.3–10.6)
CHLORIDE BLD-SCNC: 98 MMOL/L (ref 99–110)
CO2: 26 MMOL/L (ref 21–32)
COMPONENT: NORMAL
COMPONENT: NORMAL
CREAT SERPL-MCNC: 0.6 MG/DL (ref 0.6–1.1)
CROSSMATCH RESULT: NORMAL
CROSSMATCH RESULT: NORMAL
DOSE AMOUNT: NORMAL
DOSE TIME: NORMAL
ERYTHROCYTE SEDIMENTATION RATE: 113 MM/HR (ref 0–20)
GFR AFRICAN AMERICAN: >60 ML/MIN/1.73M2
GFR NON-AFRICAN AMERICAN: >60 ML/MIN/1.73M2
GLUCOSE BLD-MCNC: 92 MG/DL (ref 70–99)
HCT VFR BLD CALC: 23.8 % (ref 37–47)
HEMOGLOBIN: 7.2 GM/DL (ref 12.5–16)
HIGH SENSITIVE C-REACTIVE PROTEIN: 144.2 MG/L
MCH RBC QN AUTO: 26.9 PG (ref 27–31)
MCHC RBC AUTO-ENTMCNC: 30.3 % (ref 32–36)
MCV RBC AUTO: 88.8 FL (ref 78–100)
PDW BLD-RTO: 14.5 % (ref 11.7–14.9)
PLATELET # BLD: 126 K/CU MM (ref 140–440)
PMV BLD AUTO: 10.3 FL (ref 7.5–11.1)
POTASSIUM SERPL-SCNC: 4.2 MMOL/L (ref 3.5–5.1)
RBC # BLD: 2.68 M/CU MM (ref 4.2–5.4)
SODIUM BLD-SCNC: 133 MMOL/L (ref 135–145)
STATUS: NORMAL
STATUS: NORMAL
TRANSFUSION STATUS: NORMAL
TRANSFUSION STATUS: NORMAL
UNIT DIVISION: 0
UNIT DIVISION: 0
UNIT NUMBER: NORMAL
UNIT NUMBER: NORMAL
VANCOMYCIN TROUGH: 10.8 UG/ML (ref 10–20)
WBC # BLD: 13.3 K/CU MM (ref 4–10.5)

## 2020-08-13 PROCEDURE — 86141 C-REACTIVE PROTEIN HS: CPT

## 2020-08-13 PROCEDURE — 94150 VITAL CAPACITY TEST: CPT

## 2020-08-13 PROCEDURE — 6370000000 HC RX 637 (ALT 250 FOR IP): Performed by: INTERNAL MEDICINE

## 2020-08-13 PROCEDURE — 2580000003 HC RX 258: Performed by: INTERNAL MEDICINE

## 2020-08-13 PROCEDURE — 72195 MRI PELVIS W/O DYE: CPT

## 2020-08-13 PROCEDURE — 6370000000 HC RX 637 (ALT 250 FOR IP): Performed by: SURGERY

## 2020-08-13 PROCEDURE — 6360000002 HC RX W HCPCS: Performed by: SURGERY

## 2020-08-13 PROCEDURE — 94761 N-INVAS EAR/PLS OXIMETRY MLT: CPT

## 2020-08-13 PROCEDURE — 85027 COMPLETE CBC AUTOMATED: CPT

## 2020-08-13 PROCEDURE — 1200000000 HC SEMI PRIVATE

## 2020-08-13 PROCEDURE — 6360000002 HC RX W HCPCS: Performed by: INTERNAL MEDICINE

## 2020-08-13 PROCEDURE — 80048 BASIC METABOLIC PNL TOTAL CA: CPT

## 2020-08-13 PROCEDURE — 85652 RBC SED RATE AUTOMATED: CPT

## 2020-08-13 PROCEDURE — 2580000003 HC RX 258: Performed by: SURGERY

## 2020-08-13 PROCEDURE — U0002 COVID-19 LAB TEST NON-CDC: HCPCS

## 2020-08-13 PROCEDURE — 80202 ASSAY OF VANCOMYCIN: CPT

## 2020-08-13 PROCEDURE — 99233 SBSQ HOSP IP/OBS HIGH 50: CPT | Performed by: INTERNAL MEDICINE

## 2020-08-13 RX ADMIN — HEPARIN SODIUM 5000 UNITS: 5000 INJECTION, SOLUTION INTRAVENOUS; SUBCUTANEOUS at 22:09

## 2020-08-13 RX ADMIN — LORAZEPAM 1 MG: 1 TABLET ORAL at 15:26

## 2020-08-13 RX ADMIN — OXYCODONE HYDROCHLORIDE 10 MG: 10 TABLET ORAL at 19:50

## 2020-08-13 RX ADMIN — VANCOMYCIN HYDROCHLORIDE 1750 MG: 5 INJECTION, POWDER, LYOPHILIZED, FOR SOLUTION INTRAVENOUS at 13:23

## 2020-08-13 RX ADMIN — CEFEPIME 2 G: 2 INJECTION, POWDER, FOR SOLUTION INTRAVENOUS at 18:08

## 2020-08-13 RX ADMIN — CEFEPIME 2 G: 2 INJECTION, POWDER, FOR SOLUTION INTRAVENOUS at 08:10

## 2020-08-13 RX ADMIN — SODIUM CHLORIDE, PRESERVATIVE FREE 10 ML: 5 INJECTION INTRAVENOUS at 19:50

## 2020-08-13 RX ADMIN — HYDROMORPHONE HYDROCHLORIDE 0.5 MG: 1 INJECTION, SOLUTION INTRAMUSCULAR; INTRAVENOUS; SUBCUTANEOUS at 03:53

## 2020-08-13 RX ADMIN — CEFEPIME 2 G: 2 INJECTION, POWDER, FOR SOLUTION INTRAVENOUS at 01:17

## 2020-08-13 RX ADMIN — SODIUM CHLORIDE, PRESERVATIVE FREE 10 ML: 5 INJECTION INTRAVENOUS at 08:11

## 2020-08-13 RX ADMIN — LORAZEPAM 1 MG: 1 TABLET ORAL at 08:10

## 2020-08-13 RX ADMIN — LORAZEPAM 1 MG: 1 TABLET ORAL at 01:17

## 2020-08-13 RX ADMIN — OXYCODONE HYDROCHLORIDE 10 MG: 10 TABLET ORAL at 06:26

## 2020-08-13 RX ADMIN — COLLAGENASE SANTYL: 250 OINTMENT TOPICAL at 13:32

## 2020-08-13 RX ADMIN — HYDROMORPHONE HYDROCHLORIDE 0.5 MG: 1 INJECTION, SOLUTION INTRAMUSCULAR; INTRAVENOUS; SUBCUTANEOUS at 22:09

## 2020-08-13 RX ADMIN — HYDROMORPHONE HYDROCHLORIDE 0.5 MG: 1 INJECTION, SOLUTION INTRAMUSCULAR; INTRAVENOUS; SUBCUTANEOUS at 08:11

## 2020-08-13 RX ADMIN — TRAZODONE HYDROCHLORIDE 50 MG: 50 TABLET ORAL at 22:09

## 2020-08-13 RX ADMIN — HYDROMORPHONE HYDROCHLORIDE 0.5 MG: 1 INJECTION, SOLUTION INTRAMUSCULAR; INTRAVENOUS; SUBCUTANEOUS at 18:08

## 2020-08-13 RX ADMIN — VANCOMYCIN HYDROCHLORIDE 1500 MG: 5 INJECTION, POWDER, LYOPHILIZED, FOR SOLUTION INTRAVENOUS at 02:12

## 2020-08-13 RX ADMIN — HEPARIN SODIUM 5000 UNITS: 5000 INJECTION, SOLUTION INTRAVENOUS; SUBCUTANEOUS at 06:26

## 2020-08-13 RX ADMIN — HEPARIN SODIUM 5000 UNITS: 5000 INJECTION, SOLUTION INTRAVENOUS; SUBCUTANEOUS at 13:24

## 2020-08-13 RX ADMIN — HYDROMORPHONE HYDROCHLORIDE 0.5 MG: 1 INJECTION, SOLUTION INTRAMUSCULAR; INTRAVENOUS; SUBCUTANEOUS at 13:23

## 2020-08-13 RX ADMIN — OXYCODONE HYDROCHLORIDE 10 MG: 10 TABLET ORAL at 02:12

## 2020-08-13 RX ADMIN — OXYCODONE HYDROCHLORIDE 10 MG: 10 TABLET ORAL at 15:26

## 2020-08-13 RX ADMIN — METOPROLOL TARTRATE 12.5 MG: 25 TABLET, FILM COATED ORAL at 19:49

## 2020-08-13 ASSESSMENT — PAIN DESCRIPTION - FREQUENCY
FREQUENCY: INTERMITTENT

## 2020-08-13 ASSESSMENT — PAIN DESCRIPTION - ORIENTATION
ORIENTATION: RIGHT;LEFT

## 2020-08-13 ASSESSMENT — PAIN DESCRIPTION - DESCRIPTORS
DESCRIPTORS: ACHING

## 2020-08-13 ASSESSMENT — PAIN DESCRIPTION - LOCATION
LOCATION: LEG

## 2020-08-13 ASSESSMENT — PAIN DESCRIPTION - ONSET
ONSET: PROGRESSIVE

## 2020-08-13 ASSESSMENT — PAIN SCALES - GENERAL
PAINLEVEL_OUTOF10: 7
PAINLEVEL_OUTOF10: 6
PAINLEVEL_OUTOF10: 7
PAINLEVEL_OUTOF10: 7
PAINLEVEL_OUTOF10: 8
PAINLEVEL_OUTOF10: 8

## 2020-08-13 ASSESSMENT — PAIN DESCRIPTION - PAIN TYPE
TYPE: ACUTE PAIN;SURGICAL PAIN

## 2020-08-13 ASSESSMENT — PAIN DESCRIPTION - PROGRESSION: CLINICAL_PROGRESSION: NOT CHANGED

## 2020-08-13 NOTE — PROGRESS NOTES
Infectious Disease Progress Note  2020   Patient Name: Jonnie Keller : 1980       Reason for visit: F/u MRSA bacteremia? History:? Interval history noted. S/p debridement of right upper thigh, right lower leg, left lower leg abscesses, incisional biopsy of petechial rash of lower leg on 2020. Wound culture was polymicrobial: MRSA, pseudomonas aeruginosa, E. coli. Blood cultures remain mono microbial: MRSA. CT-guided aspiration of the right hip for right hip MRSA septic arthritis done on 2020.  10 cc of turbid fluid was aspirated. Had a fever overnight. Physical Exam:  Vital Signs: /66   Pulse 104   Temp 98.5 °F (36.9 °C) (Oral)   Resp 20   Ht 5' 5\" (1.651 m)   Wt 204 lb 1 oz (92.6 kg)   SpO2 92%   BMI 33.96 kg/m²     Gen: alert and oriented X3,  Skin: Bilateral leg non-blanchable petechiae  Wounds: Right lateral thigh, left medial leg C/D/I  HEMT: AT/NC Oropharynx pink, moist, and without lesions or exudates; dentition in good state of repair  Eyes: PERRLA, EOMI, conjunctiva pink, sclera anicteric. Neck: Supple. Trachea midline. No LAD. Chest: no distress and CTA. Good air movement. Heart: RRR and no MRG. Abd: soft, non-distended, no tenderness, no hepatomegaly. Normoactive bowel sounds. Ext: no clubbing, cyanosis, or edema  Catheter Site: without erythema or tenderness  Neuro: Mental status intact. CN 2-12 intact and no focal sensory or motor deficits     Radiologic / Imaging / TESTING  CT Right femur  1. Ulceration in the posterolateral proximal right thigh with minimal adjacent cellulitis. No associated findings of abscess, necrotizing fasciitis, myositis, or osteomyelitis. 2. Patchy stranding deep to the skin in the lateral, anterior, in the medial right thigh potentially related to additional injection sites. 3. Moderate right hip joint effusion potentially related to mild to moderate osteoarthritic changes of the right hip.      RAFI 2020  Summary    Central line catheter tip visualized terminating within the right atrium. Vegetation noted above the tricuspid valve measuring 1.35 cm x 1. 25 cm; it    appears to be attached to the interatrial septum or tricuspid annulus    (septal leaflet origin). There was no thrombus noted in the left atrial appendage. Negative bubble study. No PFO or ASD noted. Labs:    Recent Results (from the past 24 hour(s))   Procalcitonin    Collection Time: 08/12/20  4:04 PM   Result Value Ref Range    Procalcitonin 0.215    Vancomycin, trough    Collection Time: 08/13/20  1:10 AM   Result Value Ref Range    Vancomycin Tr 10.8 10 - 20 UG/ML    DOSE AMOUNT DOSE AMT.  GIVEN - UNKNOWN     DOSE TIME DOSE TIME GIVEN - 8/13 @ 0130    CBC    Collection Time: 08/13/20  6:30 AM   Result Value Ref Range    WBC 13.3 (H) 4.0 - 10.5 K/CU MM    RBC 2.68 (L) 4.2 - 5.4 M/CU MM    Hemoglobin 7.2 (L) 12.5 - 16.0 GM/DL    Hematocrit 23.8 (L) 37 - 47 %    MCV 88.8 78 - 100 FL    MCH 26.9 (L) 27 - 31 PG    MCHC 30.3 (L) 32.0 - 36.0 %    RDW 14.5 11.7 - 14.9 %    Platelets 384 (L) 969 - 440 K/CU MM    MPV 10.3 7.5 - 11.1 FL   C-Reactive Protein    Collection Time: 08/13/20  6:30 AM   Result Value Ref Range    CRP, High Sensitivity 144.2 mg/L   Sedimentation Rate    Collection Time: 08/13/20  6:30 AM   Result Value Ref Range    Sed Rate 113 (H) 0 - 20 MM/HR     CULTURE results: Invalid input(s): BLOOD CULTURE,  URINE CULTURE, SURGICAL CULTURE  Specimens:   ID Type Source Tests Collected by Time Destination   1 : Right Thigh Tissue Tissue CULTURE, SURGICAL Frederico Ormond, MD 8/4/2020 0932     2 : Right Calf Wound Tissue Tissue CULTURE, SURGICAL Frederico Ormond, MD 8/4/2020 7412     3 : Biopsy of Petechiae Right Lower Leg Tissue Tissue CULTURE, TISSUE Frederico Ormond, MD 8/4/2020 6318     A : Biopsy of Petechiae Right Lower Leg Tissue Tissue SURGICAL PATHOLOGY Frederico Ormond, MD 8/4/2020 8298      Narrative   Performed by: CML[facility]   SETUP DATE/TIME:  08/04/2020 1831    Susceptibility     Staph aureus mrsa (2)     Antibiotic  Interpretation  RICCARDO  Status     erythromycin  Resistant  >4  Preliminary     gentamicin  Sensitive  <=4  Preliminary     moxifloxacin  Sensitive  <=0.5  Preliminary     oxacillin  Resistant  >2  Preliminary     tetracycline  Sensitive  <=4  Preliminary     trimethoprim-sulfamethoxazole  Sensitive  <=0.5/9.5  Preliminary     vancomycin  Sensitive  1  Preliminary     ceFAZolin  Resistant  >16  Preliminary     Pseudomonas aeruginosa (3)     Antibiotic  Interpretation  RICCARDO  Status     cefepime  Sensitive  <=2  Preliminary     ciprofloxacin  Sensitive  <=1  Preliminary     gentamicin  Sensitive  <=4  Preliminary     meropenem  Sensitive  <=1  Preliminary     piperacillin-tazobactam  Sensitive  <=16  Preliminary     tobramycin  Sensitive  <=4  Preliminary     Escherichia coli (4)     Antibiotic  Interpretation  RICCARDO  Status     ampicillin  Sensitive  <=8  Preliminary     ceFAZolin  Sensitive  <=2  Preliminary     cefepime  Sensitive  <=2  Preliminary     cefuroxime  Sensitive  8  Preliminary     ciprofloxacin  Sensitive  <=1  Preliminary     ertapenem  Sensitive  <=0.5  Preliminary     gentamicin  Sensitive  <=4  Preliminary     meropenem  Sensitive  <=1  Preliminary     piperacillin-tazobactam  Sensitive  <=16  Preliminary     trimethoprim-sulfamethoxazole  Sensitive  <=2/38  Preliminary       Pathology report: 8/5/2020  Final Pathologic Diagnosis:   Skin, right lower leg, excision:   -  Benign skin containing focal acute inflammatory   infiltrates involving vessels suggestive of leukocytoclastic   vasculitis (see comment).       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  Therapeutic: continue vancomycin and cefepime, pharmacy dosing, aim for vancomycin trough of 15mcg/mL. If this is not achievable then will change to intravenous daptomycin   Diagnostic: CRP, ESR, procalcitonin.  F/u: Blood culturesblood cx q 48h until negative: 8/6/2020-1 of 2 MRSA, repeat blood culture on 8/8-1 of 2 MRSA, blood culture 8/10 1/1 NGTD, 8/11 1/1 NGTD   Other:   Summary: Continues to fever, blood cultures remain persistently positive. Concerned that the size of the vegetation on the tricuspid valve and/or right hip ar not allowing for sterilization. Wound culture has MRSA, Pseudomonas and E. coli.       Electronically signed by: Electronically signed by Merly Peres MD on 8/13/2020 at 9:43 AM

## 2020-08-13 NOTE — PROGRESS NOTES
C without hepatic coma (HCC)  Acute septic pulmonary embolism without acute cor pulmonale (HCC)   - Noted on imaging on admission   Tobacco abuse smoking cessation   - Counseling provided, nicotine patch    Diet DIET GENERAL; Daily Fluid Restriction: 1800 ml   DVT Prophylaxis [x] Lovenox, []  Heparin, [] SCDs, [] Ambulation   GI Prophylaxis [] PPI,  [] H2 Blocker,  [] Carafate,  [x] Diet/Tube Feeds   Code Status Full Code   Disposition Patient requires continued admission due to sepsis   MDM [] Low, [x] Moderate,[]  High  Patient's risk as above due to sepsis     History of Present Illness:     Still with continued lower extremity pain today. Denies any fevers or chills, nausea or vomiting. Denies any recent drainage from lower extremity abscess sites. No chest pains or shortness of breath. No dysuria. Objective: Intake/Output Summary (Last 24 hours) at 8/13/2020 1146  Last data filed at 8/13/2020 0941  Gross per 24 hour   Intake 1930 ml   Output 4300 ml   Net -2370 ml      Vitals:   Vitals:    08/13/20 0800   BP: 106/66   Pulse: 104   Resp: 20   Temp: 98.5 °F (36.9 °C)   SpO2: 92%     Physical Exam:     GEN    Awake female, sitting upright in bed in no apparent distress. Appears given age. EYES   Pupils are equally round. No scleral erythema, discharge, or conjunctivitis. HENT  Mucous membranes are moist.   NECK  Supple, no apparent thyromegaly or masses. RESP  Clear to auscultation, no wheezes, rales or rhonchi. Symmetric chest movement while on room air. CARDIO/VASC           S1/S2 auscultated. Regular rate without appreciable murmurs, rubs, or gallops. No JVD    Peripheral pulses equal bilaterally and palpable. No peripheral edema. GI        Abdomen is soft without significant tenderness, masses, or guarding. Rectal exam deferred.    MSK    Bilateral lower extremity wounds with dressings c/d/i, no active drainage, skin warm to the touch today, right hip needle drainage site c/d/i; CVC dressing c/d/i  SKIN    Normal coloration, warm, dry. NEURO           Cranial nerves appear grossly intact, normal  Speech  PSYCH            Awake, alert, oriented x 4.   Affect appropriate  Medications:   Medications:    vancomycin  1,750 mg Intravenous Q12H    LORazepam  0.5 mg Intravenous Once    metoprolol tartrate  12.5 mg Oral BID    cefepime  2 g Intravenous Q8H    collagenase   Topical Daily    sodium chloride flush  10 mL Intravenous 2 times per day    nicotine  1 patch Transdermal Daily    heparin (porcine)  5,000 Units Subcutaneous 3 times per day      Infusions:   PRN Meds: oxyCODONE, 10 mg, Q4H PRN  HYDROmorphone, 0.5 mg, Q4H PRN  potassium chloride, 40 mEq, PRN    Or  potassium alternative oral replacement, 40 mEq, PRN    Or  potassium chloride, 10 mEq, PRN  LORazepam, 1 mg, Q6H PRN  sodium chloride flush, 10 mL, PRN  acetaminophen, 650 mg, Q6H PRN    Or  acetaminophen, 650 mg, Q6H PRN  polyethylene glycol, 17 g, Daily PRN  cloNIDine, 0.1 mg, PRN  traZODone, 50 mg, Nightly PRN  promethazine, 12.5 mg, Q6H PRN    Or  promethazine, 6.25 mg, Q6H PRN          Electronically signed by Chaka Torres MD on 8/13/2020 at 11:46 AM

## 2020-08-13 NOTE — PLAN OF CARE
Problem: Pain:  Description: Pain management should include both nonpharmacologic and pharmacologic interventions.   Goal: Pain level will decrease  Description: Pain level will decrease  Outcome: Ongoing  Goal: Control of acute pain  Description: Control of acute pain  Outcome: Ongoing  Goal: Control of chronic pain  Description: Control of chronic pain  Outcome: Ongoing     Problem: Falls - Risk of:  Goal: Will remain free from falls  Description: Will remain free from falls  Outcome: Ongoing  Goal: Absence of physical injury  Description: Absence of physical injury  Outcome: Ongoing     Problem: Skin Integrity:  Goal: Will show no infection signs and symptoms  Description: Will show no infection signs and symptoms  Outcome: Ongoing  Goal: Absence of new skin breakdown  Description: Absence of new skin breakdown  Outcome: Ongoing     Problem: Discharge Planning:  Goal: Discharged to appropriate level of care  Description: Discharged to appropriate level of care  Outcome: Ongoing     Problem: Gas Exchange - Impaired:  Goal: Levels of oxygenation will improve  Description: Levels of oxygenation will improve  Outcome: Ongoing     Problem: Infection, Septic Shock:  Goal: Will show no infection signs and symptoms  Description: Will show no infection signs and symptoms  Outcome: Ongoing     Problem: Infection - Ventilator-Associated Pneumonia:  Goal: Absence of pulmonary infection  Description: Absence of pulmonary infection  Outcome: Ongoing     Problem: Serum Glucose Level - Abnormal:  Goal: Ability to maintain appropriate glucose levels will improve  Description: Ability to maintain appropriate glucose levels will improve  Outcome: Ongoing     Problem: Tissue Perfusion, Altered:  Goal: Circulatory function within specified parameters  Description: Circulatory function within specified parameters  Outcome: Ongoing     Problem: Venous Thromboembolism:  Goal: Will show no signs or symptoms of venous thromboembolism  Description: Will show no signs or symptoms of venous thromboembolism  Outcome: Ongoing  Goal: Absence of signs or symptoms of impaired coagulation  Description: Absence of signs or symptoms of impaired coagulation  Outcome: Ongoing

## 2020-08-13 NOTE — PROGRESS NOTES
Physical Therapy  Hold, patient going for MRI of hip for potential abscess, potential decision to transfer to high level of care, will await determination of medical plan.

## 2020-08-13 NOTE — PROGRESS NOTES
Ortho-  CT on admit shows cysts in pelvis and acetabulum suggestive of chronic osteomyelitis and since the hip is arthritic and chronically infected had discussed with IR drain placement and they did not recommend this due to low level of fluid. I spoke to radiologist on the phone on Tuesday.     I am Recommending serial aspirations of hip during this stay and referral once discharged to a total joint revision specialist for a resection and antibiotic spacer  Would image pelvis to look for abscess around psoas and eval bone involvement   She may need transferred to tertiary facility if there is a psoas /pelvic abscess that isnt managable here

## 2020-08-13 NOTE — PROGRESS NOTES
[address] [facility]  consulted by Dr. Gregory Carter for monitoring and adjustment. Indication for treatment: Right hip septic arthritis, MRSA bacteremia 2/2 leg abscess, r/o endocarditis   Goal trough: 15 mcg/mL     Pertinent Laboratory Values:   Temp Readings from Last 3 Encounters:   08/13/20 99.5 °F (37.5 °C) (Oral)   08/04/20 96.3 °F (35.7 °C)   06/01/20 98.1 °F (36.7 °C) (Oral)     Recent Labs     08/10/20  0550 08/11/20  0439 08/12/20  0000   WBC 10.8* 11.8* 9.7     Recent Labs     08/10/20  0550 08/11/20  1316   BUN 9 10   CREATININE 0.5* 0.6     Estimated Creatinine Clearance: 143 mL/min (based on SCr of 0.6 mg/dL). Intake/Output Summary (Last 24 hours) at 8/13/2020 0517  Last data filed at 8/12/2020 1810  Gross per 24 hour   Intake 995.42 ml   Output 2550 ml   Net -1554.58 ml       Pertinent Cultures:  Date                             Source                                     Results  8/4   Surgical abscess cx  Staph aureus, pseudomonas, e coli  8/4   Surgical abscess cx  MRSA  8/6   Blood    MRSA  8/7   Surgical abscess cx  MRSA  8/8   Blood    MRSA    Vancomycin level:   TROUGH:    Recent Labs     08/10/20  1342 08/13/20  0110   VANCOTROUGH 12.2 10.8     RANDOM:    No results for input(s): VANCORANDOM in the last 72 hours. Assessment:  · WBC and temperature: normal WBC; Tmax = 101.5F.  · SCr, BUN, and urine output: LISBETH resolved and renal function at baseline  · Day(s) of therapy: #9  · Vancomycin level:  · 12.6, 8h post-dose (1250 mg IVPB q12h)  · 12.2, after x2 doses on new regimen, 12h post-dose  · 10.8, sub-therapeutic (1500 mg IVPB q12h)    Plan:  · LISBETH resolved, renal at baseline  · Vancomycin level returned with a sub-therapeutic trough  · Will increase vancomycin dose to 1750 mg IVPB q12h  · Check vancomycin trough on 8/15 @ 0130  · Pharmacy will continue to monitor patient and adjust therapy as indicated.     VANCOMYCIN TROUGH SCHEDULED FOR 8/15/2020 @ 0130    Thank

## 2020-08-13 NOTE — PROGRESS NOTES
Pt educated multiple times this shift about her being a on fluid restriction all day. Pt keeps drinking even with education.  Will continue to monitor

## 2020-08-14 PROBLEM — A41.9 SEPSIS (HCC): Status: RESOLVED | Noted: 2020-08-04 | Resolved: 2020-08-14

## 2020-08-14 LAB
HCT VFR BLD CALC: 22.4 % (ref 37–47)
HCT VFR BLD CALC: 26.4 % (ref 37–47)
HEMOGLOBIN: 6.9 GM/DL (ref 12.5–16)
HEMOGLOBIN: 8.4 GM/DL (ref 12.5–16)
HIGH SENSITIVE C-REACTIVE PROTEIN: 137.3 MG/L
MCH RBC QN AUTO: 27.7 PG (ref 27–31)
MCHC RBC AUTO-ENTMCNC: 30.8 % (ref 32–36)
MCV RBC AUTO: 90 FL (ref 78–100)
PDW BLD-RTO: 14.5 % (ref 11.7–14.9)
PLATELET # BLD: 142 K/CU MM (ref 140–440)
PMV BLD AUTO: 10.3 FL (ref 7.5–11.1)
PROCALCITONIN: 0.18
RBC # BLD: 2.49 M/CU MM (ref 4.2–5.4)
WBC # BLD: 8.6 K/CU MM (ref 4–10.5)

## 2020-08-14 PROCEDURE — 2580000003 HC RX 258: Performed by: SURGERY

## 2020-08-14 PROCEDURE — 6360000002 HC RX W HCPCS: Performed by: INTERNAL MEDICINE

## 2020-08-14 PROCEDURE — 6360000002 HC RX W HCPCS: Performed by: SURGERY

## 2020-08-14 PROCEDURE — 86141 C-REACTIVE PROTEIN HS: CPT

## 2020-08-14 PROCEDURE — 6370000000 HC RX 637 (ALT 250 FOR IP): Performed by: INTERNAL MEDICINE

## 2020-08-14 PROCEDURE — 6370000000 HC RX 637 (ALT 250 FOR IP): Performed by: NURSE PRACTITIONER

## 2020-08-14 PROCEDURE — 1200000000 HC SEMI PRIVATE

## 2020-08-14 PROCEDURE — 85018 HEMOGLOBIN: CPT

## 2020-08-14 PROCEDURE — 86901 BLOOD TYPING SEROLOGIC RH(D): CPT

## 2020-08-14 PROCEDURE — 87040 BLOOD CULTURE FOR BACTERIA: CPT

## 2020-08-14 PROCEDURE — 99233 SBSQ HOSP IP/OBS HIGH 50: CPT | Performed by: INTERNAL MEDICINE

## 2020-08-14 PROCEDURE — 84145 PROCALCITONIN (PCT): CPT

## 2020-08-14 PROCEDURE — 6370000000 HC RX 637 (ALT 250 FOR IP): Performed by: SURGERY

## 2020-08-14 PROCEDURE — 2580000003 HC RX 258: Performed by: INTERNAL MEDICINE

## 2020-08-14 PROCEDURE — 85027 COMPLETE CBC AUTOMATED: CPT

## 2020-08-14 PROCEDURE — 2580000003 HC RX 258: Performed by: NURSE PRACTITIONER

## 2020-08-14 PROCEDURE — 85014 HEMATOCRIT: CPT

## 2020-08-14 PROCEDURE — 94761 N-INVAS EAR/PLS OXIMETRY MLT: CPT

## 2020-08-14 RX ORDER — PANTOPRAZOLE SODIUM 40 MG/1
40 TABLET, DELAYED RELEASE ORAL ONCE
Status: COMPLETED | OUTPATIENT
Start: 2020-08-14 | End: 2020-08-14

## 2020-08-14 RX ORDER — CALCIUM CARBONATE 200(500)MG
500 TABLET,CHEWABLE ORAL 3 TIMES DAILY PRN
Status: DISCONTINUED | OUTPATIENT
Start: 2020-08-14 | End: 2020-08-16 | Stop reason: HOSPADM

## 2020-08-14 RX ORDER — 0.9 % SODIUM CHLORIDE 0.9 %
250 INTRAVENOUS SOLUTION INTRAVENOUS ONCE
Status: COMPLETED | OUTPATIENT
Start: 2020-08-14 | End: 2020-08-14

## 2020-08-14 RX ADMIN — METOPROLOL TARTRATE 12.5 MG: 25 TABLET, FILM COATED ORAL at 10:07

## 2020-08-14 RX ADMIN — OXYCODONE HYDROCHLORIDE 10 MG: 10 TABLET ORAL at 13:17

## 2020-08-14 RX ADMIN — CEFEPIME 2 G: 2 INJECTION, POWDER, FOR SOLUTION INTRAVENOUS at 02:07

## 2020-08-14 RX ADMIN — PANTOPRAZOLE SODIUM 40 MG: 40 TABLET, DELAYED RELEASE ORAL at 23:13

## 2020-08-14 RX ADMIN — VANCOMYCIN HYDROCHLORIDE 1250 MG: 5 INJECTION, POWDER, LYOPHILIZED, FOR SOLUTION INTRAVENOUS at 19:11

## 2020-08-14 RX ADMIN — HEPARIN SODIUM 5000 UNITS: 5000 INJECTION, SOLUTION INTRAVENOUS; SUBCUTANEOUS at 21:40

## 2020-08-14 RX ADMIN — HEPARIN SODIUM 5000 UNITS: 5000 INJECTION, SOLUTION INTRAVENOUS; SUBCUTANEOUS at 15:10

## 2020-08-14 RX ADMIN — CEFEPIME 2 G: 2 INJECTION, POWDER, FOR SOLUTION INTRAVENOUS at 10:08

## 2020-08-14 RX ADMIN — OXYCODONE HYDROCHLORIDE 10 MG: 10 TABLET ORAL at 05:16

## 2020-08-14 RX ADMIN — HYDROMORPHONE HYDROCHLORIDE 0.5 MG: 1 INJECTION, SOLUTION INTRAMUSCULAR; INTRAVENOUS; SUBCUTANEOUS at 06:53

## 2020-08-14 RX ADMIN — OXYCODONE HYDROCHLORIDE 10 MG: 10 TABLET ORAL at 00:08

## 2020-08-14 RX ADMIN — HYDROMORPHONE HYDROCHLORIDE 0.5 MG: 1 INJECTION, SOLUTION INTRAMUSCULAR; INTRAVENOUS; SUBCUTANEOUS at 15:16

## 2020-08-14 RX ADMIN — OXYCODONE HYDROCHLORIDE 10 MG: 10 TABLET ORAL at 17:19

## 2020-08-14 RX ADMIN — SODIUM CHLORIDE, PRESERVATIVE FREE 10 ML: 5 INJECTION INTRAVENOUS at 21:08

## 2020-08-14 RX ADMIN — HYDROMORPHONE HYDROCHLORIDE 0.5 MG: 1 INJECTION, SOLUTION INTRAMUSCULAR; INTRAVENOUS; SUBCUTANEOUS at 23:17

## 2020-08-14 RX ADMIN — HEPARIN SODIUM 5000 UNITS: 5000 INJECTION, SOLUTION INTRAVENOUS; SUBCUTANEOUS at 05:16

## 2020-08-14 RX ADMIN — CEFEPIME 2 G: 2 INJECTION, POWDER, FOR SOLUTION INTRAVENOUS at 17:53

## 2020-08-14 RX ADMIN — SODIUM CHLORIDE, PRESERVATIVE FREE 10 ML: 5 INJECTION INTRAVENOUS at 11:23

## 2020-08-14 RX ADMIN — SODIUM CHLORIDE 250 ML: 9 INJECTION, SOLUTION INTRAVENOUS at 11:02

## 2020-08-14 RX ADMIN — OXYCODONE HYDROCHLORIDE 10 MG: 10 TABLET ORAL at 21:39

## 2020-08-14 RX ADMIN — HYDROMORPHONE HYDROCHLORIDE 0.5 MG: 1 INJECTION, SOLUTION INTRAMUSCULAR; INTRAVENOUS; SUBCUTANEOUS at 11:01

## 2020-08-14 RX ADMIN — LORAZEPAM 1 MG: 1 TABLET ORAL at 13:17

## 2020-08-14 RX ADMIN — METOPROLOL TARTRATE 12.5 MG: 25 TABLET, FILM COATED ORAL at 12:05

## 2020-08-14 RX ADMIN — VANCOMYCIN HYDROCHLORIDE 1750 MG: 5 INJECTION, POWDER, LYOPHILIZED, FOR SOLUTION INTRAVENOUS at 02:07

## 2020-08-14 RX ADMIN — HYDROMORPHONE HYDROCHLORIDE 0.5 MG: 1 INJECTION, SOLUTION INTRAMUSCULAR; INTRAVENOUS; SUBCUTANEOUS at 19:06

## 2020-08-14 RX ADMIN — HYDROMORPHONE HYDROCHLORIDE 0.5 MG: 1 INJECTION, SOLUTION INTRAMUSCULAR; INTRAVENOUS; SUBCUTANEOUS at 02:16

## 2020-08-14 RX ADMIN — CALCIUM CARBONATE 500 MG: 500 TABLET, CHEWABLE ORAL at 23:13

## 2020-08-14 RX ADMIN — VANCOMYCIN HYDROCHLORIDE 1250 MG: 5 INJECTION, POWDER, LYOPHILIZED, FOR SOLUTION INTRAVENOUS at 11:19

## 2020-08-14 ASSESSMENT — PAIN DESCRIPTION - FREQUENCY
FREQUENCY: CONTINUOUS

## 2020-08-14 ASSESSMENT — PAIN DESCRIPTION - DESCRIPTORS
DESCRIPTORS: SHARP
DESCRIPTORS: DULL;STABBING
DESCRIPTORS: SHARP
DESCRIPTORS: SHARP

## 2020-08-14 ASSESSMENT — PAIN - FUNCTIONAL ASSESSMENT
PAIN_FUNCTIONAL_ASSESSMENT: ACTIVITIES ARE NOT PREVENTED

## 2020-08-14 ASSESSMENT — PAIN DESCRIPTION - ONSET
ONSET: ON-GOING

## 2020-08-14 ASSESSMENT — PAIN DESCRIPTION - LOCATION
LOCATION: LEG
LOCATION: LEG
LOCATION: HIP
LOCATION: LEG

## 2020-08-14 ASSESSMENT — PAIN DESCRIPTION - DIRECTION
RADIATING_TOWARDS: DOWN

## 2020-08-14 ASSESSMENT — PAIN DESCRIPTION - ORIENTATION
ORIENTATION: RIGHT;LEFT
ORIENTATION: RIGHT
ORIENTATION: RIGHT;LEFT

## 2020-08-14 ASSESSMENT — PAIN SCALES - GENERAL
PAINLEVEL_OUTOF10: 7
PAINLEVEL_OUTOF10: 8
PAINLEVEL_OUTOF10: 10
PAINLEVEL_OUTOF10: 7
PAINLEVEL_OUTOF10: 7
PAINLEVEL_OUTOF10: 0
PAINLEVEL_OUTOF10: 8
PAINLEVEL_OUTOF10: 10
PAINLEVEL_OUTOF10: 7
PAINLEVEL_OUTOF10: 8
PAINLEVEL_OUTOF10: 10
PAINLEVEL_OUTOF10: 7
PAINLEVEL_OUTOF10: 8
PAINLEVEL_OUTOF10: 8
PAINLEVEL_OUTOF10: 0

## 2020-08-14 ASSESSMENT — PAIN DESCRIPTION - PROGRESSION
CLINICAL_PROGRESSION: GRADUALLY WORSENING
CLINICAL_PROGRESSION: NOT CHANGED
CLINICAL_PROGRESSION: GRADUALLY WORSENING

## 2020-08-14 ASSESSMENT — PAIN DESCRIPTION - PAIN TYPE
TYPE: ACUTE PAIN

## 2020-08-14 NOTE — CARE COORDINATION
SUMAN received a call from Sal/Robert. Pt Koletabernardino Duong has been approved. KIRBYW informed Sal that Pt will be transferred to [address] [place] [address]. Sal stated that he can follow while Pt is at [address] [place] [address].      Electronically signed by SUMAN Trinidad on 8/14/2020 at 2:20 PM

## 2020-08-14 NOTE — PROGRESS NOTES
Infectious Disease Progress Note  2020   Patient Name: Farhat Bansal : 1980       Reason for visit: F/u MRSA bacteremia, native tricuspid valve endocarditis, right hip effusion. ? History:? Interval history noted. S/p debridement of right upper thigh, right lower leg, left lower leg abscesses, incisional biopsy of petechial rash of lower leg on 2020. Wound culture was polymicrobial: MRSA, pseudomonas aeruginosa, E. coli. Blood cultures remain mono microbial: MRSA. CT-guided aspiration of the right hip for right hip MRSA septic arthritis done on 2020.  10 cc of turbid fluid was aspirated. Continues to have fever  Physical Exam:  Vital Signs: /72   Pulse 105   Temp 100.2 °F (37.9 °C) (Oral) Comment: henri epstein was noitfted  Resp 18   Ht 5' 5\" (1.651 m)   Wt 199 lb 8 oz (90.5 kg)   SpO2 93%   BMI 33.20 kg/m²     Gen: alert and oriented X3,  Skin: Bilateral leg non-blanchable petechiae  Wounds: Right lateral thigh, left medial leg C/D/I  HEMT: AT/NC Oropharynx pink, moist, and without lesions or exudates; dentition in good state of repair  Eyes: PERRLA, EOMI, conjunctiva pink, sclera anicteric. Neck: Supple. Trachea midline. No LAD. Chest: no distress and CTA. Good air movement. Heart: RRR and no MRG. Abd: soft, non-distended, no tenderness, no hepatomegaly. Normoactive bowel sounds. Ext:right hip tenderness. Catheter Site: without erythema or tenderness  Neuro: Mental status intact. CN 2-12 intact and no focal sensory or motor deficits     Radiologic / Imaging / TESTING  CT Right femur  1. Ulceration in the posterolateral proximal right thigh with minimal adjacent cellulitis. No associated findings of abscess, necrotizing fasciitis, myositis, or osteomyelitis. 2. Patchy stranding deep to the skin in the lateral, anterior, in the medial right thigh potentially related to additional injection sites.  3. Moderate right hip joint effusion potentially related to mild to moderate osteoarthritic changes of the right hip. RAFI 8/6/2020  Summary    Central line catheter tip visualized terminating within the right atrium. Vegetation noted above the tricuspid valve measuring 1.35 cm x 1. 25 cm; it    appears to be attached to the interatrial septum or tricuspid annulus    (septal leaflet origin). There was no thrombus noted in the left atrial appendage. Negative bubble study. No PFO or ASD noted. MRI of the pelvis  1. Large right hip effusion with mild-to-moderate degenerative changes and mild marrow edema throughout the right acetabulum as well as the right femoral head and neck. Differential includes a sterile effusion with reactive marrow edema. Alternatively, septic arthritis is not excluded and in this setting the marrow edema is likely reactive although osteomyelitis is not excluded. 2. Fluid within and adjacent to the right L4-5 facet joint that may also be reactive with septic arthritis not excluded. If clinically indicated further evaluation could be obtained with MRI of the lumbar spine. 3. Extensive subcutaneous and intramuscular edema about the pelvis. No drainable fluid collection. 4. Trace free fluid in the pelvis with presacral edema. No well-defined drainable intrapelvic fluid collection.         Labs:    Recent Results (from the past 24 hour(s))   CBC    Collection Time: 08/14/20  5:30 AM   Result Value Ref Range    WBC 8.6 4.0 - 10.5 K/CU MM    RBC 2.49 (L) 4.2 - 5.4 M/CU MM    Hemoglobin 6.9 (LL) 12.5 - 16.0 GM/DL    Hematocrit 22.4 (L) 37 - 47 %    MCV 90.0 78 - 100 FL    MCH 27.7 27 - 31 PG    MCHC 30.8 (L) 32.0 - 36.0 %    RDW 14.5 11.7 - 14.9 %    Platelets 720 059 - 277 K/CU MM    MPV 10.3 7.5 - 11.1 FL     CULTURE results: Invalid input(s): BLOOD CULTURE,  URINE CULTURE, SURGICAL CULTURE  Specimens:   ID Type Source Tests Collected by Time Destination   1 : Right Thigh Tissue Tissue CULTURE, SURGICAL Andree Sherwood MD 8/4/2020 0932     2 : suggestive of leukocytoclastic   vasculitis (see comment). 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  Therapeutic: continue vancomycin and cefepime, pharmacy dosing, aim for vancomycin trough of 15mcg/mL.  Diagnostic: MRI of the lumbar spine   F/u: Blood culturesblood cx q 48h until negative: 8/6/2020-1 of 2 MRSA, repeat blood culture on 8/8-1 of 2 MRSA, blood culture 8/10 1/1 NGTD, 8/11 1/1 NGTD   Other:   Summary: Continues to fever, blood cultures remain persistently positive. Seems to be multifactorial.  Vancomycin trough goal of 15 mcg/mL is yet to be achieved, large size of tricuspid valve vegetation, right hip effusion. Discussed with the pharmacist, vancomycin dose has been adjusted to 1250 mg every 8 hours hopefully we can achieve below. If we cannot then she has been instructed to discontinue vancomycin and start daptomycin at 10 mg/kg (ideal body weight). MRI of the lumbar spine also ordered to assess for epidural abscess. Await orthopedic: Dr. Jennifer Stevens for hip.       Electronically signed by: Electronically signed by Nelia Ahumada, MD on 8/14/2020 at 8:54 AM

## 2020-08-14 NOTE — PROGRESS NOTES
MRI reviewed shows edema in bone on both sides of joint and appears to have osteomyelitis  Will discuss with ID but I recommend transfer to tertiary facility / [facility] as for treatment as the femoral head may need to be resected and an antibiotic spacer placed or staged surgeries

## 2020-08-14 NOTE — PLAN OF CARE
Problem: Pain:  Goal: Pain level will decrease  Description: Pain level will decrease  8/13/2020 2120 by Rafael Aguirre RN  Outcome: Ongoing  8/13/2020 1104 by Sachin Hung RN  Outcome: Ongoing  Goal: Control of acute pain  Description: Control of acute pain  8/13/2020 2120 by Rafael Aguirre RN  Outcome: Ongoing  8/13/2020 1104 by Sachin Hung RN  Outcome: Ongoing  Goal: Control of chronic pain  Description: Control of chronic pain  8/13/2020 2120 by Rafael Aguirre RN  Outcome: Ongoing  8/13/2020 1104 by Sachin Hung RN  Outcome: Ongoing     Problem: Falls - Risk of:  Goal: Will remain free from falls  Description: Will remain free from falls  8/13/2020 2120 by Rafael Aguirre RN  Outcome: Ongoing  8/13/2020 1104 by Sachin Hung RN  Outcome: Ongoing  Goal: Absence of physical injury  Description: Absence of physical injury  8/13/2020 2120 by Rafael Aguirre RN  Outcome: Ongoing  8/13/2020 1104 by Sachin Hung RN  Outcome: Ongoing     Problem: Skin Integrity:  Goal: Will show no infection signs and symptoms  Description: Will show no infection signs and symptoms  8/13/2020 2120 by Rafael Aguirre RN  Outcome: Ongoing  8/13/2020 1104 by Sachin Hung RN  Outcome: Ongoing  Goal: Absence of new skin breakdown  Description: Absence of new skin breakdown  8/13/2020 2120 by Rafael Aguirre RN  Outcome: Ongoing  8/13/2020 1104 by Sachin Hung RN  Outcome: Ongoing     Problem: Discharge Planning:  Goal: Discharged to appropriate level of care  Description: Discharged to appropriate level of care  8/13/2020 2120 by Rafael Aguirre RN  Outcome: Ongoing  8/13/2020 1104 by Sachin Hung RN  Outcome: Ongoing     Problem: Gas Exchange - Impaired:  Goal: Levels of oxygenation will improve  Description: Levels of oxygenation will improve  8/13/2020 2120 by Rafael Aguirre RN  Outcome: Ongoing  8/13/2020 1104 by Sachin Hung RN  Outcome: Ongoing     Problem: Infection, Septic Shock:  Goal: Will show no infection signs and

## 2020-08-14 NOTE — PROGRESS NOTES
Comprehensive Nutrition Assessment    Type and Reason for Visit:  Reassess    Nutrition Recommendations/Plan:   Continue general diet with fluid restriction as needed  Encourage consistent intake  Resume oral nutrition supplement as accepted     Nutrition Assessment:  Remains on general diet with 1800 ml fluid restriction. Meal intake %, ate all of breakfast today. Snacks at bedside. Malnutrition Assessment:  Malnutrition Status: At risk for malnutrition (Comment)    Context:  Acute Illness       Estimated Daily Nutrient Needs:  Energy (kcal):  6385-7517; Weight Used for Energy Requirements:  Current     Protein (g):  68-79 (1.2-1.4 g/kg); Weight Used for Protein Requirements:  Ideal        Fluid (ml/day):  1900 (1ml/lakeshia); Weight Used for Fluid Requirements:  Current      Nutrition Related Findings:  asleep in bed on visit, soda pop bottles and fast food container on bedside table, hx IVDA and endocarditis      Wounds:  Open Wounds, Surgical Wound(abscess on LEs, hip incision)       Current Nutrition Therapies:    DIET GENERAL; Daily Fluid Restriction: 1800 ml    Anthropometric Measures:  · Height: 5' 5\" (165.1 cm)  · Current Body Weight: 199 lb 8.3 oz (90.5 kg)   · Admission Body Weight: 188 lb 4.4 oz (85.4 kg)    · Usual Body Weight:       · Ideal Body Weight: 125 lbs; % Ideal Body Weight 159.6 %   · BMI: 33.2  · Adjusted Body Weight:  ; No Adjustment   · BMI Categories: Obese Class 1 (BMI 30.0-34. 9)       Nutrition Diagnosis:   · Increased nutrient needs related to other (comment)(wounds) as evidenced by wounds      Nutrition Interventions:   Food and/or Nutrient Delivery:  Continue Current Diet, Start Oral Nutrition Supplement  Nutrition Education/Counseling:  No recommendation at this time   Coordination of Nutrition Care:  Continued Inpatient Monitoring    Goals:  Patient will consume at least 75% at meals during stay       Nutrition Monitoring and Evaluation:   Food/Nutrient Intake Outcomes: Food and Nutrient Intake, Supplement Intake  Physical Signs/Symptoms Outcomes:  Skin, Weight, Biochemical Data, Nutrition Focused Physical Findings     Discharge Planning:    Continue current diet     Electronically signed by Nando Coello RD, KIMBERLY on 8/14/20 at 10:15 AM EDT    Contact: [phone]

## 2020-08-14 NOTE — DISCHARGE SUMMARY
Discharge Summary    Name:  Orin Santoro /Age/Sex: 1980  (47 y.o. female)   MRN & CSN:  [id number] & [id number] Admission Date/Time: 2020 12:42 AM   Attending:  Bill Head MD Discharging Physician: Bill Head MD     Hospital Course:   Orin Santoro is a 47 y.o.  female  who presented initially for worsening right thigh wound and bilateral lower extremity swelling and redness. Patient was found to be septic and hyponatremic in the ED secondary to likely lower extremity abscesses. Patient was admitted to the ICU from the emergency room and was fluid resuscitated and started on broad-spectrum antibiotics. Both infectious disease and nephrology were consulted. Patient was seen by general surgery who upon further evaluation decided to I&D the abscesses of her lower extremities. It was found that the patient had bacteremia for which transthoracic echocardiogram was completed that demonstrated a tricuspid valve vegetation consistent with endocarditis. Blood cultures came back positive for MRSA as well as her lower extremity wounds so the patient was continued on vancomycin. Once patient was stable she was transferred to the medical surgical unit. In the medical surgical unit of right hip effusion was noted. It was drained to collect fluid to attempt to see if this with potentially septic arthritis  . Orthopedic surgery was consulted. The effusion was drained however drain was not able to be placed by interventional radiology. Therefore an MRI was completed to further evaluate for any potential abscesses. The MRI did demonstrate osteo-of the right hip and after discussion with orthopedic surgery they recommended transfer to a tertiary center for treatment of the osteomyelitis and potential for the femoral head to be resected and an antibiotic spacer to be placed which would require multiple staged surgeries. The patient was signed out to [facility] for transfer.     46 y/o --    < > 21.5* 24.0* 23.8* 22.4*   PLT  --   --  116*  --  126* 142    < > = values in this interval not displayed. IMAGING:  MRI Pelvis:  1. Large right hip effusion with mild-to-moderate degenerative changes and    mild marrow edema throughout the right acetabulum as well as the right    femoral head and neck.  Differential includes a sterile effusion with    reactive marrow edema.  Alternatively, septic arthritis is not excluded and    in this setting the marrow edema is likely reactive although osteomyelitis is    not excluded. 2. Fluid within and adjacent to the right L4-5 facet joint that may also be    reactive with septic arthritis not excluded.  If clinically indicated further    evaluation could be obtained with MRI of the lumbar spine. 3. Extensive subcutaneous and intramuscular edema about the pelvis.  No    drainable fluid collection. 4. Trace free fluid in the pelvis with presacral edema.  No well-defined    drainable intrapelvic fluid collection. CT Femur:  1. Ulceration in the posterolateral proximal right thigh with minimal    adjacent cellulitis.  No associated findings of abscess, necrotizing    fasciitis, myositis, or osteomyelitis. 2. Patchy stranding deep to the skin in the lateral, anterior, in the medial    right thigh potentially related to additional injection sites. 3. Moderate right hip joint effusion potentially related to mild to moderate    osteoarthritic changes of the right hip.           Discharge Time of 35 minutes    Electronically signed by Bill Head MD on 8/14/2020 at 12:53 PM

## 2020-08-14 NOTE — PROGRESS NOTES
This afternoon spoke with [facility] transfer center who accepted patient. Patient was signed out to covering physician at [facility]. Both family and patient have been updated throughout the day. Discharge medicine reconciliation is completed and discharge order in for when patient has a bed available. Hopeful transfer tonight.

## 2020-08-14 NOTE — PROGRESS NOTES
[address]  consulted by Dr. Pakr Payment for monitoring and adjustment. Indication for treatment: Right hip septic arthritis, MRSA bacteremia 2/2 leg abscess, endocarditis   Goal trough: 15 mcg/mL     Pertinent Laboratory Values:   Temp Readings from Last 3 Encounters:   08/14/20 100.2 °F (37.9 °C) (Oral)   08/04/20 96.3 °F (35.7 °C)   06/01/20 98.1 °F (36.7 °C) (Oral)     Recent Labs     08/12/20  0000 08/13/20  0630 08/14/20  0530   WBC 9.7 13.3* 8.6     Recent Labs     08/11/20  1316 08/13/20  0630   BUN 10 11   CREATININE 0.6 0.6     Estimated Creatinine Clearance: 141 mL/min (based on SCr of 0.6 mg/dL). Intake/Output Summary (Last 24 hours) at 8/14/2020 0924  Last data filed at 8/14/2020 0304  Gross per 24 hour   Intake 170 ml   Output 3550 ml   Net -3380 ml       Pertinent Cultures:  Date                             Source                                     Results  8/4   Surgical abscess cx  Staph aureus, pseudomonas, e coli  8/4   Surgical abscess cx  MRSA  8/6   Blood    MRSA  8/7   Surgical abscess cx  MRSA  8/8   Blood    MRSA  8/10   Blood    NGTD  8/11   Surgical   MRSA  8/11   Blood    NGTD    Vancomycin level:   TROUGH:    Recent Labs     08/13/20  0110   VANCOTROUGH 10.8     RANDOM:    No results for input(s): VANCORANDOM in the last 72 hours. Assessment:  · WBC and temperature: WBC WNL; Tmax = 100.2F  · SCr, BUN, and urine output:   · LISBETH resolved and renal function at baseline  · Day(s) of therapy: #10  · Vancomycin level:  · 10.8, sub-therapeutic (1500 mg IVPB q12h)    Plan:  · LISBETH resolved, renal at baseline  · Most recent trough on 1500 mg q12h schedule is sub-therapeutic. Since renal function has improved, will need to increase dosing to q8h to obtain therapeutic trough.   · Continue patient on vancomycin 1250 mg IVPB q8h  · AUC/RICCARDO: 586.1 (Target range: 400-600)  · Predicted trough: 17 mcg/dL  · Repeat next vancomycin trough: 08-15-20 @ 0930  · If trough level remains sub-therapeutic tomorrow, will plan to adjust to daptomycin per ID guidance  · Pharmacy will continue to monitor patient and adjust therapy as indicated.     VANCOMYCIN TROUGH SCHEDULED FOR 8/15/2020 @ 6052    Thank you for the consult,  Flako Hassan [facility]  8/14/2020 9:24 AM

## 2020-08-15 ENCOUNTER — APPOINTMENT (OUTPATIENT)
Dept: MRI IMAGING | Age: 40
DRG: 710 | End: 2020-08-15
Payer: MEDICAID

## 2020-08-15 VITALS
WEIGHT: 194 LBS | HEIGHT: 65 IN | RESPIRATION RATE: 18 BRPM | SYSTOLIC BLOOD PRESSURE: 114 MMHG | TEMPERATURE: 99.3 F | BODY MASS INDEX: 32.32 KG/M2 | OXYGEN SATURATION: 93 % | HEART RATE: 103 BPM | DIASTOLIC BLOOD PRESSURE: 77 MMHG

## 2020-08-15 LAB
ABO/RH: NORMAL
ANTIBODY SCREEN: NEGATIVE
COMPONENT: NORMAL
CREAT SERPL-MCNC: 0.6 MG/DL (ref 0.6–1.1)
CROSSMATCH RESULT: NORMAL
CULTURE: ABNORMAL
CULTURE: ABNORMAL
CULTURE: NORMAL
DOSE AMOUNT: NORMAL
DOSE TIME: NORMAL
GFR AFRICAN AMERICAN: >60 ML/MIN/1.73M2
GFR NON-AFRICAN AMERICAN: >60 ML/MIN/1.73M2
HCT VFR BLD CALC: 24.5 % (ref 37–47)
HEMOGLOBIN: 7.9 GM/DL (ref 12.5–16)
HIGH SENSITIVE C-REACTIVE PROTEIN: 106.2 MG/L
Lab: ABNORMAL
Lab: NORMAL
MCH RBC QN AUTO: 28.2 PG (ref 27–31)
MCHC RBC AUTO-ENTMCNC: 32.2 % (ref 32–36)
MCV RBC AUTO: 87.5 FL (ref 78–100)
PDW BLD-RTO: 14.5 % (ref 11.7–14.9)
PLATELET # BLD: 152 K/CU MM (ref 140–440)
PMV BLD AUTO: 9.9 FL (ref 7.5–11.1)
RBC # BLD: 2.8 M/CU MM (ref 4.2–5.4)
SPECIMEN: ABNORMAL
SPECIMEN: NORMAL
STATUS: NORMAL
TRANSFUSION STATUS: NORMAL
UNIT DIVISION: 0
UNIT NUMBER: NORMAL
VANCOMYCIN TROUGH: 12.5 UG/ML (ref 10–20)
WBC # BLD: 8.2 K/CU MM (ref 4–10.5)

## 2020-08-15 PROCEDURE — 1200000000 HC SEMI PRIVATE

## 2020-08-15 PROCEDURE — 94761 N-INVAS EAR/PLS OXIMETRY MLT: CPT

## 2020-08-15 PROCEDURE — 6360000002 HC RX W HCPCS: Performed by: INTERNAL MEDICINE

## 2020-08-15 PROCEDURE — 82565 ASSAY OF CREATININE: CPT

## 2020-08-15 PROCEDURE — A9579 GAD-BASE MR CONTRAST NOS,1ML: HCPCS | Performed by: INTERNAL MEDICINE

## 2020-08-15 PROCEDURE — 6370000000 HC RX 637 (ALT 250 FOR IP): Performed by: INTERNAL MEDICINE

## 2020-08-15 PROCEDURE — 6370000000 HC RX 637 (ALT 250 FOR IP): Performed by: SURGERY

## 2020-08-15 PROCEDURE — 85027 COMPLETE CBC AUTOMATED: CPT

## 2020-08-15 PROCEDURE — 6360000004 HC RX CONTRAST MEDICATION: Performed by: INTERNAL MEDICINE

## 2020-08-15 PROCEDURE — 2580000003 HC RX 258: Performed by: SURGERY

## 2020-08-15 PROCEDURE — 2580000003 HC RX 258: Performed by: INTERNAL MEDICINE

## 2020-08-15 PROCEDURE — 86141 C-REACTIVE PROTEIN HS: CPT

## 2020-08-15 PROCEDURE — 6360000002 HC RX W HCPCS: Performed by: SURGERY

## 2020-08-15 PROCEDURE — 72158 MRI LUMBAR SPINE W/O & W/DYE: CPT

## 2020-08-15 PROCEDURE — 80202 ASSAY OF VANCOMYCIN: CPT

## 2020-08-15 RX ADMIN — HEPARIN SODIUM 5000 UNITS: 5000 INJECTION, SOLUTION INTRAVENOUS; SUBCUTANEOUS at 05:51

## 2020-08-15 RX ADMIN — OXYCODONE HYDROCHLORIDE 10 MG: 10 TABLET ORAL at 12:31

## 2020-08-15 RX ADMIN — ACETAMINOPHEN 650 MG: 325 TABLET ORAL at 20:07

## 2020-08-15 RX ADMIN — HYDROMORPHONE HYDROCHLORIDE 0.5 MG: 1 INJECTION, SOLUTION INTRAMUSCULAR; INTRAVENOUS; SUBCUTANEOUS at 14:03

## 2020-08-15 RX ADMIN — GADOTERIDOL 20 ML: 279.3 INJECTION, SOLUTION INTRAVENOUS at 11:44

## 2020-08-15 RX ADMIN — METOPROLOL TARTRATE 12.5 MG: 25 TABLET, FILM COATED ORAL at 08:31

## 2020-08-15 RX ADMIN — COLLAGENASE SANTYL: 250 OINTMENT TOPICAL at 08:36

## 2020-08-15 RX ADMIN — PROMETHAZINE HYDROCHLORIDE 6.25 MG: 25 INJECTION INTRAMUSCULAR; INTRAVENOUS at 16:50

## 2020-08-15 RX ADMIN — HYDROMORPHONE HYDROCHLORIDE 0.5 MG: 1 INJECTION, SOLUTION INTRAMUSCULAR; INTRAVENOUS; SUBCUTANEOUS at 10:01

## 2020-08-15 RX ADMIN — CEFEPIME 2 G: 2 INJECTION, POWDER, FOR SOLUTION INTRAVENOUS at 02:37

## 2020-08-15 RX ADMIN — HYDROMORPHONE HYDROCHLORIDE 0.5 MG: 1 INJECTION, SOLUTION INTRAMUSCULAR; INTRAVENOUS; SUBCUTANEOUS at 04:54

## 2020-08-15 RX ADMIN — SODIUM CHLORIDE, PRESERVATIVE FREE 10 ML: 5 INJECTION INTRAVENOUS at 08:34

## 2020-08-15 RX ADMIN — OXYCODONE HYDROCHLORIDE 10 MG: 10 TABLET ORAL at 08:31

## 2020-08-15 RX ADMIN — LORAZEPAM 1 MG: 1 TABLET ORAL at 19:59

## 2020-08-15 RX ADMIN — OXYCODONE HYDROCHLORIDE 10 MG: 10 TABLET ORAL at 02:31

## 2020-08-15 RX ADMIN — HYDROMORPHONE HYDROCHLORIDE 0.5 MG: 1 INJECTION, SOLUTION INTRAMUSCULAR; INTRAVENOUS; SUBCUTANEOUS at 19:58

## 2020-08-15 RX ADMIN — CEFEPIME 2 G: 2 INJECTION, POWDER, FOR SOLUTION INTRAVENOUS at 12:32

## 2020-08-15 RX ADMIN — METOPROLOL TARTRATE 12.5 MG: 25 TABLET, FILM COATED ORAL at 19:59

## 2020-08-15 RX ADMIN — HEPARIN SODIUM 5000 UNITS: 5000 INJECTION, SOLUTION INTRAVENOUS; SUBCUTANEOUS at 14:02

## 2020-08-15 RX ADMIN — HEPARIN SODIUM 5000 UNITS: 5000 INJECTION, SOLUTION INTRAVENOUS; SUBCUTANEOUS at 22:44

## 2020-08-15 RX ADMIN — OXYCODONE HYDROCHLORIDE 10 MG: 10 TABLET ORAL at 16:50

## 2020-08-15 RX ADMIN — VANCOMYCIN HYDROCHLORIDE 1250 MG: 5 INJECTION, POWDER, LYOPHILIZED, FOR SOLUTION INTRAVENOUS at 03:04

## 2020-08-15 RX ADMIN — VANCOMYCIN HYDROCHLORIDE 1250 MG: 5 INJECTION, POWDER, LYOPHILIZED, FOR SOLUTION INTRAVENOUS at 19:59

## 2020-08-15 RX ADMIN — LORAZEPAM 1 MG: 1 TABLET ORAL at 12:31

## 2020-08-15 RX ADMIN — VANCOMYCIN HYDROCHLORIDE 1250 MG: 5 INJECTION, POWDER, LYOPHILIZED, FOR SOLUTION INTRAVENOUS at 14:03

## 2020-08-15 RX ADMIN — CEFEPIME 2 G: 2 INJECTION, POWDER, FOR SOLUTION INTRAVENOUS at 18:17

## 2020-08-15 ASSESSMENT — PAIN SCALES - GENERAL
PAINLEVEL_OUTOF10: 10
PAINLEVEL_OUTOF10: 7
PAINLEVEL_OUTOF10: 6
PAINLEVEL_OUTOF10: 9
PAINLEVEL_OUTOF10: 3
PAINLEVEL_OUTOF10: 7
PAINLEVEL_OUTOF10: 7
PAINLEVEL_OUTOF10: 10
PAINLEVEL_OUTOF10: 10
PAINLEVEL_OUTOF10: 9
PAINLEVEL_OUTOF10: 8
PAINLEVEL_OUTOF10: 9

## 2020-08-15 ASSESSMENT — PAIN DESCRIPTION - ONSET: ONSET: ON-GOING

## 2020-08-15 ASSESSMENT — PAIN DESCRIPTION - LOCATION
LOCATION: LEG

## 2020-08-15 ASSESSMENT — PAIN DESCRIPTION - ORIENTATION
ORIENTATION: RIGHT
ORIENTATION: RIGHT
ORIENTATION: LEFT;RIGHT

## 2020-08-15 ASSESSMENT — PAIN DESCRIPTION - DESCRIPTORS
DESCRIPTORS: DULL;STABBING
DESCRIPTORS: DULL;STABBING
DESCRIPTORS: ACHING;CONSTANT;CRUSHING;DISCOMFORT

## 2020-08-15 ASSESSMENT — PAIN DESCRIPTION - PAIN TYPE
TYPE: SURGICAL PAIN
TYPE: ACUTE PAIN;CHRONIC PAIN
TYPE: SURGICAL PAIN;ACUTE PAIN

## 2020-08-15 ASSESSMENT — PAIN DESCRIPTION - FREQUENCY
FREQUENCY: CONTINUOUS

## 2020-08-15 ASSESSMENT — PAIN DESCRIPTION - PROGRESSION: CLINICAL_PROGRESSION: GRADUALLY WORSENING

## 2020-08-15 NOTE — PROGRESS NOTES
Hospitalist Progress Note      Name:  Efrain Carey /Age/Sex: 1980  (47 y.o. female)   MRN & CSN:  [id number] & [id number] Admission Date/Time: 2020 12:42 AM   Location:  [address] [place] [address] PCP: [facility] [id number] Day: 12    Assessment and Plan:   Severe Sepsis [facility])  MRSA bacteremia  Right Septic Hip Arthritis  Right Hip Effusion  Right Hip Osteomyelitis  Multiple lower extremity abscesses  -Spoke with orthopedic surgery who recommended transfer to tertiary center for likely multiple staged surgeries on her right hip on . [facility] was contacted immediately and accepted the patient in transfer. Due to no bed available on  patient was not transferred to [facility]. 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                 Chronic hepatitis B (HCC)  Chronic hepatitis C without hepatic coma (HCC)     Tobacco abuse smoking cessation    - Counseling provided, nicotine patch    Diet DIET GENERAL; Daily Fluid Restriction: 1800 ml   DVT Prophylaxis [x] Lovenox, []  Heparin, [] SCDs, [] Ambulation   GI Prophylaxis [] PPI,  [] H2 Blocker,  [] Carafate,  [x] Diet/Tube Feeds   Code Status Full Code   Disposition Patient requires continued admission due to awaiting transfer   MDM [x] Low, [x] Moderate,[] High  Patient's risk as above due to awaiting transfer     History of Present Illness:     No bed was available last night to transfer to [facility]. Patient states that she slept well last night. Denies any chest pain nausea vomiting fevers chills and is tolerating her diet. Objective: Intake/Output Summary (Last 24 hours) at 8/15/2020 1118  Last data filed at 8/15/2020 0009  Gross per 24 hour   Intake 375 ml   Output 3625 ml   Net -3250 ml      Vitals:   Vitals:    08/15/20 0914   BP:    Pulse:    Resp:    Temp:    SpO2: 93%     Physical Exam:     GEN    Awake female, sitting upright in bed in no apparent distress. Appears given age. EYES   Pupils are equally round. No scleral erythema, discharge, or conjunctivitis. HENT  Mucous membranes are moist. Oral pharynx without exudates, no evidence of thrush. NECK  Supple, no apparent thyromegaly or masses. RESP  Clear to auscultation, no wheezes, rales or rhonchi. Symmetric chest movement while on room air. CARDIO/VASC           S1/S2 auscultated. Regular rate without appreciable murmurs, rubs, or gallops. No JVD or carotid bruits. No peripheral edema  GI        Abdomen is soft without significant tenderness, masses, or guarding. Bowel sounds are normoactive. Rectal exam deferred.        No costovertebral angle tenderness. HEME/LYMPH            No petechiae or ecchymoses. MSK    No gross joint deformities. SKIN    lower extremity incision sites clean dry and intact, right-sided IJ clean dry and intact  NEURO           Cranial nerves appear grossly intact, normal speech, no lateralizing weakness. PSYCH            Awake, alert, oriented x 4. Affect appropriate.     Medications:   Medications:    vancomycin  1,250 mg Intravenous Q8H    LORazepam  0.5 mg Intravenous Once    metoprolol tartrate  12.5 mg Oral BID    cefepime  2 g Intravenous Q8H    collagenase   Topical Daily    sodium chloride flush  10 mL Intravenous 2 times per day    nicotine 1 patch Transdermal Daily    heparin (porcine)  5,000 Units Subcutaneous 3 times per day      Infusions:   PRN Meds: calcium carbonate, 500 mg, TID PRN  oxyCODONE, 10 mg, Q4H PRN  HYDROmorphone, 0.5 mg, Q4H PRN  potassium chloride, 40 mEq, PRN    Or  potassium alternative oral replacement, 40 mEq, PRN    Or  potassium chloride, 10 mEq, PRN  LORazepam, 1 mg, Q6H PRN  sodium chloride flush, 10 mL, PRN  acetaminophen, 650 mg, Q6H PRN    Or  acetaminophen, 650 mg, Q6H PRN  polyethylene glycol, 17 g, Daily PRN  cloNIDine, 0.1 mg, PRN  traZODone, 50 mg, Nightly PRN  promethazine, 12.5 mg, Q6H PRN    Or  promethazine, 6.25 mg, Q6H PRN          Electronically signed by Hawa Huffman MD on 8/15/2020 at 11:18 AM

## 2020-08-15 NOTE — PROGRESS NOTES
[facility]  consulted by Dr. Farris  for monitoring and adjustment. Indication for treatment: Right hip septic arthritis, MRSA bacteremia 2/2 leg abscess, endocarditis   Goal trough: 15 mcg/mL     Pertinent Laboratory Values:   Temp Readings from Last 3 Encounters:   08/15/20 98.3 °F (36.8 °C) (Oral)   08/04/20 96.3 °F (35.7 °C)   06/01/20 98.1 °F (36.7 °C) (Oral)     Recent Labs     08/13/20  0630 08/14/20  0530 08/15/20  0555   WBC 13.3* 8.6 8.2     Recent Labs     08/13/20  0630 08/15/20  0555   BUN 11  --    CREATININE 0.6 0.6     Estimated Creatinine Clearance: 139 mL/min (based on SCr of 0.6 mg/dL). Intake/Output Summary (Last 24 hours) at 8/15/2020 1356  Last data filed at 8/15/2020 1100  Gross per 24 hour   Intake 615 ml   Output 2175 ml   Net -1560 ml       Pertinent Cultures:  Date                             Source                                     Results  8/4   Surgical abscess cx  Staph aureus, pseudomonas, e coli  8/4   Surgical abscess cx  MRSA  8/6   Blood    MRSA  8/7   Surgical abscess cx  MRSA  8/8   Blood    MRSA  8/10   Blood    NGTD  8/11   Surgical   MRSA  8/11   Blood    NGTD    Vancomycin level:   TROUGH:    Recent Labs     08/13/20  0110 08/15/20  1230   VANCOTROUGH 10.8 12.5     RANDOM:    No results for input(s): VANCORANDOM in the last 72 hours.     Assessment:  · WBC and temperature: WBC WNL; 24-hr Tmax = 99.8F  · SCr, BUN, and urine output:   · LISBETH resolved and renal function at baseline  · Scr stable  · Day(s) of therapy: #11  · Vancomycin level:  · 10.8, sub-therapeutic (1500 mg IVPB q12h)  · 12.5, 9.5h post-dose (1250 mg IVPB q8h)    Plan:  · Vancomycin trough level collected 9.5h post-dose as patient was in MRI  · Based on PK evaluation, predict true trough level, 8h post-dose is 17.8 (therapeutic)  · Reese = 0.102 hr-1  · t1/2 = 6.8 hrs  · Vd = 61.6 L  · Cl = 6.283L/hr  · Continue patient on vancomycin 1250 mg IVPB q8h  · Calculated AUC/RICCARDO: 596.8 (Target range: 400-600)  · Predicted true trough: 17.8 mcg/dL  · If trough level returned sub-therapeutic, therapy was to be switched to daptomycin per ID. Predicted trough is therapeutic. Plan to discuss with hospitalist if vancomycin should be continued or switch made to daptomycin given most recent cultures remain positive for GPC. · Plan to continue vancomycin per Dr. Loreta Nayak  · Repeat next trough level: 8/17 @ 0430. · Pharmacy will continue to monitor patient and adjust therapy as indicated.     VANCOMYCIN TROUGH SCHEDULED FOR 8/17/2020 @ 0430    Thank you for the consult,  Ba Mandel [place]  8/15/2020 1:56 PM

## 2020-08-16 LAB
CULTURE: ABNORMAL
CULTURE: ABNORMAL
GRAM SMEAR: ABNORMAL
Lab: ABNORMAL
SARS-COV-2: NOT DETECTED
SOURCE: NORMAL
SPECIMEN: ABNORMAL

## 2020-08-16 PROCEDURE — 6360000002 HC RX W HCPCS: Performed by: INTERNAL MEDICINE

## 2020-08-16 PROCEDURE — 6370000000 HC RX 637 (ALT 250 FOR IP): Performed by: INTERNAL MEDICINE

## 2020-08-16 RX ADMIN — LORAZEPAM 1 MG: 1 TABLET ORAL at 01:35

## 2020-08-16 RX ADMIN — HYDROMORPHONE HYDROCHLORIDE 0.5 MG: 1 INJECTION, SOLUTION INTRAMUSCULAR; INTRAVENOUS; SUBCUTANEOUS at 01:35

## 2020-08-16 ASSESSMENT — PAIN DESCRIPTION - LOCATION: LOCATION: ABDOMEN

## 2020-08-16 ASSESSMENT — PAIN DESCRIPTION - DESCRIPTORS: DESCRIPTORS: ACHING;CRAMPING;CONSTANT

## 2020-08-16 ASSESSMENT — PAIN SCALES - GENERAL: PAINLEVEL_OUTOF10: 9

## 2020-08-16 ASSESSMENT — PAIN DESCRIPTION - ONSET: ONSET: ON-GOING

## 2020-08-16 ASSESSMENT — PAIN DESCRIPTION - PAIN TYPE: TYPE: ACUTE PAIN;CHRONIC PAIN

## 2020-08-16 ASSESSMENT — PAIN DESCRIPTION - ORIENTATION: ORIENTATION: MID;LOWER

## 2020-08-16 ASSESSMENT — PAIN DESCRIPTION - FREQUENCY: FREQUENCY: CONTINUOUS

## 2020-08-16 NOTE — PROGRESS NOTES
Spoke with radiology and images to be sent to [facility] electronically. 12:12 AM Report called to [facility], spoke with Emanuel Rice. All questions answered and informed him of 0200  time via [employer]. 12:39 AM Per pt request, this RN attempted to call pt mother. No answer at this time and unable to leave a voicemail due to voicemail not set up.

## 2020-08-17 LAB
CULTURE: ABNORMAL
CULTURE: ABNORMAL
CULTURE: NORMAL
Lab: ABNORMAL
Lab: NORMAL
SPECIMEN: ABNORMAL
SPECIMEN: NORMAL

## 2020-08-18 LAB
EKG ATRIAL RATE: 63 BPM
EKG DIAGNOSIS: NORMAL
EKG Q-T INTERVAL: 412 MS
EKG QRS DURATION: 92 MS
EKG QTC CALCULATION (BAZETT): 596 MS
EKG R AXIS: 29 DEGREES
EKG T AXIS: 36 DEGREES
EKG VENTRICULAR RATE: 126 BPM

## 2020-09-13 ENCOUNTER — HOSPITAL ENCOUNTER (INPATIENT)
Age: 40
LOS: 2 days | Discharge: ANOTHER HOSPITAL | DRG: 049 | End: 2020-09-17
Attending: INTERNAL MEDICINE | Admitting: INTERNAL MEDICINE
Payer: MEDICAID

## 2020-09-13 ENCOUNTER — APPOINTMENT (OUTPATIENT)
Dept: CT IMAGING | Age: 40
DRG: 049 | End: 2020-09-13
Payer: MEDICAID

## 2020-09-13 DIAGNOSIS — N17.9 AKI (ACUTE KIDNEY INJURY) (HCC): Primary | ICD-10-CM

## 2020-09-13 DIAGNOSIS — R10.9 RIGHT FLANK PAIN: ICD-10-CM

## 2020-09-13 DIAGNOSIS — R31.9 HEMATURIA, UNSPECIFIED TYPE: ICD-10-CM

## 2020-09-13 LAB
ALBUMIN SERPL-MCNC: 3.7 GM/DL (ref 3.4–5)
ALP BLD-CCNC: 70 IU/L (ref 40–129)
ALT SERPL-CCNC: 7 U/L (ref 10–40)
AMORPHOUS: ABNORMAL /HPF
ANION GAP SERPL CALCULATED.3IONS-SCNC: 14 MMOL/L (ref 4–16)
AST SERPL-CCNC: 13 IU/L (ref 15–37)
BACTERIA: NEGATIVE /HPF
BASOPHILS ABSOLUTE: 0.1 K/CU MM
BASOPHILS RELATIVE PERCENT: 0.6 % (ref 0–1)
BILIRUB SERPL-MCNC: 0.3 MG/DL (ref 0–1)
BILIRUBIN URINE: NEGATIVE MG/DL
BLOOD, URINE: ABNORMAL
BUN BLDV-MCNC: 27 MG/DL (ref 6–23)
CALCIUM SERPL-MCNC: 9.8 MG/DL (ref 8.3–10.6)
CHLORIDE BLD-SCNC: 100 MMOL/L (ref 99–110)
CLARITY: CLEAR
CO2: 23 MMOL/L (ref 21–32)
COLOR: YELLOW
CREAT SERPL-MCNC: 1.5 MG/DL (ref 0.6–1.1)
DIFFERENTIAL TYPE: ABNORMAL
EOSINOPHILS ABSOLUTE: 0.3 K/CU MM
EOSINOPHILS RELATIVE PERCENT: 3.5 % (ref 0–3)
GFR AFRICAN AMERICAN: 47 ML/MIN/1.73M2
GFR NON-AFRICAN AMERICAN: 39 ML/MIN/1.73M2
GLUCOSE BLD-MCNC: 109 MG/DL (ref 70–99)
GLUCOSE, URINE: NEGATIVE MG/DL
HCT VFR BLD CALC: 29.5 % (ref 37–47)
HEMOGLOBIN: 9.3 GM/DL (ref 12.5–16)
IMMATURE NEUTROPHIL %: 0.4 % (ref 0–0.43)
KETONES, URINE: NEGATIVE MG/DL
LACTATE: 1.1 MMOL/L (ref 0.4–2)
LEUKOCYTE ESTERASE, URINE: NEGATIVE
LYMPHOCYTES ABSOLUTE: 2 K/CU MM
LYMPHOCYTES RELATIVE PERCENT: 23.9 % (ref 24–44)
MCH RBC QN AUTO: 29.1 PG (ref 27–31)
MCHC RBC AUTO-ENTMCNC: 31.5 % (ref 32–36)
MCV RBC AUTO: 92.2 FL (ref 78–100)
MONOCYTES ABSOLUTE: 0.4 K/CU MM
MONOCYTES RELATIVE PERCENT: 5.1 % (ref 0–4)
MUCUS: ABNORMAL HPF
NITRITE URINE, QUANTITATIVE: NEGATIVE
NUCLEATED RBC %: 0 %
PDW BLD-RTO: 15.6 % (ref 11.7–14.9)
PH, URINE: 5 (ref 5–8)
PLATELET # BLD: 334 K/CU MM (ref 140–440)
PMV BLD AUTO: 10.2 FL (ref 7.5–11.1)
POTASSIUM SERPL-SCNC: 4.5 MMOL/L (ref 3.5–5.1)
PROTEIN UA: 30 MG/DL
RBC # BLD: 3.2 M/CU MM (ref 4.2–5.4)
RBC URINE: 81 /HPF (ref 0–6)
SEGMENTED NEUTROPHILS ABSOLUTE COUNT: 5.6 K/CU MM
SEGMENTED NEUTROPHILS RELATIVE PERCENT: 66.5 % (ref 36–66)
SODIUM BLD-SCNC: 137 MMOL/L (ref 135–145)
SPECIFIC GRAVITY UA: 1.01 (ref 1–1.03)
SQUAMOUS EPITHELIAL: <1 /HPF
TOTAL IMMATURE NEUTOROPHIL: 0.03 K/CU MM
TOTAL NUCLEATED RBC: 0 K/CU MM
TOTAL PROTEIN: 10 GM/DL (ref 6.4–8.2)
TRICHOMONAS: ABNORMAL /HPF
UROBILINOGEN, URINE: NORMAL MG/DL (ref 0.2–1)
WBC # BLD: 8.5 K/CU MM (ref 4–10.5)
WBC UA: 9 /HPF (ref 0–5)

## 2020-09-13 PROCEDURE — 2580000003 HC RX 258: Performed by: PHYSICIAN ASSISTANT

## 2020-09-13 PROCEDURE — 74176 CT ABD & PELVIS W/O CONTRAST: CPT

## 2020-09-13 PROCEDURE — 83605 ASSAY OF LACTIC ACID: CPT

## 2020-09-13 PROCEDURE — 85025 COMPLETE CBC W/AUTO DIFF WBC: CPT

## 2020-09-13 PROCEDURE — 80053 COMPREHEN METABOLIC PANEL: CPT

## 2020-09-13 PROCEDURE — 96361 HYDRATE IV INFUSION ADD-ON: CPT

## 2020-09-13 PROCEDURE — 96365 THER/PROPH/DIAG IV INF INIT: CPT

## 2020-09-13 PROCEDURE — 87086 URINE CULTURE/COLONY COUNT: CPT

## 2020-09-13 PROCEDURE — 84703 CHORIONIC GONADOTROPIN ASSAY: CPT

## 2020-09-13 PROCEDURE — 99285 EMERGENCY DEPT VISIT HI MDM: CPT

## 2020-09-13 PROCEDURE — 81001 URINALYSIS AUTO W/SCOPE: CPT

## 2020-09-13 PROCEDURE — 6360000002 HC RX W HCPCS: Performed by: PHYSICIAN ASSISTANT

## 2020-09-13 RX ORDER — KETOROLAC TROMETHAMINE 30 MG/ML
30 INJECTION, SOLUTION INTRAMUSCULAR; INTRAVENOUS ONCE
Status: DISCONTINUED | OUTPATIENT
Start: 2020-09-13 | End: 2020-09-13

## 2020-09-13 RX ORDER — 0.9 % SODIUM CHLORIDE 0.9 %
1000 INTRAVENOUS SOLUTION INTRAVENOUS ONCE
Status: COMPLETED | OUTPATIENT
Start: 2020-09-13 | End: 2020-09-14

## 2020-09-13 RX ADMIN — LIDOCAINE HYDROCHLORIDE 81.6 MG: 20 INJECTION, SOLUTION INTRAVENOUS at 22:56

## 2020-09-13 RX ADMIN — SODIUM CHLORIDE 1000 ML: 9 INJECTION, SOLUTION INTRAVENOUS at 22:56

## 2020-09-13 ASSESSMENT — PAIN DESCRIPTION - ORIENTATION: ORIENTATION: RIGHT

## 2020-09-13 ASSESSMENT — PAIN SCALES - GENERAL: PAINLEVEL_OUTOF10: 8

## 2020-09-13 ASSESSMENT — PAIN DESCRIPTION - LOCATION: LOCATION: FLANK;BACK

## 2020-09-13 ASSESSMENT — PAIN DESCRIPTION - PAIN TYPE: TYPE: ACUTE PAIN

## 2020-09-14 ENCOUNTER — APPOINTMENT (OUTPATIENT)
Dept: MRI IMAGING | Age: 40
DRG: 049 | End: 2020-09-14
Payer: MEDICAID

## 2020-09-14 PROBLEM — R10.9 RIGHT FLANK PAIN: Status: ACTIVE | Noted: 2020-09-14

## 2020-09-14 PROCEDURE — 6370000000 HC RX 637 (ALT 250 FOR IP): Performed by: INTERNAL MEDICINE

## 2020-09-14 PROCEDURE — 2580000003 HC RX 258: Performed by: PHYSICIAN ASSISTANT

## 2020-09-14 PROCEDURE — 2580000003 HC RX 258: Performed by: INTERNAL MEDICINE

## 2020-09-14 PROCEDURE — 6370000000 HC RX 637 (ALT 250 FOR IP): Performed by: NURSE PRACTITIONER

## 2020-09-14 PROCEDURE — 99223 1ST HOSP IP/OBS HIGH 75: CPT | Performed by: INTERNAL MEDICINE

## 2020-09-14 PROCEDURE — 6360000002 HC RX W HCPCS: Performed by: HOSPITALIST

## 2020-09-14 PROCEDURE — 96367 TX/PROPH/DG ADDL SEQ IV INF: CPT

## 2020-09-14 PROCEDURE — 94761 N-INVAS EAR/PLS OXIMETRY MLT: CPT

## 2020-09-14 PROCEDURE — G0378 HOSPITAL OBSERVATION PER HR: HCPCS

## 2020-09-14 PROCEDURE — 72146 MRI CHEST SPINE W/O DYE: CPT

## 2020-09-14 PROCEDURE — 6360000002 HC RX W HCPCS: Performed by: INTERNAL MEDICINE

## 2020-09-14 PROCEDURE — 96375 TX/PRO/DX INJ NEW DRUG ADDON: CPT

## 2020-09-14 PROCEDURE — 96376 TX/PRO/DX INJ SAME DRUG ADON: CPT

## 2020-09-14 PROCEDURE — 96372 THER/PROPH/DIAG INJ SC/IM: CPT

## 2020-09-14 PROCEDURE — 72148 MRI LUMBAR SPINE W/O DYE: CPT

## 2020-09-14 RX ORDER — POTASSIUM CHLORIDE 20 MEQ/1
40 TABLET, EXTENDED RELEASE ORAL PRN
Status: DISCONTINUED | OUTPATIENT
Start: 2020-09-14 | End: 2020-09-18 | Stop reason: HOSPADM

## 2020-09-14 RX ORDER — PROMETHAZINE HYDROCHLORIDE 25 MG/1
12.5 TABLET ORAL EVERY 6 HOURS PRN
Status: DISCONTINUED | OUTPATIENT
Start: 2020-09-14 | End: 2020-09-18 | Stop reason: HOSPADM

## 2020-09-14 RX ORDER — ACETAMINOPHEN 325 MG/1
650 TABLET ORAL EVERY 6 HOURS PRN
Status: DISCONTINUED | OUTPATIENT
Start: 2020-09-14 | End: 2020-09-18 | Stop reason: HOSPADM

## 2020-09-14 RX ORDER — SODIUM CHLORIDE 9 MG/ML
INJECTION, SOLUTION INTRAVENOUS CONTINUOUS
Status: DISCONTINUED | OUTPATIENT
Start: 2020-09-14 | End: 2020-09-14

## 2020-09-14 RX ORDER — CYCLOBENZAPRINE HCL 10 MG
10 TABLET ORAL 3 TIMES DAILY PRN
Status: DISCONTINUED | OUTPATIENT
Start: 2020-09-14 | End: 2020-09-18 | Stop reason: HOSPADM

## 2020-09-14 RX ORDER — BUPRENORPHINE AND NALOXONE 8; 2 MG/1; MG/1
1 FILM, SOLUBLE BUCCAL; SUBLINGUAL 2 TIMES DAILY
Status: DISCONTINUED | OUTPATIENT
Start: 2020-09-14 | End: 2020-09-18 | Stop reason: HOSPADM

## 2020-09-14 RX ORDER — SODIUM CHLORIDE 9 MG/ML
INJECTION, SOLUTION INTRAVENOUS CONTINUOUS
Status: DISCONTINUED | OUTPATIENT
Start: 2020-09-14 | End: 2020-09-18 | Stop reason: HOSPADM

## 2020-09-14 RX ORDER — POTASSIUM CHLORIDE 7.45 MG/ML
10 INJECTION INTRAVENOUS PRN
Status: DISCONTINUED | OUTPATIENT
Start: 2020-09-14 | End: 2020-09-18 | Stop reason: HOSPADM

## 2020-09-14 RX ORDER — SODIUM CHLORIDE 0.9 % (FLUSH) 0.9 %
10 SYRINGE (ML) INJECTION EVERY 12 HOURS SCHEDULED
Status: DISCONTINUED | OUTPATIENT
Start: 2020-09-14 | End: 2020-09-18 | Stop reason: HOSPADM

## 2020-09-14 RX ORDER — LORAZEPAM 2 MG/ML
1 INJECTION INTRAMUSCULAR PRN
Status: COMPLETED | OUTPATIENT
Start: 2020-09-14 | End: 2020-09-14

## 2020-09-14 RX ORDER — SODIUM CHLORIDE 0.9 % (FLUSH) 0.9 %
10 SYRINGE (ML) INJECTION PRN
Status: DISCONTINUED | OUTPATIENT
Start: 2020-09-14 | End: 2020-09-18 | Stop reason: HOSPADM

## 2020-09-14 RX ORDER — ONDANSETRON 2 MG/ML
4 INJECTION INTRAMUSCULAR; INTRAVENOUS EVERY 6 HOURS PRN
Status: DISCONTINUED | OUTPATIENT
Start: 2020-09-14 | End: 2020-09-18 | Stop reason: HOSPADM

## 2020-09-14 RX ORDER — POLYETHYLENE GLYCOL 3350 17 G/17G
17 POWDER, FOR SOLUTION ORAL DAILY PRN
Status: DISCONTINUED | OUTPATIENT
Start: 2020-09-14 | End: 2020-09-18 | Stop reason: HOSPADM

## 2020-09-14 RX ORDER — BUPRENORPHINE AND NALOXONE 8; 2 MG/1; MG/1
1 FILM, SOLUBLE BUCCAL; SUBLINGUAL DAILY
Status: DISCONTINUED | OUTPATIENT
Start: 2020-09-14 | End: 2020-09-14

## 2020-09-14 RX ORDER — LORAZEPAM 2 MG/ML
2 INJECTION INTRAMUSCULAR SEE ADMIN INSTRUCTIONS
Status: COMPLETED | OUTPATIENT
Start: 2020-09-14 | End: 2020-09-14

## 2020-09-14 RX ORDER — ACETAMINOPHEN 650 MG/1
650 SUPPOSITORY RECTAL EVERY 6 HOURS PRN
Status: DISCONTINUED | OUTPATIENT
Start: 2020-09-14 | End: 2020-09-18 | Stop reason: HOSPADM

## 2020-09-14 RX ADMIN — ACETAMINOPHEN 650 MG: 325 TABLET ORAL at 21:22

## 2020-09-14 RX ADMIN — SODIUM CHLORIDE: 900 INJECTION INTRAVENOUS at 01:25

## 2020-09-14 RX ADMIN — SODIUM CHLORIDE: 9 INJECTION, SOLUTION INTRAVENOUS at 18:14

## 2020-09-14 RX ADMIN — LORAZEPAM 2 MG: 2 INJECTION INTRAMUSCULAR; INTRAVENOUS at 08:46

## 2020-09-14 RX ADMIN — SODIUM CHLORIDE 75 ML/HR: 9 INJECTION, SOLUTION INTRAVENOUS at 02:12

## 2020-09-14 RX ADMIN — SODIUM CHLORIDE, PRESERVATIVE FREE 10 ML: 5 INJECTION INTRAVENOUS at 08:46

## 2020-09-14 RX ADMIN — LORAZEPAM 1 MG: 2 INJECTION INTRAMUSCULAR; INTRAVENOUS at 11:30

## 2020-09-14 RX ADMIN — CYCLOBENZAPRINE 10 MG: 10 TABLET, FILM COATED ORAL at 21:21

## 2020-09-14 RX ADMIN — ACETAMINOPHEN 650 MG: 325 TABLET ORAL at 08:46

## 2020-09-14 RX ADMIN — ENOXAPARIN SODIUM 40 MG: 40 INJECTION SUBCUTANEOUS at 08:46

## 2020-09-14 RX ADMIN — CEFTRIAXONE SODIUM 1 G: 1 INJECTION, POWDER, FOR SOLUTION INTRAMUSCULAR; INTRAVENOUS at 03:04

## 2020-09-14 RX ADMIN — ONDANSETRON 4 MG: 2 INJECTION INTRAMUSCULAR; INTRAVENOUS at 08:45

## 2020-09-14 RX ADMIN — BUPRENORPHINE AND NALOXONE 1 FILM: 8; 2 FILM, SOLUBLE BUCCAL; SUBLINGUAL at 21:21

## 2020-09-14 RX ADMIN — ONDANSETRON 4 MG: 2 INJECTION INTRAMUSCULAR; INTRAVENOUS at 21:18

## 2020-09-14 RX ADMIN — BUPRENORPHINE AND NALOXONE 1 FILM: 8; 2 FILM, SOLUBLE BUCCAL; SUBLINGUAL at 03:04

## 2020-09-14 RX ADMIN — CYCLOBENZAPRINE 10 MG: 10 TABLET, FILM COATED ORAL at 03:05

## 2020-09-14 RX ADMIN — BUPRENORPHINE AND NALOXONE 1 FILM: 8; 2 FILM, SOLUBLE BUCCAL; SUBLINGUAL at 08:45

## 2020-09-14 RX ADMIN — SODIUM CHLORIDE 550 MG: 9 INJECTION, SOLUTION INTRAVENOUS at 13:52

## 2020-09-14 RX ADMIN — ONDANSETRON 4 MG: 2 INJECTION INTRAMUSCULAR; INTRAVENOUS at 03:05

## 2020-09-14 RX ADMIN — SODIUM CHLORIDE, PRESERVATIVE FREE 10 ML: 5 INJECTION INTRAVENOUS at 03:04

## 2020-09-14 ASSESSMENT — PAIN DESCRIPTION - FREQUENCY
FREQUENCY: CONTINUOUS

## 2020-09-14 ASSESSMENT — PAIN DESCRIPTION - PAIN TYPE
TYPE: CHRONIC PAIN;ACUTE PAIN
TYPE: ACUTE PAIN
TYPE: ACUTE PAIN

## 2020-09-14 ASSESSMENT — PAIN DESCRIPTION - ORIENTATION
ORIENTATION: RIGHT
ORIENTATION: RIGHT;LOWER
ORIENTATION: RIGHT
ORIENTATION: RIGHT;LOWER

## 2020-09-14 ASSESSMENT — PAIN - FUNCTIONAL ASSESSMENT
PAIN_FUNCTIONAL_ASSESSMENT: ACTIVITIES ARE NOT PREVENTED

## 2020-09-14 ASSESSMENT — PAIN SCALES - GENERAL
PAINLEVEL_OUTOF10: 3
PAINLEVEL_OUTOF10: 6
PAINLEVEL_OUTOF10: 3
PAINLEVEL_OUTOF10: 7
PAINLEVEL_OUTOF10: 6
PAINLEVEL_OUTOF10: 6
PAINLEVEL_OUTOF10: 7

## 2020-09-14 ASSESSMENT — PAIN DESCRIPTION - LOCATION
LOCATION: FLANK;BACK
LOCATION: BACK
LOCATION: BACK;FLANK

## 2020-09-14 ASSESSMENT — PAIN DESCRIPTION - DESCRIPTORS
DESCRIPTORS: ACHING
DESCRIPTORS: ACHING;DISCOMFORT
DESCRIPTORS: ACHING

## 2020-09-14 ASSESSMENT — PAIN DESCRIPTION - ONSET
ONSET: ON-GOING

## 2020-09-14 ASSESSMENT — PAIN DESCRIPTION - PROGRESSION
CLINICAL_PROGRESSION: GRADUALLY IMPROVING
CLINICAL_PROGRESSION: NOT CHANGED
CLINICAL_PROGRESSION: GRADUALLY IMPROVING

## 2020-09-14 NOTE — PROGRESS NOTES
Comprehensive Nutrition Assessment    Type and Reason for Visit:  Initial, Positive Nutrition Screen, Wound    Nutrition Recommendations/Plan:   Continue General Diet    Nutrition Assessment:  Admitted with h/o IV drug abuse, recent tricuspid endocarditis with MRSA who presents with right flank pain and hematuria. Scabs noted per chart review, these are not nutritionally significant wounds. Adequate intake, consuming greater than half of meals. No wt loss reported/noted over the past yr. Pt is at low nutrition risk, will continue to monitor for changes in nutrition status.     Malnutrition Assessment:  Malnutrition Status:  Insufficient data    Context:  Acute Illness       Nutrition Related Findings:  contact isolation      Wounds:  (scabs)       Current Nutrition Therapies:    DIET GENERAL    Anthropometric Measures:  · Height: 5' 5\" (165.1 cm)  · Current Body Weight: 180 lb (81.6 kg)   · Usual Body Weight: 180 lb (81.6 kg)     · Ideal Body Weight: 125 lbs; % Ideal Body Weight 144 %   · BMI: 30    Nutrition Diagnosis:   No nutrition diagnosis at this time     Nutrition Interventions:   Food and/or Nutrient Delivery:  Continue Current Diet  Nutrition Education/Counseling:  No recommendation at this time   Coordination of Nutrition Care:  Continued Inpatient Monitoring    Nutrition Monitoring and Evaluation:   Food/Nutrient Intake Outcomes:  Food and Nutrient Intake  Physical Signs/Symptoms Outcomes:  Biochemical Data, Skin, Weight     Discharge Planning:    No discharge needs at this time     Electronically signed by Derrel Dakin, RD, LD on 9/14/20 at 11:15 AM EDT    Contact: [phone]

## 2020-09-14 NOTE — ED NOTES
Patient refused to wear gown at this time. Friend went to go get pt something to eat per ok from [facility].      Reed Jarrell RN  09/14/20 8792

## 2020-09-14 NOTE — ED PROVIDER NOTES
eMERGENCY dEPARTMENT eNCOUnter         [address] [facility] EMERGENCY DEPARTMENT     PCP: [address]    Chief Complaint   Patient presents with    Flank Pain     right    Back Pain     right       HPI    General Call is a 47 y.o. female who presents with right-sided lower back/flank pain with hematuria. Onset was prior to arrival, x1 week ago. Context is patient denies any history of fall or trauma. Patient has had intermittent lower/ flank \"kidney\" pain, 8/10 waxing waning intensity without associated nausea vomiting or diarrhea. Has had gross hematuria for the last 2 days without dysuria. No abnormal vaginal discharge. No previous history of kidney stones. Does state that she is currently on Suboxone therapy. Was recently admitted for sepsis as well as endocarditis secondary to IV drug use but has been clean for the last 2 months. No improvement of current flank pain with home Suboxone. No fever or chills. No new saddle paresthesia. No new bowel incontinence or bladder retention. Does not radiate to the abdomen. No chest pain or shortness of breath, cough dizziness or lightheadedness. No abnormal vaginal discharge or concern for STD      REVIEW OF SYSTEMS    Constitutional:  Denies fever, chills, weight loss or weakness   HENT:  Denies sore throat or ear pain   Cardiovascular:  Denies chest pain, palpitations or swelling   Respiratory:  Denies cough or shortness of breath   GI:  See HPI above  : See HPI  Musculoskeletal:  Denies groin pain or masses. No pain or swelling of extremities.   Skin:  Denies rash  Neurologic:  Denies headache, focal weakness or sensory changes   Endocrine:  Denies polyuria or polydypsia   Lymphatic:  Denies swollen glands     All other review of systems are negative  See HPI and nursing notes for additional information     PAST MEDICAL & SURGICAL HISTORY    Past Medical History:   Diagnosis Date    Liver disease hepatitis    No significant medical problems      Past Surgical History:   Procedure Laterality Date     SECTION  , 2007    x 2    CHOLECYSTECTOMY      HYSTERECTOMY  2008    MARLIN    INCISION AND DRAINAGE Right 2020    RIGHT UPPER THIGH INCISION AND DRAINAGE performed by Maegan Cottrell MD at [address] 03 2013    [facility] sera       CURRENT MEDICATIONS    Current Outpatient Rx   Medication Sig Dispense Refill    cyclobenzaprine (FLEXERIL) 10 MG tablet Take 10 mg by mouth 3 times daily as needed for Muscle spasms      buprenorphine-naloxone (SUBOXONE) 8-2 MG FILM SL film Place 1 Film under the tongue daily.          ALLERGIES    No Known Allergies    SOCIAL AND FAMILY HISTORY    Social History     Socioeconomic History    Marital status: Single     Spouse name: None    Number of children: 2    Years of education: None    Highest education level: None   Occupational History    Occupation: [employer]   Social Needs    Financial resource strain: None    Food insecurity     Worry: None     Inability: None    Transportation needs     Medical: None     Non-medical: None   Tobacco Use    Smoking status: Former Smoker     Packs/day: 0.50     Years: 12.00     Pack years: 6.00     Types: Cigarettes    Smokeless tobacco: Never Used   Substance and Sexual Activity    Alcohol use: No    Drug use: Not Currently     Types: IV, Cocaine, Opiates      Comment: pt denies any drug use    Sexual activity: Yes     Partners: Male   Lifestyle    Physical activity     Days per week: None     Minutes per session: None    Stress: None   Relationships    Social connections     Talks on phone: None     Gets together: None     Attends Tenriism service: None     Active member of club or organization: None     Attends meetings of clubs or organizations: None     Relationship status: None    Intimate partner violence     Fear of current or ex partner: None     Emotionally abused: None     Physically abused: None     Forced sexual activity: None   Other Topics Concern    None   Social History Narrative    Do you donate blood or plasma? Yes    Caffeine intake? Moderate    Advance directive? No    Is blood transfusion acceptable in an emergency? Yes             Family History   Problem Relation Age of Onset    Obesity Brother     Mental Illness Maternal Aunt     COPD Paternal Grandmother     Dementia Paternal Grandfather     Depression Son     Mental Illness Son         Bipole, ADHD, Adjustment disorder    Colon Cancer Neg Hx        PHYSICAL EXAM    VITAL SIGNS: BP (!) 155/110   Pulse 107   Temp 97.7 °F (36.5 °C) (Oral)   Resp 16   Ht 5' 5\" (1.651 m)   Wt 180 lb (81.6 kg)   SpO2 100%   BMI 29.95 kg/m²   General:  Well developed, well nourished, In no acute distress  Eyes:  Sclera nonicteric, Conjunctiva moist, No discharge  Head:  Normocephalic, Atramautic  Neck/Lymphatics: Supple, no JVD, no swollen nodes  Respiratory:  Clear to ausculation bilaterally, No retractions, Non labored breathing  Cardiovascular:  RRR, No murmurs/gallops/thrills  GI:   No gross discoloration. Bowel sounds present in all quadrants, No audible bruits. Soft,  Nondistended. No focal abdominal tenderness and without rebound tenderness or guarding, No palpable pulsatile masses or obvious hernias. No McBurney's point tenderness   Negative Rovsing sign    Negative Canseco's sign. Back:  +mild right sided CVA tenderness to percussion, none on the left. No midline step-offs tenderness or crepitus of the midline bony thoracic or lumbar spine. No paraspinal muscle tenderness or swelling. Negative straight leg raise. No dropfoot.   HIGH SENSITIVITY NEURO EXAM:  - Intact L1- L2 Cremasteric reflex (inner thigh sensation), Equal bilaterally  - Intact L2 Abduct thighs (cross legs) 5/5 bilaterally  - Intact L3 Extend knee, 5/5 bilaterally  - Intact L4 Dorsiflex ankle (up), 5/5 bilaterally  - Intact L5 Point great toe up, 5/5 bilaterally  - Intact L2 - L4 Patellar reflex, 2+ bilaterally  - Intact S1 Flexed knee, 5/5 bilaterally  - Intact S1 Achilles reflex, 2+ bilaterally  - Intact S2 Plantar flex toes, 5/5 bilaterally  - Intact S3- 5 Groin/perianal sensation (per patient report), Equal bilaterally  Musculoskeletal:  No edema, No deformity  Peripheral Vascular: Distal pulses 2+ equal bilaterally  Integument: No rash, Normal turgor  Neurologic:  Alert & oriented, Normal speech  Psychiatric: Cooperative, pleasant affect       I have reviewed and interpreted all of the currently available lab results from this visit (if applicable):  Results for orders placed or performed during the hospital encounter of 09/13/20   CBC auto diff   Result Value Ref Range    WBC 8.5 4.0 - 10.5 K/CU MM    RBC 3.20 (L) 4.2 - 5.4 M/CU MM    Hemoglobin 9.3 (L) 12.5 - 16.0 GM/DL    Hematocrit 29.5 (L) 37 - 47 %    MCV 92.2 78 - 100 FL    MCH 29.1 27 - 31 PG    MCHC 31.5 (L) 32.0 - 36.0 %    RDW 15.6 (H) 11.7 - 14.9 %    Platelets 041 730 - 687 K/CU MM    MPV 10.2 7.5 - 11.1 FL    Differential Type AUTOMATED DIFFERENTIAL     Segs Relative 66.5 (H) 36 - 66 %    Lymphocytes % 23.9 (L) 24 - 44 %    Monocytes % 5.1 (H) 0 - 4 %    Eosinophils % 3.5 (H) 0 - 3 %    Basophils % 0.6 0 - 1 %    Segs Absolute 5.6 K/CU MM    Lymphocytes Absolute 2.0 K/CU MM    Monocytes Absolute 0.4 K/CU MM    Eosinophils Absolute 0.3 K/CU MM    Basophils Absolute 0.1 K/CU MM    Nucleated RBC % 0.0 %    Total Nucleated RBC 0.0 K/CU MM    Total Immature Neutrophil 0.03 K/CU MM    Immature Neutrophil % 0.4 0 - 0.43 %   CMP   Result Value Ref Range    Sodium 137 135 - 145 MMOL/L    Potassium 4.5 3.5 - 5.1 MMOL/L    Chloride 100 99 - 110 mMol/L    CO2 23 21 - 32 MMOL/L    BUN 27 (H) 6 - 23 MG/DL    CREATININE 1.5 (H) 0.6 - 1.1 MG/DL    Glucose 109 (H) 70 - 99 MG/DL    Calcium 9.8 8.3 - 10.6 MG/DL    Alb 3.7 3.4 - 5.0 GM/DL    Total Protein 10.0 (H) 6.4 - 8.2 GM/DL    Total Bilirubin 0.3 0.0 - 1.0 MG/DL    ALT 7 (L) 10 - 40 U/L    AST 13 (L) 15 - 37 IU/L    Alkaline Phosphatase 70 40 - 129 IU/L    GFR Non- 39 (L) >60 mL/min/1.73m2    GFR  47 (L) >60 mL/min/1.73m2    Anion Gap 14 4 - 16   Lactic Acid, Plasma   Result Value Ref Range    Lactate 1.1 0.4 - 2.0 mMOL/L        RADIOLOGY/PROCEDURES    Pending at time of this dictation      ED COURSE & MEDICAL DECISION MAKING      Vital signs and nursing notes reviewed during ED course. I have independently evaluated this patient . Supervising MD - Dr Corey Hanna - present in the Emergency Department, available for consultation, throughout entirety of  patient care. All pertinent Lab data and radiographic results reviewed with patient at bedside. The patient and / or the family were informed of the results of any tests, a time was given to answer questions, a plan was proposed and they agreed with plan. Differential diagnosis: Abdominal Aortic Aneurysm, Ischemic Bowel, Bowel Obstruction, Acute Cholecystitis, Acute Appendicitis, other    Clinical  IMPRESSION    1. Right flank pain    2. LISBETH (acute kidney injury) [facility])        Patient presents with right-sided flank pain hematuria. On exam, patient is well-appearing nontoxic, no acute distress. Noted tachycardic triage vital signs however has normal heart rate on my exam.  She is afebrile. Abdominal exam is nonsurgical.  Reproducible right-sided CVA tenderness percussion, none on the left. No midline step-offs crepitus or tenderness of the bony lumbar spine. Neurovascular intact in the lower legs. No dropfoot. No saddle paresthesia. Start IV fluids, IV lidocaine for pain. CBC without leukocytosis and normal lactic acid. Hemoglobin is up trended compared to previous. CMP with a new LISBETH with BUN/creatinine 27/1.5, baseline creatinine is 0.5-0.6 range so I did discontinue initial toradol order.      Pending at time of this dictation are pending

## 2020-09-14 NOTE — CONSULTS
Infectious Disease Consult Note  2020   Patient Name: Staci Daley : 1980   Impression   Native TV MRSA endocarditis   Osteomyelitis/Discitis   o Hx of L3-L4 discitis and right hip septic arthrits. Repeat MRI on  continues to show L3-L4 fluid  o Abnormality at T3-4 suggestive of epidural abscess  o Known to me from previous visits in August for the above-named diagnosis. Patient failed to comply with antibiotic plan for 6 weeks of daptomycin, and that also have been completed on 2020. He has been off antibiotics since 2020.  LISBETH  o Hx of staphylococcal infectious GN. o UTI unlikely   Heroin PWID  o In remission; on Suboxone   Chronic hepatitis C   Multi-morbidity: per PMHx  Plan:   Therapeutic: d/c ceftriaxone, start daptomycin 10mg/kg/day   Diagnostic: CRP, ESR, pct   F/u test: blood cx     Thank you for allowing me to consult in the care of this patient.  ------------------------  REASON FOR CONSULT: Infective syndrome   Requested by: Dr. Sherly Ferrell is a 47 y.o.   female PWID (heroin) in reported remission, chronic hepatitis C known to me from previous admission in 2020. She had a diagnosis of MRSA bacteremia, native tricuspid valve endocarditis, right hip septic arthritis, L3-L4 vertebral osteomyelitis. Her stay was complicated by acute kidney injury. She was transferred to [facility] where she was evaluated by cardiothoracic surgery. No intervention was done. She was seen by orthopedic surgery, unclear if any intervention was done. She was discharged to complete a 6-week course of daptomycin on 2020. She states that she discharged to a nursing home at [facility]. She did not go in the transportation stating that that asked for a $2200. Her grandfather took her to [facility]. There she got to bed blood left within 3 hours stating that she had a panic attack and was not satisfied with the room arrangements.   She went home and has not been on daptomycin since then. She was admitted 9/13/2020 for further evaluation and management of blood in the urine for 3 days prior to admission. She endorsed constant back pain, worsened by bending over. She also has right hip pain. She denies any fever, chills or night sweats. A urinalysis showed hematuria. She was started empirically on ceftriaxone. CT of the abdomen and pelvis did not show any acute abnormality. However MRI of the thoracal lumbar spine shows enhancement in T3-4 level. Also there is fluid in the L3-4 level. Blood cultures are awaited. ? Infectious diseases service was consulted to evaluate the pt, and recommend further investigative and therapeutic measures. Review and summary of old records:  Discharge summary from [facility] (8/16/20-9/5/2020)  \"Native tricuspid valve MRSA endocarditis: Likely d/t MRSA bacteremia in the setting of IVDA. She will continue IV daptomycin through PICC line for total 6 weeks duration (8/18- 9/29) with ID follow up with Dr. Kay Ramirez. Cardiothoracic surgery consulted- no inpatient management. Follow up with Dr. Rebeka Foster in 6 weeks for repeat blood cultures and echocardiogram.  - IV daptomycin: weekly chem 6, CBC with differential and CK faxed to [phone]. She was discharged with a PICC line. Non-oliguric LISBETH: Cr peaked to 4.86 (baseline 0.9) complicated by hyperkalemia requiring medical treatment. 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 Nephrology was consulted who recommended monitor of Cr on Tues 9/8/20 and weekly thereafter until next nephrology appointment. She is at higher risk for developing new CKD in the future.     Possible L3-L4 osteomyelitis and T3-T4 discitis, Right hip effusion: Orthopedics was consulted who recommended continued IV antibiotics and follow up with comprehensive spine clinic  - Needs repeat MRI outpatient prior to stopping antibiotics per ortho/ID discretion    Fluid overload with right sided pleural effusion and peripheral edema: Patient had bilateral 3+ peripheral edema to her mid-thighs likely due to tricuspid regurgitation in the setting of TV endocarditis and LISBETH. She was diuresed intermittently with IV lasix with good response and treated supportively with rima hoses and compression stockings. Acute on chronic normocytic anemia: Hgb dropped to 6.5 during admission (baseline 8) Likely anemia of chronic disease, unlikely hemolysis due to endocarditis, no signs of GI bleeding. She was transfused 2 units pRBC during admission with appropriate response and discharged with Hgb of 7.2. IVDU and severe opioid use disorder: suboxone bridge script for 3 days prescribed upon discharge to SNF. MAT team was consulted during admission for management of suboxone. Chronic hepatitis C: GI outpatient follow up for consideration of future HCV treamtent    Hypertension: No prior history, was started on norvasc 5 mg upon discharge. \"    ROS: Other systems reviewed Including eyes, ENT, respiratory, cardiovascular, GI, , dermatologic, neurologic, psych, hem/lymphatic, musculoskeletal and endocrine were negative other than what is mentioned above.    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 2020    MRSA bacteremia 2020    Amphetamine user ([address] 2020    Chronic hepatitis C without hepatic coma ([address] 2020    Acute septic pulmonary embolism without acute cor pulmonale ([address] 2020    CCC (chronic calculous cholecystitis) 2013     Past Medical History:   Diagnosis Date    Liver disease     hepatitis    No significant medical problems       Past Surgical History:   Procedure Laterality Date     SECTION  , 2007    x 2    CHOLECYSTECTOMY      HYSTERECTOMY  2008    MARLIN    INCISION AND DRAINAGE Right 8/4/2020    RIGHT UPPER THIGH INCISION AND DRAINAGE performed by Sarah Queen MD at [facility]  07 03 2013    lap sera      Family History   Problem Relation Age of Onset    Obesity Brother     Mental Illness Maternal Aunt     COPD Paternal Grandmother     Dementia Paternal Grandfather     Depression Son     Mental Illness Son         Bipole, ADHD, Adjustment disorder    Colon Cancer Neg Hx       Infectious disease related family history - not contibutory. SOCIAL HISTORY  Social History     Tobacco Use    Smoking status: Former Smoker     Packs/day: 0.50     Years: 12.00     Pack years: 6.00     Types: Cigarettes    Smokeless tobacco: Never Used   Substance Use Topics    Alcohol use: No       Born:   Lived   Occupation:   No recent travel of significance.  No recent unusual exposures.  NO pets    ? ALLERGIES  No Known Allergies   MEDICATIONS  Reviewed and are per the chart/EMR. IMMUNIZATION HISTORY  Immunization History   Administered Date(s) Administered    Tdap (Boostrix, Adacel) 09/28/2019     ? Antibiotics:   Ceftriaxone  ?  -------------------------------------------------------------------------------------------------------------------    Vital Signs:  Vitals:    09/14/20 0842   BP: (!) 128/97   Pulse: 58   Resp: 16   Temp: 97.8 °F (36.6 °C)   SpO2: 100%         Exam:    VS: noted; wt 81.6 kg  Gen: alert and oriented X3, no distress  Skin: no stigmata of endocarditis  Wounds: C/D/I  HEMT: AT/NC Oropharynx pink, moist, and without lesions or exudates; dentition in good state of repair  Eyes: PERRLA, EOMI, conjunctiva pink, sclera anicteric. Neck: Supple. Trachea midline. No LAD. Chest: no distress and CTA. Good air movement. Heart: RRR and no MRG. Abd: soft, non-distended, no tenderness, no hepatomegaly. Normoactive bowel sounds.   Ext: no clubbing, cyanosis, or edema  Catheter Site: without erythema or tenderness  Neuro: Mental status intact. CN 2-12 intact and no focal sensory or motor deficits  KATHRYN: spinal tenderness    ? Diagnostic Studies: reviewed  ? ? I have examined this patient and available medical records on this date and have made the above observations, conclusions and recommendations.   Electronically signed by: Electronically signed by Oumar Jain MD on 9/14/2020 at 11:34 AM

## 2020-09-14 NOTE — ED NOTES
IV in right elbow infiltrated. Patient refused this nurse to attmpt IV insertion, requested ultrasound.       Lily Krueger RN  09/13/20 2780

## 2020-09-14 NOTE — CONSULTS
[facility] [address]   Consult Note  [facility] 1 2 3 4 5    Date: 2020   Patient: Florecita Paredes   : 1980   DOA: 2020   MRN: [id number]   ROOM#: [id number]     Reason for Consult: Hematuria  Requesting Physician:  Dr. Fabrice Fernandes  Collaborating Urologist on Call at time of admission: Dr. Kahlil Mcintosh: Right flank pain    History Obtained From:  patient, electronic medical record    HISTORY OF PRESENT ILLNESS:                The patient is a 47 y.o. female with significant past medical history of IV drug use now on Suboxone and recent tricuspid endocarditis with MRSA who presented with right flank pain and gross hematuria x3-4 days. States her urine appears to be clearing recently. Denies abd pain, dysuria, or other sx. ED Provider HPI 20: Florecita Paredes is a 47 y.o. female who presents with right-sided lower back/flank pain with hematuria. Onset was prior to arrival, x1 week ago. Context is patient denies any history of fall or trauma. Patient has had intermittent lower/ flank \"kidney\" pain, 8/10 waxing waning intensity without associated nausea vomiting or diarrhea. Has had gross hematuria for the last 2 days without dysuria. No abnormal vaginal discharge. No previous history of kidney stones. Does state that she is currently on Suboxone therapy. Was recently admitted for sepsis as well as endocarditis secondary to IV drug use but has been clean for the last 2 months. No improvement of current flank pain with home Suboxone. No fever or chills. No new saddle paresthesia. No new bowel incontinence or bladder retention. Does not radiate to the abdomen. No chest pain or shortness of breath, cough dizziness or lightheadedness.   No abnormal vaginal discharge or concern for STD    Past Medical History:        Diagnosis Date    Liver disease     hepatitis    No significant medical problems      Past Surgical History:        Procedure Laterality Date   SECTION  , 2007    x 2    CHOLECYSTECTOMY      HYSTERECTOMY  2008    MARLIN    INCISION AND DRAINAGE Right 2020    RIGHT UPPER THIGH INCISION AND DRAINAGE performed by Migue Eddy MD at [address] 03 2013    lap sera     Current Medications:   Current Facility-Administered Medications: cyclobenzaprine (FLEXERIL) tablet 10 mg, 10 mg, Oral, TID PRN  0.9 % sodium chloride infusion, , Intravenous, Continuous  sodium chloride flush 0.9 % injection 10 mL, 10 mL, Intravenous, 2 times per day  sodium chloride flush 0.9 % injection 10 mL, 10 mL, Intravenous, PRN  acetaminophen (TYLENOL) tablet 650 mg, 650 mg, Oral, Q6H PRN **OR** acetaminophen (TYLENOL) suppository 650 mg, 650 mg, Rectal, Q6H PRN  polyethylene glycol (GLYCOLAX) packet 17 g, 17 g, Oral, Daily PRN  promethazine (PHENERGAN) tablet 12.5 mg, 12.5 mg, Oral, Q6H PRN **OR** ondansetron (ZOFRAN) injection 4 mg, 4 mg, Intravenous, Q6H PRN  enoxaparin (LOVENOX) injection 40 mg, 40 mg, Subcutaneous, Daily  potassium chloride (KLOR-CON M) extended release tablet 40 mEq, 40 mEq, Oral, PRN **OR** potassium bicarb-citric acid (EFFER-K) effervescent tablet 40 mEq, 40 mEq, Oral, PRN **OR** potassium chloride 10 mEq/100 mL IVPB (Peripheral Line), 10 mEq, Intravenous, PRN  cefTRIAXone (ROCEPHIN) 1 g IVPB in 50 mL D5W minibag, 1 g, Intravenous, Q24H  LORazepam (ATIVAN) injection 2 mg, 2 mg, Intravenous, See Admin Instructions  buprenorphine-naloxone (SUBOXONE) 8-2 MG SL film 1 Film, 1 Film, Sublingual, BID    Allergies:  Patient has no known allergies. Social History:   TOBACCO:   reports that she has quit smoking. Her smoking use included cigarettes. She has a 6.00 pack-year smoking history. She has never used smokeless tobacco.  ETOH:   reports no history of alcohol use. DRUGS:   reports previous drug use. Drugs: IV, Cocaine, and Opiates .     Family History:       Problem Relation Age of Onset    Obesity Query constipation. Normal appendix. Previously seen multifocal airspace disease at the lung bases as regressed in to atelectasis/scarring. Mri Lumbar Spine W Wo Contrast    Result Date: 8/15/2020  EXAMINATION: MRI OF THE LUMBAR SPINE WITHOUT AND WITH CONTRAST  8/15/2020 11:15 am TECHNIQUE: Multiplanar multisequence MRI of the lumbar spine was performed without and with the administration of intravenous contrast. COMPARISON: Pelvis 08/13/2020. HISTORY: ORDERING SYSTEM PROVIDED HISTORY: MRSA bacteremia: L4-L5 facet fluid TECHNOLOGIST PROVIDED HISTORY: Reason for exam:->MRSA bacteremia: L4-L5 facet fluid Is the patient pregnant?->No Reason for Exam: PT MOANING THROUGHOUT EXAM/MOTION ON IMAGES FINDINGS: BONES/ALIGNMENT: Examination is markedly degraded by patient motion artifact as well as low signal to noise ratio. Lumbar spine alignment is normal.  Lumbar vertebral bodies are normal in height. No acute lumbar spine fracture. Diffuse low T1 marrow signal throughout the lumbar spine. There is bone marrow edema within right L3 inferior articular facet and right L4 superior articular facet. SPINAL CORD:  The conus terminates normally. SOFT TISSUES: Right paraspinal soft tissue edema and enhancement about the right L3-L4 and L4-L5 facet joints. There is small fluid collection posterior to the right L3-L4 facet joint measuring 1.1 x 0.8 x 1.1 cm. This fluid collection has rim enhancement. L1-L2: There is no significant disc protrusion, spinal canal stenosis or neural foraminal narrowing. Bilateral facet joint DJD. L2-L3: There is no significant disc protrusion, spinal canal stenosis or neural foraminal narrowing. Bilateral facet joint DJD. L3-L4: Mild DDD. Diffuse annular bulging. Bilateral facet joint DJD. Mild to moderate spinal canal stenosis. Mild bilateral neural foraminal stenosis. L4-L5: There is no significant disc protrusion, spinal canal stenosis or neural foraminal narrowing.   Bilateral facet joint DJD. L5-S1: There is no significant disc protrusion, spinal canal stenosis or neural foraminal narrowing. 1. Examination is markedly degraded by patient motion artifact as well as low signal to noise ratio. 2. Fluid collection in the paraspinal soft tissues around the right L3-L4 facet joint with adjacent edema within the right L3 inferior articular facet and right L4 superior articular facet. Findings are suspicious for septic arthritis with adjacent soft tissue abscess. The fluid collection measures about 1.1 x 0.8 x 1.1 cm. 3. No findings of discitis osteomyelitis in the lumbar spine and no epidural abscess. 4. Mild to moderate L3-L4 spinal canal stenosis and mild bilateral neural foraminal stenosis. 5. Diffuse low T1 marrow signal throughout the lumbar spine which may be due to anemia, obesity, smoking, or diffuse marrow infiltrative process. Correlation with CBC recommended. The findings were sent to the Radiology Results [address] at 12:43 pm on 8/15/2020to be communicated to a licensed caregiver. Assessment & Plan:      Rafa Ellison is a 47y.o. year old female admitted 9/13/2020 for right flank pain. 1) Gross Hematuria   CT a/p 9/14/20: Reviewed. No acute abdominopelvic findings. Specifically, no evidence of urinary tract stone disease or obstructive uropathy. Hgb 9.3   Cr 1.5, improved from 4.8 at [address] [place] [address] 3 weeks ago   Plan for MRI today for further evaluation of back pain   Recommend outpatient cystoscopy in 1-2 weeks   Will follow    Patient seen and examined, chart reviewed.      Electronically signed by Danita Duane, PA-C on 9/14/2020 at 8:10 AM

## 2020-09-14 NOTE — PROGRESS NOTES
Spoke with Dr. Abel Price in regards to pt's status and MRI results. Per Dr. [facility] is for pt to be transferred to [facility]. Waiting on [facility]'s call center to return call with accepting physician and available bed. Pt updated on plan. All questions and concerns addressed.  Zenia Black RN

## 2020-09-14 NOTE — H&P
abuse, recent tricuspid endocarditis with MRSA who presents with right flank pain and hematuria. Patient states that she started to see some bleeding in the urine 3 4 days ago and this was accompanied by right flank pain which is persistent. The pain was becoming worse and preventing her from even standing up or walking. She denies any fever. There is no frequency or dysuria. Patient was treated for sepsis from right thigh wound and MRSA tricuspid endocarditis the beginning of August and transferred to [facility] for further management as there was suspicion for right hip osteomyelitis. Patient developed ATN/AIN rule out MRSA glomerulonephritis while she was in [facility] but did not require dialysis as her creatinine improved slowly. There was a suspicion for lumbar spine osteomyelitis and or thoracic spine discitis. She was started on daptomycin 900 mg IV and sent to the nursing home in [facility] to continue her daptomycin for 6 weeks with end date of 9/29/2020. However, patient left AMA from the nursing home within 3 hours of arrival as she had anxiety attack. She has not completed the daptomycin treatment. She did not go back for follow-up at [facility]. She has remained afebrile. Denies any numbness or tingling of the lower extremities or difficulty controlling her urine or stool/incontinence. No weakness of her lower extremities. She was started on Suboxone and wants to continue taking it and claims that she has been free of any IV drugs for the months of August and September. Ten point ROS reviewed negative, unless as noted above    Objective: Intake/Output Summary (Last 24 hours) at 9/14/2020 0108  Last data filed at 9/14/2020 0035  Gross per 24 hour   Intake 1100 ml   Output --   Net 1100 ml      Vitals:   Vitals:    09/13/20 2305   BP: (!) 139/98   Pulse:    Resp:    Temp:    SpO2: 98%     Physical Exam:    GEN Awake female, resting in bed in no apparent distress.  Appears given age.  Irean Grime are equally round. No scleral erythema, discharge, or conjunctivitis. HENT Mucous membranes are moist.   NECK No apparent thyromegaly or masses. RESP Clear to auscultation, no wheezes, rales or rhonchi. Symmetric chest movement while on room air. CARDIO/VASC S1/S2 auscultated. Regular rate without appreciable murmurs, rubs, or gallops. Peripheral pulses equal bilaterally and palpable. No peripheral edema. GI Abdomen is soft without significant tenderness, masses, or guarding. Bowel sounds are normoactive. Rectal exam deferred.  Angel catheter is not present. HEME/LYMPH No petechiae or ecchymoses. MSK No gross joint deformities. Spontaneous movement of all extremities  SKIN Normal coloration, warm, dry. NEURO Cranial nerves appear grossly intact, normal speech, no lateralizing weakness. PSYCH Awake, alert, oriented x 4. Affect appropriate. Past Medical History:      Past Medical History:   Diagnosis Date    Liver disease     hepatitis    No significant medical problems      PSHX:  has a past surgical history that includes  section (, ); Hysterectomy (); Cholecystectomy; other surgical history (2013); and incision and drainage (Right, 2020). Allergies: No Known Allergies    FAM HX: family history includes COPD in her paternal grandmother; Dementia in her paternal grandfather; Depression in her son; Mental Illness in her maternal aunt and son; Obesity in her brother.   Soc HX:   Social History     Socioeconomic History    Marital status: Single     Spouse name: None    Number of children: 2    Years of education: None    Highest education level: None   Occupational History    Occupation:    Social Needs    Financial resource strain: None    Food insecurity     Worry: None     Inability: None    Transportation needs     Medical: None     Non-medical: None   Tobacco Use    Smoking status: Former Smoker     Packs/day: 0.50     Years: 12.00     Pack years: 6.00     Types: Cigarettes    Smokeless tobacco: Never Used   Substance and Sexual Activity    Alcohol use: No    Drug use: Not Currently     Types: IV, Cocaine, Opiates      Comment: pt denies any drug use    Sexual activity: Yes     Partners: Male   Lifestyle    Physical activity     Days per week: None     Minutes per session: None    Stress: None   Relationships    Social connections     Talks on phone: None     Gets together: None     Attends Sikhism service: None     Active member of club or organization: None     Attends meetings of clubs or organizations: None     Relationship status: None    Intimate partner violence     Fear of current or ex partner: None     Emotionally abused: None     Physically abused: None     Forced sexual activity: None   Other Topics Concern    None   Social History Narrative    Do you donate blood or plasma? Yes    Caffeine intake? Moderate    Advance directive? No    Is blood transfusion acceptable in an emergency? Yes               Medications:   Medications:    Infusions:    sodium chloride       PRN Meds:    Current home medications   Prior to Admission medications    Medication Sig Start Date End Date Taking? Authorizing Provider   cyclobenzaprine (FLEXERIL) 10 MG tablet Take 10 mg by mouth 3 times daily as needed for Muscle spasms    Historical Provider, MD   buprenorphine-naloxone (SUBOXONE) 8-2 MG FILM SL film Place 1 Film under the tongue daily.     Historical Provider, MD       PHYSICIAN CERTIFICATION    I certify that Jayla Carvalho is expected to be hospitalized for less than 2 midnights based on the above assessment and plan:     Current diagnosis and plan of management discussed with the patient and her friend at the time of admission in lay language     Code status was discussed with patient  - Full code     Pain Assessment -continue with Suboxone    Electronically signed by Eloisa Darby MD on 9/14/2020 at 1:08 AM

## 2020-09-14 NOTE — PROGRESS NOTES
Skin assessment done with with Terrence Ricci.  R ankle healing surgical site, R calf scab from abscess removal, R thigh scab from abscess removal.

## 2020-09-14 NOTE — PROGRESS NOTES
· Possible Epidural Abscess  -MRI thoracic spine showed discitis osteomyelitis at T3-4; suspected small to moderate right ventral epidural abscess measuring 6 mm x 10 mm impinging upon the right portion of the thoracic cord; progressive collapse of T3 vertebral body compatible with acute to subacute fracture. MRI lumbar spine: Protrusions at L3-L4 resulting in effacement of exiting L3 nerve roots; fluid distention of right L3-L4 possibly representing septic arthritis versus synovitis. Discussed case with radiologist who recommends for neurosurgery evaluation. Patient has been accepted to [facility] to be seen neurosurgery. At this time patient does not appear to be having any neurological deficits in her lower extremities. She still has bowel and bladder control and sensation and strength in her lower extremities. No fevers. · Right Flank Pain with Hematuria  - CT abdomen pelvis: No acute findings. Urology recommends outpatient cystoscopy in 1 to 2 weeks.     · MRSA endocarditis of tricuspid valve -did not complete treatment, was transferred to [facility] 8/14 and was discharged to SNF in [facility] on 9/5 to complete daptomycin for 6 weeks -end date 9/29-left the skilled nursing facility within 3 hours of arrival (PICC removed) -   -ID has restarted patient on daptomycin and stopped Rocephin.   Check blood cultures although antibiotics have already been resumed.     · Subacute kidney disease - Creatinine 1.5, was as high as 4.8 in [facility] 3 weeks ago -now improving, did not require dialysis, -avoid nephrotoxic agents -monitor BMP closely  -recheck creatinine.     · IV drug use disorder -now on Suboxone which will continued

## 2020-09-14 NOTE — ED PROVIDER NOTES
Emergency Department Encounter  Location: [address] [place] [address]    Patient: General Call  MRN: [id number]  : 1980  Date of evaluation: 2020  ED Provider: Le Harrell PA-C    2300 PM  General Call was checked out to me by Paschal Sandhoff, PA-C. Please see his/her initial documentation for details of the patient's initial ED presentation, physical exam and completed studies. In brief,  Call is a 47 y.o. female that presented to the emergency department for right flank pain and hematuria. Patient noted to have LISBETH. UA and CT pending at time of sign-out. I have reviewed and interpreted all of the currently available lab results and diagnostics from this visit:  Results for orders placed or performed during the hospital encounter of 20   CBC auto diff   Result Value Ref Range    WBC 8.5 4.0 - 10.5 K/CU MM    RBC 3.20 (L) 4.2 - 5.4 M/CU MM    Hemoglobin 9.3 (L) 12.5 - 16.0 GM/DL    Hematocrit 29.5 (L) 37 - 47 %    MCV 92.2 78 - 100 FL    MCH 29.1 27 - 31 PG    MCHC 31.5 (L) 32.0 - 36.0 %    RDW 15.6 (H) 11.7 - 14.9 %    Platelets 277 273 - 034 K/CU MM    MPV 10.2 7.5 - 11.1 FL    Differential Type AUTOMATED DIFFERENTIAL     Segs Relative 66.5 (H) 36 - 66 %    Lymphocytes % 23.9 (L) 24 - 44 %    Monocytes % 5.1 (H) 0 - 4 %    Eosinophils % 3.5 (H) 0 - 3 %    Basophils % 0.6 0 - 1 %    Segs Absolute 5.6 K/CU MM    Lymphocytes Absolute 2.0 K/CU MM    Monocytes Absolute 0.4 K/CU MM    Eosinophils Absolute 0.3 K/CU MM    Basophils Absolute 0.1 K/CU MM    Nucleated RBC % 0.0 %    Total Nucleated RBC 0.0 K/CU MM    Total Immature Neutrophil 0.03 K/CU MM    Immature Neutrophil % 0.4 0 - 0.43 %     No results found. Final ED Course and MDM:  -  Patient seen and evaluated in the emergency department. -  Triage and nursing notes reviewed and incorporated. -  Old chart records reviewed and incorporated.   -  Supervising physician was  Damaso Sanchez.  Patient was seen independently. -  Patient's Cr is 1.5 today, increased from 0.6 about a month ago. IV fluids were initiated. UA with 81 RBCs, 9 WBCs, but no leuk esterase, bacteria, or nitrites. CT scan is negative for obstructive uropathy. We did discuss possibility of recently passed stone. Did send urine for culture. I spoke with Dr. Ronda Good, hospitalist, who will admit for further management. In light of current events, I did utilize appropriate PPE (including surgical face mask, safety glasses, and gloves, as recommended by the health facility/national standard best practice, during my bedside interactions with the patient. Patient was also masked throughout ED course. Final Impression      1.  Right flank pain        DISPOSITION       (Please note that portions of this note may have been completed with a voice recognition program. Efforts were made to edit the dictations but occasionally words are mis-transcribed.)    Cande Short PA-C  [address] [place] [address]  09/14/20 [address] [place] [address]

## 2020-09-15 PROBLEM — I38 ENDOCARDITIS: Status: ACTIVE | Noted: 2020-09-15

## 2020-09-15 PROBLEM — M46.20 VERTEBRAL OSTEOMYELITIS (HCC): Status: ACTIVE | Noted: 2020-09-15

## 2020-09-15 LAB
ANION GAP SERPL CALCULATED.3IONS-SCNC: 9 MMOL/L (ref 4–16)
BASOPHILS ABSOLUTE: 0 K/CU MM
BASOPHILS RELATIVE PERCENT: 0.5 % (ref 0–1)
BUN BLDV-MCNC: 24 MG/DL (ref 6–23)
CALCIUM SERPL-MCNC: 9.1 MG/DL (ref 8.3–10.6)
CHLORIDE BLD-SCNC: 106 MMOL/L (ref 99–110)
CO2: 23 MMOL/L (ref 21–32)
CREAT SERPL-MCNC: 1.5 MG/DL (ref 0.6–1.1)
CULTURE: NORMAL
DIFFERENTIAL TYPE: ABNORMAL
EOSINOPHILS ABSOLUTE: 0.3 K/CU MM
EOSINOPHILS RELATIVE PERCENT: 5.8 % (ref 0–3)
ERYTHROCYTE SEDIMENTATION RATE: >120 MM/HR (ref 0–20)
GFR AFRICAN AMERICAN: 47 ML/MIN/1.73M2
GFR NON-AFRICAN AMERICAN: 39 ML/MIN/1.73M2
GLUCOSE BLD-MCNC: 86 MG/DL (ref 70–99)
HCT VFR BLD CALC: 21.9 % (ref 37–47)
HEMOGLOBIN: 6.8 GM/DL (ref 12.5–16)
HIGH SENSITIVE C-REACTIVE PROTEIN: 19.9 MG/L
IMMATURE NEUTROPHIL %: 0.2 % (ref 0–0.43)
LYMPHOCYTES ABSOLUTE: 1.8 K/CU MM
LYMPHOCYTES RELATIVE PERCENT: 32.1 % (ref 24–44)
Lab: NORMAL
MCH RBC QN AUTO: 28.9 PG (ref 27–31)
MCHC RBC AUTO-ENTMCNC: 31.1 % (ref 32–36)
MCV RBC AUTO: 93.2 FL (ref 78–100)
MONOCYTES ABSOLUTE: 0.5 K/CU MM
MONOCYTES RELATIVE PERCENT: 8.7 % (ref 0–4)
NUCLEATED RBC %: 0 %
PDW BLD-RTO: 15.4 % (ref 11.7–14.9)
PLATELET # BLD: 200 K/CU MM (ref 140–440)
PMV BLD AUTO: 9.1 FL (ref 7.5–11.1)
POTASSIUM SERPL-SCNC: 4.7 MMOL/L (ref 3.5–5.1)
PROCALCITONIN: 0.05
RBC # BLD: 2.35 M/CU MM (ref 4.2–5.4)
SEGMENTED NEUTROPHILS ABSOLUTE COUNT: 2.9 K/CU MM
SEGMENTED NEUTROPHILS RELATIVE PERCENT: 52.7 % (ref 36–66)
SODIUM BLD-SCNC: 138 MMOL/L (ref 135–145)
SPECIMEN: NORMAL
TOTAL CK: 14 IU/L (ref 26–140)
TOTAL IMMATURE NEUTOROPHIL: 0.01 K/CU MM
TOTAL NUCLEATED RBC: 0 K/CU MM
WBC # BLD: 5.5 K/CU MM (ref 4–10.5)

## 2020-09-15 PROCEDURE — 6360000002 HC RX W HCPCS: Performed by: INTERNAL MEDICINE

## 2020-09-15 PROCEDURE — 86850 RBC ANTIBODY SCREEN: CPT

## 2020-09-15 PROCEDURE — 86141 C-REACTIVE PROTEIN HS: CPT

## 2020-09-15 PROCEDURE — 86922 COMPATIBILITY TEST ANTIGLOB: CPT

## 2020-09-15 PROCEDURE — 86900 BLOOD TYPING SEROLOGIC ABO: CPT

## 2020-09-15 PROCEDURE — 80048 BASIC METABOLIC PNL TOTAL CA: CPT

## 2020-09-15 PROCEDURE — 36415 COLL VENOUS BLD VENIPUNCTURE: CPT

## 2020-09-15 PROCEDURE — 1200000000 HC SEMI PRIVATE

## 2020-09-15 PROCEDURE — 2580000003 HC RX 258: Performed by: INTERNAL MEDICINE

## 2020-09-15 PROCEDURE — 96372 THER/PROPH/DIAG INJ SC/IM: CPT

## 2020-09-15 PROCEDURE — 6370000000 HC RX 637 (ALT 250 FOR IP): Performed by: INTERNAL MEDICINE

## 2020-09-15 PROCEDURE — 99233 SBSQ HOSP IP/OBS HIGH 50: CPT | Performed by: INTERNAL MEDICINE

## 2020-09-15 PROCEDURE — P9016 RBC LEUKOCYTES REDUCED: HCPCS

## 2020-09-15 PROCEDURE — 82550 ASSAY OF CK (CPK): CPT

## 2020-09-15 PROCEDURE — 85025 COMPLETE CBC W/AUTO DIFF WBC: CPT

## 2020-09-15 PROCEDURE — 85652 RBC SED RATE AUTOMATED: CPT

## 2020-09-15 PROCEDURE — 6370000000 HC RX 637 (ALT 250 FOR IP): Performed by: HOSPITALIST

## 2020-09-15 PROCEDURE — 36430 TRANSFUSION BLD/BLD COMPNT: CPT

## 2020-09-15 PROCEDURE — 6370000000 HC RX 637 (ALT 250 FOR IP): Performed by: NURSE PRACTITIONER

## 2020-09-15 PROCEDURE — 84145 PROCALCITONIN (PCT): CPT

## 2020-09-15 PROCEDURE — 94761 N-INVAS EAR/PLS OXIMETRY MLT: CPT

## 2020-09-15 PROCEDURE — 96366 THER/PROPH/DIAG IV INF ADDON: CPT

## 2020-09-15 PROCEDURE — 86901 BLOOD TYPING SEROLOGIC RH(D): CPT

## 2020-09-15 PROCEDURE — 2580000003 HC RX 258: Performed by: NURSE PRACTITIONER

## 2020-09-15 PROCEDURE — 96376 TX/PRO/DX INJ SAME DRUG ADON: CPT

## 2020-09-15 RX ORDER — 0.9 % SODIUM CHLORIDE 0.9 %
250 INTRAVENOUS SOLUTION INTRAVENOUS ONCE
Status: COMPLETED | OUTPATIENT
Start: 2020-09-15 | End: 2020-09-15

## 2020-09-15 RX ORDER — LORAZEPAM 0.5 MG/1
0.5 TABLET ORAL EVERY 4 HOURS PRN
Status: DISCONTINUED | OUTPATIENT
Start: 2020-09-15 | End: 2020-09-18 | Stop reason: HOSPADM

## 2020-09-15 RX ADMIN — ONDANSETRON 4 MG: 2 INJECTION INTRAMUSCULAR; INTRAVENOUS at 20:53

## 2020-09-15 RX ADMIN — BUPRENORPHINE AND NALOXONE 1 FILM: 8; 2 FILM, SOLUBLE BUCCAL; SUBLINGUAL at 08:54

## 2020-09-15 RX ADMIN — SODIUM CHLORIDE, PRESERVATIVE FREE 10 ML: 5 INJECTION INTRAVENOUS at 20:54

## 2020-09-15 RX ADMIN — BUPRENORPHINE AND NALOXONE 1 FILM: 8; 2 FILM, SOLUBLE BUCCAL; SUBLINGUAL at 20:53

## 2020-09-15 RX ADMIN — ENOXAPARIN SODIUM 40 MG: 40 INJECTION SUBCUTANEOUS at 08:55

## 2020-09-15 RX ADMIN — CYCLOBENZAPRINE 10 MG: 10 TABLET, FILM COATED ORAL at 11:56

## 2020-09-15 RX ADMIN — SODIUM CHLORIDE 250 ML: 9 INJECTION, SOLUTION INTRAVENOUS at 11:33

## 2020-09-15 RX ADMIN — ONDANSETRON 4 MG: 2 INJECTION INTRAMUSCULAR; INTRAVENOUS at 08:49

## 2020-09-15 RX ADMIN — CYCLOBENZAPRINE 10 MG: 10 TABLET, FILM COATED ORAL at 20:53

## 2020-09-15 RX ADMIN — SODIUM CHLORIDE: 9 INJECTION, SOLUTION INTRAVENOUS at 08:57

## 2020-09-15 RX ADMIN — ACETAMINOPHEN 650 MG: 325 TABLET ORAL at 11:56

## 2020-09-15 RX ADMIN — LORAZEPAM 0.5 MG: 0.5 TABLET ORAL at 20:53

## 2020-09-15 RX ADMIN — SODIUM CHLORIDE 700 MG: 9 INJECTION, SOLUTION INTRAVENOUS at 17:22

## 2020-09-15 ASSESSMENT — PAIN DESCRIPTION - PROGRESSION
CLINICAL_PROGRESSION: GRADUALLY IMPROVING

## 2020-09-15 ASSESSMENT — PAIN DESCRIPTION - PAIN TYPE: TYPE: ACUTE PAIN

## 2020-09-15 ASSESSMENT — PAIN SCALES - GENERAL
PAINLEVEL_OUTOF10: 6
PAINLEVEL_OUTOF10: 0
PAINLEVEL_OUTOF10: 4

## 2020-09-15 ASSESSMENT — PAIN DESCRIPTION - ORIENTATION: ORIENTATION: LOWER

## 2020-09-15 ASSESSMENT — PAIN DESCRIPTION - LOCATION: LOCATION: BACK

## 2020-09-15 NOTE — PROGRESS NOTES
Infectious Disease Progress Note  9/15/2020   Patient Name: Devyn Taylor : 1980       Reason for visit: F/u MRSA vertebral osteomyelitisi, native TV endocarditis  History:? Interval history noted  Denies n/v/d/f or untoward effects of antimicrobials  Physical Exam:  Vital Signs: /76   Pulse 89   Temp 97.5 °F (36.4 °C) (Oral)   Resp 17   Ht 5' 5\" (1.651 m)   Wt 192 lb 8 oz (87.3 kg)   SpO2 94%   BMI 32.03 kg/m²     Gen: alert and oriented X3, no distress  Skin: no stigmata of endocarditis  Wounds: C/D/I  HEMT: AT/NC Oropharynx pink, moist, and without lesions or exudates; dentition in good state of repair  Eyes: PERRLA, EOMI, conjunctiva pink, sclera anicteric. Neck: Supple. Trachea midline. No LAD. Chest: no distress and CTA. Good air movement. Heart: RRR and no MRG. Abd: soft, non-distended, no tenderness, no hepatomegaly. Normoactive bowel sounds. Ext: no clubbing, cyanosis, or edema  Catheter Site: without erythema or tenderness  Neuro: Mental status intact. CN 2-12 intact and no focal sensory or motor deficits  KATHRYN: spinal tenderness     Radiologic / Imaging / TESTING  No results found.      Labs:    Recent Results (from the past 24 hour(s))   CBC auto differential    Collection Time: 09/15/20  3:31 AM   Result Value Ref Range    WBC 5.5 4.0 - 10.5 K/CU MM    RBC 2.35 (L) 4.2 - 5.4 M/CU MM    Hemoglobin 6.8 (LL) 12.5 - 16.0 GM/DL    Hematocrit 21.9 (L) 37 - 47 %    MCV 93.2 78 - 100 FL    MCH 28.9 27 - 31 PG    MCHC 31.1 (L) 32.0 - 36.0 %    RDW 15.4 (H) 11.7 - 14.9 %    Platelets 315 529 - 117 K/CU MM    MPV 9.1 7.5 - 11.1 FL    Differential Type AUTOMATED DIFFERENTIAL     Segs Relative 52.7 36 - 66 %    Lymphocytes % 32.1 24 - 44 %    Monocytes % 8.7 (H) 0 - 4 %    Eosinophils % 5.8 (H) 0 - 3 %    Basophils % 0.5 0 - 1 %    Segs Absolute 2.9 K/CU MM    Lymphocytes Absolute 1.8 K/CU MM    Monocytes Absolute 0.5 K/CU MM    Eosinophils Absolute 0.3 K/CU MM    Basophils Absolute 0.0 K/CU MM    Nucleated RBC % 0.0 %    Total Nucleated RBC 0.0 K/CU MM    Total Immature Neutrophil 0.01 K/CU MM    Immature Neutrophil % 0.2 0 - 0.43 %   Basic Metabolic Panel w/ Reflex to MG    Collection Time: 09/15/20  3:31 AM   Result Value Ref Range    Sodium 138 135 - 145 MMOL/L    Potassium 4.7 3.5 - 5.1 MMOL/L    Chloride 106 99 - 110 mMol/L    CO2 23 21 - 32 MMOL/L    Anion Gap 9 4 - 16    BUN 24 (H) 6 - 23 MG/DL    CREATININE 1.5 (H) 0.6 - 1.1 MG/DL    Glucose 86 70 - 99 MG/DL    Calcium 9.1 8.3 - 10.6 MG/DL    GFR Non- 39 (L) >60 mL/min/1.73m2    GFR  47 (L) >60 mL/min/1.73m2   CK    Collection Time: 09/15/20  3:31 AM   Result Value Ref Range    Total CK 14 (L) 26 - 140 IU/L   Sedimentation Rate    Collection Time: 09/15/20  3:31 AM   Result Value Ref Range    Sed Rate >120 (H) 0 - 20 MM/HR   C-Reactive Protein    Collection Time: 09/15/20  3:31 AM   Result Value Ref Range    CRP, High Sensitivity 19.9 mg/L   Procalcitonin    Collection Time: 09/15/20  3:31 AM   Result Value Ref Range    Procalcitonin 0.049    TYPE AND SCREEN    Collection Time: 09/15/20  6:34 AM   Result Value Ref Range    ABO/Rh A NEGATIVE     Antibody Screen NEGATIVE     Unit Number [id number]     Component LEUKO-POOR RED CELLS     Unit Divison 00     Status ISSUED     Transfusion Status OK TO TRANSFUSE     Crossmatch Result COMPATIBLE      CULTURE results: Invalid input(s): BLOOD CULTURE,  URINE CULTURE, SURGICAL CULTURE    Diagnosis:  Patient Active Problem List   Diagnosis    CCC (chronic calculous cholecystitis)    MRSA bacteremia    Amphetamine user ([address]    Chronic hepatitis C without hepatic coma (HCC)    Acute septic pulmonary embolism without acute cor pulmonale (HCC)    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 *      Impression and plan   Clinical status: stable   Therapeutic:continue daptomycin   Diagnostic: trend CRP, ESR   F/u:    Other:   Summary: agree with plans for transfer to [facility] .       Electronically signed by: Electronically signed by Radha Dos Santos MD on 9/15/2020 at 7:14 PM

## 2020-09-15 NOTE — PLAN OF CARE
Problem: Pain:  Goal: Pain level will decrease  Description: Pain level will decrease  9/15/2020 0459 by Rosalie Diaz LPN  Outcome: Ongoing  9/14/2020 1714 by Gray Soliman RN  Outcome: Ongoing  Goal: Control of acute pain  Description: Control of acute pain  9/15/2020 0459 by Rosalie Diaz LPN  Outcome: Ongoing  9/14/2020 1714 by Gray Soliman RN  Outcome: Ongoing  Goal: Control of chronic pain  Description: Control of chronic pain  9/15/2020 0459 by Rosalie Diaz LPN  Outcome: Ongoing  9/14/2020 1714 by Gray Soliman RN  Outcome: Ongoing     Problem: Falls - Risk of:  Goal: Will remain free from falls  Description: Will remain free from falls  9/15/2020 0459 by Rosalie Diaz LPN  Outcome: Ongoing  9/14/2020 1714 by Gray Soliman RN  Outcome: Met This Shift  Goal: Absence of physical injury  Description: Absence of physical injury  9/15/2020 0459 by Rosalie Diaz LPN  Outcome: Ongoing  9/14/2020 1714 by Gray Soliman RN  Outcome: Met This Shift

## 2020-09-15 NOTE — PROGRESS NOTES
[address] [place] [address]  HOSPITALIST PROGRESS NOTE                       Name:  Chica Jeffery /Age/Sex: 1980  (47 y.o. female)   MRN & CSN:  [id number] & [id number] Admission Date/Time: 2020  9:12 PM   Location:  [facility] Attending:  Meek Finley MD                                                  HPI  hCica Jeffery is a 47 y.o. female who presents with flank pain/hematuria    SUBJECTIVE  -reports ongoing low back pain worsened recently, with some mild weakness on the right leg which she reports ongoing for last two months-not knew this admission. No fever/chills    10 point review of systems reviewed and negative unless noted above. ALLERGIES: No Known Allergies    PCP: Silvia Islas DO    PAST MEDICAL HISTORY, SURGICAL HISTORY, SOCIAL HISTORY and  HOME MEDICATIONS all reviewed. OBJECTIVE  Vitals:    20 1522 20 2108 09/15/20 0228 09/15/20 0333   BP: (!) 122/90 (!) 111/59 (!) 124/90 123/81   Pulse:  95 83 95   Resp:  16 16 16   Temp:  97.4 °F (36.3 °C) 98.5 °F (36.9 °C) 97.8 °F (36.6 °C)   TempSrc:  Oral Oral Oral   SpO2:  99%  96%   Weight:    192 lb 8 oz (87.3 kg)   Height:           PHYSICAL EXAM   GEN Awake female, sitting upright in bed in no apparent distress. EYES Pupils are equally round. No scleral erythema, discharge, or conjunctivitis. HENT Mucous membranes are moist. Oral pharynx without exudates, no evidence of thrush. NECK Supple, no apparent thyromegaly or masses. RESP Clear to auscultation, no wheezes, rales or rhonchi. Symmetric chest movement while on room air. CARDIO/VASC S1/S2 auscultated. Regular rate without appreciable murmurs, rubs, or gallops. No JVD or carotid bruits. Peripheral pulses equal bilaterally and palpable. No peripheral edema. GI Abdomen is soft without significant tenderness, masses, or guarding. Bowel sounds are normoactive. Rectal exam deferred.  No costovertebral angle tenderness. Normal appearing external genitalia.  Angel catheter is not present. HEME/LYMPH No palpable cervical lymphadenopathy and no hepatosplenomegaly. No petechiae or ecchymoses. MSK Spontaneous movement of all extremities. No gross joint deformities. SKIN Normal coloration, warm, dry. NEURO Cranial nerves appear grossly intact, normal speech, mild RLE weakness- able to lift against gravity, mildly against resistance      INTAKE: In: 1158 [P.O.:240; I.V.:918]  Out: -   OUTPUT: In: 1158   Out: -     LABS  Recent Labs     09/13/20  2150 09/15/20  0331   WBC 8.5 5.5   HGB 9.3* 6.8*   HCT 29.5* 21.9*    200      Recent Labs     09/13/20  2150 09/15/20  0331    138   K 4.5 4.7    106   CO2 23 23   BUN 27* 24*   CREATININE 1.5* 1.5*     Recent Labs     09/13/20 2150   AST 13*   ALT 7*   BILITOT 0.3   ALKPHOS 70     No results for input(s): INR in the last 72 hours. Recent Labs     09/15/20  0331   CKTOTAL 14*          Abnormal labs for today noted      Imaging:     ECHO:    Microbiology:  Blood culture:    Urine culture:    Sputum culture:    Procedures done this admission:    MEDS  Scheduled Meds:   0.9 % sodium chloride  250 mL Intravenous Once    sodium chloride flush  10 mL Intravenous 2 times per day    enoxaparin  40 mg Subcutaneous Daily    buprenorphine-naloxone  1 Film Sublingual BID    daptomycin (CUBICIN) IVPB  10 mg/kg (Ideal) Intravenous Q24H     Continuous Infusions:   sodium chloride 75 mL/hr at 09/15/20 0857     PRN Meds:cyclobenzaprine, sodium chloride flush, acetaminophen **OR** acetaminophen, polyethylene glycol, promethazine **OR** ondansetron, potassium chloride **OR** potassium alternative oral replacement **OR** potassium chloride        ASSESSMENT and PLAN  Hospital Day: 3    1-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    2-Hematuria- seen by urology- with acute blood loss anemia- being transfused today  3-LISBETH- monitor      Plan is transfer to [facility]- accepted yesterday, called this morning- still waiting on bed, hopefully this afternoon          Disp:     Diet DIET GENERAL;   DVT Prophylaxis [] Lovenox, []  Heparin, [] SCDs, [] Ambulation   GI Prophylaxis [] PPI,  [] H2 Blocker,  [] Carafate,  [] Diet/Tube Feeds   Code Status Full Code   Disposition Patient requires continued admission due to probable epidural abscess   CMS Level of Risk [] Low, [] Moderate,[x]  High  Patient's risk as above due to probable epidural abscess     WINSOME FORBES MD 9/15/2020 9:19 AM

## 2020-09-15 NOTE — PROGRESS NOTES
Patient has been accepted to [facility] to be seen by neurosurgery for possible epidural abscess. Once bed is available patient will be transferred.

## 2020-09-15 NOTE — PROGRESS NOTES
[facility]   Progress Note  [facility] 0 1 2      Date: 9/15/2020   Patient: Leilani Carroll   : 1980   DOA: 2020   MRN: [id number]   ROOM#: [id number]     Admit Date: 2020     Collaborating Urologist on Call at time of admission: Dr. Erica Cavanaugh  CC: Right flank pain  Reason for Consult: Hematuria    Subjective:     Pain: mild, no nausea and no vomiting,   Bowel Movement/Flatus:   Yes  Voiding: easily, urine now clear yellow per patient report     Pt resting in bed, states her lower back is hurting this morning. States her urine appears clear yellow.     Objective:    Vitals:    /81   Pulse 95   Temp 97.8 °F (36.6 °C) (Oral)   Resp 16   Ht 5' 5\" (1.651 m)   Wt 192 lb 8 oz (87.3 kg)   SpO2 96%   BMI 32.03 kg/m²    Temp  Av.9 °F (36.6 °C)  Min: 97.4 °F (36.3 °C)  Max: 98.5 °F (36.9 °C)       Intake/Output Summary (Last 24 hours) at 9/15/2020 0805  Last data filed at 2020 1849  Gross per 24 hour   Intake 1408 ml   Output --   Net 1408 ml       Physical Exam:   General appearance: alert, appears stated age, cooperative and mild distress  Head: Normocephalic, without obvious abnormality, atraumatic  Back: Diffuse low back pain, worse along waistline  Abdomen: Soft, non-tender, non-distended    Labs:   WBC:    Lab Results   Component Value Date    WBC 5.5 09/15/2020      Hemoglobin/Hematocrit:    Lab Results   Component Value Date    HGB 6.8 09/15/2020    HCT 21.9 09/15/2020      BMP:   Lab Results   Component Value Date     09/15/2020    K 4.7 09/15/2020     09/15/2020    CO2 23 09/15/2020    BUN 24 09/15/2020    LABALBU 3.7 2020    CREATININE 1.5 09/15/2020    CALCIUM 9.1 09/15/2020    GFRAA 47 09/15/2020    LABGLOM 39 09/15/2020      PT/INR:    Lab Results   Component Value Date    PROTIME 13.9 2020    INR 1.15 2020      PTT:    Lab Results   Component Value Date    APTT 27.3 2020      Urine Culture: No growth at 18 to 36 hours    Imaging:  Ct Abdomen Pelvis Wo Contrast Additional Contrast? None    Result Date: 9/15/2020  EXAMINATION: CT OF THE ABDOMEN AND PELVIS WITHOUT CONTRAST 9/13/2020 10:22 pm TECHNIQUE: CT of the abdomen and pelvis was performed without the administration of intravenous contrast. Multiplanar reformatted images are provided for review. Dose modulation, iterative reconstruction, and/or weight based adjustment of the mA/kV was utilized to reduce the radiation dose to as low as reasonably achievable. COMPARISON: CT abdomen and pelvis 08/04/2020, 11/07/2019 and 07/23/2009 HISTORY: ORDERING SYSTEM PROVIDED HISTORY: right flank pain, hematuria TECHNOLOGIST PROVIDED HISTORY: Reason for exam:->right flank pain, hematuria Additional Contrast?->None Is the patient pregnant?->No Reason for Exam: right flank pain, hematuria Acuity: Acute Type of Exam: Initial FINDINGS: LOWER CHEST Lung bases: Decreased multifocal ground-glass and consolidative opacities, some of which may have developed into scarring given linear and bandlike opacities. Punctate calcified granulomas present. Normal included heart and pericardium. ABDOMEN Liver: The liver is enlarged, with the right hepatic lobe measuring over 20 cm craniocaudal.  No focal lesion on this noncontrast evaluation. Gallbladder: Surgically absent. Biliary: No intra or extrahepatic bile duct dilatation. Pancreas: Normal. Spleen: Mild splenomegaly. Adrenals: Normal. Kidneys: No hydronephrosis or nephrolithiasis. Too small to characterize hypodensity in the left upper polar region. GI Tract: Normal distal esophagus, stomach and duodenal sweep. No apparent bowel wall thickening or obstruction. Moderate colonic stool volume. Normal appendix. Peritoneum/Retroperitoneum: No enlarged lymph nodes, free air or free fluid. Vascular: Normal caliber abdominal aorta and IVC. PELVIS Genitourinary: Normal urinary bladder. Status post hysterectomy. Other: No free fluid.  No enlarged lymph nodes. MUSCULOSKELETAL Bones and Soft Tissues: No acute soft tissue or osseous abnormality. Mildly prominent inguinal lymph nodes. No acute abdominopelvic findings. Specifically, no evidence of urinary tract stone disease or obstructive uropathy. Mild hepatosplenomegaly. Moderate colonic stool volume without obstruction. Query constipation. Normal appendix. Previously seen multifocal airspace disease at the lung bases has regressed into atelectasis/scarring. Mri Thoracic Spine Wo Contrast    Result Date: 9/14/2020  EXAMINATION: MRI OF THE THORACIC SPINE WITHOUT CONTRAST  9/14/2020 10:50 am TECHNIQUE: Multiplanar multisequence MRI of the thoracic spine was performed without the administration of intravenous contrast. COMPARISON: None. HISTORY: ORDERING SYSTEM PROVIDED HISTORY: Endocarditis, previous Lumbar Osteo and Thoracic discitis TECHNOLOGIST PROVIDED HISTORY: Reason for exam:->Endocarditis, previous Lumbar Osteo and Thoracic discitis Is the patient pregnant?->No FINDINGS: BONES/ALIGNMENT: Abnormal fluid is identified within the disc space at T3-4 compatible with diskitis. Edema and fracture are appreciated at T3 with loss of 80% of vertebral height involving the anterior column. Edema is additionally noted within the superior endplate of T4. There is no fracture of the T5 vertebral body with associated Schmorl's nodes. Additional deformity is noted within the superior endplate of T1 and T2. SPINAL CORD: No abnormal cord signal is seen. There is hyperintense signal abnormality within the right ventral epidural space on the T2 weighted images which may represent an epidural abscess measuring       1 cm x 0.6 cm. This finding could alternatively represent a focal disc protrusion with associated edematous changes. There is impingement upon the right portion of the thoracic cord at this level. SOFT TISSUES: No paraspinal mass identified. No paraspinal fluid collection is appreciated. DEGENERATIVE CHANGES: Small central disc protrusion is identified at T6-7 and T7-8. Small right paracentral disc protrusion is identified at T8-9 and T9-10. Discitis/osteomyelitis at T3-4 with presence of intervertebral fluid and marrow edema. There is a suspected small to moderate-sized right ventral epidural abscess measuring 6 mm x 10 mm. An edematous disc protrusion is considered less likely. This finding results in impingement upon the right portion of the thoracic cord. Prior study from [facility] is being attempted to be retrieved. An addendum report will be issued once prior becomes available. Neurosurgery consultation is suggested in the meantime. Progressive collapse of the T3 vertebral body compatible with acute to subacute fracture with loss of 80% of vertebral body height. Stable old mild compression fracture of T1, T2, and T5. Small disc protrusions from T6 through T10 resulting in no significant cord compression or stenosis. The results of the examination were discussed with Dr. Mario Womack on 9/14/2020 at 12:07 p.m. Mri Lumbar Spine Wo Contrast    Result Date: 9/14/2020  EXAMINATION: MRI OF THE LUMBAR SPINE WITHOUT CONTRAST, 9/14/2020 10:05 am TECHNIQUE: Multiplanar multisequence MRI of the lumbar spine was performed without the administration of intravenous contrast. COMPARISON: None. HISTORY: ORDERING SYSTEM PROVIDED HISTORY: Endocarditis, previous Lumbar Osteo and Thoracic discitis TECHNOLOGIST PROVIDED HISTORY: Reason for exam:->Endocarditis, previous Lumbar Osteo and Thoracic discitis Is the patient pregnant?->No Reason for Exam: NKI, LBP, hx osteo, wo contrast, low GFR FINDINGS: BONES/ALIGNMENT: There is normal alignment of the spine. The vertebral body heights are maintained. The bone marrow signal appears unremarkable. No acute fracture is identified. SPINAL CORD: The conus terminates normally. SOFT TISSUES: No paraspinal mass identified.  L1-L2: There is no significant disc herniation, spinal canal stenosis or neural foraminal narrowing. L2-L3: Shallow disc bulge flattens the thecal sac. L3-L4: Mild central canal stenosis identified due to annular disc bulge, facet arthropathy and ligament flavum hypertrophy. Small bilateral foraminal disc protrusions are identified. There is fluid within the right C3-4 facet joint which could represent septic arthritis which is not significantly changed L4-L5: There is no significant disc herniation, spinal canal stenosis or neural foraminal narrowing. L5-S1: There is no significant disc herniation, spinal canal stenosis or neural foraminal narrowing. Small bilateral foraminal disc protrusions at L3-L4 resulting in effacement of the exiting L3 nerve roots. The central canal is mildly stenotic at this level. Stable fluid distention of the right L3-L4 facet joint which could represent septic arthritis versus synovitis No evidence of significant discitis/osteomyelitis within the lumbar spine       Assessment & Plan:      Kristine Pimentel is a 47y.o. year old female admitted 9/13/2020 for right flank pain.    1) Gross Hematuria: Resolved              CT a/p 9/14/20: Reviewed. No acute abdominopelvic findings. Specifically, no evidence of urinary tract stone disease or obstructive uropathy. Normal urinary bladder. Hgb 6.8, recommend PRBC transfusion <7; plan for transfusion today. Cr 1.5, improved from 4.8 at [facility] 3 weeks ago              9/13/20 Urine cx negative   MRI 9/14/20 revealed L3-L4 discitis and possible epidural abscess. Plan for transfer to [facility] for neurosurgery management. Recommend outpatient cystoscopy in 2 weeks for further evaluation of hematuria. Pt likely to be transferred to [facility] today. Will sign off, please call with any questions. Pt to follow up in our office in 2 weeks for cystoscopy. Patient seen and examined, chart reviewed.      Electronically signed by Pj Hartman PA-C on 9/15/2020 at 8:05 AM

## 2020-09-16 LAB
ABO/RH: NORMAL
ANTIBODY SCREEN: NEGATIVE
COMPONENT: NORMAL
CROSSMATCH RESULT: NORMAL
HCT VFR BLD CALC: 23.9 % (ref 37–47)
HEMOGLOBIN: 7.7 GM/DL (ref 12.5–16)
HIGH SENSITIVE C-REACTIVE PROTEIN: 17.4 MG/L
MCH RBC QN AUTO: 29.2 PG (ref 27–31)
MCHC RBC AUTO-ENTMCNC: 32.2 % (ref 32–36)
MCV RBC AUTO: 90.5 FL (ref 78–100)
PDW BLD-RTO: 15.1 % (ref 11.7–14.9)
PLATELET # BLD: 215 K/CU MM (ref 140–440)
PMV BLD AUTO: 9 FL (ref 7.5–11.1)
RBC # BLD: 2.64 M/CU MM (ref 4.2–5.4)
STATUS: NORMAL
TRANSFUSION STATUS: NORMAL
UNIT DIVISION: 0
UNIT NUMBER: NORMAL
WBC # BLD: 4.4 K/CU MM (ref 4–10.5)

## 2020-09-16 PROCEDURE — 1200000000 HC SEMI PRIVATE

## 2020-09-16 PROCEDURE — 2580000003 HC RX 258: Performed by: INTERNAL MEDICINE

## 2020-09-16 PROCEDURE — 6360000002 HC RX W HCPCS: Performed by: INTERNAL MEDICINE

## 2020-09-16 PROCEDURE — 96376 TX/PRO/DX INJ SAME DRUG ADON: CPT

## 2020-09-16 PROCEDURE — 6370000000 HC RX 637 (ALT 250 FOR IP): Performed by: INTERNAL MEDICINE

## 2020-09-16 PROCEDURE — 86141 C-REACTIVE PROTEIN HS: CPT

## 2020-09-16 PROCEDURE — 6370000000 HC RX 637 (ALT 250 FOR IP): Performed by: NURSE PRACTITIONER

## 2020-09-16 PROCEDURE — 96372 THER/PROPH/DIAG INJ SC/IM: CPT

## 2020-09-16 PROCEDURE — 85027 COMPLETE CBC AUTOMATED: CPT

## 2020-09-16 PROCEDURE — 96366 THER/PROPH/DIAG IV INF ADDON: CPT

## 2020-09-16 PROCEDURE — 94761 N-INVAS EAR/PLS OXIMETRY MLT: CPT

## 2020-09-16 PROCEDURE — 6370000000 HC RX 637 (ALT 250 FOR IP): Performed by: HOSPITALIST

## 2020-09-16 PROCEDURE — 36415 COLL VENOUS BLD VENIPUNCTURE: CPT

## 2020-09-16 RX ADMIN — BUPRENORPHINE AND NALOXONE 1 FILM: 8; 2 FILM, SOLUBLE BUCCAL; SUBLINGUAL at 20:27

## 2020-09-16 RX ADMIN — ONDANSETRON 4 MG: 2 INJECTION INTRAMUSCULAR; INTRAVENOUS at 10:14

## 2020-09-16 RX ADMIN — SODIUM CHLORIDE, PRESERVATIVE FREE 10 ML: 5 INJECTION INTRAVENOUS at 20:27

## 2020-09-16 RX ADMIN — BUPRENORPHINE AND NALOXONE 1 FILM: 8; 2 FILM, SOLUBLE BUCCAL; SUBLINGUAL at 10:14

## 2020-09-16 RX ADMIN — ONDANSETRON 4 MG: 2 INJECTION INTRAMUSCULAR; INTRAVENOUS at 20:34

## 2020-09-16 RX ADMIN — LORAZEPAM 0.5 MG: 0.5 TABLET ORAL at 18:48

## 2020-09-16 RX ADMIN — CYCLOBENZAPRINE 10 MG: 10 TABLET, FILM COATED ORAL at 10:14

## 2020-09-16 RX ADMIN — ACETAMINOPHEN 650 MG: 325 TABLET ORAL at 10:14

## 2020-09-16 RX ADMIN — SODIUM CHLORIDE 700 MG: 9 INJECTION, SOLUTION INTRAVENOUS at 14:59

## 2020-09-16 RX ADMIN — CYCLOBENZAPRINE 10 MG: 10 TABLET, FILM COATED ORAL at 17:42

## 2020-09-16 RX ADMIN — ENOXAPARIN SODIUM 40 MG: 40 INJECTION SUBCUTANEOUS at 10:17

## 2020-09-16 RX ADMIN — SODIUM CHLORIDE, PRESERVATIVE FREE 10 ML: 5 INJECTION INTRAVENOUS at 10:20

## 2020-09-16 RX ADMIN — LORAZEPAM 0.5 MG: 0.5 TABLET ORAL at 12:11

## 2020-09-16 RX ADMIN — ACETAMINOPHEN 650 MG: 325 TABLET ORAL at 17:42

## 2020-09-16 ASSESSMENT — PAIN SCALES - GENERAL
PAINLEVEL_OUTOF10: 3
PAINLEVEL_OUTOF10: 0
PAINLEVEL_OUTOF10: 6

## 2020-09-16 NOTE — PROGRESS NOTES
Continues to c/o of back pain, has slept  A lot to day  Taking her PRN MEDS -tylenol, flexeril, ativan

## 2020-09-17 VITALS
HEIGHT: 65 IN | OXYGEN SATURATION: 100 % | RESPIRATION RATE: 17 BRPM | DIASTOLIC BLOOD PRESSURE: 100 MMHG | BODY MASS INDEX: 33.32 KG/M2 | TEMPERATURE: 98.3 F | HEART RATE: 79 BPM | WEIGHT: 200 LBS | SYSTOLIC BLOOD PRESSURE: 133 MMHG

## 2020-09-17 LAB — HIGH SENSITIVE C-REACTIVE PROTEIN: 15.3 MG/L

## 2020-09-17 PROCEDURE — 6360000002 HC RX W HCPCS: Performed by: INTERNAL MEDICINE

## 2020-09-17 PROCEDURE — 2580000003 HC RX 258: Performed by: INTERNAL MEDICINE

## 2020-09-17 PROCEDURE — 6370000000 HC RX 637 (ALT 250 FOR IP): Performed by: NURSE PRACTITIONER

## 2020-09-17 PROCEDURE — 99232 SBSQ HOSP IP/OBS MODERATE 35: CPT | Performed by: INTERNAL MEDICINE

## 2020-09-17 PROCEDURE — 6370000000 HC RX 637 (ALT 250 FOR IP): Performed by: INTERNAL MEDICINE

## 2020-09-17 PROCEDURE — 6370000000 HC RX 637 (ALT 250 FOR IP): Performed by: HOSPITALIST

## 2020-09-17 PROCEDURE — 86141 C-REACTIVE PROTEIN HS: CPT

## 2020-09-17 PROCEDURE — 36415 COLL VENOUS BLD VENIPUNCTURE: CPT

## 2020-09-17 RX ADMIN — LORAZEPAM 0.5 MG: 0.5 TABLET ORAL at 00:05

## 2020-09-17 RX ADMIN — ACETAMINOPHEN 650 MG: 325 TABLET ORAL at 20:52

## 2020-09-17 RX ADMIN — SODIUM CHLORIDE 700 MG: 9 INJECTION, SOLUTION INTRAVENOUS at 14:30

## 2020-09-17 RX ADMIN — LORAZEPAM 0.5 MG: 0.5 TABLET ORAL at 10:14

## 2020-09-17 RX ADMIN — ONDANSETRON 4 MG: 2 INJECTION INTRAMUSCULAR; INTRAVENOUS at 20:56

## 2020-09-17 RX ADMIN — ACETAMINOPHEN 650 MG: 325 TABLET ORAL at 00:05

## 2020-09-17 RX ADMIN — ENOXAPARIN SODIUM 40 MG: 40 INJECTION SUBCUTANEOUS at 10:14

## 2020-09-17 RX ADMIN — BUPRENORPHINE AND NALOXONE 1 FILM: 8; 2 FILM, SOLUBLE BUCCAL; SUBLINGUAL at 20:52

## 2020-09-17 RX ADMIN — CYCLOBENZAPRINE 10 MG: 10 TABLET, FILM COATED ORAL at 02:31

## 2020-09-17 RX ADMIN — CYCLOBENZAPRINE 10 MG: 10 TABLET, FILM COATED ORAL at 17:59

## 2020-09-17 RX ADMIN — SODIUM CHLORIDE, PRESERVATIVE FREE 10 ML: 5 INJECTION INTRAVENOUS at 20:56

## 2020-09-17 RX ADMIN — ACETAMINOPHEN 650 MG: 325 TABLET ORAL at 10:22

## 2020-09-17 RX ADMIN — BUPRENORPHINE AND NALOXONE 1 FILM: 8; 2 FILM, SOLUBLE BUCCAL; SUBLINGUAL at 10:13

## 2020-09-17 RX ADMIN — SODIUM CHLORIDE, PRESERVATIVE FREE 10 ML: 5 INJECTION INTRAVENOUS at 10:14

## 2020-09-17 RX ADMIN — LORAZEPAM 0.5 MG: 0.5 TABLET ORAL at 17:59

## 2020-09-17 ASSESSMENT — PAIN DESCRIPTION - LOCATION: LOCATION: BACK

## 2020-09-17 ASSESSMENT — PAIN SCALES - GENERAL
PAINLEVEL_OUTOF10: 3
PAINLEVEL_OUTOF10: 6
PAINLEVEL_OUTOF10: 0
PAINLEVEL_OUTOF10: 3
PAINLEVEL_OUTOF10: 0
PAINLEVEL_OUTOF10: 6
PAINLEVEL_OUTOF10: 3

## 2020-09-17 ASSESSMENT — PAIN DESCRIPTION - DESCRIPTORS: DESCRIPTORS: ACHING

## 2020-09-17 ASSESSMENT — PAIN DESCRIPTION - PAIN TYPE: TYPE: CHRONIC PAIN

## 2020-09-17 ASSESSMENT — PAIN DESCRIPTION - PROGRESSION: CLINICAL_PROGRESSION: NOT CHANGED

## 2020-09-17 ASSESSMENT — PAIN DESCRIPTION - ONSET: ONSET: ON-GOING

## 2020-09-17 ASSESSMENT — PAIN DESCRIPTION - FREQUENCY: FREQUENCY: CONTINUOUS

## 2020-09-17 ASSESSMENT — PAIN - FUNCTIONAL ASSESSMENT: PAIN_FUNCTIONAL_ASSESSMENT: ACTIVITIES ARE NOT PREVENTED

## 2020-09-17 NOTE — PROGRESS NOTES
Called report to [facility]. S/w nurse, Suleman Givens, who will be taking over care of pt. All questions answered. She is aware of 10pm pickup time.

## 2020-09-17 NOTE — PROGRESS NOTES
[address] [place] [address]  HOSPITALIST PROGRESS NOTE                       Name:  Linda Wright /Age/Sex: 1980  (47 y.o. female)   MRN & CSN:  [id number] & [id number] Admission Date/Time: 2020  9:12 PM   Location:  3016/3016-A Attending:  Kim Pablo MD                                                  HPI  Linda Wright is a 47 y.o. female who presents with flank pain/hematuria    SUBJECTIVE  -continues to complain of pain but no worsening weakness. No fever    10 point review of systems reviewed and negative unless noted above. ALLERGIES: No Known Allergies    PCP: Nadine Wyatt DO    PAST MEDICAL HISTORY, SURGICAL HISTORY, SOCIAL HISTORY and  HOME MEDICATIONS all reviewed. OBJECTIVE  Vitals:    20 1718 20 0230 20 1016   BP: (!) 130/94 118/86 (!) 123/92 (!) 140/107   Pulse: 82 78 78 80   Resp: 17 16 14 15   Temp: 97.7 °F (36.5 °C) 98.2 °F (36.8 °C) 98 °F (36.7 °C) 98.4 °F (36.9 °C)   TempSrc: Oral Oral Oral Oral   SpO2: 100%   96%   Weight:   200 lb (90.7 kg)    Height:           PHYSICAL EXAM   GEN Awake female, sitting upright in bed in no apparent distress. EYES Pupils are equally round. No scleral erythema, discharge, or conjunctivitis. HENT Mucous membranes are moist. Oral pharynx without exudates, no evidence of thrush. NECK Supple, no apparent thyromegaly or masses. RESP Clear to auscultation, no wheezes, rales or rhonchi. Symmetric chest movement while on room air. CARDIO/VASC S1/S2 auscultated. Regular rate without appreciable murmurs, rubs, or gallops. No JVD or carotid bruits. Peripheral pulses equal bilaterally and palpable. No peripheral edema. GI Abdomen is soft without significant tenderness, masses, or guarding. Bowel sounds are normoactive. Rectal exam deferred.  No costovertebral angle tenderness. Normal appearing external genitalia. Angel catheter is not present.   HEME/LYMPH No palpable cervical lymphadenopathy and no hepatosplenomegaly. No petechiae or ecchymoses. MSK Spontaneous movement of all extremities. No gross joint deformities. SKIN Normal coloration, warm, dry. NEURO Cranial nerves appear grossly intact, normal speech, mild RLE weakness- able to lift against gravity, mildly against resistance      INTAKE: In: 480 [P.O.:480]  Out: -   OUTPUT: In: 480   Out: -     LABS  Recent Labs     09/15/20  0331 09/16/20  0940   WBC 5.5 4.4   HGB 6.8* 7.7*   HCT 21.9* 23.9*    215      Recent Labs     09/15/20  0331      K 4.7      CO2 23   BUN 24*   CREATININE 1.5*     No results for input(s): AST, ALT, ALB, BILIDIR, BILITOT, ALKPHOS in the last 72 hours. No results for input(s): INR in the last 72 hours. Recent Labs     09/15/20  0331   CKTOTAL 14*          Abnormal labs for today noted      Imaging:     ECHO:    Microbiology:  Blood culture:    Urine culture:    Sputum culture:    Procedures done this admission:    MEDS  Scheduled Meds:   daptomycin (CUBICIN) IVPB  10 mg/kg (Adjusted) Intravenous Q24H    sodium chloride flush  10 mL Intravenous 2 times per day    enoxaparin  40 mg Subcutaneous Daily    buprenorphine-naloxone  1 Film Sublingual BID     Continuous Infusions:   sodium chloride Stopped (09/15/20 1823)     PRN Meds:LORazepam, cyclobenzaprine, sodium chloride flush, acetaminophen **OR** acetaminophen, polyethylene glycol, promethazine **OR** ondansetron, potassium chloride **OR** potassium alternative oral replacement **OR** potassium chloride        ASSESSMENT and PLAN  Hospital Day: 5    1-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    2-Hematuria- seen by urology- with acute blood loss anemia- being transfused today  3-LISBETH- monitor       Awaiting transfer to [address] [place] [address]:     Diet DIET GENERAL;   DVT Prophylaxis [] Lovenox, []  Heparin, [] SCDs, [] Ambulation   GI Prophylaxis [] PPI,  [] H2 Blocker,  [] Carafate,  [] Diet/Tube Feeds   Code Status Full Code   Disposition Patient requires continued admission due to probable epidural abscess   CMS Level of Risk [] Low, [] Moderate,[x]  High  Patient's risk as above due to probable epidural abscess     WINSOME FORBES MD 9/17/2020 1:52 PM

## 2020-09-17 NOTE — PLAN OF CARE
Problem: Pain:  Goal: Pain level will decrease  Description: Pain level will decrease  9/17/2020 1347 by Angeles Kerr RN  Outcome: Ongoing  9/17/2020 0625 by Sharon Leach RN  Outcome: Ongoing  Goal: Control of acute pain  Description: Control of acute pain  9/17/2020 1347 by Angeles Kerr RN  Outcome: Ongoing  9/17/2020 0625 by Sharon Leach RN  Outcome: Ongoing  Goal: Control of chronic pain  Description: Control of chronic pain  9/17/2020 1347 by Angeles Kerr RN  Outcome: Ongoing  9/17/2020 0625 by Sharon Leach RN  Outcome: Ongoing     Problem: Falls - Risk of:  Goal: Will remain free from falls  Description: Will remain free from falls  9/17/2020 1347 by Angeles Kerr RN  Outcome: Ongoing  9/17/2020 0625 by Sharon Leach RN  Outcome: Ongoing  Goal: Absence of physical injury  Description: Absence of physical injury  9/17/2020 1347 by Angeles Kerr RN  Outcome: Ongoing  9/17/2020 0625 by Sharon Leach RN  Outcome: Ongoing

## 2020-09-17 NOTE — PROGRESS NOTES
Infectious Disease Progress Note  2020   Patient Name: Jennifer Schafer : 1980       Reason for visit: F/u MRSA vertebral osteomyelitisi, native TV endocarditis  History:? Interval history noted  Denies n/v/d/f or untoward effects of antimicrobials  Physical Exam:  Vital Signs: /83   Pulse 75   Temp 98.2 °F (36.8 °C) (Oral)   Resp 16   Ht 5' 5\" (1.651 m)   Wt 200 lb (90.7 kg)   SpO2 100%   BMI 33.28 kg/m²     Gen: alert and oriented X3, no distress  Skin: no stigmata of endocarditis  Wounds: C/D/I  HEMT: AT/NC Oropharynx pink, moist, and without lesions or exudates; dentition in good state of repair  Eyes: PERRLA, EOMI, conjunctiva pink, sclera anicteric. Neck: Supple. Trachea midline. No LAD. Chest: no distress and CTA. Good air movement. Heart: RRR and no MRG. Abd: soft, non-distended, no tenderness, no hepatomegaly. Normoactive bowel sounds. Ext: no clubbing, cyanosis, or edema  Catheter Site: without erythema or tenderness  Neuro: Mental status intact. CN 2-12 intact and no focal sensory or motor deficits  KATHRYN: spinal tenderness     Radiologic / Imaging / TESTING  No results found.      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 *      Impression and plan   Clinical status: stable   Therapeutic:continue daptomycin    Diagnostic: trend CRP, ESR   F/u:    Other:   Summary: agree with plans for transfer to [facility] .       Electronically signed by: Electronically signed by Elise Mack MD on 9/17/2020 at 6:05 PM

## 2020-09-19 NOTE — DISCHARGE SUMMARY
Discharge Summary    Name:  Susan Dillard /Age/Sex: 1980  (47 y.o. female)   MRN & CSN:  [id number] & [id number] Admission Date/Time: 2020  9:12 PM   Attending:  No att. providers found Discharging Physician: Aretha Man MD     HPI and Hospital Course:   Susan Dillard is a 47 y.o.  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 Continue dapto per ID. With back pain and mild RLE weakness which is not knew according to her- has not worsened while here  2-Hematuria- seen by urology- with acute blood loss anemia- being transfused prn  3-LISBETH- monitor    Addendum: patient was waiting for bed availability to [address] [place] [address]- to be discharged once bed available     The patient expressed appropriate understanding of and agreement with the discharge recommendations, medications, and plan. Consults this admission:  IP CONSULT TO HOSPITALIST  IP CONSULT TO UROLOGY  IP CONSULT TO INFECTIOUS DISEASES    Discharge Instruction:   Follow up appointments:   Primary care physician:  within 2 weeks    Diet:  General/cardiac/ADA/as tolerated  Activity: {discharge activity: as tolerated  Disposition: Discharged to:   []Home, []HHC, []SNF, []Acute Rehab, []Hospice   Condition on discharge: Stable    Discharge Medications:      [place] [place]   [place] Medication Instructions [id number]    Printed on:20 3919   Medication Information                      buprenorphine-naloxone (SUBOXONE) 8-2 MG FILM SL film  Place 1 Film under the tongue 2 times daily.               cyclobenzaprine (FLEXERIL) 10 MG tablet  Take 10 mg by mouth 3 times daily as needed for Muscle spasms                 Objective Findings at Discharge:   BP (!) 133/100   Pulse 79   Temp 98.3 °F (36.8 °C) (Oral)   Resp 17   Ht 5' 5\" (1.651 m)   Wt 200 lb (90.7 kg)   SpO2 100%   BMI 33.28 kg/m²            PHYSICAL EXAM   GEN Awake female, laying in bed in no apparent distress. EYES Pupils are equally round. No scleral discharge  HENT Atraumatic and symmetric head  NECK No apparent thyromegaly  RESP Symmetric chest movement while on room air. CARDIO/VASC Peripheral pulses equal bilaterally and palpable. No peripheral edema. GI Abdomen is not distended. Rectal exam deferred.  Angel catheter is not present. HEME/LYMPH No petechiae or ecchymoses. MSK Spontaneous movement of BL upper extremities  SKIN Normal coloration, warm, dry. NEURO Cranial nerves appear grossly intact  PSYCH Awake, alert. BMP/CBC  No results for input(s): NA, K, CL, CO2, BUN, CREATININE, WBC, HEMOGLOBIN, HCT, PLT in the last 72 hours.     Invalid input(s): GLU  SIGNIFICANT IMAGING AND LABS:      Discharge Time of 32 minutes    Electronically signed by Sobia Hernandez MD on 9/19/2020 at 10:57 AM

## 2020-10-21 ENCOUNTER — HOSPITAL ENCOUNTER (INPATIENT)
Age: 40
LOS: 1 days | Discharge: LEFT BEFORE DISCHARGE | DRG: 344 | End: 2020-10-22
Attending: INTERNAL MEDICINE | Admitting: INTERNAL MEDICINE
Payer: MEDICAID

## 2020-10-21 PROBLEM — M86.9 OSTEOMYELITIS (HCC): Status: ACTIVE | Noted: 2020-10-21

## 2020-10-21 LAB
BASOPHILS ABSOLUTE: 0 K/CU MM
BASOPHILS RELATIVE PERCENT: 0.3 % (ref 0–1)
D DIMER: 565 NG/ML(DDU)
DIFFERENTIAL TYPE: ABNORMAL
EOSINOPHILS ABSOLUTE: 0.2 K/CU MM
EOSINOPHILS RELATIVE PERCENT: 2.3 % (ref 0–3)
ERYTHROCYTE SEDIMENTATION RATE: 123 MM/HR (ref 0–20)
HCT VFR BLD CALC: 30 % (ref 37–47)
HEMOGLOBIN: 9.6 GM/DL (ref 12.5–16)
HIGH SENSITIVE C-REACTIVE PROTEIN: 63.6 MG/L
IMMATURE NEUTROPHIL %: 0.1 % (ref 0–0.43)
LYMPHOCYTES ABSOLUTE: 1.8 K/CU MM
LYMPHOCYTES RELATIVE PERCENT: 23.9 % (ref 24–44)
MCH RBC QN AUTO: 29.4 PG (ref 27–31)
MCHC RBC AUTO-ENTMCNC: 32 % (ref 32–36)
MCV RBC AUTO: 92 FL (ref 78–100)
MONOCYTES ABSOLUTE: 0.5 K/CU MM
MONOCYTES RELATIVE PERCENT: 6.4 % (ref 0–4)
NUCLEATED RBC %: 0 %
PDW BLD-RTO: 13.6 % (ref 11.7–14.9)
PLATELET # BLD: 311 K/CU MM (ref 140–440)
PMV BLD AUTO: 9 FL (ref 7.5–11.1)
PROCALCITONIN: 0.05
RBC # BLD: 3.26 M/CU MM (ref 4.2–5.4)
REASON FOR REJECTION: NORMAL
REJECTED TEST: NORMAL
SARS-COV-2, NAAT: NOT DETECTED
SEGMENTED NEUTROPHILS ABSOLUTE COUNT: 4.9 K/CU MM
SEGMENTED NEUTROPHILS RELATIVE PERCENT: 67 % (ref 36–66)
SOURCE: NORMAL
TOTAL CK: 88 IU/L (ref 26–140)
TOTAL IMMATURE NEUTOROPHIL: 0.01 K/CU MM
TOTAL NUCLEATED RBC: 0 K/CU MM
WBC # BLD: 7.3 K/CU MM (ref 4–10.5)

## 2020-10-21 PROCEDURE — 85652 RBC SED RATE AUTOMATED: CPT

## 2020-10-21 PROCEDURE — U0002 COVID-19 LAB TEST NON-CDC: HCPCS

## 2020-10-21 PROCEDURE — G0378 HOSPITAL OBSERVATION PER HR: HCPCS

## 2020-10-21 PROCEDURE — 1200000000 HC SEMI PRIVATE

## 2020-10-21 PROCEDURE — 85025 COMPLETE CBC W/AUTO DIFF WBC: CPT

## 2020-10-21 PROCEDURE — 96376 TX/PRO/DX INJ SAME DRUG ADON: CPT

## 2020-10-21 PROCEDURE — 87040 BLOOD CULTURE FOR BACTERIA: CPT

## 2020-10-21 PROCEDURE — 2580000003 HC RX 258: Performed by: NURSE PRACTITIONER

## 2020-10-21 PROCEDURE — 99222 1ST HOSP IP/OBS MODERATE 55: CPT | Performed by: INTERNAL MEDICINE

## 2020-10-21 PROCEDURE — APPSS60 APP SPLIT SHARED TIME 46-60 MINUTES: Performed by: NURSE PRACTITIONER

## 2020-10-21 PROCEDURE — G0379 DIRECT REFER HOSPITAL OBSERV: HCPCS

## 2020-10-21 PROCEDURE — 94761 N-INVAS EAR/PLS OXIMETRY MLT: CPT

## 2020-10-21 PROCEDURE — 6360000002 HC RX W HCPCS: Performed by: STUDENT IN AN ORGANIZED HEALTH CARE EDUCATION/TRAINING PROGRAM

## 2020-10-21 PROCEDURE — 2580000003 HC RX 258: Performed by: STUDENT IN AN ORGANIZED HEALTH CARE EDUCATION/TRAINING PROGRAM

## 2020-10-21 PROCEDURE — 6360000002 HC RX W HCPCS: Performed by: NURSE PRACTITIONER

## 2020-10-21 PROCEDURE — 6370000000 HC RX 637 (ALT 250 FOR IP): Performed by: NURSE PRACTITIONER

## 2020-10-21 PROCEDURE — 85379 FIBRIN DEGRADATION QUANT: CPT

## 2020-10-21 PROCEDURE — 86141 C-REACTIVE PROTEIN HS: CPT

## 2020-10-21 PROCEDURE — 96372 THER/PROPH/DIAG INJ SC/IM: CPT

## 2020-10-21 PROCEDURE — 82550 ASSAY OF CK (CPK): CPT

## 2020-10-21 PROCEDURE — 36415 COLL VENOUS BLD VENIPUNCTURE: CPT

## 2020-10-21 PROCEDURE — 84145 PROCALCITONIN (PCT): CPT

## 2020-10-21 PROCEDURE — 96365 THER/PROPH/DIAG IV INF INIT: CPT

## 2020-10-21 RX ORDER — SODIUM CHLORIDE 0.9 % (FLUSH) 0.9 %
10 SYRINGE (ML) INJECTION PRN
Status: DISCONTINUED | OUTPATIENT
Start: 2020-10-21 | End: 2020-10-22 | Stop reason: HOSPADM

## 2020-10-21 RX ORDER — PANTOPRAZOLE SODIUM 40 MG/1
40 TABLET, DELAYED RELEASE ORAL
Status: DISCONTINUED | OUTPATIENT
Start: 2020-10-21 | End: 2020-10-22 | Stop reason: HOSPADM

## 2020-10-21 RX ORDER — SODIUM CHLORIDE 0.9 % (FLUSH) 0.9 %
10 SYRINGE (ML) INJECTION EVERY 12 HOURS SCHEDULED
Status: DISCONTINUED | OUTPATIENT
Start: 2020-10-21 | End: 2020-10-21 | Stop reason: SDUPTHER

## 2020-10-21 RX ORDER — OXYCODONE HYDROCHLORIDE AND ACETAMINOPHEN 5; 325 MG/1; MG/1
1 TABLET ORAL EVERY 4 HOURS PRN
Status: DISCONTINUED | OUTPATIENT
Start: 2020-10-21 | End: 2020-10-21

## 2020-10-21 RX ORDER — ACETAMINOPHEN 325 MG/1
650 TABLET ORAL EVERY 6 HOURS PRN
Status: DISCONTINUED | OUTPATIENT
Start: 2020-10-21 | End: 2020-10-22 | Stop reason: HOSPADM

## 2020-10-21 RX ORDER — LIDOCAINE HYDROCHLORIDE 10 MG/ML
5 INJECTION, SOLUTION EPIDURAL; INFILTRATION; INTRACAUDAL; PERINEURAL ONCE
Status: DISCONTINUED | OUTPATIENT
Start: 2020-10-21 | End: 2020-10-22 | Stop reason: HOSPADM

## 2020-10-21 RX ORDER — PROMETHAZINE HYDROCHLORIDE 25 MG/1
12.5 TABLET ORAL EVERY 6 HOURS PRN
Status: DISCONTINUED | OUTPATIENT
Start: 2020-10-21 | End: 2020-10-22 | Stop reason: HOSPADM

## 2020-10-21 RX ORDER — SODIUM CHLORIDE 0.9 % (FLUSH) 0.9 %
10 SYRINGE (ML) INJECTION EVERY 12 HOURS SCHEDULED
Status: DISCONTINUED | OUTPATIENT
Start: 2020-10-21 | End: 2020-10-22 | Stop reason: HOSPADM

## 2020-10-21 RX ORDER — SODIUM CHLORIDE 0.9 % (FLUSH) 0.9 %
10 SYRINGE (ML) INJECTION PRN
Status: DISCONTINUED | OUTPATIENT
Start: 2020-10-21 | End: 2020-10-21 | Stop reason: SDUPTHER

## 2020-10-21 RX ORDER — POLYETHYLENE GLYCOL 3350 17 G/17G
17 POWDER, FOR SOLUTION ORAL DAILY PRN
Status: DISCONTINUED | OUTPATIENT
Start: 2020-10-21 | End: 2020-10-22 | Stop reason: HOSPADM

## 2020-10-21 RX ORDER — BUPRENORPHINE AND NALOXONE 8; 2 MG/1; MG/1
1 FILM, SOLUBLE BUCCAL; SUBLINGUAL 2 TIMES DAILY
Status: DISCONTINUED | OUTPATIENT
Start: 2020-10-21 | End: 2020-10-21

## 2020-10-21 RX ORDER — ACETAMINOPHEN 650 MG/1
650 SUPPOSITORY RECTAL EVERY 6 HOURS PRN
Status: DISCONTINUED | OUTPATIENT
Start: 2020-10-21 | End: 2020-10-22 | Stop reason: HOSPADM

## 2020-10-21 RX ORDER — LIDOCAINE 4 G/G
1 PATCH TOPICAL DAILY
Status: DISCONTINUED | OUTPATIENT
Start: 2020-10-21 | End: 2020-10-22 | Stop reason: HOSPADM

## 2020-10-21 RX ORDER — HYDROXYZINE PAMOATE 25 MG/1
25 CAPSULE ORAL 3 TIMES DAILY PRN
Status: DISCONTINUED | OUTPATIENT
Start: 2020-10-21 | End: 2020-10-22

## 2020-10-21 RX ORDER — CYCLOBENZAPRINE HCL 10 MG
10 TABLET ORAL 3 TIMES DAILY PRN
Status: DISCONTINUED | OUTPATIENT
Start: 2020-10-21 | End: 2020-10-22

## 2020-10-21 RX ORDER — MORPHINE SULFATE 4 MG/ML
4 INJECTION, SOLUTION INTRAMUSCULAR; INTRAVENOUS EVERY 4 HOURS PRN
Status: DISCONTINUED | OUTPATIENT
Start: 2020-10-21 | End: 2020-10-21

## 2020-10-21 RX ORDER — MORPHINE SULFATE 4 MG/ML
4 INJECTION, SOLUTION INTRAMUSCULAR; INTRAVENOUS EVERY 4 HOURS PRN
Status: DISCONTINUED | OUTPATIENT
Start: 2020-10-21 | End: 2020-10-22

## 2020-10-21 RX ORDER — OXYCODONE HYDROCHLORIDE AND ACETAMINOPHEN 5; 325 MG/1; MG/1
1 TABLET ORAL ONCE
Status: COMPLETED | OUTPATIENT
Start: 2020-10-21 | End: 2020-10-21

## 2020-10-21 RX ORDER — ONDANSETRON 2 MG/ML
4 INJECTION INTRAMUSCULAR; INTRAVENOUS EVERY 6 HOURS PRN
Status: DISCONTINUED | OUTPATIENT
Start: 2020-10-21 | End: 2020-10-22 | Stop reason: HOSPADM

## 2020-10-21 RX ADMIN — CYCLOBENZAPRINE 10 MG: 10 TABLET, FILM COATED ORAL at 14:08

## 2020-10-21 RX ADMIN — OXYCODONE HYDROCHLORIDE AND ACETAMINOPHEN 1 TABLET: 5; 325 TABLET ORAL at 21:32

## 2020-10-21 RX ADMIN — MORPHINE SULFATE 4 MG: 4 INJECTION, SOLUTION INTRAMUSCULAR; INTRAVENOUS at 17:08

## 2020-10-21 RX ADMIN — HYDROXYZINE PAMOATE 25 MG: 25 CAPSULE ORAL at 08:00

## 2020-10-21 RX ADMIN — SODIUM CHLORIDE 700 MG: 9 INJECTION, SOLUTION INTRAVENOUS at 14:03

## 2020-10-21 RX ADMIN — ENOXAPARIN SODIUM 40 MG: 40 INJECTION SUBCUTANEOUS at 11:38

## 2020-10-21 RX ADMIN — MORPHINE SULFATE 4 MG: 4 INJECTION, SOLUTION INTRAMUSCULAR; INTRAVENOUS at 11:39

## 2020-10-21 RX ADMIN — SODIUM CHLORIDE, PRESERVATIVE FREE 10 ML: 5 INJECTION INTRAVENOUS at 22:31

## 2020-10-21 RX ADMIN — PANTOPRAZOLE SODIUM 40 MG: 40 TABLET, DELAYED RELEASE ORAL at 11:38

## 2020-10-21 RX ADMIN — OXYCODONE HYDROCHLORIDE AND ACETAMINOPHEN 1 TABLET: 5; 325 TABLET ORAL at 23:53

## 2020-10-21 RX ADMIN — CYCLOBENZAPRINE 10 MG: 10 TABLET, FILM COATED ORAL at 05:49

## 2020-10-21 RX ADMIN — MORPHINE SULFATE 4 MG: 4 INJECTION, SOLUTION INTRAMUSCULAR; INTRAVENOUS at 22:30

## 2020-10-21 RX ADMIN — CYCLOBENZAPRINE 10 MG: 10 TABLET, FILM COATED ORAL at 21:32

## 2020-10-21 RX ADMIN — HYDROXYZINE PAMOATE 25 MG: 25 CAPSULE ORAL at 05:49

## 2020-10-21 RX ADMIN — HYDROXYZINE PAMOATE 25 MG: 25 CAPSULE ORAL at 23:56

## 2020-10-21 ASSESSMENT — PAIN DESCRIPTION - PAIN TYPE
TYPE_2: ACUTE PAIN
TYPE: CHRONIC PAIN
TYPE: CHRONIC PAIN

## 2020-10-21 ASSESSMENT — PAIN DESCRIPTION - PROGRESSION

## 2020-10-21 ASSESSMENT — PAIN SCALES - GENERAL
PAINLEVEL_OUTOF10: 6
PAINLEVEL_OUTOF10: 8
PAINLEVEL_OUTOF10: 9
PAINLEVEL_OUTOF10: 8
PAINLEVEL_OUTOF10: 5
PAINLEVEL_OUTOF10: 9
PAINLEVEL_OUTOF10: 9
PAINLEVEL_OUTOF10: 6
PAINLEVEL_OUTOF10: 8

## 2020-10-21 ASSESSMENT — PAIN - FUNCTIONAL ASSESSMENT: PAIN_FUNCTIONAL_ASSESSMENT: PREVENTS OR INTERFERES SOME ACTIVE ACTIVITIES AND ADLS

## 2020-10-21 ASSESSMENT — PAIN DESCRIPTION - FREQUENCY: FREQUENCY: CONTINUOUS

## 2020-10-21 ASSESSMENT — PAIN DESCRIPTION - DESCRIPTORS: DESCRIPTORS: ACHING

## 2020-10-21 ASSESSMENT — PAIN DESCRIPTION - INTENSITY: RATING_2: 5

## 2020-10-21 ASSESSMENT — PAIN DESCRIPTION - LOCATION
LOCATION: BACK
LOCATION: BACK
LOCATION_2: CHEST

## 2020-10-21 NOTE — PROGRESS NOTES
OARRx reviewed    NO active long term pain medications listed including suboxone. Will only rx for PRN      Dr. Kirk Ontiveros.  [facility]  Internal Medicine Hospitalist  [facility]  10/21/2020 10:53 AM

## 2020-10-21 NOTE — H&P
Procedure Laterality Date     SECTION  , 2007    x 2    CHOLECYSTECTOMY      HYSTERECTOMY  2008    MARLIN    INCISION AND DRAINAGE Right 2020    RIGHT UPPER THIGH INCISION AND DRAINAGE performed by oBnny Tavares MD at [address]  2013    lap sera     Family History   Problem Relation Age of Onset    Obesity Brother     Mental Illness Maternal Aunt     COPD Paternal Grandmother     Dementia Paternal Grandfather     Depression Son     Mental Illness Son         Bipole, ADHD, Adjustment disorder    Colon Cancer Neg Hx      Family Hx of HTN  Family Hx as reviewed above, otherwise non-contributory  Social History     Socioeconomic History    Marital status: Single     Spouse name: Not on file    Number of children: 2    Years of education: Not on file    Highest education level: Not on file   Occupational History    Occupation:    Social Needs    Financial resource strain: Not on file    Food insecurity     Worry: Not on file     Inability: Not on file   [employer] needs     Medical: Not on file     Non-medical: Not on file   Tobacco Use    Smoking status: Former Smoker     Packs/day: 0.50     Years: 12.00     Pack years: 6.00     Types: Cigarettes    Smokeless tobacco: Never Used   Substance and Sexual Activity    Alcohol use: No    Drug use: Not Currently     Types: IV, Cocaine, Opiates      Comment: pt denies any drug use    Sexual activity: Yes     Partners: Male   Lifestyle    Physical activity     Days per week: Not on file     Minutes per session: Not on file    Stress: Not on file   Relationships    Social connections     Talks on phone: Not on file     Gets together: Not on file     Attends Christian service: Not on file     Active member of club or organization: Not on file     Attends meetings of clubs or organizations: Not on file     Relationship status: Not on file    Intimate partner violence     Fear of current PHYSICAL EXAM     Wt Readings from Last 3 Encounters:   10/21/20 181 lb (82.1 kg)   09/17/20 200 lb (90.7 kg)   09/12/20 165 lb (74.8 kg)       Blood pressure 129/83, pulse 102, temperature 98.3 °F (36.8 °C), temperature source Oral, resp. rate 18, height 5' 5\" (1.651 m), weight 181 lb (82.1 kg), SpO2 95 %, not currently breastfeeding. General - AAO x 3  Psych - Appropriate affect/speech. No agitation  Eyes - Eye lids intact. No scleral icterus  ENT - Lips wnl. External ear clear/dry/intact. No thyromegaly on inspection  Neuro - No gross peripheral or central neuro deficits on inspection  Heart - Sinus. RRR. S1 and S2 present. +HS/murmurs appreciated. No elevated JVD appreciated. Nonpitting bilateral ankle edema. Lung - Adequate air entry b/l, EUNICE UL crackles appreciated. No wheezing  GI - Soft. No guarding/rigidity. No hepatosplenomegaly/ascites. BS+   - No CVA/suprapubic tenderness or palpable bladder distension  Skin - Intact. No rash/petechiae/ecchymosis. Warm extremities  MSK - Joints with normal ROM. LABS AND IMAGING   CBC  [unfilled]    Last 3 Hemoglobin  Lab Results   Component Value Date    HGB 7.7 09/16/2020    HGB 6.8 09/15/2020    HGB 9.3 09/13/2020     Last 3 WBC/ANC  Lab Results   Component Value Date    WBC 4.4 09/16/2020    WBC 5.5 09/15/2020    WBC 8.5 09/13/2020     No components found for: GRNLOCTYABS  Last 3 Platelets  No results found for: PLATELET  Chemistry  [unfilled]  [unfilled]  No results found for: LDH  Coagulation Studies  Lab Results   Component Value Date    INR 1.15 08/06/2020     Liver Function Studies  Lab Results   Component Value Date    ALT 7 09/13/2020    AST 13 09/13/2020    ALKPHOS 70 09/13/2020       Recent Imaging        Relevant labs and imaging reviewed    ASSESSMENT AND PLAN     Ms. Ilia Kearns is a 72-year-old female who presents with complaints of recurrent mid back and midsternal pain. A/P as follows;     Intractable back pain likely 2/2 osteomyelitis with recurrent discitis T3-4 and septic arthritis L3-4. Midsternal chest pain likely due to endocarditis. No SOB. -Was admitted at [facility] for MRSA bacteremia and endocarditis of tricuspid valve. Discharged 9/28 to SNF on daptomycin 900 mg every 24 hours until 10/14. She left AMA on arrival to SNF.  -Resume daptomycin  -ID consulted  -Repeat cultures  -RAFI summary  on 8/6:Summary   Central line catheter tip visualized terminating within the right atrium. Vegetation noted above the tricuspid valve measuring 1.35 cm x 1. 25 cm; it   appears to be attached to the interatrial septum or tricuspid annulus   (septal leaflet origin). There was no thrombus noted in the left atrial appendage. Negative bubble study. No PFO or ASD noted. -Pain control  -CBC, CMP, D-dimer, pending  -Encouraged to wear back brace when out of bed as recommended by Ortho from [facility]. Opiate use disorder. On Suboxone 8 mg twice daily. States she ran out of her pills and could not afford to go for refill. Reports relapsing yesterday 10/20 on fentanyl (snorts). -Resume Suboxone  -Lidocaine patch to back  -She states she goes to [facility] addiction treatment center and plans to return post discharge. Nicotine dependence. Advised on smoking cessation.   -Objected nicotine patch.     GRAEME-vistaril      -DVT Prophylaxis: lovenox    -GI Prophylaxis: protonix        Case d/w  Attending Dr. Sariah Moreno, CNP  10/21/2020 at 4:56 AM

## 2020-10-21 NOTE — PROGRESS NOTES
Pt direct admission from [facility].  Chronic back pain and some chest pain. Pt has hx of drug use and hx of endocarditis. Refused hospital gown at this time, otherwise cooperative. Skin assessment done by 2 nurses; old abscess scab on left lower leg and several abscess scars on bilateral legs.

## 2020-10-21 NOTE — PROGRESS NOTES
Hospitalist Progress Note      Name:  Kailee Pickens /Age/Sex: 1980  (47 y.o. female)   MRN & CSN:  [id number] & [id number] Admission Date/Time: 10/21/2020  4:28 AM   Location:  1105/[facility]-A PCP: Dipak Nugent Day: 1    Hospital Course:   Kailee Pickens is a 47 y.o. female who presented with neck pain, found to have osteomyelitis which was confirmed at [address] [place] [address] several weeks ago. She has been noncompliant with therapy. Assessment and Plan:      Acute osteomyelitis, confirmed on MRI at [facility] Nonadherence to IV antibiotics for osteomyelitis infection   Recent drug use including fentanyl 2 days prior to admission. Admits to snorting   Long history of IV drug use   Chronic pain syndrome, chronic lumbago, does not see pain management   Reports chronic hepatitis   History of tobacco abuse    Restart Cubicin per [facility] discharge instructions since she left AMA from the nursing home; this is not a drug failure situation  OARRS checked-she is not on chronic Suboxone and so this medication be continued  Will give IV morphine and muscle relaxers  PICC line to be placed  Will be discharged to nursing home for continued antibiotic therapy x12 weeks. Added cows protocol for opiate withdrawal   Ordered Covid test for plan discharge to nursing home    Diet DIET GENERAL;   DVT Prophylaxis [] Lovenox, []  Heparin, [] SCDs, []No VTE prophylaxis, patient ambulating   GI Prophylaxis [] PPI, [] H2 Blocker, [] No GI prophylaxis, patient is receiving diet/Tube Feeds   Code Status Full Code   Disposition Patient requires continued admission due to IV antibiotics    Discharge to nursing home as soon as possible.    MDM [] Low, [] Moderate,[x]  High  Patient's risk as above due to     [] One or more chronic illnesses with severe exacerbation or progression    [] Acute or chronic illnesses or injuries that pose a threat to life or bodily function    [] An abrupt change in neurological status    [] Decision not to resuscitate     [x] Drug therapy requiring intensive monitoring for toxicity      Subjective:   Chronic lumbago,  Pt S&E. She is focused on her back and chest pain. She states she did not know her spine was infected. She states she left the nursing home after her discharge from [facility] because they did not have her Suboxone. She has subsequently been buying Suboxone off the street however 2 days ago she bought fentanyl and snorted it. She states that due to her chronic lower back pain. 10-14 point ROS reviewed negative, unless as noted above    Objective:   No intake or output data in the 24 hours ending 10/21/20 0936   Vitals:   Vitals:    10/21/20 0900   BP: (!) 148/93   Pulse: 94   Resp: 17   Temp: 97.7 °F (36.5 °C)   SpO2: 100%     Physical Exam:    GEN Awake female, laying in bed in no apparent distress. Appears given age. EYES Pupils are equally round. No scleral discharge  HENT Atraumatic and symmetric head  NECK No apparent thyromegaly  RESP Symmetric chest movement while on room air. CARDIO/VASC Peripheral pulses equal bilaterally and palpable. No peripheral edema. GI Abdomen is not distended. Rectal exam deferred.  Angel catheter is not present. HEME/LYMPH No petechiae or ecchymoses. MSK Spontaneous movement of BL upper extremities  SKIN Normal coloration, warm, dry. Sores on bilateral lower extremities. NEURO Cranial nerves appear grossly intact  PSYCH Awake, alert. Oriented x4.   Anxious     Medications:   Medications:    sodium chloride flush  10 mL Intravenous 2 times per day    enoxaparin  40 mg Subcutaneous Daily    daptomycin (CUBICIN) IVPB  900 mg Intravenous Q24H    buprenorphine-naloxone  1 Film Sublingual BID    lidocaine  1 patch Transdermal Daily    pantoprazole  40 mg Oral QAM AC      Infusions:   PRN Meds: sodium chloride flush, 10 mL, PRN  acetaminophen, 650 mg, Q6H PRN    Or  acetaminophen, 650 mg, Q6H PRN  polyethylene glycol, 17 g, Daily PRN  promethazine, 12.5

## 2020-10-21 NOTE — CONSULTS
Infectious Disease Consult Note  10/21/2020   Patient Name: Sinai Marte : 1980   Impression   Chronic OM/Discitis T3-T4  · Native TV MRSA Endocarditis  · Afebrile  · Blood cultures 10/21-pending  · COVID-19 10/21-Not Detected     IVDU  · Liver Disease  · Hepatitis C  · Tobacco Abuse   Multi-morbidity: per PMHx:  Chronic Hep C, GRAEME, OM and recurrent discitis T3-T4, septic arthritis L3-L4, TV MRSA IE, hyster, choley  Plan:   Continue daptomycin, dose will be adjusted by pharmacy according to past dosing per [facility]   Trend Pct and CRP  · Check ESR  · Obtain CXR in am to evaluate any new changes   · If biomarkers are elevated, will obtain MRI/imaging   · Await blood cultures from 10/21  · Baseline CK in am    Thank you for allowing me to consult in the care of this patient.  ------------------------  REASON FOR CONSULT: Infective syndrome     Requested by: LEIGHTON Boyle    HPI:Patient is a 47 y.o.  female who was admitted 10/21/2020 for further evaluation and management of chronic right chest/clavicular pain. States she was DC'd from [facility] on 20 after being treated for OM/discitis and MRSA endocarditis. She was then [facility] to a SNF for IV ABX therapy continuation of daptomycin. States she did not receive her Suboxone pain medication at the SNF so she left AMA. She then used heroine a few times as well as methamphetamine. States she then [place] [facility] [place] due to complaints of pain in her right chest, shoulder, and clavicular areas. States the pain has been ongoing for a few weeks, but has been worsening. States now she is willing to be treated with IV ABX therapy and placed back in a SNF to complete treatment. ?  Infectious diseases service was consulted to evaluate the pt, and recommend further investigative and therapeutic measures.     ROS: Other systems reviewed Including eyes, ENT, respiratory, cardiovascular, GI, , dermatologic, neurologic, psych, hem/lymphatic, musculoskeletal and endocrine were negative other than what is mentioned above. 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    MRSA bacteremia 2020    Amphetamine user ([facility] 2020    Chronic hepatitis C without hepatic coma ([facility] 2020    Acute septic pulmonary embolism without acute cor pulmonale ([facility] 2020    CCC (chronic calculous cholecystitis) 2013     Past Medical History:   Diagnosis Date    Liver disease     hepatitis    No significant medical problems       Past Surgical History:   Procedure Laterality Date     SECTION  , 2007    x 2    CHOLECYSTECTOMY      HYSTERECTOMY  2008    MARLIN    INCISION AND DRAINAGE Right 2020    RIGHT UPPER THIGH INCISION AND DRAINAGE performed by Socrates Méndez MD at [place] [address]  07 03 2013    lap sera      Family History   Problem Relation Age of Onset    Obesity Brother     Mental Illness Maternal Aunt     COPD Paternal Grandmother     Dementia Paternal Grandfather     Depression Son     Mental Illness Son         Bipole, ADHD, Adjustment disorder    Colon Cancer Neg Hx       Infectious disease related family history - not contibutory. SOCIAL HISTORY  Social History     Tobacco Use    Smoking status: Former Smoker     Packs/day: 0.50     Years: 12.00     Pack years: 6.00     Types: Cigarettes    Smokeless tobacco: Never Used   Substance Use Topics    Alcohol use: No       Born:[place], [place]   Lives:[place] with boyfriend   Occupation:Unemployed   No recent travel of significance.  No recent unusual exposures.  Pets: one dog  ?   ALLERGIES  No Known Allergies   MEDICATIONS  Reviewed and are per the chart/EMR. ? Antibiotics:   Present:  daptomycin 10/21-    Past:   daptomycin -last dose was 9/28 at [facility]  -------------------------------------------------------------------------------------------------------------------    Vital Signs:  Vitals:    10/21/20 0900   BP: (!) 148/93   Pulse: 94   Resp: 17   Temp: 97.7 °F (36.5 °C)   SpO2: 100%         Exam:    VS: noted; wt 181 lb (82.1 kg) 5'5\" Height  Gen: alert and oriented X3, no distress  Skin: no stigmata of endocarditis  Wounds: C/D/I  HEMT: AT/NC Oropharynx pink, moist, and without lesions or exudates; dentition in good state of repair  Eyes: PERRLA, EOMI, conjunctiva pink, sclera anicteric. Neck: Supple. Trachea midline. No LAD. Chest: no distress and CTA. Good air movement. Heart: RRR and no MRG. Murmur  Abd: soft, non-distended, no tenderness, no hepatomegaly. Normoactive bowel sounds. Ext: no clubbing, cyanosis, BLE +1 pitting edema. Neuro: Mental status intact. CN 2-12 intact and no focal sensory or motor deficits    ? Diagnostic Studies: reviewed  ? ? I have examined this patient and available medical records on this date and have made the above observations, conclusions and recommendations. Electronically signed by: Electronically signed by LEIGHTON Cruz CNP on 10/21/2020 at 9:16 AM

## 2020-10-22 ENCOUNTER — APPOINTMENT (OUTPATIENT)
Dept: GENERAL RADIOLOGY | Age: 40
DRG: 344 | End: 2020-10-22
Attending: INTERNAL MEDICINE
Payer: MEDICAID

## 2020-10-22 VITALS
RESPIRATION RATE: 20 BRPM | BODY MASS INDEX: 30.16 KG/M2 | WEIGHT: 181 LBS | HEIGHT: 65 IN | HEART RATE: 98 BPM | DIASTOLIC BLOOD PRESSURE: 89 MMHG | SYSTOLIC BLOOD PRESSURE: 142 MMHG | OXYGEN SATURATION: 96 % | TEMPERATURE: 98 F

## 2020-10-22 LAB
ALBUMIN SERPL-MCNC: 3.2 GM/DL (ref 3.4–5)
ALP BLD-CCNC: 68 IU/L (ref 40–128)
ALT SERPL-CCNC: <5 U/L (ref 10–40)
ANION GAP SERPL CALCULATED.3IONS-SCNC: 12 MMOL/L (ref 4–16)
AST SERPL-CCNC: 7 IU/L (ref 15–37)
BASOPHILS ABSOLUTE: 0 K/CU MM
BASOPHILS RELATIVE PERCENT: 0.4 % (ref 0–1)
BILIRUB SERPL-MCNC: 0.2 MG/DL (ref 0–1)
BUN BLDV-MCNC: 12 MG/DL (ref 6–23)
CALCIUM SERPL-MCNC: 9.2 MG/DL (ref 8.3–10.6)
CHLORIDE BLD-SCNC: 102 MMOL/L (ref 99–110)
CO2: 26 MMOL/L (ref 21–32)
CREAT SERPL-MCNC: 0.9 MG/DL (ref 0.6–1.1)
DIFFERENTIAL TYPE: ABNORMAL
EOSINOPHILS ABSOLUTE: 0.1 K/CU MM
EOSINOPHILS RELATIVE PERCENT: 2.6 % (ref 0–3)
GFR AFRICAN AMERICAN: >60 ML/MIN/1.73M2
GFR NON-AFRICAN AMERICAN: >60 ML/MIN/1.73M2
GLUCOSE BLD-MCNC: 126 MG/DL (ref 70–99)
HCT VFR BLD CALC: 29.2 % (ref 37–47)
HEMOGLOBIN: 8.9 GM/DL (ref 12.5–16)
HIGH SENSITIVE C-REACTIVE PROTEIN: 60.9 MG/L
IMMATURE NEUTROPHIL %: 0.4 % (ref 0–0.43)
LYMPHOCYTES ABSOLUTE: 1.5 K/CU MM
LYMPHOCYTES RELATIVE PERCENT: 29.1 % (ref 24–44)
MCH RBC QN AUTO: 28.4 PG (ref 27–31)
MCHC RBC AUTO-ENTMCNC: 30.5 % (ref 32–36)
MCV RBC AUTO: 93.3 FL (ref 78–100)
MONOCYTES ABSOLUTE: 0.5 K/CU MM
MONOCYTES RELATIVE PERCENT: 9.1 % (ref 0–4)
NUCLEATED RBC %: 0 %
PDW BLD-RTO: 13.7 % (ref 11.7–14.9)
PLATELET # BLD: 264 K/CU MM (ref 140–440)
PMV BLD AUTO: 9.7 FL (ref 7.5–11.1)
POTASSIUM SERPL-SCNC: 4 MMOL/L (ref 3.5–5.1)
PROCALCITONIN: 0.05
RBC # BLD: 3.13 M/CU MM (ref 4.2–5.4)
SEGMENTED NEUTROPHILS ABSOLUTE COUNT: 3 K/CU MM
SEGMENTED NEUTROPHILS RELATIVE PERCENT: 58.4 % (ref 36–66)
SODIUM BLD-SCNC: 140 MMOL/L (ref 135–145)
TOTAL CK: 39 IU/L (ref 26–140)
TOTAL IMMATURE NEUTOROPHIL: 0.02 K/CU MM
TOTAL NUCLEATED RBC: 0 K/CU MM
TOTAL PROTEIN: 7.1 GM/DL (ref 6.4–8.2)
WBC # BLD: 5.1 K/CU MM (ref 4–10.5)

## 2020-10-22 PROCEDURE — 85025 COMPLETE CBC W/AUTO DIFF WBC: CPT

## 2020-10-22 PROCEDURE — 80053 COMPREHEN METABOLIC PANEL: CPT

## 2020-10-22 PROCEDURE — 82550 ASSAY OF CK (CPK): CPT

## 2020-10-22 PROCEDURE — 84145 PROCALCITONIN (PCT): CPT

## 2020-10-22 PROCEDURE — 94761 N-INVAS EAR/PLS OXIMETRY MLT: CPT

## 2020-10-22 PROCEDURE — 86141 C-REACTIVE PROTEIN HS: CPT

## 2020-10-22 PROCEDURE — 2580000003 HC RX 258: Performed by: STUDENT IN AN ORGANIZED HEALTH CARE EDUCATION/TRAINING PROGRAM

## 2020-10-22 PROCEDURE — 71045 X-RAY EXAM CHEST 1 VIEW: CPT

## 2020-10-22 PROCEDURE — G0378 HOSPITAL OBSERVATION PER HR: HCPCS

## 2020-10-22 PROCEDURE — 99233 SBSQ HOSP IP/OBS HIGH 50: CPT | Performed by: NURSE PRACTITIONER

## 2020-10-22 PROCEDURE — 96366 THER/PROPH/DIAG IV INF ADDON: CPT

## 2020-10-22 PROCEDURE — 6370000000 HC RX 637 (ALT 250 FOR IP): Performed by: NURSE PRACTITIONER

## 2020-10-22 PROCEDURE — 6360000002 HC RX W HCPCS: Performed by: NURSE PRACTITIONER

## 2020-10-22 PROCEDURE — 2580000003 HC RX 258: Performed by: NURSE PRACTITIONER

## 2020-10-22 PROCEDURE — 96372 THER/PROPH/DIAG INJ SC/IM: CPT

## 2020-10-22 PROCEDURE — 36415 COLL VENOUS BLD VENIPUNCTURE: CPT

## 2020-10-22 PROCEDURE — 6370000000 HC RX 637 (ALT 250 FOR IP): Performed by: STUDENT IN AN ORGANIZED HEALTH CARE EDUCATION/TRAINING PROGRAM

## 2020-10-22 PROCEDURE — 96376 TX/PRO/DX INJ SAME DRUG ADON: CPT

## 2020-10-22 RX ORDER — HYDROXYZINE PAMOATE 25 MG/1
25 CAPSULE ORAL 3 TIMES DAILY PRN
Status: DISCONTINUED | OUTPATIENT
Start: 2020-10-22 | End: 2020-10-22 | Stop reason: HOSPADM

## 2020-10-22 RX ORDER — FENTANYL 12 UG/H
1 PATCH TRANSDERMAL
Status: DISCONTINUED | OUTPATIENT
Start: 2020-10-22 | End: 2020-10-22 | Stop reason: HOSPADM

## 2020-10-22 RX ORDER — OXYCODONE HYDROCHLORIDE 5 MG/1
5 TABLET ORAL EVERY 4 HOURS PRN
Status: DISCONTINUED | OUTPATIENT
Start: 2020-10-22 | End: 2020-10-22 | Stop reason: HOSPADM

## 2020-10-22 RX ORDER — CYCLOBENZAPRINE HCL 10 MG
10 TABLET ORAL 3 TIMES DAILY
Status: DISCONTINUED | OUTPATIENT
Start: 2020-10-22 | End: 2020-10-22 | Stop reason: HOSPADM

## 2020-10-22 RX ADMIN — CYCLOBENZAPRINE 10 MG: 10 TABLET, FILM COATED ORAL at 11:36

## 2020-10-22 RX ADMIN — OXYCODONE HYDROCHLORIDE 5 MG: 5 TABLET ORAL at 15:33

## 2020-10-22 RX ADMIN — SODIUM CHLORIDE, PRESERVATIVE FREE 10 ML: 5 INJECTION INTRAVENOUS at 07:23

## 2020-10-22 RX ADMIN — MORPHINE SULFATE 4 MG: 4 INJECTION, SOLUTION INTRAMUSCULAR; INTRAVENOUS at 02:51

## 2020-10-22 RX ADMIN — SODIUM CHLORIDE 700 MG: 9 INJECTION, SOLUTION INTRAVENOUS at 13:41

## 2020-10-22 RX ADMIN — MORPHINE SULFATE 4 MG: 4 INJECTION, SOLUTION INTRAMUSCULAR; INTRAVENOUS at 07:22

## 2020-10-22 RX ADMIN — SODIUM CHLORIDE, PRESERVATIVE FREE 10 ML: 5 INJECTION INTRAVENOUS at 11:38

## 2020-10-22 RX ADMIN — HYDROXYZINE PAMOATE 25 MG: 25 CAPSULE ORAL at 13:41

## 2020-10-22 RX ADMIN — PANTOPRAZOLE SODIUM 40 MG: 40 TABLET, DELAYED RELEASE ORAL at 07:23

## 2020-10-22 RX ADMIN — MORPHINE SULFATE 4 MG: 4 INJECTION, SOLUTION INTRAMUSCULAR; INTRAVENOUS at 11:36

## 2020-10-22 RX ADMIN — ENOXAPARIN SODIUM 40 MG: 40 INJECTION SUBCUTANEOUS at 11:40

## 2020-10-22 ASSESSMENT — PAIN SCALES - GENERAL
PAINLEVEL_OUTOF10: 9
PAINLEVEL_OUTOF10: 4
PAINLEVEL_OUTOF10: 10
PAINLEVEL_OUTOF10: 6
PAINLEVEL_OUTOF10: 8
PAINLEVEL_OUTOF10: 8

## 2020-10-22 ASSESSMENT — PAIN DESCRIPTION - PROGRESSION

## 2020-10-22 NOTE — CARE COORDINATION
Pt has recent history of drug use and will be on IV antibiotics for extended period of time. Referral made to [place] [id number] for possible placement when medically stable. Marbella Montoya at [facility] is reviewing Pt if they are able to accept. CM following.

## 2020-10-22 NOTE — PROGRESS NOTES
Patient is complaining of chest pain. Spoke with  Frida,Hospitalist. Ordered to have morning labs draw now. Notified lab. Left a message at phlebotomist phone.

## 2020-10-22 NOTE — PROGRESS NOTES
Infectious Disease Progress Note  10/22/2020   Patient Name: Tariq Cole : 1980   Impression  · Chronic OM/Discitis T3-T4  ? Native TV MRSA Endocarditis  § Afebrile  § Blood cultures 10/21-02-NGTD  § COVID-19 10/21-Not Detected  § CK baseline 39  § 10/22-CXR:  Pulmonary vascular congestion. Low lung volumes. Improvement of the previously noted nodular opacities.      · IVDU  ? Liver Disease  ? Hepatitis C  ? Tobacco Abuse  · Multi-morbidity: per PMHx:  Chronic Hep C, GRAEME, OM and recurrent discitis T3-T4, septic arthritis L3-L4, TV MRSA IE, hyster, choley  Plan:  · Continue daptomycin, dose will be adjusted by pharmacy according to past dosing per [facility]  · Trend Pct and CRP, trended down  ? Reviewed CXR   ? If biomarkers become elevated, will obtain MRI/imaging   ? Reviewed TTE and MRI from [facility], placed below under imaging  ? Plans for DC, for SNF and PICC placement with daptomycin       Ongoing Antimicrobial Therapy  daptomycin 10/21-? Completed Antimicrobial Therapy  daptomycin -last dose was  at [facility]  ? History:? Interval history noted. Chief complaint:   Chronic OM/Discitis T3-T4, native TV MRSA endocarditis. Denies n/v/d/f or untoward effects of antibiotics. Physical Exam:  Vital Signs: BP (!) 142/89   Pulse 98   Temp 98 °F (36.7 °C) (Oral)   Resp 20   Ht 5' 5\" (1.651 m)   Wt 181 lb (82.1 kg)   SpO2 96%   BMI 30.12 kg/m²     Gen: alert and oriented X3, no distress  Skin: no stigmata of endocarditis  Wounds: C/D/I  HEMT: AT/NC Oropharynx pink, moist, and without lesions or exudates; dentition in good state of repair  Eyes: PERRLA, EOMI, conjunctiva pink, sclera anicteric. Neck: Supple. Trachea midline. No LAD. Chest: no distress and CTA. Good air movement. Heart: RRR and no MRG. Murmur  Abd: soft, non-distended, no tenderness, no hepatomegaly. Normoactive bowel sounds. Ext: no clubbing, cyanosis, BLE +1 pitting edema. Neuro: Mental status intact.  CN 2-12 intact and no focal Result Value Ref Range    Procalcitonin 0.053    Sedimentation Rate    Collection Time: 10/21/20  7:49 PM   Result Value Ref Range    Sed Rate 123 (H) 0 - 20 MM/HR   CBC Auto Differential    Collection Time: 10/21/20  7:49 PM   Result Value Ref Range    WBC 7.3 4.0 - 10.5 K/CU MM    RBC 3.26 (L) 4.2 - 5.4 M/CU MM    Hemoglobin 9.6 (L) 12.5 - 16.0 GM/DL    Hematocrit 30.0 (L) 37 - 47 %    MCV 92.0 78 - 100 FL    MCH 29.4 27 - 31 PG    MCHC 32.0 32.0 - 36.0 %    RDW 13.6 11.7 - 14.9 %    Platelets 493 381 - 169 K/CU MM    MPV 9.0 7.5 - 11.1 FL    Differential Type AUTOMATED DIFFERENTIAL     Segs Relative 67.0 (H) 36 - 66 %    Lymphocytes % 23.9 (L) 24 - 44 %    Monocytes % 6.4 (H) 0 - 4 %    Eosinophils % 2.3 0 - 3 %    Basophils % 0.3 0 - 1 %    Segs Absolute 4.9 K/CU MM    Lymphocytes Absolute 1.8 K/CU MM    Monocytes Absolute 0.5 K/CU MM    Eosinophils Absolute 0.2 K/CU MM    Basophils Absolute 0.0 K/CU MM    Nucleated RBC % 0.0 %    Total Nucleated RBC 0.0 K/CU MM    Total Immature Neutrophil 0.01 K/CU MM    Immature Neutrophil % 0.1 0 - 0.43 %   Comprehensive Metabolic Panel w/ Reflex to MG    Collection Time: 10/22/20  5:27 AM   Result Value Ref Range    Sodium 140 135 - 145 MMOL/L    Potassium 4.0 3.5 - 5.1 MMOL/L    Chloride 102 99 - 110 mMol/L    CO2 26 21 - 32 MMOL/L    BUN 12 6 - 23 MG/DL    CREATININE 0.9 0.6 - 1.1 MG/DL    Glucose 126 (H) 70 - 99 MG/DL    Calcium 9.2 8.3 - 10.6 MG/DL    Alb 3.2 (L) 3.4 - 5.0 GM/DL    Total Protein 7.1 6.4 - 8.2 GM/DL    Total Bilirubin 0.2 0.0 - 1.0 MG/DL    ALT <5 (L) 10 - 40 U/L    AST 7 (L) 15 - 37 IU/L    Alkaline Phosphatase 68 40 - 128 IU/L    GFR Non-African American >60 >60 mL/min/1.73m2    GFR African American >60 >60 mL/min/1.73m2    Anion Gap 12 4 - 16   CBC auto differential    Collection Time: 10/22/20  5:27 AM   Result Value Ref Range    WBC 5.1 4.0 - 10.5 K/CU MM    RBC 3.13 (L) 4.2 - 5.4 M/CU MM    Hemoglobin 8.9 (L) 12.5 - 16.0 GM/DL    Hematocrit 29.2 (L) 37 - 47 %    MCV 93.3 78 - 100 FL    MCH 28.4 27 - 31 PG    MCHC 30.5 (L) 32.0 - 36.0 %    RDW 13.7 11.7 - 14.9 %    Platelets 568 716 - 878 K/CU MM    MPV 9.7 7.5 - 11.1 FL    Differential Type AUTOMATED DIFFERENTIAL     Segs Relative 58.4 36 - 66 %    Lymphocytes % 29.1 24 - 44 %    Monocytes % 9.1 (H) 0 - 4 %    Eosinophils % 2.6 0 - 3 %    Basophils % 0.4 0 - 1 %    Segs Absolute 3.0 K/CU MM    Lymphocytes Absolute 1.5 K/CU MM    Monocytes Absolute 0.5 K/CU MM    Eosinophils Absolute 0.1 K/CU MM    Basophils Absolute 0.0 K/CU MM    Nucleated RBC % 0.0 %    Total Nucleated RBC 0.0 K/CU MM    Total Immature Neutrophil 0.02 K/CU MM    Immature Neutrophil % 0.4 0 - 0.43 %   C-Reactive Protein    Collection Time: 10/22/20  5:27 AM   Result Value Ref Range    CRP, High Sensitivity 60.9 mg/L   CK    Collection Time: 10/22/20  5:27 AM   Result Value Ref Range    Total CK 39 26 - 140 IU/L     CULTURE results: Invalid input(s): BLOOD CULTURE,  URINE CULTURE, SURGICAL CULTURE    Diagnosis:  Patient Active Problem List   Diagnosis    CCC (chronic calculous cholecystitis)    MRSA bacteremia    [facility] ([facility]    Chronic hepatitis C without hepatic coma ([facility]    Acute septic pulmonary embolism without acute cor pulmonale (HCC)    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 *    Electronically signed by: Electronically signed by LEIGHTON Gillespie CNP on 10/22/2020 at 8:58 AM

## 2020-10-22 NOTE — PROGRESS NOTES
Call initiated by: Nursing staff:  Michele Quispe  Call addressed around: 10/21/2020 9:34 PM      Reason for call: Patient wants morphine back      Orders placed: morphine reordered        LEIGHTON Schultz - CNP

## 2020-10-26 LAB
CULTURE: NORMAL
CULTURE: NORMAL
Lab: NORMAL
Lab: NORMAL
SPECIMEN: NORMAL
SPECIMEN: NORMAL

## 2020-10-28 NOTE — DISCHARGE SUMMARY
Discharge Summary    Name:  Ricky Phillips /Age/Sex: 1980  (47 y.o. female)   MRN & CSN:  [id number] & [id number] Admission Date/Time: 10/21/2020  4:28 AM   Attending:  No att. providers found Discharging Physician: Selam Larson DO     HPI and Hospital Course:   Ricky Phillips is a 47 y.o.  female  who presents with No chief complaint on file. · Acute osteomyelitis, confirmed on MRI at [address] [place] [address]  · Suppose to be on cubicin but keeps leaving the facilities   · Nonadherence to IV antibiotics for osteomyelitis infection  · Left AMA again  · No PICC line placed  · Recent drug use including fentanyl 2 days prior to admission. Admits to snorting  · Long history of IV drug use  · Chronic pain syndrome, chronic lumbago, does not see pain management  · Reports chronic hepatitis  · History of tobacco abuse    The patient expressed appropriate understanding of and agreement with the discharge recommendations, medications, and plan. Consults this admission:  IP CONSULT TO INFECTIOUS DISEASES    Discharge Instruction:   Follow up appointments: ID  Primary care physician:  within 2 weeks    Diet:  General/cardiac/ADA/as tolerated  Activity: {discharge activity: as tolerated  Disposition: Discharged to:   [x]Home, []C, []SNF, []Acute Rehab, []Hospice   Condition on discharge: Stable    Discharge Medications:      [facility] Medication Instructions [facility]:[id number]    Printed on:10/28/20 1128   Medication Information                      cyclobenzaprine (FLEXERIL) 10 MG tablet  Take 10 mg by mouth 3 times daily as needed for Muscle spasms                 Objective Findings at Discharge:   BP (!) 142/89   Pulse 98   Temp 98 °F (36.7 °C) (Oral)   Resp 20   Ht 5' 5\" (1.651 m)   Wt 181 lb (82.1 kg)   SpO2 96%   BMI 30.12 kg/m²            PHYSICAL EXAM - evaluated earlier in the day prior to her leaving AMA  GEN Awake female, laying in bed in no apparent distress. Appears given age.   EYES Pupils are equally round. No scleral discharge  HENT Atraumatic and symmetric head  NECK No apparent thyromegaly  RESP Symmetric chest movement while on room air. CARDIO/VASC Peripheral pulses equal bilaterally and palpable. No peripheral edema. GI Abdomen is not distended. Rectal exam deferred.  Angel catheter is not present. HEME/LYMPH No petechiae or ecchymoses. MSK Spontaneous movement of BL upper extremities  SKIN Normal coloration, warm, dry. NEURO Cranial nerves appear grossly intact  PSYCH Awake, alert. Oriented x4    BMP/CBC  No results for input(s): NA, K, CL, CO2, BUN, CREATININE, WBC, HEMOGLOBIN, HCT, PLT in the last 72 hours. Invalid input(s): GLU  SIGNIFICANT IMAGING AND LABS:  Impression:   cxr       Pulmonary vascular congestion.  Low lung volumes. Improvement of the previously noted nodular opacities.      Discharge Time of 33 minutes    Electronically signed by Daine Severe, DO on 10/28/2020 at 11:25 AM

## 2020-12-08 ENCOUNTER — APPOINTMENT (OUTPATIENT)
Dept: GENERAL RADIOLOGY | Age: 40
DRG: 344 | End: 2020-12-08
Payer: MEDICAID

## 2020-12-08 ENCOUNTER — HOSPITAL ENCOUNTER (INPATIENT)
Age: 40
LOS: 1 days | Discharge: HOME | DRG: 344 | End: 2020-12-10
Attending: EMERGENCY MEDICINE | Admitting: INTERNAL MEDICINE
Payer: MEDICAID

## 2020-12-08 PROCEDURE — 93005 ELECTROCARDIOGRAM TRACING: CPT | Performed by: PHYSICIAN ASSISTANT

## 2020-12-08 PROCEDURE — 71045 X-RAY EXAM CHEST 1 VIEW: CPT

## 2020-12-08 PROCEDURE — 99285 EMERGENCY DEPT VISIT HI MDM: CPT

## 2020-12-08 ASSESSMENT — PAIN DESCRIPTION - LOCATION: LOCATION: CHEST;BACK

## 2020-12-08 ASSESSMENT — PAIN DESCRIPTION - PAIN TYPE: TYPE: ACUTE PAIN

## 2020-12-08 ASSESSMENT — PAIN SCALES - GENERAL: PAINLEVEL_OUTOF10: 9

## 2020-12-09 ENCOUNTER — APPOINTMENT (OUTPATIENT)
Dept: CT IMAGING | Age: 40
DRG: 344 | End: 2020-12-09
Payer: MEDICAID

## 2020-12-09 PROBLEM — M54.9 BACK PAIN: Status: ACTIVE | Noted: 2020-12-09

## 2020-12-09 LAB
ALBUMIN SERPL-MCNC: 2.7 GM/DL (ref 3.4–5)
ALP BLD-CCNC: 82 IU/L (ref 40–129)
ALT SERPL-CCNC: <5 U/L (ref 10–40)
ANION GAP SERPL CALCULATED.3IONS-SCNC: 13 MMOL/L (ref 4–16)
APTT: 49.4 SECONDS (ref 25.1–37.1)
AST SERPL-CCNC: 7 IU/L (ref 15–37)
BASOPHILS ABSOLUTE: 0 K/CU MM
BASOPHILS RELATIVE PERCENT: 0.5 % (ref 0–1)
BILIRUB SERPL-MCNC: 0.3 MG/DL (ref 0–1)
BUN BLDV-MCNC: 17 MG/DL (ref 6–23)
CALCIUM SERPL-MCNC: 9.3 MG/DL (ref 8.3–10.6)
CHLORIDE BLD-SCNC: 92 MMOL/L (ref 99–110)
CO2: 25 MMOL/L (ref 21–32)
CREAT SERPL-MCNC: 0.7 MG/DL (ref 0.6–1.1)
D DIMER: 910 NG/ML(DDU)
DIFFERENTIAL TYPE: ABNORMAL
EOSINOPHILS ABSOLUTE: 0.2 K/CU MM
EOSINOPHILS RELATIVE PERCENT: 2.4 % (ref 0–3)
GFR AFRICAN AMERICAN: >60 ML/MIN/1.73M2
GFR NON-AFRICAN AMERICAN: >60 ML/MIN/1.73M2
GLUCOSE BLD-MCNC: 92 MG/DL (ref 70–99)
HCT VFR BLD CALC: 34.3 % (ref 37–47)
HEMOGLOBIN: 10.1 GM/DL (ref 12.5–16)
IMMATURE NEUTROPHIL %: 0.1 % (ref 0–0.43)
INR BLD: 1.15 INDEX
LACTATE: 1 MMOL/L (ref 0.4–2)
LIPASE: 13 IU/L (ref 13–60)
LYMPHOCYTES ABSOLUTE: 2.2 K/CU MM
LYMPHOCYTES RELATIVE PERCENT: 28.1 % (ref 24–44)
MCH RBC QN AUTO: 28.5 PG (ref 27–31)
MCHC RBC AUTO-ENTMCNC: 29.4 % (ref 32–36)
MCV RBC AUTO: 96.6 FL (ref 78–100)
MONOCYTES ABSOLUTE: 0.6 K/CU MM
MONOCYTES RELATIVE PERCENT: 7.8 % (ref 0–4)
NUCLEATED RBC %: 0 %
PDW BLD-RTO: 13.4 % (ref 11.7–14.9)
PLATELET # BLD: 325 K/CU MM (ref 140–440)
PMV BLD AUTO: 9.5 FL (ref 7.5–11.1)
POTASSIUM SERPL-SCNC: 4 MMOL/L (ref 3.5–5.1)
PROTHROMBIN TIME: 13.9 SECONDS (ref 11.7–14.5)
RBC # BLD: 3.55 M/CU MM (ref 4.2–5.4)
SEGMENTED NEUTROPHILS ABSOLUTE COUNT: 4.8 K/CU MM
SEGMENTED NEUTROPHILS RELATIVE PERCENT: 61.1 % (ref 36–66)
SODIUM BLD-SCNC: 130 MMOL/L (ref 135–145)
TOTAL IMMATURE NEUTOROPHIL: 0.01 K/CU MM
TOTAL NUCLEATED RBC: 0 K/CU MM
TOTAL PROTEIN: 9 GM/DL (ref 6.4–8.2)
TROPONIN T: <0.01 NG/ML
WBC # BLD: 7.9 K/CU MM (ref 4–10.5)

## 2020-12-09 PROCEDURE — 85730 THROMBOPLASTIN TIME PARTIAL: CPT

## 2020-12-09 PROCEDURE — 36410 VNPNXR 3YR/> PHY/QHP DX/THER: CPT

## 2020-12-09 PROCEDURE — 96372 THER/PROPH/DIAG INJ SC/IM: CPT

## 2020-12-09 PROCEDURE — 83690 ASSAY OF LIPASE: CPT

## 2020-12-09 PROCEDURE — 6360000002 HC RX W HCPCS: Performed by: INTERNAL MEDICINE

## 2020-12-09 PROCEDURE — 93308 TTE F-UP OR LMTD: CPT

## 2020-12-09 PROCEDURE — 1200000000 HC SEMI PRIVATE

## 2020-12-09 PROCEDURE — 2580000003 HC RX 258: Performed by: INTERNAL MEDICINE

## 2020-12-09 PROCEDURE — 76937 US GUIDE VASCULAR ACCESS: CPT

## 2020-12-09 PROCEDURE — 85379 FIBRIN DEGRADATION QUANT: CPT

## 2020-12-09 PROCEDURE — 6360000002 HC RX W HCPCS: Performed by: EMERGENCY MEDICINE

## 2020-12-09 PROCEDURE — 80053 COMPREHEN METABOLIC PANEL: CPT

## 2020-12-09 PROCEDURE — 85610 PROTHROMBIN TIME: CPT

## 2020-12-09 PROCEDURE — 6370000000 HC RX 637 (ALT 250 FOR IP): Performed by: INTERNAL MEDICINE

## 2020-12-09 PROCEDURE — 02HV33Z INSERTION OF INFUSION DEVICE INTO SUPERIOR VENA CAVA, PERCUTANEOUS APPROACH: ICD-10-PCS | Performed by: INTERNAL MEDICINE

## 2020-12-09 PROCEDURE — 2580000003 HC RX 258: Performed by: EMERGENCY MEDICINE

## 2020-12-09 PROCEDURE — 6360000004 HC RX CONTRAST MEDICATION: Performed by: EMERGENCY MEDICINE

## 2020-12-09 PROCEDURE — 87040 BLOOD CULTURE FOR BACTERIA: CPT

## 2020-12-09 PROCEDURE — 93010 ELECTROCARDIOGRAM REPORT: CPT | Performed by: INTERNAL MEDICINE

## 2020-12-09 PROCEDURE — C1751 CATH, INF, PER/CENT/MIDLINE: HCPCS

## 2020-12-09 PROCEDURE — 71275 CT ANGIOGRAPHY CHEST: CPT

## 2020-12-09 PROCEDURE — 84484 ASSAY OF TROPONIN QUANT: CPT

## 2020-12-09 PROCEDURE — 83605 ASSAY OF LACTIC ACID: CPT

## 2020-12-09 PROCEDURE — 94761 N-INVAS EAR/PLS OXIMETRY MLT: CPT

## 2020-12-09 PROCEDURE — 85025 COMPLETE CBC W/AUTO DIFF WBC: CPT

## 2020-12-09 RX ORDER — SODIUM CHLORIDE 0.9 % (FLUSH) 0.9 %
10 SYRINGE (ML) INJECTION 2 TIMES DAILY
Status: DISCONTINUED | OUTPATIENT
Start: 2020-12-09 | End: 2020-12-10 | Stop reason: HOSPADM

## 2020-12-09 RX ORDER — ACETAMINOPHEN 325 MG/1
650 TABLET ORAL EVERY 6 HOURS PRN
Status: DISCONTINUED | OUTPATIENT
Start: 2020-12-09 | End: 2020-12-10 | Stop reason: HOSPADM

## 2020-12-09 RX ORDER — MORPHINE SULFATE 4 MG/ML
2 INJECTION, SOLUTION INTRAMUSCULAR; INTRAVENOUS EVERY 4 HOURS PRN
Status: CANCELLED | OUTPATIENT
Start: 2020-12-09

## 2020-12-09 RX ORDER — TRAMADOL HYDROCHLORIDE 50 MG/1
50 TABLET ORAL EVERY 6 HOURS PRN
Status: DISCONTINUED | OUTPATIENT
Start: 2020-12-09 | End: 2020-12-10 | Stop reason: HOSPADM

## 2020-12-09 RX ORDER — ONDANSETRON 2 MG/ML
4 INJECTION INTRAMUSCULAR; INTRAVENOUS EVERY 6 HOURS PRN
Status: DISCONTINUED | OUTPATIENT
Start: 2020-12-09 | End: 2020-12-10 | Stop reason: HOSPADM

## 2020-12-09 RX ORDER — ACETAMINOPHEN 650 MG/1
650 SUPPOSITORY RECTAL EVERY 6 HOURS PRN
Status: DISCONTINUED | OUTPATIENT
Start: 2020-12-09 | End: 2020-12-10 | Stop reason: HOSPADM

## 2020-12-09 RX ORDER — PROMETHAZINE HYDROCHLORIDE 25 MG/1
12.5 TABLET ORAL EVERY 6 HOURS PRN
Status: DISCONTINUED | OUTPATIENT
Start: 2020-12-09 | End: 2020-12-10 | Stop reason: HOSPADM

## 2020-12-09 RX ORDER — MORPHINE SULFATE 4 MG/ML
2 INJECTION, SOLUTION INTRAMUSCULAR; INTRAVENOUS EVERY 4 HOURS PRN
Status: DISCONTINUED | OUTPATIENT
Start: 2020-12-09 | End: 2020-12-10 | Stop reason: HOSPADM

## 2020-12-09 RX ORDER — SODIUM CHLORIDE 0.9 % (FLUSH) 0.9 %
10 SYRINGE (ML) INJECTION EVERY 12 HOURS SCHEDULED
Status: DISCONTINUED | OUTPATIENT
Start: 2020-12-09 | End: 2020-12-10 | Stop reason: HOSPADM

## 2020-12-09 RX ORDER — SODIUM CHLORIDE 0.9 % (FLUSH) 0.9 %
10 SYRINGE (ML) INJECTION PRN
Status: DISCONTINUED | OUTPATIENT
Start: 2020-12-09 | End: 2020-12-10 | Stop reason: HOSPADM

## 2020-12-09 RX ORDER — KETOROLAC TROMETHAMINE 30 MG/ML
30 INJECTION, SOLUTION INTRAMUSCULAR; INTRAVENOUS ONCE
Status: COMPLETED | OUTPATIENT
Start: 2020-12-09 | End: 2020-12-09

## 2020-12-09 RX ADMIN — TRAMADOL HYDROCHLORIDE 50 MG: 50 TABLET, FILM COATED ORAL at 22:13

## 2020-12-09 RX ADMIN — SODIUM CHLORIDE, PRESERVATIVE FREE 10 ML: 5 INJECTION INTRAVENOUS at 22:20

## 2020-12-09 RX ADMIN — SODIUM CHLORIDE, PRESERVATIVE FREE 10 ML: 5 INJECTION INTRAVENOUS at 22:19

## 2020-12-09 RX ADMIN — SODIUM CHLORIDE 700 MG: 9 INJECTION, SOLUTION INTRAVENOUS at 18:17

## 2020-12-09 RX ADMIN — SODIUM CHLORIDE, PRESERVATIVE FREE 10 ML: 5 INJECTION INTRAVENOUS at 09:53

## 2020-12-09 RX ADMIN — ENOXAPARIN SODIUM 40 MG: 40 INJECTION SUBCUTANEOUS at 11:16

## 2020-12-09 RX ADMIN — IOPAMIDOL 100 ML: 755 INJECTION, SOLUTION INTRAVENOUS at 05:03

## 2020-12-09 RX ADMIN — CEFTRIAXONE SODIUM 2 G: 2 INJECTION, POWDER, FOR SOLUTION INTRAMUSCULAR; INTRAVENOUS at 10:18

## 2020-12-09 RX ADMIN — MORPHINE SULFATE 2 MG: 4 INJECTION, SOLUTION INTRAMUSCULAR; INTRAVENOUS at 14:04

## 2020-12-09 RX ADMIN — SODIUM CHLORIDE, PRESERVATIVE FREE 10 ML: 5 INJECTION INTRAVENOUS at 05:41

## 2020-12-09 RX ADMIN — MORPHINE SULFATE 2 MG: 4 INJECTION, SOLUTION INTRAMUSCULAR; INTRAVENOUS at 18:16

## 2020-12-09 RX ADMIN — KETOROLAC TROMETHAMINE 30 MG: 30 INJECTION, SOLUTION INTRAMUSCULAR at 00:45

## 2020-12-09 RX ADMIN — MORPHINE SULFATE 2 MG: 4 INJECTION, SOLUTION INTRAMUSCULAR; INTRAVENOUS at 23:22

## 2020-12-09 RX ADMIN — TRAMADOL HYDROCHLORIDE 50 MG: 50 TABLET, FILM COATED ORAL at 09:52

## 2020-12-09 RX ADMIN — VANCOMYCIN HYDROCHLORIDE 1500 MG: 5 INJECTION, POWDER, LYOPHILIZED, FOR SOLUTION INTRAVENOUS at 08:02

## 2020-12-09 ASSESSMENT — PAIN SCALES - GENERAL
PAINLEVEL_OUTOF10: 8
PAINLEVEL_OUTOF10: 9
PAINLEVEL_OUTOF10: 10
PAINLEVEL_OUTOF10: 8
PAINLEVEL_OUTOF10: 8
PAINLEVEL_OUTOF10: 9
PAINLEVEL_OUTOF10: 9

## 2020-12-09 NOTE — H&P
HISTORY AND PHYSICAL  (Hospitalist, Internal Medicine)  IDENTIFYING INFORMATION   PATIENT:  Buddy Moreira  MRN:  [id number]  ADMIT DATE: 2020  TIME OF EVALUATION: 2020 5:49 AM       CHIEF COMPLAINT   back pain      HISTORY OF PRESENT ILLNESS   Buddy Moreira is a 47 y.o. female with a past medical history of opioid abuse [fentanyl], GRAEME,  osteomyelitis with recurrent discitis T3-T4 and septic arthritis L3-L4 in the setting of TV MRSA endocarditis. Patient is usually not take any medications, states that she was on suboxone, no longer taking. States that she cannot raise her left arm above 90 degrees, Obed any swelling, states that she is some tender to palpation to the left chest wall which has been evolving over the last 2 weeks. Pt otherwise has no complaints of CP, SOB, dizziness, N/V/C/D, abdominal pain, dysuria, rash/boils, or fevers.           PMH listed below:    PAST MEDICAL, SURGICAL, FAMILY, and SOCIAL HISTORY     Past Medical History:   Diagnosis Date    Hepatitis C     Liver disease     hepatitis    No significant medical problems      Past Surgical History:   Procedure Laterality Date     SECTION  , 2007    x 2    CHOLECYSTECTOMY      HYSTERECTOMY  2008    MARLIN    INCISION AND DRAINAGE Right 2020    RIGHT UPPER THIGH INCISION AND DRAINAGE performed by Shantal Robertson MD at [address] [place] [address]  07 2013    lap sera     Family History   Problem Relation Age of Onset    Obesity Brother     Mental Illness Maternal Aunt     COPD Paternal Grandmother     Dementia Paternal Grandfather     Depression Son     Mental Illness Son         Bipole, ADHD, Adjustment disorder    Colon Cancer Neg Hx      Family Hx of HTN  Family Hx as reviewed above, otherwise non-contributory  Social History     Socioeconomic History    Marital status: Single     Spouse name: None    Number of children: 2    Years of education: None    Highest education level: None   Occupational History    Occupation: [employer]   Social Needs    Financial resource strain: None    Food insecurity     Worry: None     Inability: None    Transportation needs     Medical: None     Non-medical: None   Tobacco Use    Smoking status: Current Every Day Smoker     Packs/day: 0.50     Years: 12.00     Pack years: 6.00     Types: Cigarettes    Smokeless tobacco: Never Used   Substance and Sexual Activity    Alcohol use: No    Drug use: Not Currently     Types: IV, Opiates      Comment: heroin-last used 4 hrs ago    Sexual activity: Yes     Partners: Male   Lifestyle    Physical activity     Days per week: None     Minutes per session: None    Stress: None   Relationships    Social connections     Talks on phone: None     Gets together: None     Attends Pent[employer]ism service: None     Active member of club or organization: None     Attends meetings of clubs or organizations: None     Relationship status: None    Intimate partner violence     Fear of current or ex partner: None     Emotionally abused: None     Physically abused: None     Forced sexual activity: None   Other Topics Concern    None   Social History Narrative    Do you donate blood or plasma? Yes    Caffeine intake? Moderate    Advance directive? No    Is blood transfusion acceptable in an emergency? Yes               MEDICATIONS   Medications Prior to Admission  Not in a hospital admission.     Current Medications  Current Facility-Administered Medications   Medication Dose Route Frequency Provider Last Rate Last Dose    sodium chloride flush 0.9 % injection 10 mL  10 mL Intravenous BID Michael Linton,    10 mL at 12/09/20 0541    vancomycin (VANCOCIN) 1,500 mg in dextrose 5 % 500 mL IVPB  1,500 mg Intravenous Once Marjorie Jimenez MD        cefTRIAXone (ROCEPHIN) 2 g IVPB in D5W 50ml minibag  2 g Intravenous Once Marjorie Jimenez  mL/hr at 12/09/20 0541 2 g at 12/09/20 0541     Current Outpatient Medications   Medication Sig Dispense Refill    cyclobenzaprine (FLEXERIL) 10 MG tablet Take 10 mg by mouth 3 times daily as needed for Muscle spasms           Allergies  No Known Allergies    REVIEW OF SYSTEMS   Within above limitations. 14 point review of systems reviewed. Pertinent positive or negative as per HPI or otherwise negative per 14 point systems review. PHYSICAL EXAM     Wt Readings from Last 3 Encounters:   12/08/20 175 lb (79.4 kg)   10/21/20 181 lb (82.1 kg)   09/17/20 200 lb (90.7 kg)       Blood pressure 109/60, pulse 80, temperature 98.3 °F (36.8 °C), temperature source Oral, resp. rate 17, height 5' 5\" (1.651 m), weight 175 lb (79.4 kg), SpO2 96 %, not currently breastfeeding. General - AAO x 3  Psych - Appropriate affect/speech. No agitation  Eyes - Eye lids intact. No scleral icterus  ENT - Lips wnl. External ear clear/dry/intact. No thyromegaly on inspection  Neuro - No gross peripheral or central neuro deficits on inspection  Heart - Sinus. RRR. S1 and S2 present. +HS/murmurs appreciated. No elevated JVD appreciated. Nonpitting bilateral ankle edema. Lung - Adequate air entry b/l, EUNICE UL crackles appreciated. No wheezing  GI - Soft. No guarding/rigidity. No hepatosplenomegaly/ascites. BS+   - No CVA/suprapubic tenderness or palpable bladder distension  Skin - Intact. No rash/petechiae/ecchymosis.  Warm extremities  MSK - LROM upper ext, no swelling, no redness    LABS AND IMAGING   CBC  [unfilled]    Last 3 Hemoglobin  Lab Results   Component Value Date    HGB 10.1 12/09/2020    HGB 8.9 10/22/2020    HGB 9.6 10/21/2020     Last 3 WBC/ANC  Lab Results   Component Value Date    WBC 7.9 12/09/2020    WBC 5.1 10/22/2020    WBC 7.3 10/21/2020     No components found for: GRNLOCTYABS  Last 3 Platelets  No results found for: PLATELET  Chemistry  [unfilled]  [unfilled]  No results found for: LDH  Coagulation Studies  Lab Results   Component Value Date    INR 1.15 12/09/2020     Liver Function Studies  Lab Results   Component Value Date    ALT <5 12/09/2020    AST 7 12/09/2020    ALKPHOS 82 12/09/2020       Recent Imaging        Relevant labs and imaging reviewed    ASSESSMENT AND PLAN   Ms. Katy Santiago is a 29-year-old female who presents with complaints of recurrent mid back and left arm pain      Intractable back pain likely 2/2 osteomyelitis with recurrent discitis, with possible septic arthritis, may need further imaging of left shoulder  Patient has missed about 2 months of antibiotics  -ID consulted, discussed with pharmacy restart Dapto  -Repeat cultures  -Pain control  -Repeat echocardiogram  -Consider left shoulder MRI  - trend CRP/ESR      Opiate use disorder. Not on Suboxone    -Reports daily heroin use, last used yesterday (snorts). States that she has not used IV drugs in a while \"  Continue morphine,  -Lidocaine patch to back  -She states she goes to [facility] addiction treatment center and plans to return post discharge. Nicotine dependence. Advised on smoking cessation. -refused nicotine patch.     GRAEME      -DVT Prophylaxis: lovenox    -GI Prophylaxis: protonix        Case d/w  ED provider    Paola Diego MD  12/9/2020 at 5:49 AM

## 2020-12-09 NOTE — ED NOTES
ptreturned from CT, IV infiltrated. This nurse attempted to get a new IV but was unable to.  This nurse has asked for assistance with getting an [facility] RN  12/09/20 [id number]

## 2020-12-09 NOTE — ED PROVIDER NOTES
Triage Chief Complaint:   Chest Pain (shortness of breath, history of endocarditis)    [place]:  Judy Evans is a 47 y.o. female that presents with left-sided chest pain. The patient states that over the past 2 weeks she has had left-sided chest pain is aching in nature and worse with movement and radiates into her left shoulder. States that she has pain with arm motion as well. Denies any fevers, nausea, vomiting, diarrhea, abdominal pain. She has some exertional shortness of breath at times. Patient is a known IV drug user and has a history of osteomyelitis of the spine and endocarditis. She has not had any fevers or cough. States that she has been taking Tylenol and ibuprofen for pain. ROS:  At least 14 systems reviewed and otherwise acutely negative except as in the [address]     Past Medical History:   Diagnosis Date    Hepatitis C     Liver disease     hepatitis    No significant medical problems      Past Surgical History:   Procedure Laterality Date     SECTION  , 2007    x 2    CHOLECYSTECTOMY      HYSTERECTOMY  2008    MARLIN    INCISION AND DRAINAGE Right 2020    RIGHT UPPER THIGH INCISION AND DRAINAGE performed by Pilo Wiggins MD at [address] [place] [address],[place] [address]  07 03     lap sera     Family History   Problem Relation Age of Onset    Obesity Brother     Mental Illness Maternal Aunt     COPD Paternal Grandmother     Dementia Paternal Grandfather     Depression Son     Mental Illness Son         Bipole, ADHD, Adjustment disorder    Colon Cancer Neg Hx      Social History     Socioeconomic History    Marital status: Single     Spouse name: Not on file    Number of children: 2    Years of education: Not on file    Highest education level: Not on file   Occupational History    Occupation:    Social Needs    Financial resource strain: Not on file    Food insecurity     Worry: Not on file     Inability: Not on file   Angelfish needs Reviewed    Physical Exam:  ED Triage Vitals [12/08/20 2019]   Enc Vitals Group      /76      Pulse 96      Resp 16      Temp 98.3 °F (36.8 °C)      Temp Source Oral      SpO2 99 %      Weight 175 lb (79.4 kg)      Height 5' 5\" (1.651 m)      Head Circumference       Peak Flow       Pain Score       Pain Loc       Pain Edu? Excl. in [address]? GENERAL APPEARANCE: Awake and alert. Cooperative. No acute distress. HEAD: Normocephalic. Atraumatic. EYES: EOM's grossly intact. Sclera anicteric. ENT: Mucous membranes are moist. Tolerates saliva. No trismus. NECK: Supple. No meningismus. Trachea midline. HEART: RRR. Radial pulses 2+. CHEST: Tenderness to palpation over left anterior chest wall. LUNGS: Respirations unlabored. CTAB  ABDOMEN: Soft. Non-tender. No guarding or rebound. EXTREMITIES: No acute deformities. With flexion and abduction of left shoulder, patient's pain is reproduced. No swelling, redness or tenderness over left shoulder. SKIN: Warm and dry. NEUROLOGICAL: No gross facial drooping. Moves all 4 extremities spontaneously. PSYCHIATRIC: Normal mood.     I have reviewed and interpreted all of the currently available lab results from this visit (if applicable):  Results for orders placed or performed during the hospital encounter of 12/08/20   CBC Auto Differential   Result Value Ref Range    WBC 7.9 4.0 - 10.5 K/CU MM    RBC 3.55 (L) 4.2 - 5.4 M/CU MM    Hemoglobin 10.1 (L) 12.5 - 16.0 GM/DL    Hematocrit 34.3 (L) 37 - 47 %    MCV 96.6 78 - 100 FL    MCH 28.5 27 - 31 PG    MCHC 29.4 (L) 32.0 - 36.0 %    RDW 13.4 11.7 - 14.9 %    Platelets 205 421 - 805 K/CU MM    MPV 9.5 7.5 - 11.1 FL    Differential Type AUTOMATED DIFFERENTIAL     Segs Relative 61.1 36 - 66 %    Lymphocytes % 28.1 24 - 44 %    Monocytes % 7.8 (H) 0 - 4 %    Eosinophils % 2.4 0 - 3 %    Basophils % 0.5 0 - 1 %    Segs Absolute 4.8 K/CU MM    Lymphocytes Absolute 2.2 K/CU MM    Monocytes Absolute 0.6 K/CU MM Eosinophils Absolute 0.2 K/CU MM    Basophils Absolute 0.0 K/CU MM    Nucleated RBC % 0.0 %    Total Nucleated RBC 0.0 K/CU MM    Total Immature Neutrophil 0.01 K/CU MM    Immature Neutrophil % 0.1 0 - 0.43 %   Comprehensive Metabolic Panel w/ Reflex to MG   Result Value Ref Range    Sodium 130 (L) 135 - 145 MMOL/L    Potassium 4.0 3.5 - 5.1 MMOL/L    Chloride 92 (L) 99 - 110 mMol/L    CO2 25 21 - 32 MMOL/L    BUN 17 6 - 23 MG/DL    CREATININE 0.7 0.6 - 1.1 MG/DL    Glucose 92 70 - 99 MG/DL    Calcium 9.3 8.3 - 10.6 MG/DL    Alb 2.7 (L) 3.4 - 5.0 GM/DL    Total Protein 9.0 (H) 6.4 - 8.2 GM/DL    ALT <5 (L) 10 - 40 U/L    AST 7 (L) 15 - 37 IU/L    Alkaline Phosphatase 82 40 - 129 IU/L    GFR Non-African American >60 >60 mL/min/1.73m2    GFR African American >60 >60 mL/min/1.73m2    Anion Gap 13 4 - 16   Lipase   Result Value Ref Range    Lipase 13 13 - 60 IU/L   Troponin   Result Value Ref Range    Troponin T <0.010 <0.01 NG/ML   D-dimer, Quantitative   Result Value Ref Range    D-Dimer, Quant 910 (H) <230 NG/mL(DDU)   Lactic Acid, Plasma   Result Value Ref Range    Lactate 1.0 0.4 - 2.0 mMOL/L   PTT   Result Value Ref Range    aPTT 49.4 (H) 25.1 - 37.1 SECONDS   PT - INR   Result Value Ref Range    Protime 13.9 11.7 - 14.5 SECONDS    INR 1.15 INDEX      Radiographs (if obtained):  [] The following radiograph was interpreted by myself in the absence of a radiologist:  [x] Radiologist's Report Reviewed:    EKG (if obtained): (All EKG's are interpreted by myself in the absence of a cardiologist)  12 lead EKG as interpreted by me reveals normal sinus rhythm. Axis is normal. There are no ischemic ST elevations or other suspicious ST changes;  QRS interval is narrow, QT interval is not prolonged. Final interpretation: Normal sinus rhythm. MDM:  Plan of care is discussed thoroughly with the patient and family if present. If performed, all imaging and lab work also discussed with patient.   All relevant prior results and chart reviewed if available. Patient presents as above. Presents with 2 weeks of worsening left-sided chest and back pain that seems to be somewhat musculoskeletal in nature but she does have a history of IV drug use and discitis/osteomyelitis. She is afebrile here with normal vital signs. Lab work has been ordered from triage. Patient's metabolic work-up is unremarkable and she does not have a leukocytosis. Troponin is normal.  However, D-dimer is elevated and CTA chest was ordered for further evaluation. This shows significant progression of osteomyelitis/infectious process since September. She will be started on Rocephin, vancomycin and admitted for further management and evaluation. Patient is agreeable with this plan of care. Clinical Impression:  1.  Osteomyelitis of vertebra (HCC)      (Please note that portions of this note may have been completed with a voice recognition program. Efforts were made to edit the dictations but occasionally words are mis-transcribed.)    MD Beba Gao MD  12/09/20 1986

## 2020-12-09 NOTE — CONSULTS
IV consult complete. Midline appropriate as patient in high risk for leaving AMA with central line. Arrow Endurance 20g 8cm midline inserted in left upper arm cephalic vein x1 attempt using sterile ultrasound guided technique. Brisk blood return, flushes easy. ED staff aware access has been established.

## 2020-12-09 NOTE — ED PROVIDER NOTES
As physician-in-triage, I performed a virtual medical screening history and physical exam on this patient. HISTORY OF PRESENT ILLNESS  Kirsten Grullon is a 47 y.o. female who presents to the emergency department complaints of pain in her left chest which radiates to the left upper back. The pain describes a throbbing and stabbing pain exacerbate with any movement or certain positions. Of note, the patient was recently in the hospital October for discitis/osteomyelitis of the thoracic spine as well as MRSA endocarditis of the tricuspid valve. She had recommendations for placement in rehab facility with IV antibiotics but ultimately left AGAINST MEDICAL ADVICE. Rates pain is severe. Denies fevers, chills, shortness of breath, nausea, vomiting. PHYSICAL EXAM  /76   Pulse 96   Temp 98.3 °F (36.8 °C) (Oral)   Resp 16   Ht 5' 5\" (1.651 m)   Wt 175 lb (79.4 kg)   SpO2 99%   BMI 29.12 kg/m²     On exam, the patient appears in no mild discomfort secondary to pain. Speech is clear. Breathing is unlabored. Moves all extremities.     Orders: CBC, CMP, EKG, troponin, D-dimer, chest x-ray, lactic acid, blood cultures        Kristofer Delcid DO  12/08/20 2830

## 2020-12-09 NOTE — ED NOTES
Pt IV infiltrated. Dr. Courtney Mackey made aware.  Dr. Courtney Mackey said she will put in a consult to PICC team and that the pt can wait to have antibiotics until then     [facility], RN  12/09/20 1921

## 2020-12-09 NOTE — PROGRESS NOTES
Hospitalist Progress Note      Name:  Christian Bill /Age/Sex: 1980  (47 y.o. female)   MRN & CSN:  [id number] & [id number] Admission Date/Time: 2020 11:44 PM   Location:  ED37/ED-37 PCP: No primary care provider on file. Hospital Day: 2    Assessment and Plan:   Ms. Kelby Figueredo is a 37-year-old female who presents with complaints of recurrent mid back and left arm pain      Intractable back pain likely 2/2 osteomyelitis with recurrent discitis  - Patient has missed about 2 months of antibiotics  -ID consulted, discussed with pharmacy restart Dapto  -Repeat cultures  -Pain control  -Repeat echocardiogram  - trend CRP/ESR  - Appreciate ID recs    History of TV Endocarditis  - Echo as per above  - Continue abx     Opiate use disorder. Not on Suboxone   -Reports daily heroin use, last used yesterday (snorts). States that she has not used IV drugs in a while \"  -Lidocaine patch to back  -She states she goes to [facility] and plans to return post discharge  - Morphine PRN pain as per above     Nicotine dependence. Advised on smoking cessation. -refused nicotine patch.     GRAEME    Diet DIET GENERAL;   DVT Prophylaxis [] Lovenox, []  Heparin, [] SCDs, [] Ambulation   GI Prophylaxis [] PPI,  [] H2 Blocker,  [] Carafate,  [] Diet/Tube Feeds   Code Status Full Code   Disposition Patient requires continued admission due to discitis   MDM [] Low, [] Moderate,[]  High  Patient's risk as above due to      History of Present Illness:     With back and left shoulder pain this AM.  Requesting pain medication. No nausea or vomiting, fevers or chills. Objective:        Intake/Output Summary (Last 24 hours) at 2020 1322  Last data filed at 2020 0541  Gross per 24 hour   Intake 10 ml   Output --   Net 10 ml      Vitals:   Vitals:    20 0700   BP: 112/85   Pulse: 92   Resp: 16   Temp:    SpO2: 98%     Physical Exam:   GEN Awake female, sitting upright in bed in no apparent distress. Appears given age. EYES Pupils are equally round. No scleral erythema, discharge, or conjunctivitis. HENT Mucous membranes are moist.   NECK Supple, no apparent thyromegaly or masses. RESP Clear to auscultation, no wheezes, rales or rhonchi. Symmetric chest movement while on room air. CARDIO/VASC S1/S2 auscultated. Regular rate without appreciable murmurs, rubs, or gallops. GI Abdomen is soft without significant tenderness, masses, or guarding.   Angel catheter is not present. HEME/LYMPH   No petechiae or ecchymoses. MSK No gross joint deformities. SKIN Normal coloration, warm, dry, thoracic spine mild TTP  NEURO Cranial nerves appear grossly intact, normal speech  PSYCH Awake, alert, oriented x 4. Affect appropriate.     Medications:   Medications:    sodium chloride flush  10 mL Intravenous BID    daptomycin (CUBICIN) IVPB  700 mg Intravenous Q24H    sodium chloride flush  10 mL Intravenous 2 times per day    enoxaparin  40 mg Subcutaneous Daily      Infusions:   PRN Meds: sodium chloride flush, 10 mL, PRN  acetaminophen, 650 mg, Q6H PRN    Or  acetaminophen, 650 mg, Q6H PRN  magnesium hydroxide, 30 mL, Daily PRN  promethazine, 12.5 mg, Q6H PRN    Or  ondansetron, 4 mg, Q6H PRN  traMADol, 50 mg, Q6H PRN  morphine, 2 mg, Q4H PRN          Electronically signed by Sherly Coyle MD on 12/9/2020 at 1:22 PM

## 2020-12-10 VITALS
WEIGHT: 174.4 LBS | DIASTOLIC BLOOD PRESSURE: 94 MMHG | SYSTOLIC BLOOD PRESSURE: 127 MMHG | HEIGHT: 65 IN | BODY MASS INDEX: 29.06 KG/M2 | RESPIRATION RATE: 16 BRPM | HEART RATE: 85 BPM | OXYGEN SATURATION: 97 % | TEMPERATURE: 98.2 F

## 2020-12-10 LAB
ALBUMIN SERPL-MCNC: 3 GM/DL (ref 3.4–5)
ALP BLD-CCNC: 81 IU/L (ref 40–128)
ALT SERPL-CCNC: <5 U/L (ref 10–40)
ANION GAP SERPL CALCULATED.3IONS-SCNC: 10 MMOL/L (ref 4–16)
AST SERPL-CCNC: 8 IU/L (ref 15–37)
BASOPHILS ABSOLUTE: 0 K/CU MM
BASOPHILS RELATIVE PERCENT: 0.6 % (ref 0–1)
BILIRUB SERPL-MCNC: 0.2 MG/DL (ref 0–1)
BUN BLDV-MCNC: 15 MG/DL (ref 6–23)
CALCIUM SERPL-MCNC: 9.4 MG/DL (ref 8.3–10.6)
CHLORIDE BLD-SCNC: 99 MMOL/L (ref 99–110)
CO2: 26 MMOL/L (ref 21–32)
CREAT SERPL-MCNC: 0.6 MG/DL (ref 0.6–1.1)
DIFFERENTIAL TYPE: ABNORMAL
EOSINOPHILS ABSOLUTE: 0.2 K/CU MM
EOSINOPHILS RELATIVE PERCENT: 2.3 % (ref 0–3)
ERYTHROCYTE SEDIMENTATION RATE: 139 MM/HR (ref 0–20)
GFR AFRICAN AMERICAN: >60 ML/MIN/1.73M2
GFR NON-AFRICAN AMERICAN: >60 ML/MIN/1.73M2
GLUCOSE BLD-MCNC: 94 MG/DL (ref 70–99)
HCT VFR BLD CALC: 29.5 % (ref 37–47)
HEMOGLOBIN: 9.3 GM/DL (ref 12.5–16)
HIGH SENSITIVE C-REACTIVE PROTEIN: 41.9 MG/L
IMMATURE NEUTROPHIL %: 0.4 % (ref 0–0.43)
LYMPHOCYTES ABSOLUTE: 1.6 K/CU MM
LYMPHOCYTES RELATIVE PERCENT: 22.8 % (ref 24–44)
MCH RBC QN AUTO: 28.3 PG (ref 27–31)
MCHC RBC AUTO-ENTMCNC: 31.5 % (ref 32–36)
MCV RBC AUTO: 89.7 FL (ref 78–100)
MONOCYTES ABSOLUTE: 0.5 K/CU MM
MONOCYTES RELATIVE PERCENT: 6.9 % (ref 0–4)
NUCLEATED RBC %: 0 %
PDW BLD-RTO: 13.1 % (ref 11.7–14.9)
PLATELET # BLD: 404 K/CU MM (ref 140–440)
PMV BLD AUTO: 8.9 FL (ref 7.5–11.1)
POTASSIUM SERPL-SCNC: 4 MMOL/L (ref 3.5–5.1)
RBC # BLD: 3.29 M/CU MM (ref 4.2–5.4)
SEGMENTED NEUTROPHILS ABSOLUTE COUNT: 4.6 K/CU MM
SEGMENTED NEUTROPHILS RELATIVE PERCENT: 67 % (ref 36–66)
SODIUM BLD-SCNC: 135 MMOL/L (ref 135–145)
TOTAL IMMATURE NEUTOROPHIL: 0.03 K/CU MM
TOTAL NUCLEATED RBC: 0 K/CU MM
TOTAL PROTEIN: 8 GM/DL (ref 6.4–8.2)
WBC # BLD: 6.8 K/CU MM (ref 4–10.5)

## 2020-12-10 PROCEDURE — 6370000000 HC RX 637 (ALT 250 FOR IP): Performed by: INTERNAL MEDICINE

## 2020-12-10 PROCEDURE — 86141 C-REACTIVE PROTEIN HS: CPT

## 2020-12-10 PROCEDURE — 94761 N-INVAS EAR/PLS OXIMETRY MLT: CPT

## 2020-12-10 PROCEDURE — 80053 COMPREHEN METABOLIC PANEL: CPT

## 2020-12-10 PROCEDURE — 6360000002 HC RX W HCPCS: Performed by: INTERNAL MEDICINE

## 2020-12-10 PROCEDURE — 85652 RBC SED RATE AUTOMATED: CPT

## 2020-12-10 PROCEDURE — 85025 COMPLETE CBC W/AUTO DIFF WBC: CPT

## 2020-12-10 RX ADMIN — MORPHINE SULFATE 2 MG: 4 INJECTION, SOLUTION INTRAMUSCULAR; INTRAVENOUS at 04:56

## 2020-12-10 RX ADMIN — TRAMADOL HYDROCHLORIDE 50 MG: 50 TABLET, FILM COATED ORAL at 09:11

## 2020-12-10 RX ADMIN — MORPHINE SULFATE 2 MG: 4 INJECTION, SOLUTION INTRAMUSCULAR; INTRAVENOUS at 10:23

## 2020-12-10 ASSESSMENT — PAIN SCALES - GENERAL
PAINLEVEL_OUTOF10: 8
PAINLEVEL_OUTOF10: 9
PAINLEVEL_OUTOF10: 8

## 2020-12-10 NOTE — PROGRESS NOTES
Two person skin check completed by this nurse and Fede Gordon, [address] [facility]. She has a scratch on her nose. She has old healed scars scattered that looks like brown areas.

## 2020-12-10 NOTE — DISCHARGE SUMMARY
Discharge Summary    Name:  Kailee Pickens /Age/Sex: 1980  (47 y.o. female)   MRN & CSN:  [id number] & [id number] Admission Date/Time: 2020 11:44 PM   Attending:  Dannielle Bro MD Discharging Physician: Mecca Varela MD     Hospital Course:   Ms. Jorge Higuera is a 42-year-old female who presents with complaints of recurrent mid back and left arm pain    Patient was seen and examined  Reported back pain   Denied any fever, chills  No N/V/D  Patient left AMA per nurse    Assessment and Plan    1) Intractable back pain likely 2/2 osteomyelitis with recurrent discitis  - Patient has missed about 2 months of antibiotics  -ID consulted, discussed with pharmacy restart Dapto  -Pain control  - trend CRP/ESR  - ID on board         2) History of TV Endocarditis  - Echo: Thickening of the anterior mitral valve leaflet; possible endocarditis   - Left AMA     Other chronic medication    Opiate use disorder.  Not on Suboxone   -Reports daily heroin use, last used yesterday (snorts).  States that she has not used IV drugs in a while \"  -Lidocaine patch to back  -She states she goes to [facility] treatment center and plans to return post discharge  - Morphine PRN pain as per above     Nicotine dependence.  Advised on smoking cessation. -refused nicotine patch.     GRAEME      The patient expressed appropriate understanding of and agreement with the discharge recommendations, medications, and plan.      Consults this admission:  IP CONSULT TO HOSPITALIST  IP CONSULT TO IV TEAM  IP CONSULT TO INFECTIOUS DISEASES    Discharge Instruction:   Follow up appointments:   Primary care physician:  within 2 weeks    Diet:  regular diet   Activity: activity as tolerated  Disposition: Discharged to:   [x] AMA, []C, []SNF, []Acute Rehab, []Hospice   Condition on discharge: Stable    Discharge Medications:      Brittany Robby   Home Medication Instructions [id number]    Printed on:12/10/20 1126   Medication Information                      cyclobenzaprine (FLEXERIL) 10 MG tablet  Take 10 mg by mouth 3 times daily as needed for Muscle spasms                 Objective Findings at Discharge:   BP (!) 127/94   Pulse 85   Temp 98.2 °F (36.8 °C) (Oral)   Resp 16   Ht 5' 5\" (1.651 m)   Wt 174 lb 6.4 oz (79.1 kg)   SpO2 97%   BMI 29.02 kg/m²            PHYSICAL EXAM   GEN Awake female, sitting upright in bed in no apparent distress. Appears given age. EYES Pupils are equally round. No scleral erythema, discharge, or conjunctivitis. HENT Mucous membranes are moist. Oral pharynx without exudates, no evidence of thrush. NECK Supple, no apparent thyromegaly or masses. RESP Clear to auscultation, no wheezes, rales or rhonchi. Symmetric chest movement while on room air. CARDIO/VASC S1/S2 auscultated. Regular rate without appreciable murmurs, rubs, or gallops. No JVD or carotid bruits. Peripheral pulses equal bilaterally and palpable. No peripheral edema. GI Abdomen is soft without significant tenderness, masses, or guarding. Bowel sounds are normoactive. Rectal exam deferred.  No costovertebral angle tenderness. Normal appearing external genitalia. Angel catheter is not present. HEME/LYMPH No palpable cervical lymphadenopathy and no hepatosplenomegaly. No petechiae or ecchymoses. MSK No gross joint deformities. SKIN Normal coloration, warm, dry. NEURO Cranial nerves appear grossly intact, normal speech, no lateralizing weakness. PSYCH Awake, alert, oriented x 4. Affect appropriate.     BMP/CBC  Recent Labs     12/09/20  0025 12/10/20  0540   * 135   K 4.0 4.0   CL 92* 99   CO2 25 26   BUN 17 15   CREATININE 0.7 0.6   WBC 7.9 6.8   HCT 34.3* 29.5*    404       IMAGING:  As above    Discharge Time of 35 minutes    Electronically signed by Leoncio De Dios MD on 12/10/2020 at 11:28 AM

## 2020-12-10 NOTE — PROGRESS NOTES
Pt wanted to leave said she wasn't getting her iv pain medicine. I explained that she would have to ask for it and that it was not a scheudled medication. Pt received morphine 2mg. I told her she would have to stay at least an hour and I had to take her midline out before she left if she tried to go ama again. Pt tried to leave, caught by security. Pt tried to run away after 10 minutes of getting morphine. Told pt she needed to come back to the room and wait until I knew she was not in any distress from the morphine. pt came back up to the room. Pt then asked if the doctor would up her pain medicine from 2mg to 4mg. I told her most likely not since she just tried to leave. She then said she was leaving as soon as she could at 11:10. I immediately took out the midline and hadhe sign ama papperwork    THIS NURSE RECOMMENDS NOT GIVING PT IV MEDICATION FOR PAIN.

## 2020-12-14 LAB
CULTURE: NORMAL
CULTURE: NORMAL
Lab: NORMAL
Lab: NORMAL
SPECIMEN: NORMAL
SPECIMEN: NORMAL

## 2020-12-15 LAB
EKG ATRIAL RATE: 95 BPM
EKG DIAGNOSIS: NORMAL
EKG P AXIS: 35 DEGREES
EKG P-R INTERVAL: 134 MS
EKG Q-T INTERVAL: 346 MS
EKG QRS DURATION: 84 MS
EKG QTC CALCULATION (BAZETT): 434 MS
EKG R AXIS: -3 DEGREES
EKG T AXIS: 26 DEGREES
EKG VENTRICULAR RATE: 95 BPM

## 2021-03-16 ENCOUNTER — HOSPITAL ENCOUNTER (EMERGENCY)
Age: 41
Discharge: LEFT BEFORE DISCHARGE | End: 2021-03-16
Attending: EMERGENCY MEDICINE
Payer: MEDICAID

## 2021-03-16 ENCOUNTER — HOSPITAL ENCOUNTER (EMERGENCY)
Age: 41
Discharge: ANOTHER HOSPITAL | End: 2021-03-17
Attending: EMERGENCY MEDICINE
Payer: MEDICAID

## 2021-03-16 VITALS
OXYGEN SATURATION: 100 % | SYSTOLIC BLOOD PRESSURE: 126 MMHG | BODY MASS INDEX: 29.16 KG/M2 | DIASTOLIC BLOOD PRESSURE: 92 MMHG | HEART RATE: 85 BPM | WEIGHT: 175 LBS | TEMPERATURE: 98 F | HEIGHT: 65 IN | RESPIRATION RATE: 17 BRPM

## 2021-03-16 DIAGNOSIS — G06.2 EPIDURAL ABSCESS: Primary | ICD-10-CM

## 2021-03-16 DIAGNOSIS — R31.9 URINARY TRACT INFECTION WITH HEMATURIA, SITE UNSPECIFIED: ICD-10-CM

## 2021-03-16 DIAGNOSIS — N39.0 URINARY TRACT INFECTION WITH HEMATURIA, SITE UNSPECIFIED: ICD-10-CM

## 2021-03-16 DIAGNOSIS — E87.6 HYPOKALEMIA: ICD-10-CM

## 2021-03-16 DIAGNOSIS — M54.50 ACUTE MIDLINE LOW BACK PAIN WITHOUT SCIATICA: ICD-10-CM

## 2021-03-16 LAB
BACTERIA: ABNORMAL /HPF
BILIRUBIN URINE: ABNORMAL MG/DL
BLOOD, URINE: ABNORMAL
CLARITY: ABNORMAL
COLOR: ABNORMAL
GLUCOSE, URINE: NEGATIVE MG/DL
ICTOTEST: NEGATIVE
KETONES, URINE: ABNORMAL MG/DL
LEUKOCYTE ESTERASE, URINE: ABNORMAL
MUCUS: ABNORMAL HPF
NITRITE URINE, QUANTITATIVE: NEGATIVE
PH, URINE: 5 (ref 5–8)
PROTEIN UA: 30 MG/DL
RBC URINE: 29 /HPF (ref 0–6)
SPECIFIC GRAVITY UA: 1.03 (ref 1–1.03)
SQUAMOUS EPITHELIAL: 3 /HPF
TRICHOMONAS: ABNORMAL /HPF
UROBILINOGEN, URINE: 2 MG/DL (ref 0.2–1)
WBC UA: 53 /HPF (ref 0–5)

## 2021-03-16 PROCEDURE — 80307 DRUG TEST PRSMV CHEM ANLYZR: CPT

## 2021-03-16 PROCEDURE — 87086 URINE CULTURE/COLONY COUNT: CPT

## 2021-03-16 PROCEDURE — 81001 URINALYSIS AUTO W/SCOPE: CPT

## 2021-03-16 PROCEDURE — 99285 EMERGENCY DEPT VISIT HI MDM: CPT

## 2021-03-16 PROCEDURE — 93005 ELECTROCARDIOGRAM TRACING: CPT | Performed by: EMERGENCY MEDICINE

## 2021-03-16 PROCEDURE — 36569 INSJ PICC 5 YR+ W/O IMAGING: CPT

## 2021-03-16 PROCEDURE — 96366 THER/PROPH/DIAG IV INF ADDON: CPT

## 2021-03-16 RX ORDER — 0.9 % SODIUM CHLORIDE 0.9 %
1000 INTRAVENOUS SOLUTION INTRAVENOUS ONCE
Status: COMPLETED | OUTPATIENT
Start: 2021-03-16 | End: 2021-03-17

## 2021-03-16 ASSESSMENT — PAIN SCALES - GENERAL
PAINLEVEL_OUTOF10: 6
PAINLEVEL_OUTOF10: 10

## 2021-03-16 ASSESSMENT — PAIN DESCRIPTION - PAIN TYPE
TYPE: ACUTE PAIN
TYPE: ACUTE PAIN

## 2021-03-16 ASSESSMENT — PAIN DESCRIPTION - LOCATION: LOCATION: BACK

## 2021-03-17 ENCOUNTER — APPOINTMENT (OUTPATIENT)
Dept: MRI IMAGING | Age: 41
End: 2021-03-17
Payer: MEDICAID

## 2021-03-17 VITALS
HEIGHT: 65 IN | DIASTOLIC BLOOD PRESSURE: 71 MMHG | WEIGHT: 175 LBS | OXYGEN SATURATION: 96 % | TEMPERATURE: 98.7 F | BODY MASS INDEX: 29.16 KG/M2 | RESPIRATION RATE: 14 BRPM | SYSTOLIC BLOOD PRESSURE: 116 MMHG | HEART RATE: 100 BPM

## 2021-03-17 LAB
ALBUMIN SERPL-MCNC: 2.8 GM/DL (ref 3.4–5)
ALP BLD-CCNC: 108 IU/L (ref 40–129)
ALT SERPL-CCNC: 7 U/L (ref 10–40)
AMPHETAMINES: NEGATIVE
ANION GAP SERPL CALCULATED.3IONS-SCNC: 7 MMOL/L (ref 4–16)
AST SERPL-CCNC: 9 IU/L (ref 15–37)
BARBITURATE SCREEN URINE: NEGATIVE
BASOPHILS ABSOLUTE: 0 K/CU MM
BASOPHILS RELATIVE PERCENT: 0.3 % (ref 0–1)
BENZODIAZEPINE SCREEN, URINE: NEGATIVE
BILIRUB SERPL-MCNC: 0.2 MG/DL (ref 0–1)
BUN BLDV-MCNC: 9 MG/DL (ref 6–23)
CALCIUM SERPL-MCNC: 9 MG/DL (ref 8.3–10.6)
CANNABINOID SCREEN URINE: NEGATIVE
CHLORIDE BLD-SCNC: 95 MMOL/L (ref 99–110)
CHP ED QC CHECK: NORMAL
CO2: 30 MMOL/L (ref 21–32)
COCAINE METABOLITE: ABNORMAL
CREAT SERPL-MCNC: 0.6 MG/DL (ref 0.6–1.1)
DIFFERENTIAL TYPE: ABNORMAL
EOSINOPHILS ABSOLUTE: 0.1 K/CU MM
EOSINOPHILS RELATIVE PERCENT: 0.9 % (ref 0–3)
ERYTHROCYTE SEDIMENTATION RATE: 120 MM/HR (ref 0–20)
GFR AFRICAN AMERICAN: >60 ML/MIN/1.73M2
GFR NON-AFRICAN AMERICAN: >60 ML/MIN/1.73M2
GLUCOSE BLD-MCNC: 105 MG/DL (ref 70–99)
GLUCOSE BLD-MCNC: 95 MG/DL
GLUCOSE BLD-MCNC: 95 MG/DL (ref 70–99)
HCT VFR BLD CALC: 36 % (ref 37–47)
HEMOGLOBIN: 11.5 GM/DL (ref 12.5–16)
HIGH SENSITIVE C-REACTIVE PROTEIN: 24.8 MG/L
IMMATURE NEUTROPHIL %: 0.4 % (ref 0–0.43)
LACTIC ACID, SEPSIS: 1.8 MMOL/L (ref 0.5–1.9)
LYMPHOCYTES ABSOLUTE: 2.2 K/CU MM
LYMPHOCYTES RELATIVE PERCENT: 19.8 % (ref 24–44)
MAGNESIUM: 1.8 MG/DL (ref 1.8–2.4)
MCH RBC QN AUTO: 26.9 PG (ref 27–31)
MCHC RBC AUTO-ENTMCNC: 31.9 % (ref 32–36)
MCV RBC AUTO: 84.3 FL (ref 78–100)
MONOCYTES ABSOLUTE: 0.7 K/CU MM
MONOCYTES RELATIVE PERCENT: 5.8 % (ref 0–4)
NUCLEATED RBC %: 0 %
OPIATES, URINE: NEGATIVE
OXYCODONE: NEGATIVE
PDW BLD-RTO: 16.2 % (ref 11.7–14.9)
PHENCYCLIDINE, URINE: NEGATIVE
PLATELET # BLD: 559 K/CU MM (ref 140–440)
PMV BLD AUTO: 11.3 FL (ref 7.5–11.1)
POTASSIUM SERPL-SCNC: 3.3 MMOL/L (ref 3.5–5.1)
RBC # BLD: 4.27 M/CU MM (ref 4.2–5.4)
SARS-COV-2, NAAT: NOT DETECTED
SEGMENTED NEUTROPHILS ABSOLUTE COUNT: 8.2 K/CU MM
SEGMENTED NEUTROPHILS RELATIVE PERCENT: 72.8 % (ref 36–66)
SODIUM BLD-SCNC: 132 MMOL/L (ref 135–145)
SOURCE: NORMAL
TOTAL IMMATURE NEUTOROPHIL: 0.04 K/CU MM
TOTAL NUCLEATED RBC: 0 K/CU MM
TOTAL PROTEIN: 8.6 GM/DL (ref 6.4–8.2)
WBC # BLD: 11.2 K/CU MM (ref 4–10.5)

## 2021-03-17 PROCEDURE — 6360000004 HC RX CONTRAST MEDICATION: Performed by: EMERGENCY MEDICINE

## 2021-03-17 PROCEDURE — 96365 THER/PROPH/DIAG IV INF INIT: CPT

## 2021-03-17 PROCEDURE — 6360000002 HC RX W HCPCS: Performed by: EMERGENCY MEDICINE

## 2021-03-17 PROCEDURE — 80053 COMPREHEN METABOLIC PANEL: CPT

## 2021-03-17 PROCEDURE — 96375 TX/PRO/DX INJ NEW DRUG ADDON: CPT

## 2021-03-17 PROCEDURE — 87040 BLOOD CULTURE FOR BACTERIA: CPT

## 2021-03-17 PROCEDURE — 72157 MRI CHEST SPINE W/O & W/DYE: CPT

## 2021-03-17 PROCEDURE — 72156 MRI NECK SPINE W/O & W/DYE: CPT

## 2021-03-17 PROCEDURE — 83605 ASSAY OF LACTIC ACID: CPT

## 2021-03-17 PROCEDURE — A9579 GAD-BASE MR CONTRAST NOS,1ML: HCPCS | Performed by: EMERGENCY MEDICINE

## 2021-03-17 PROCEDURE — 72158 MRI LUMBAR SPINE W/O & W/DYE: CPT

## 2021-03-17 PROCEDURE — 82962 GLUCOSE BLOOD TEST: CPT

## 2021-03-17 PROCEDURE — 87635 SARS-COV-2 COVID-19 AMP PRB: CPT

## 2021-03-17 PROCEDURE — 93010 ELECTROCARDIOGRAM REPORT: CPT | Performed by: INTERNAL MEDICINE

## 2021-03-17 PROCEDURE — 85652 RBC SED RATE AUTOMATED: CPT

## 2021-03-17 PROCEDURE — 85025 COMPLETE CBC W/AUTO DIFF WBC: CPT

## 2021-03-17 PROCEDURE — 2580000003 HC RX 258: Performed by: EMERGENCY MEDICINE

## 2021-03-17 PROCEDURE — 96366 THER/PROPH/DIAG IV INF ADDON: CPT

## 2021-03-17 PROCEDURE — 83735 ASSAY OF MAGNESIUM: CPT

## 2021-03-17 PROCEDURE — 96367 TX/PROPH/DG ADDL SEQ IV INF: CPT

## 2021-03-17 PROCEDURE — 86141 C-REACTIVE PROTEIN HS: CPT

## 2021-03-17 PROCEDURE — 96376 TX/PRO/DX INJ SAME DRUG ADON: CPT

## 2021-03-17 RX ORDER — LORAZEPAM 2 MG/ML
1 INJECTION INTRAMUSCULAR ONCE
Status: COMPLETED | OUTPATIENT
Start: 2021-03-17 | End: 2021-03-17

## 2021-03-17 RX ORDER — LORAZEPAM 2 MG/ML
2 INJECTION INTRAMUSCULAR ONCE
Status: COMPLETED | OUTPATIENT
Start: 2021-03-17 | End: 2021-03-17

## 2021-03-17 RX ORDER — MORPHINE SULFATE 4 MG/ML
2 INJECTION, SOLUTION INTRAMUSCULAR; INTRAVENOUS ONCE
Status: COMPLETED | OUTPATIENT
Start: 2021-03-17 | End: 2021-03-17

## 2021-03-17 RX ORDER — MORPHINE SULFATE 4 MG/ML
4 INJECTION, SOLUTION INTRAMUSCULAR; INTRAVENOUS ONCE
Status: COMPLETED | OUTPATIENT
Start: 2021-03-17 | End: 2021-03-17

## 2021-03-17 RX ADMIN — LORAZEPAM 2 MG: 2 INJECTION INTRAMUSCULAR; INTRAVENOUS at 02:11

## 2021-03-17 RX ADMIN — LORAZEPAM 1 MG: 2 INJECTION, SOLUTION INTRAMUSCULAR; INTRAVENOUS at 03:13

## 2021-03-17 RX ADMIN — CEFEPIME HYDROCHLORIDE 2000 MG: 2 INJECTION, POWDER, FOR SOLUTION INTRAVENOUS at 06:35

## 2021-03-17 RX ADMIN — LORAZEPAM 1 MG: 2 INJECTION INTRAMUSCULAR; INTRAVENOUS at 01:43

## 2021-03-17 RX ADMIN — VANCOMYCIN HYDROCHLORIDE 1500 MG: 5 INJECTION, POWDER, LYOPHILIZED, FOR SOLUTION INTRAVENOUS at 01:00

## 2021-03-17 RX ADMIN — MORPHINE SULFATE 4 MG: 4 INJECTION, SOLUTION INTRAMUSCULAR; INTRAVENOUS at 09:07

## 2021-03-17 RX ADMIN — GADOTERIDOL 15 ML: 279.3 INJECTION, SOLUTION INTRAVENOUS at 04:14

## 2021-03-17 RX ADMIN — SODIUM CHLORIDE 1000 ML: 9 INJECTION, SOLUTION INTRAVENOUS at 01:00

## 2021-03-17 RX ADMIN — MORPHINE SULFATE 2 MG: 4 INJECTION, SOLUTION INTRAMUSCULAR; INTRAVENOUS at 01:43

## 2021-03-17 ASSESSMENT — PAIN SCALES - GENERAL
PAINLEVEL_OUTOF10: 5
PAINLEVEL_OUTOF10: 7

## 2021-03-17 NOTE — ED NOTES
Pt departed to MRI at this time. Tech states it will take about 2 hours.       Nisreen Rojo RN  03/17/21 7353

## 2021-03-17 NOTE — ED NOTES
Pt accepted to [facility] ED by Dr. Rigoberto Toribio. Transport set up with [facility] for [phone].      Mara Samaniego  03/17/21 8641

## 2021-03-17 NOTE — ED PROVIDER NOTES
CHIEF COMPLAINT    Chief Complaint   Patient presents with    Back Pain     HPI  Ele Gaston is a 46 y.o. female with history of IV drug abuse, MRSA bacteremia, tricuspid valve endocarditis, osteomyelitis of the thoracic vertebra, epidural abscess who presents to the ED with complaints of generalized fatigue, chills, worsening upper back pain/neck pain and change of taste. The patient underwent T2-T4 corpectomy as well as fusion of C4-T7 on January 8 at [facility] with Dr. Deepti Méndez secondary to osteomyelitis of T2-T4 with concern for epidural abscess at T5. She was placed on cefepime and vancomycin. She ultimately had recommendations for discharge to rehab facilities IV antibiotics but left AGAINST MEDICAL ADVICE. She tells me that she had her staples removed 3 weeks ago although it seems by notes that this was performed on February 3. She states over the last week or so she has been experiencing some generalized fatigue as well as increasing pain in the cervical and thoracic spine describes a throbbing stabbing pain rated 8/10. She also describes a metallic taste in her mouth and change in her sense of smell. She had experienced similar symptoms before with her epidural abscess/osteomyelitis of the spine. Patient has been experiencing some chills but no measured fevers. Symptoms are constant. Denies chest pain, shortness of breath, loss of bowel control, loss of bladder control, saddle anesthesia, recent sick contacts, or recent travel. REVIEW OF SYSTEMS  Constitutional: Complains of chills  Eye: No visual changes  HENT: No earache or sore throat. Complains of loss of smell and change of taste  Resp: No SOB or productive cough. Cardio: No chest pain or palpitations. GI: No abdominal pain, nausea, vomiting, constipation or diarrhea. No melena. : No dysuria, urgency or frequency.   Endocrine: No heat intolerance, no cold intolerance, no polydipsia   Lymphatics: No adenopathy  Musculoskeletal: Complains of neck pain and back pain  Neuro: No headaches. Psych: No homicidal or suicidal thoughts  Skin: No rash, No itching. ?  ?   PAST MEDICAL HISTORY  Past Medical History:   Diagnosis Date    Hepatitis C     Liver disease     hepatitis    No significant medical problems      FAMILY HISTORY  Family History   Problem Relation Age of Onset    Obesity Brother     Mental Illness Maternal Aunt     COPD Paternal Grandmother     Dementia Paternal Grandfather     Depression Son     Mental Illness Son         Bipole, ADHD, Adjustment disorder    Colon Cancer Neg Hx      SOCIAL HISTORY  Social History     Socioeconomic History    Marital status: Single     Spouse name: None    Number of children: 2    Years of education: None    Highest education level: None   Occupational History    Occupation: [employer]   Social Needs    Financial resource strain: None    Food insecurity     Worry: None     Inability: None    Transportation needs     Medical: None     Non-medical: None   Tobacco Use    Smoking status: Current Every Day Smoker     Packs/day: 0.50     Years: 12.00     Pack years: 6.00     Types: Cigarettes    Smokeless tobacco: Never Used   Substance and Sexual Activity    Alcohol use: No    Drug use: Not Currently     Types: IV, Opiates      Comment: heroin-last used 4 hrs ago    Sexual activity: Yes     Partners: Male   Lifestyle    Physical activity     Days per week: None     Minutes per session: None    Stress: None   Relationships    Social connections     Talks on phone: None     Gets together: None     Attends Rastafari service: None     Active member of club or organization: None     Attends meetings of clubs or organizations: None     Relationship status: None    Intimate partner violence     Fear of current or ex partner: None     Emotionally abused: None     Physically abused: None     Forced sexual activity: None   Other Topics Concern    None   Social History Narrative    Do you donate blood or plasma? Yes    Caffeine intake? Moderate    Advance directive? No    Is blood transfusion acceptable in an emergency? Yes               SURGICAL HISTORY  Past Surgical History:   Procedure Laterality Date     SECTION  , 2007    x 2    CHOLECYSTECTOMY      HYSTERECTOMY  2008    MARLIN    INCISION AND DRAINAGE Right 2020    RIGHT UPPER THIGH INCISION AND DRAINAGE performed by Jaguar Santoro MD at [address] 03 2013    lap sera     CURRENT MEDICATIONS  Previous Medications    CYCLOBENZAPRINE (FLEXERIL) 10 MG TABLET    Take 10 mg by mouth 3 times daily as needed for Muscle spasms     ALLERGIES  No Known Allergies  PHYSICAL EXAM  VITAL SIGNS:   ED Triage Vitals   Enc Vitals Group      BP 21 (!) 124/93      Pulse 21 111      Resp 21 18      Temp 21 98 °F (36.7 °C)      Temp Source 21 Oral      SpO2 21 98 %      Weight 21 175 lb (79.4 kg)      Height 21 5' 5\" (1.651 m)      Head Circumference --       Peak Flow --       Pain Score --       Pain Loc --       Pain Edu? --       Excl. in [address]? --      Constitutional: Well developed, Well nourished, appears uncomfortable   HENT: Normocephalic, Atraumatic, Bilateral external ears normal, Oropharynx moist, No oral exudates, Nose normal.   Eyes: PERRL, EOMI, Conjunctiva normal, No discharge. No scleral icterus. Neck: Incision site present to the midline cervical spine with some tenderness palpation of the lower cervical spine  Lymphatic: No lymphadenopathy noted. Cardiovascular: Tachycardic, Normal rhythm, No murmurs, gallops or rubs. Thorax & Lungs: Normal breath sounds, No respiratory distress, No wheezing. Abdomen: Soft, No tenderness, No masses, No pulsatile masses, No distention, Normal bowel sounds  Skin: Warm, Dry, Pink, No mottling, No erythema, No rash.    Back: Midline incision appears well-healed without external erythema or drainage with tenderness palpation of the upper thoracic spine  Extremities: No edema, No tenderness, No cyanosis, Normal perfusion, No clubbing. Musculoskeletal: Good range of motion in all major joints as observed. No major deformities noted. Neurologic: Alert & oriented x 3, Normal motor function, Normal sensory function, CN II-XII grossly intact as tested, No focal deficits noted. Psychiatric: Affect normal, Judgment normal, Mood normal.   EKG  Per my interpretation demonstrates normal sinus rhythm at a rate of 89 bpm.  Normal axis. Normal intervals. No acute ST segment changes.     RADIOLOGY  Labs Reviewed   CBC WITH AUTO DIFFERENTIAL - Abnormal; Notable for the following components:       Result Value    WBC 11.2 (*)     Hemoglobin 11.5 (*)     Hematocrit 36.0 (*)     MCH 26.9 (*)     MCHC 31.9 (*)     RDW 16.2 (*)     Platelets 246 (*)     MPV 11.3 (*)     Segs Relative 72.8 (*)     Lymphocytes % 19.8 (*)     Monocytes % 5.8 (*)     All other components within normal limits   URINE RT REFLEX TO CULTURE - Abnormal; Notable for the following components:    Color, UA DIAN (*)     Clarity, UA HAZY (*)     Bilirubin Urine SMALL (*)     Ketones, Urine SMALL (*)     Blood, Urine SMALL (*)     Protein, UA 30 (*)     Urobilinogen, Urine 2.0 (*)     Leukocyte Esterase, Urine LARGE (*)     RBC, UA 29 (*)     WBC, UA 53 (*)     Bacteria, UA RARE (*)     Mucus, UA OCCASIONAL (*)     All other components within normal limits   SEDIMENTATION RATE - Abnormal; Notable for the following components:    Sed Rate 120 (*)     All other components within normal limits   URINE DRUG SCREEN - Abnormal; Notable for the following components:    Cocaine Metabolite UNCONFIRMED POSITIVE (*)     All other components within normal limits   COMPREHENSIVE METABOLIC PANEL W/ REFLEX TO MG FOR LOW K - Abnormal; Notable for the following components:    Sodium 132 (*)     Potassium 3.3 (*)     Chloride 95 (*) Glucose 105 (*)     Albumin 2.8 (*)     Total Protein 8.6 (*)     ALT 7 (*)     AST 9 (*)     All other components within normal limits   CULTURE, URINE   CULTURE, BLOOD 1   CULTURE, BLOOD 2   COVID-19, RAPID   LACTATE, SEPSIS   ICTOTEST, URINE   C-REACTIVE PROTEIN   MAGNESIUM   POCT GLUCOSE     I personally reviewed the images. The radiologist's interpretation reveals:  Last Imaging results   MRI CERVICAL SPINE W WO CONTRAST   Preliminary Result   1. There are areas of fluid noted surrounding the thecal sac extending from   the level of C5 extending into the thoracic spine, concerning for epidural   phlegmon. 2. Extensive paraspinal edema in the cervical spine extending into the   thoracic spine which may be related to sequela of prior surgery. Underlying   infection cannot be excluded. 3. Limited study secondary to susceptibility artifact from hardware. MRI LUMBAR SPINE W WO CONTRAST   Preliminary Result   1. Right L3 septic arthritis with slightly less edema compared to the   previous MRI on 09/14/2020.   2. No new areas of infection are identified. 3. Moderate central spinal canal narrowing at L3-L4. Mild central spinal   canal narrowing at L2-L3, and L4-L5. MRI THORACIC SPINE W WO CONTRAST   Preliminary Result   1. Postsurgical changes are noted from a C7-T6 posterior spinal fusion with   suspected corpectomy device bridging the T1-T3 vertebral bodies. 2. Compression of the thoracic spinal cord at T1-T6 which may be exacerbated   secondary to artifact from the hardware from the fusion. 3. Extensive paraspinal edema extending from the lower cervical spine down to   the level of T8 which may represent sequela of recent surgery. 4. Mild central spinal canal narrowing at T5-T6 through T8-T9. 5. Prominent soft tissue surrounding the thoracic spinal cord at T1-T6 which   may be related to underlying infection. 6. Small bilateral pleural effusions.            Procedures  NA  MEDS GIVEN IN ED:  Medications   cefepime (MAXIPIME) 2000 mg IVPB minibag (has no administration in time range)   0.9 % sodium chloride bolus (0 mLs Intravenous Stopped 3/17/21 0531)   vancomycin (VANCOCIN) 1,500 mg in dextrose 5 % 500 mL IVPB (1,500 mg Intravenous Given by Other Clinician 3/17/21 0100)   morphine sulfate (PF) injection 2 mg (2 mg Intravenous Given 3/17/21 0143)   LORazepam (ATIVAN) injection 1 mg (1 mg Intravenous Given 3/17/21 0143)   LORazepam (ATIVAN) injection 2 mg (2 mg Intravenous Given 3/17/21 0211)   LORazepam (ATIVAN) injection 1 mg (1 mg Intravenous Given 3/17/21 0313)   gadoteridol (PROHANCE) injection 15 mL (15 mLs Intravenous Given 3/17/21 0414)     COURSE & MEDICAL DECISION MAKING  68-year-old female with history of polysubstance use and epidural abscess as well as discitis/osteomyelitis of the thoracic spine presents the emergency department complaints of worsening back pain, chills, fatigue, and change of taste. Initial vital signs are remarkable tachycardia rate of 111. On exam she appears uncomfortable. Her midline incision in the cervical and thoracic spine appear well-healed without external erythema or drainage but she does have some tenderness to palpation of the lower cervical and upper thoracic spine. She has 5/5 muscle during testing of bilateral lower extremities as well as bilateral upper extremities. At this time we will obtain CBC, CMP, lactic acid, blood cultures, COVID-19 testing, CRP, ESR, MRI of the cervical, thoracic, lumbar spine and initiate the patient on IV vancomycin and cefepime. CBC shows leukocytosis with a white blood cell count of 11.2. CMP shows mild hypokalemia with a potassium of 3.3. CRP is within normal limits. ESR is elevated at 120. Lactic acid is nonelevated. Urinalysis shows large amount of leukocytes as well as some bacteria. This was sent for culture. Patient does admit to experiencing some dysuria.   MRI of the cervical spine shows areas of fluid around the thecal sac extending from level of C5 into the thoracic spine concerning for epidural phlegmon. There is extensive paraspinal edema in the cervical spine extending to the thoracic spine. She is also noticed to have extensive paraspinal edema extending from the lower cervical spine down to the level of T8 and prominent soft tissue surrounding the thoracic spinal cord at T1-T6 which may related to underlying infection. Patient discussed with [facility] transfer center and will be transferred to [facility] after being accepted by Dr. Kate Hyde. Appropriate PPE utilized as indicated for entire patient encounter? Time of Disposition: See timeline  ? New Prescriptions    No medications on file     FINAL IMPRESSION  1. Epidural abscess    2. Acute midline low back pain without sciatica    3. Hypokalemia    4. Urinary tract infection with hematuria, site unspecified        Electronically signed by:  [address], DO, 3/17/2021         [address], DO  03/17/21 0502

## 2021-03-17 NOTE — ED NOTES
Patient to restroom. Patient registered  Earlier and states left to get something to eat.       Jeet Becerra RN  03/16/21 [id number]

## 2021-03-17 NOTE — ED PROVIDER NOTES
As physician-in-triage, I performed a medical screening history and physical exam on this patient. I performed a medical screening examination and evaluated this patient briefly linda tele-health platform with the purpose of initiating their ED workup in an expeditious manner. Please see notes from other ED providers regarding comprehensive evaluation including full history, physical exam, interpretation of results, and medical decision making/disposition. CHIEF COMPLAINT  Chief Complaint   Patient presents with    Back Pain       HISTORY OF PRESENT ILLNESS  Lazarus Bijou is a 46 y.o. female that presents to the emergency department for evaluation of back pain. Patient has a prior medical history of IV drug use, hepatitis C, osteomyelitis, epidural abscess discitis, MRSA bacteremia, tricuspid valve endocarditis. Initially, presented to [facility] at the beginning of January with lower extremity weakness. MRI was concerning for discitis and osteomyelitis from T2-T4 with cord edema, L3-L4 septic joint. Patient was taken to the operating room for T2-T4 partial vertebrectomy and C4-T7 fusion on 1/8/2020. After surgery, infectious disease recommended empiric intravenous vancomycin and cefepime. PICC line was placed. While coordinating placement at skilled nursing facility, patient decided to leave [address] Had a follow-up appointment with with her spine surgeon at [address] for staple removal from her back. He has been recommending that the patient come back to the emergency department to initiate IV antibiotics. Patient notes over the past couple days, she has had worsening back pain. She now has a metallic taste in her mouth. She believes that the infection is getting worse, is now seeking antibiotics. She believes that she needs admission to skilled nursing facility to complete her 6 to 8-week antibiotic course and for rehabilitation.   She

## 2021-03-17 NOTE — ED NOTES
Nurse to Nurse called to [address] at [facility]. Kady Alejandre.  MONROE Wetzel  03/17/21 [id number]

## 2021-03-18 LAB
CULTURE: NORMAL
EKG ATRIAL RATE: 89 BPM
EKG DIAGNOSIS: NORMAL
EKG P AXIS: 53 DEGREES
EKG P-R INTERVAL: 146 MS
EKG Q-T INTERVAL: 362 MS
EKG QRS DURATION: 86 MS
EKG QTC CALCULATION (BAZETT): 440 MS
EKG R AXIS: 8 DEGREES
EKG T AXIS: 21 DEGREES
EKG VENTRICULAR RATE: 89 BPM
Lab: NORMAL
SPECIMEN: NORMAL

## 2021-03-22 LAB
CULTURE: NORMAL
CULTURE: NORMAL
Lab: NORMAL
Lab: NORMAL
SPECIMEN: NORMAL
SPECIMEN: NORMAL

## 2022-01-01 ENCOUNTER — TELEPHONE (OUTPATIENT)
Dept: FAMILY MEDICINE CLINIC | Age: 42
End: 2022-01-01

## 2022-01-01 ENCOUNTER — APPOINTMENT (OUTPATIENT)
Dept: GENERAL RADIOLOGY | Age: 42
DRG: 130 | End: 2022-01-01
Payer: MEDICAID

## 2022-01-01 ENCOUNTER — APPOINTMENT (OUTPATIENT)
Dept: CT IMAGING | Age: 42
DRG: 130 | End: 2022-01-01
Payer: MEDICAID

## 2022-01-01 ENCOUNTER — HOSPITAL ENCOUNTER (OUTPATIENT)
Dept: ULTRASOUND IMAGING | Age: 42
Discharge: HOME | End: 2022-11-03
Payer: MEDICAID

## 2022-01-01 ENCOUNTER — APPOINTMENT (OUTPATIENT)
Dept: GENERAL RADIOLOGY | Age: 42
End: 2022-01-01
Payer: MEDICAID

## 2022-01-01 ENCOUNTER — APPOINTMENT (OUTPATIENT)
Dept: NUCLEAR MEDICINE | Age: 42
DRG: 130 | End: 2022-01-01
Payer: MEDICAID

## 2022-01-01 ENCOUNTER — OFFICE VISIT (OUTPATIENT)
Dept: FAMILY MEDICINE CLINIC | Age: 42
End: 2022-01-01
Payer: MEDICAID

## 2022-01-01 ENCOUNTER — HOSPITAL ENCOUNTER (INPATIENT)
Age: 42
LOS: 7 days | DRG: 130 | End: 2022-11-11
Attending: EMERGENCY MEDICINE | Admitting: INTERNAL MEDICINE
Payer: MEDICAID

## 2022-01-01 ENCOUNTER — HOSPITAL ENCOUNTER (EMERGENCY)
Age: 42
Discharge: HOME | End: 2022-10-28
Attending: EMERGENCY MEDICINE
Payer: MEDICAID

## 2022-01-01 VITALS
WEIGHT: 292 LBS | SYSTOLIC BLOOD PRESSURE: 91 MMHG | RESPIRATION RATE: 16 BRPM | HEART RATE: 89 BPM | DIASTOLIC BLOOD PRESSURE: 71 MMHG | TEMPERATURE: 97.5 F | OXYGEN SATURATION: 94 % | BODY MASS INDEX: 48.65 KG/M2 | HEIGHT: 65 IN

## 2022-01-01 VITALS
DIASTOLIC BLOOD PRESSURE: 64 MMHG | BODY MASS INDEX: 48.82 KG/M2 | WEIGHT: 293 LBS | HEIGHT: 65 IN | HEART RATE: 85 BPM | SYSTOLIC BLOOD PRESSURE: 114 MMHG | RESPIRATION RATE: 16 BRPM | OXYGEN SATURATION: 95 %

## 2022-01-01 DIAGNOSIS — J96.02 ACUTE RESPIRATORY FAILURE WITH HYPOXIA AND HYPERCAPNIA (HCC): ICD-10-CM

## 2022-01-01 DIAGNOSIS — E87.6 HYPOKALEMIA: ICD-10-CM

## 2022-01-01 DIAGNOSIS — R60.9 PERIPHERAL EDEMA: ICD-10-CM

## 2022-01-01 DIAGNOSIS — N76.0 BACTERIAL VAGINOSIS: Primary | ICD-10-CM

## 2022-01-01 DIAGNOSIS — R06.00 DYSPNEA, UNSPECIFIED TYPE: Primary | ICD-10-CM

## 2022-01-01 DIAGNOSIS — J96.01 ACUTE RESPIRATORY FAILURE WITH HYPOXIA AND HYPERCAPNIA (HCC): ICD-10-CM

## 2022-01-01 DIAGNOSIS — L03.115 CELLULITIS OF RIGHT LOWER EXTREMITY: Primary | ICD-10-CM

## 2022-01-01 DIAGNOSIS — B96.89 BACTERIAL VAGINOSIS: Primary | ICD-10-CM

## 2022-01-01 DIAGNOSIS — I46.9 CARDIAC ARREST (HCC): Primary | ICD-10-CM

## 2022-01-01 DIAGNOSIS — Z52.9 ORGAN DONATION: ICD-10-CM

## 2022-01-01 DIAGNOSIS — R82.90 MALODOROUS URINE: ICD-10-CM

## 2022-01-01 LAB
ALBUMIN SERPL-MCNC: 2.1 GM/DL (ref 3.4–5)
ALBUMIN SERPL-MCNC: 2.3 GM/DL (ref 3.4–5)
ALBUMIN SERPL-MCNC: 2.8 GM/DL (ref 3.4–5)
ALBUMIN SERPL-MCNC: 3 GM/DL (ref 3.4–5)
ALP BLD-CCNC: 121 IU/L (ref 40–128)
ALP BLD-CCNC: 139 IU/L (ref 40–129)
ALP BLD-CCNC: 175 IU/L (ref 40–129)
ALP BLD-CCNC: 190 IU/L (ref 40–129)
ALT SERPL-CCNC: 147 U/L (ref 10–40)
ALT SERPL-CCNC: 20 U/L (ref 10–40)
ALT SERPL-CCNC: 53 U/L (ref 10–40)
ALT SERPL-CCNC: 70 U/L (ref 10–40)
AMPHETAMINES: NEGATIVE
AMPHETAMINES: NEGATIVE
AMYLASE: 78 U/L (ref 25–115)
ANION GAP SERPL CALCULATED.3IONS-SCNC: 11 MMOL/L (ref 4–16)
ANION GAP SERPL CALCULATED.3IONS-SCNC: 11 MMOL/L (ref 4–16)
ANION GAP SERPL CALCULATED.3IONS-SCNC: 12 MMOL/L (ref 3–16)
ANION GAP SERPL CALCULATED.3IONS-SCNC: 13 MMOL/L (ref 4–16)
ANION GAP SERPL CALCULATED.3IONS-SCNC: 14 MMOL/L (ref 4–16)
ANION GAP SERPL CALCULATED.3IONS-SCNC: 15 MMOL/L (ref 4–16)
ANION GAP SERPL CALCULATED.3IONS-SCNC: 16 MMOL/L (ref 4–16)
ANION GAP SERPL CALCULATED.3IONS-SCNC: 26 MMOL/L (ref 4–16)
ANION GAP SERPL CALCULATED.3IONS-SCNC: 9 MMOL/L (ref 4–16)
APTT: 26.2 SECONDS (ref 25.1–37.1)
APTT: 35.4 SECONDS (ref 25.1–37.1)
APTT: 38.1 SECONDS (ref 25.1–37.1)
APTT: 44 SECONDS (ref 25.1–37.1)
AST SERPL-CCNC: 208 IU/L (ref 15–37)
AST SERPL-CCNC: 25 IU/L (ref 15–37)
AST SERPL-CCNC: 56 IU/L (ref 15–37)
AST SERPL-CCNC: 97 IU/L (ref 15–37)
BANDED NEUTROPHILS ABSOLUTE COUNT: 1.56 K/CU MM
BANDED NEUTROPHILS RELATIVE PERCENT: 10 % (ref 5–11)
BARBITURATE SCREEN URINE: NEGATIVE
BARBITURATE SCREEN URINE: NEGATIVE
BASE EXCESS MIXED: 0.3 (ref 0–2.3)
BASE EXCESS MIXED: 0.5 (ref 0–2.3)
BASE EXCESS MIXED: 0.8 (ref 0–2.3)
BASE EXCESS MIXED: 1.1 (ref 0–2.3)
BASE EXCESS MIXED: 1.5 (ref 0–2.3)
BASE EXCESS MIXED: 1.6 (ref 0–2.3)
BASE EXCESS MIXED: 4 (ref 0–2.3)
BASE EXCESS MIXED: 4.7 (ref 0–2.3)
BASE EXCESS MIXED: 5.3 (ref 0–2.3)
BASE EXCESS MIXED: 6.3 (ref 0–2.3)
BASE EXCESS MIXED: 9.7 (ref 0–2.3)
BASE EXCESS: 1 (ref 0–2.4)
BASE EXCESS: 2 (ref 0–2.4)
BASE EXCESS: 3 (ref 0–2.4)
BASE EXCESS: 7 (ref 0–2.4)
BASOPHILS ABSOLUTE: 0 K/CU MM
BASOPHILS RELATIVE PERCENT: 0.3 % (ref 0–1)
BENZODIAZEPINE SCREEN, URINE: NEGATIVE
BENZODIAZEPINE SCREEN, URINE: NEGATIVE
BILIRUB SERPL-MCNC: 0.2 MG/DL (ref 0–1)
BILIRUB SERPL-MCNC: 0.2 MG/DL (ref 0–1)
BILIRUB SERPL-MCNC: 0.3 MG/DL (ref 0–1)
BILIRUB SERPL-MCNC: 0.4 MG/DL (ref 0–1)
BILIRUB SERPL-MCNC: 0.5 MG/DL (ref 0–1)
BILIRUBIN DIRECT: 0.2 MG/DL (ref 0–0.3)
BILIRUBIN DIRECT: 0.3 MG/DL (ref 0–0.3)
BILIRUBIN URINE: NEGATIVE MG/DL
BILIRUBIN, INDIRECT: 0 MG/DL (ref 0–0.7)
BILIRUBIN, INDIRECT: 0.2 MG/DL (ref 0–0.7)
BLOOD, URINE: NEGATIVE
BUN BLDV-MCNC: 20 MG/DL (ref 6–23)
BUN BLDV-MCNC: 31 MG/DL (ref 6–23)
BUN BLDV-MCNC: 32 MG/DL (ref 7–20)
BUN BLDV-MCNC: 37 MG/DL (ref 6–23)
BUN BLDV-MCNC: 40 MG/DL (ref 6–23)
BUN BLDV-MCNC: 43 MG/DL (ref 6–23)
BUN BLDV-MCNC: 43 MG/DL (ref 6–23)
BUN BLDV-MCNC: 44 MG/DL (ref 6–23)
BUN BLDV-MCNC: 47 MG/DL (ref 6–23)
BUN BLDV-MCNC: 52 MG/DL (ref 6–23)
CALCIUM IONIZED: 4.08 MG/DL (ref 4.48–5.28)
CALCIUM IONIZED: 4.12 MG/DL (ref 4.48–5.28)
CALCIUM IONIZED: 4.16 MG/DL (ref 4.48–5.28)
CALCIUM IONIZED: 4.36 MG/DL (ref 4.48–5.28)
CALCIUM IONIZED: 4.4 MG/DL (ref 4.48–5.28)
CALCIUM IONIZED: 4.68 MG/DL (ref 4.48–5.28)
CALCIUM SERPL-MCNC: 8 MG/DL (ref 8.3–10.6)
CALCIUM SERPL-MCNC: 8.1 MG/DL (ref 8.3–10.6)
CALCIUM SERPL-MCNC: 8.2 MG/DL (ref 8.3–10.6)
CALCIUM SERPL-MCNC: 8.3 MG/DL (ref 8.3–10.6)
CALCIUM SERPL-MCNC: 8.3 MG/DL (ref 8.3–10.6)
CALCIUM SERPL-MCNC: 8.5 MG/DL (ref 8.3–10.6)
CALCIUM SERPL-MCNC: 9.4 MG/DL (ref 8.3–10.6)
CALCIUM SERPL-MCNC: 9.8 MG/DL (ref 8.3–10.6)
CANNABINOID SCREEN URINE: NEGATIVE
CANNABINOID SCREEN URINE: NEGATIVE
CARBON MONOXIDE, BLOOD: 1.6 % (ref 0–5)
CARBON MONOXIDE, BLOOD: 1.6 % (ref 0–5)
CARBON MONOXIDE, BLOOD: 1.7 % (ref 0–5)
CARBON MONOXIDE, BLOOD: 1.8 % (ref 0–5)
CARBON MONOXIDE, BLOOD: 2 % (ref 0–5)
CARBON MONOXIDE, BLOOD: 2.1 % (ref 0–5)
CARBON MONOXIDE, BLOOD: 2.3 % (ref 0–5)
CARBON MONOXIDE, BLOOD: 2.3 % (ref 0–5)
CARBON MONOXIDE, BLOOD: 2.4 % (ref 0–5)
CARBON MONOXIDE, BLOOD: 4.1 % (ref 0–5)
CHLORIDE BLD-SCNC: 100 MMOL/L (ref 99–110)
CHLORIDE BLD-SCNC: 101 MMOL/L (ref 99–110)
CHLORIDE BLD-SCNC: 84 MMOL/L (ref 99–110)
CHLORIDE BLD-SCNC: 88 MMOL/L (ref 99–110)
CHLORIDE BLD-SCNC: 92 MMOL/L (ref 99–110)
CHLORIDE BLD-SCNC: 93 MMOL/L (ref 99–110)
CHLORIDE BLD-SCNC: 95 MMOL/L (ref 99–110)
CHLORIDE BLD-SCNC: 96 MMOL/L (ref 99–110)
CHLORIDE BLD-SCNC: 98 MMOL/L (ref 99–110)
CHLORIDE BLD-SCNC: 98 MMOL/L (ref 99–110)
CLARITY: CLEAR
CO2 CONTENT: 26.4 MMOL/L (ref 19–24)
CO2 CONTENT: 27.4 MMOL/L (ref 19–24)
CO2 CONTENT: 27.7 MMOL/L (ref 19–24)
CO2 CONTENT: 28.9 MMOL/L (ref 19–24)
CO2 CONTENT: 30.2 MMOL/L (ref 19–24)
CO2 CONTENT: 30.5 MMOL/L (ref 19–24)
CO2 CONTENT: 31.3 MMOL/L (ref 19–24)
CO2 CONTENT: 32.2 MMOL/L (ref 19–24)
CO2 CONTENT: 33.6 MMOL/L (ref 19–24)
CO2 CONTENT: 34.8 MMOL/L (ref 19–24)
CO2: 21 MMOL/L (ref 21–32)
CO2: 23 MMOL/L (ref 21–32)
CO2: 25 MMOL/L (ref 21–32)
CO2: 25 MMOL/L (ref 21–32)
CO2: 26 MMOL/L (ref 21–32)
CO2: 27 MMOL/L (ref 21–32)
CO2: 28 MMOL/L (ref 21–32)
CO2: 28 MMOL/L (ref 21–32)
CO2: 29 MMOL/L (ref 21–32)
CO2: 29 MMOL/L (ref 21–32)
CO2: 30 MMOL/L (ref 21–32)
CO2: 30 MMOL/L (ref 21–32)
CO2: 31 MMOL/L (ref 21–32)
CO2: 32 MMOL/L (ref 21–32)
CO2: 39 MMOL/L (ref 21–32)
COCAINE METABOLITE: NEGATIVE
COCAINE METABOLITE: NEGATIVE
COLOR: YELLOW
COMMENT: ABNORMAL
CREAT SERPL-MCNC: 1 MG/DL (ref 0.6–1.1)
CREAT SERPL-MCNC: 1.1 MG/DL (ref 0.6–1.1)
CREAT SERPL-MCNC: 1.7 MG/DL (ref 0.6–1.1)
CREAT SERPL-MCNC: 2 MG/DL (ref 0.6–1.1)
CREAT SERPL-MCNC: 2.1 MG/DL (ref 0.6–1.1)
CREAT SERPL-MCNC: 2.2 MG/DL (ref 0.6–1.1)
CREAT SERPL-MCNC: 2.4 MG/DL (ref 0.6–1.1)
CREAT SERPL-MCNC: 2.5 MG/DL (ref 0.6–1.1)
CREAT SERPL-MCNC: 2.7 MG/DL (ref 0.6–1.1)
CREAT SERPL-MCNC: 2.9 MG/DL (ref 0.6–1.1)
CULTURE: ABNORMAL
CULTURE: ABNORMAL
CULTURE: NORMAL
DIFFERENTIAL TYPE: ABNORMAL
DIFFERENTIAL TYPE: ABNORMAL
DOSE AMOUNT: NORMAL
DOSE AMOUNT: NORMAL
DOSE TIME: NORMAL
DOSE TIME: NORMAL
EGFR, POC: 23 ML/MIN/1.73M2
EGFR, POC: 26 ML/MIN/1.73M2
EGFR, POC: 27 ML/MIN/1.73M2
EKG ATRIAL RATE: 90 BPM
EKG ATRIAL RATE: 99 BPM
EKG DIAGNOSIS: NORMAL
EKG P AXIS: 198 DEGREES
EKG P AXIS: 35 DEGREES
EKG P AXIS: 49 DEGREES
EKG P-R INTERVAL: 122 MS
EKG P-R INTERVAL: 212 MS
EKG P-R INTERVAL: 272 MS
EKG Q-T INTERVAL: 402 MS
EKG Q-T INTERVAL: 416 MS
EKG Q-T INTERVAL: 474 MS
EKG QRS DURATION: 102 MS
EKG QRS DURATION: 112 MS
EKG QRS DURATION: 202 MS
EKG QTC CALCULATION (BAZETT): 477 MS
EKG QTC CALCULATION (BAZETT): 491 MS
EKG QTC CALCULATION (BAZETT): 608 MS
EKG R AXIS: -31 DEGREES
EKG R AXIS: 145 DEGREES
EKG R AXIS: 161 DEGREES
EKG T AXIS: -9 DEGREES
EKG T AXIS: 5 DEGREES
EKG T AXIS: 58 DEGREES
EKG VENTRICULAR RATE: 79 BPM
EKG VENTRICULAR RATE: 90 BPM
EKG VENTRICULAR RATE: 99 BPM
EOSINOPHILS ABSOLUTE: 0.3 K/CU MM
EOSINOPHILS ABSOLUTE: 0.4 K/CU MM
EOSINOPHILS RELATIVE PERCENT: 2 % (ref 0–3)
EOSINOPHILS RELATIVE PERCENT: 6 % (ref 0–3)
ESTIMATED AVERAGE GLUCOSE: 94 MG/DL
GAMMA GLUTAMYL TRANSFERASE: 35 IU/L (ref 5–36)
GFR AFRICAN AMERICAN: >60
GFR NON-AFRICAN AMERICAN: 55
GFR SERPL CREATININE-BSD FRML MDRD: 20 ML/MIN/1.73M2
GFR SERPL CREATININE-BSD FRML MDRD: 22 ML/MIN/1.73M2
GFR SERPL CREATININE-BSD FRML MDRD: 24 ML/MIN/1.73M2
GFR SERPL CREATININE-BSD FRML MDRD: 26 ML/MIN/1.73M2
GFR SERPL CREATININE-BSD FRML MDRD: 28 ML/MIN/1.73M2
GFR SERPL CREATININE-BSD FRML MDRD: 30 ML/MIN/1.73M2
GFR SERPL CREATININE-BSD FRML MDRD: 32 ML/MIN/1.73M2
GFR SERPL CREATININE-BSD FRML MDRD: 39 ML/MIN/1.73M2
GFR SERPL CREATININE-BSD FRML MDRD: >60 ML/MIN/1.73M2
GLUCOSE BLD-MCNC: 101 MG/DL (ref 70–99)
GLUCOSE BLD-MCNC: 101 MG/DL (ref 70–99)
GLUCOSE BLD-MCNC: 104 MG/DL (ref 70–99)
GLUCOSE BLD-MCNC: 104 MG/DL (ref 70–99)
GLUCOSE BLD-MCNC: 106 MG/DL (ref 70–99)
GLUCOSE BLD-MCNC: 108 MG/DL (ref 70–99)
GLUCOSE BLD-MCNC: 120 MG/DL (ref 70–99)
GLUCOSE BLD-MCNC: 120 MG/DL (ref 70–99)
GLUCOSE BLD-MCNC: 142 MG/DL (ref 70–99)
GLUCOSE BLD-MCNC: 146 MG/DL (ref 70–99)
GLUCOSE BLD-MCNC: 175 MG/DL (ref 70–99)
GLUCOSE BLD-MCNC: 323 MG/DL (ref 70–99)
GLUCOSE BLD-MCNC: 53 MG/DL (ref 70–99)
GLUCOSE BLD-MCNC: 65 MG/DL (ref 70–99)
GLUCOSE BLD-MCNC: 76 MG/DL (ref 70–99)
GLUCOSE BLD-MCNC: 77 MG/DL (ref 70–99)
GLUCOSE BLD-MCNC: 77 MG/DL (ref 70–99)
GLUCOSE BLD-MCNC: 79 MG/DL (ref 70–99)
GLUCOSE BLD-MCNC: 81 MG/DL (ref 70–99)
GLUCOSE BLD-MCNC: 81 MG/DL (ref 70–99)
GLUCOSE BLD-MCNC: 84 MG/DL (ref 70–99)
GLUCOSE BLD-MCNC: 84 MG/DL (ref 70–99)
GLUCOSE BLD-MCNC: 85 MG/DL (ref 70–99)
GLUCOSE BLD-MCNC: 86 MG/DL (ref 70–99)
GLUCOSE BLD-MCNC: 87 MG/DL (ref 70–99)
GLUCOSE BLD-MCNC: 88 MG/DL (ref 70–99)
GLUCOSE BLD-MCNC: 90 MG/DL (ref 70–99)
GLUCOSE BLD-MCNC: 90 MG/DL (ref 70–99)
GLUCOSE BLD-MCNC: 91 MG/DL (ref 70–99)
GLUCOSE BLD-MCNC: 92 MG/DL (ref 70–99)
GLUCOSE BLD-MCNC: 92 MG/DL (ref 70–99)
GLUCOSE BLD-MCNC: 93 MG/DL (ref 70–99)
GLUCOSE BLD-MCNC: 95 MG/DL (ref 70–99)
GLUCOSE BLD-MCNC: 95 MG/DL (ref 70–99)
GLUCOSE BLD-MCNC: 96 MG/DL (ref 70–99)
GLUCOSE BLD-MCNC: 99 MG/DL (ref 70–99)
GLUCOSE BLD-MCNC: 99 MG/DL (ref 70–99)
GLUCOSE, URINE: NEGATIVE MG/DL
GRAM SMEAR: ABNORMAL
HBA1C MFR BLD: 4.9 % (ref 4.2–6.3)
HCO3 ARTERIAL: 24.7 MMOL/L (ref 18–23)
HCO3 ARTERIAL: 25.7 MMOL/L (ref 18–23)
HCO3 ARTERIAL: 26 MMOL/L (ref 18–23)
HCO3 ARTERIAL: 27.2 MMOL/L (ref 18–23)
HCO3 ARTERIAL: 27.3 MMOL/L (ref 18–23)
HCO3 ARTERIAL: 27.5 MMOL/L (ref 18–23)
HCO3 ARTERIAL: 27.9 MMOL/L (ref 18–23)
HCO3 ARTERIAL: 28.5 MMOL/L (ref 18–23)
HCO3 ARTERIAL: 29.2 MMOL/L (ref 18–23)
HCO3 ARTERIAL: 29.2 MMOL/L (ref 18–23)
HCO3 ARTERIAL: 29.5 MMOL/L (ref 18–23)
HCO3 ARTERIAL: 29.9 MMOL/L (ref 18–23)
HCO3 ARTERIAL: 30.6 MMOL/L (ref 18–23)
HCO3 ARTERIAL: 31.9 MMOL/L (ref 18–23)
HCO3 ARTERIAL: 33.1 MMOL/L (ref 18–23)
HCO3 VENOUS: 23.3 MMOL/L (ref 19–25)
HCO3 VENOUS: 37.3 MMOL/L (ref 19–25)
HCT VFR BLD CALC: 33 % (ref 37–47)
HCT VFR BLD CALC: 34 % (ref 37–47)
HCT VFR BLD CALC: 35 % (ref 37–47)
HCT VFR BLD CALC: 36.5 % (ref 37–47)
HCT VFR BLD CALC: 37 % (ref 37–47)
HCT VFR BLD CALC: 38 % (ref 37–47)
HEMOGLOBIN: 10.6 GM/DL (ref 12.5–16)
HEMOGLOBIN: 11 GM/DL (ref 12.5–16)
HEMOGLOBIN: 11.2 GM/DL (ref 12.5–16)
HEMOGLOBIN: 11.3 GM/DL (ref 12.5–16)
HEMOGLOBIN: 11.6 GM/DL (ref 12.5–16)
HEMOGLOBIN: 12.5 GM/DL (ref 12.5–16)
IMMATURE NEUTROPHIL %: 0.3 % (ref 0–0.43)
INR BLD: 1.01 INDEX
INR BLD: 1.09 INDEX
INR BLD: 1.21 INDEX
INR BLD: 1.41 INDEX
INTERPRETATION: NORMAL
IONIZED CA: 1.02 MMOL/L (ref 1.12–1.32)
IONIZED CA: 1.03 MMOL/L (ref 1.12–1.32)
IONIZED CA: 1.04 MMOL/L (ref 1.12–1.32)
IONIZED CA: 1.09 MMOL/L (ref 1.12–1.32)
IONIZED CA: 1.1 MMOL/L (ref 1.12–1.32)
IONIZED CA: 1.17 MMOL/L (ref 1.12–1.32)
KETONES, URINE: NEGATIVE MG/DL
LACTATE DEHYDROGENASE: 996 IU/L (ref 120–246)
LACTATE: 1.7 MMOL/L (ref 0.4–2)
LACTATE: 1.9 MMOL/L (ref 0.4–2)
LACTATE: 12.2 MMOL/L (ref 0.4–2)
LACTATE: 2.1 MMOL/L (ref 0.4–2)
LACTATE: 2.7 MMOL/L (ref 0.4–2)
LEUKOCYTE ESTERASE, URINE: NEGATIVE
LIPASE: 31 IU/L (ref 13–60)
LIPASE: 36 IU/L (ref 13–60)
LV EF: 53 %
LVEF MODALITY: NORMAL
LYMPHOCYTES ABSOLUTE: 1 K/CU MM
LYMPHOCYTES ABSOLUTE: 2 K/CU MM
LYMPHOCYTES RELATIVE PERCENT: 13 % (ref 24–44)
LYMPHOCYTES RELATIVE PERCENT: 15.8 % (ref 24–44)
Lab: ABNORMAL
Lab: NORMAL
MAGNESIUM: 1.5 MG/DL (ref 1.8–2.4)
MAGNESIUM: 1.5 MG/DL (ref 1.8–2.4)
MAGNESIUM: 1.6 MG/DL (ref 1.8–2.4)
MAGNESIUM: 1.6 MG/DL (ref 1.8–2.4)
MAGNESIUM: 1.7 MG/DL (ref 1.8–2.4)
MAGNESIUM: 1.8 MG/DL (ref 1.8–2.4)
MAGNESIUM: 1.9 MG/DL (ref 1.8–2.4)
MAGNESIUM: 2.1 MG/DL (ref 1.8–2.4)
MCH RBC QN AUTO: 27.9 PG (ref 27–31)
MCH RBC QN AUTO: 28.3 PG (ref 27–31)
MCH RBC QN AUTO: 28.6 PG (ref 27–31)
MCHC RBC AUTO-ENTMCNC: 29 % (ref 32–36)
MCHC RBC AUTO-ENTMCNC: 29.7 % (ref 32–36)
MCHC RBC AUTO-ENTMCNC: 31.4 % (ref 32–36)
MCV RBC AUTO: 91.1 FL (ref 78–100)
MCV RBC AUTO: 93.8 FL (ref 78–100)
MCV RBC AUTO: 97.6 FL (ref 78–100)
METAMYELOCYTES ABSOLUTE COUNT: 0.47 K/CU MM
METAMYELOCYTES PERCENT: 3 %
METHEMOGLOBIN ARTERIAL: 0.5 %
METHEMOGLOBIN ARTERIAL: 0.8 %
METHEMOGLOBIN ARTERIAL: 0.9 %
METHEMOGLOBIN ARTERIAL: 0.9 %
METHEMOGLOBIN ARTERIAL: 1.2 %
METHEMOGLOBIN ARTERIAL: 1.3 %
METHEMOGLOBIN ARTERIAL: 1.3 %
METHEMOGLOBIN ARTERIAL: 1.4 %
MONOCYTES ABSOLUTE: 0.6 K/CU MM
MONOCYTES RELATIVE PERCENT: 9.3 % (ref 0–4)
MYELOCYTE PERCENT: 2 %
MYELOCYTES ABSOLUTE COUNT: 0.31 K/CU MM
NITRITE URINE, QUANTITATIVE: NEGATIVE
NUCLEATED RBC %: 0 %
O2 SAT, VEN: 48.7 % (ref 50–70)
O2 SAT, VEN: 55.2 % (ref 50–70)
O2 SATURATION: 63.2 % (ref 96–97)
O2 SATURATION: 87.6 % (ref 96–97)
O2 SATURATION: 89.4 % (ref 96–97)
O2 SATURATION: 92.3 % (ref 96–97)
O2 SATURATION: 93 % (ref 96–97)
O2 SATURATION: 93.2 % (ref 96–97)
O2 SATURATION: 93.2 % (ref 96–97)
O2 SATURATION: 93.5 % (ref 96–97)
O2 SATURATION: 93.9 % (ref 96–97)
O2 SATURATION: 94 % (ref 96–97)
O2 SATURATION: 94.1 % (ref 96–97)
O2 SATURATION: 94.5 % (ref 96–97)
O2 SATURATION: 95.7 % (ref 96–97)
O2 SATURATION: 96 % (ref 96–97)
O2 SATURATION: 96.3 % (ref 96–97)
OPIATES, URINE: NEGATIVE
OPIATES, URINE: NEGATIVE
OXYCODONE: NEGATIVE
OXYCODONE: NEGATIVE
PCO2 ARTERIAL: 42 MMHG (ref 32–45)
PCO2 ARTERIAL: 50.4 MMHG (ref 32–45)
PCO2 ARTERIAL: 53 MMHG (ref 32–45)
PCO2 ARTERIAL: 53 MMHG (ref 32–45)
PCO2 ARTERIAL: 53.9 MMHG (ref 32–45)
PCO2 ARTERIAL: 53.9 MMHG (ref 32–45)
PCO2 ARTERIAL: 54 MMHG (ref 32–45)
PCO2 ARTERIAL: 55 MMHG (ref 32–45)
PCO2 ARTERIAL: 56 MMHG (ref 32–45)
PCO2 ARTERIAL: 56 MMHG (ref 32–45)
PCO2 ARTERIAL: 60 MMHG (ref 32–45)
PCO2 ARTERIAL: 60 MMHG (ref 32–45)
PCO2 ARTERIAL: 66.7 MMHG (ref 32–45)
PCO2, VEN: 63 MMHG (ref 38–52)
PCO2, VEN: 70 MMHG (ref 38–52)
PDW BLD-RTO: 18.5 % (ref 11.7–14.9)
PDW BLD-RTO: 19.1 % (ref 11.7–14.9)
PDW BLD-RTO: 19.2 % (ref 11.7–14.9)
PH BLOOD: 7.26 (ref 7.34–7.45)
PH BLOOD: 7.26 (ref 7.34–7.45)
PH BLOOD: 7.27 (ref 7.34–7.45)
PH BLOOD: 7.29 (ref 7.34–7.45)
PH BLOOD: 7.3 (ref 7.34–7.45)
PH BLOOD: 7.3 (ref 7.34–7.45)
PH BLOOD: 7.32 (ref 7.34–7.45)
PH BLOOD: 7.33 (ref 7.34–7.45)
PH BLOOD: 7.34 (ref 7.34–7.45)
PH BLOOD: 7.37 (ref 7.34–7.45)
PH BLOOD: 7.38 (ref 7.34–7.45)
PH BLOOD: 7.38 (ref 7.34–7.45)
PH BLOOD: 7.45 (ref 7.34–7.45)
PH VENOUS: 7.13 (ref 7.32–7.42)
PH VENOUS: 7.38 (ref 7.32–7.42)
PH, URINE: 7 (ref 5–8)
PHENCYCLIDINE, URINE: NEGATIVE
PHENCYCLIDINE, URINE: NEGATIVE
PHOSPHORUS: 2.6 MG/DL (ref 2.5–4.9)
PHOSPHORUS: 3.5 MG/DL (ref 2.5–4.9)
PHOSPHORUS: 3.5 MG/DL (ref 2.5–4.9)
PHOSPHORUS: 3.8 MG/DL (ref 2.5–4.9)
PHOSPHORUS: 4.3 MG/DL (ref 2.5–4.9)
PHOSPHORUS: 4.8 MG/DL (ref 2.5–4.9)
PHOSPHORUS: 4.9 MG/DL (ref 2.5–4.9)
PHOSPHORUS: 5 MG/DL (ref 2.5–4.9)
PHOSPHORUS: 5.5 MG/DL (ref 2.5–4.9)
PLATELET # BLD: 214 K/CU MM (ref 140–440)
PLATELET # BLD: 225 K/CU MM (ref 140–440)
PLATELET # BLD: 265 K/CU MM (ref 140–440)
PMV BLD AUTO: 10.2 FL (ref 7.5–11.1)
PMV BLD AUTO: 10.3 FL (ref 7.5–11.1)
PMV BLD AUTO: 10.8 FL (ref 7.5–11.1)
PO2 ARTERIAL: 128 MMHG (ref 75–100)
PO2 ARTERIAL: 38.9 MMHG (ref 75–100)
PO2 ARTERIAL: 59 MMHG (ref 75–100)
PO2 ARTERIAL: 68 MMHG (ref 75–100)
PO2 ARTERIAL: 76.9 MMHG (ref 75–100)
PO2 ARTERIAL: 78 MMHG (ref 75–100)
PO2 ARTERIAL: 79 MMHG (ref 75–100)
PO2 ARTERIAL: 81.1 MMHG (ref 75–100)
PO2 ARTERIAL: 86 MMHG (ref 75–100)
PO2 ARTERIAL: 87.6 MMHG (ref 75–100)
PO2 ARTERIAL: 88 MMHG (ref 75–100)
PO2 ARTERIAL: 88 MMHG (ref 75–100)
PO2 ARTERIAL: 90.4 MMHG (ref 75–100)
PO2 ARTERIAL: 95 MMHG (ref 75–100)
PO2 ARTERIAL: 99 MMHG (ref 75–100)
PO2, VEN: 28 MMHG (ref 28–48)
PO2, VEN: 38 MMHG (ref 28–48)
POC CALCIUM: 1.06 MMOL/L (ref 1.12–1.32)
POC CALCIUM: 1.12 MMOL/L (ref 1.12–1.32)
POC CALCIUM: 1.15 MMOL/L (ref 1.12–1.32)
POC CALCIUM: 1.16 MMOL/L (ref 1.12–1.32)
POC CALCIUM: 1.18 MMOL/L (ref 1.12–1.32)
POC CHLORIDE: 101 MMOL/L (ref 98–109)
POC CHLORIDE: 102 MMOL/L (ref 98–109)
POC CHLORIDE: 102 MMOL/L (ref 98–109)
POC CHLORIDE: 103 MMOL/L (ref 98–109)
POC CHLORIDE: 104 MMOL/L (ref 98–109)
POC CREATININE: 2.3 MG/DL (ref 0.6–1.1)
POC CREATININE: 2.4 MG/DL (ref 0.6–1.1)
POC CREATININE: 2.6 MG/DL (ref 0.6–1.1)
POLYCHROMASIA: ABNORMAL
POTASSIUM SERPL-SCNC: 2.6 MMOL/L (ref 3.5–5.1)
POTASSIUM SERPL-SCNC: 2.7 MMOL/L (ref 3.5–5.1)
POTASSIUM SERPL-SCNC: 2.8 MMOL/L (ref 3.5–5.1)
POTASSIUM SERPL-SCNC: 3.2 MMOL/L (ref 3.5–5.1)
POTASSIUM SERPL-SCNC: 3.3 MMOL/L (ref 3.5–5.1)
POTASSIUM SERPL-SCNC: 3.3 MMOL/L (ref 3.5–5.1)
POTASSIUM SERPL-SCNC: 3.4 MMOL/L (ref 3.5–4.5)
POTASSIUM SERPL-SCNC: 3.4 MMOL/L (ref 3.5–5.1)
POTASSIUM SERPL-SCNC: 3.4 MMOL/L (ref 3.5–5.1)
POTASSIUM SERPL-SCNC: 3.5 MMOL/L (ref 3.5–4.5)
POTASSIUM SERPL-SCNC: 3.6 MMOL/L (ref 3.5–4.5)
POTASSIUM SERPL-SCNC: 3.7 MMOL/L (ref 3.5–5.1)
POTASSIUM SERPL-SCNC: 3.8 MMOL/L (ref 3.5–4.5)
POTASSIUM SERPL-SCNC: 4 MMOL/L (ref 3.5–4.5)
POTASSIUM SERPL-SCNC: 4.2 MMOL/L (ref 3.5–5.1)
POTASSIUM SERPL-SCNC: 4.5 MMOL/L (ref 3.5–5.1)
POTASSIUM SERPL-SCNC: 4.6 MMOL/L (ref 3.5–5.1)
PREGNANCY, URINE: NEGATIVE
PRO-BNP: 124.7 PG/ML
PRO-BNP: 528.5 PG/ML
PROCALCITONIN: 17.36
PROTEIN UA: NEGATIVE MG/DL
PROTHROMBIN TIME: 13 SECONDS (ref 11.7–14.5)
PROTHROMBIN TIME: 14.1 SECONDS (ref 11.7–14.5)
PROTHROMBIN TIME: 15.7 SECONDS (ref 11.7–14.5)
PROTHROMBIN TIME: 18.3 SECONDS (ref 11.7–14.5)
RBC # BLD: 3.74 M/CU MM (ref 4.2–5.4)
RBC # BLD: 3.84 M/CU MM (ref 4.2–5.4)
RBC # BLD: 4.05 M/CU MM (ref 4.2–5.4)
REASON FOR REJECTION: NORMAL
REJECTED TEST: NORMAL
SEGMENTED NEUTROPHILS ABSOLUTE COUNT: 11 K/CU MM
SEGMENTED NEUTROPHILS ABSOLUTE COUNT: 4.1 K/CU MM
SEGMENTED NEUTROPHILS RELATIVE PERCENT: 68.3 % (ref 36–66)
SEGMENTED NEUTROPHILS RELATIVE PERCENT: 70 % (ref 36–66)
SODIUM BLD-SCNC: 131 MMOL/L (ref 135–145)
SODIUM BLD-SCNC: 134 MMOL/L (ref 135–145)
SODIUM BLD-SCNC: 135 MMOL/L (ref 135–145)
SODIUM BLD-SCNC: 135 MMOL/L (ref 135–145)
SODIUM BLD-SCNC: 135 MMOL/L (ref 136–145)
SODIUM BLD-SCNC: 136 MMOL/L (ref 135–145)
SODIUM BLD-SCNC: 136 MMOL/L (ref 135–145)
SODIUM BLD-SCNC: 138 MMOL/L (ref 135–145)
SODIUM BLD-SCNC: 138 MMOL/L (ref 135–145)
SODIUM BLD-SCNC: 139 MMOL/L (ref 135–145)
SODIUM BLD-SCNC: 140 MMOL/L (ref 138–146)
SODIUM BLD-SCNC: 142 MMOL/L (ref 138–146)
SODIUM BLD-SCNC: 143 MMOL/L (ref 138–146)
SOURCE, BLOOD GAS: ABNORMAL
SPECIFIC GRAVITY UA: 1.01 (ref 1–1.03)
SPECIFIC GRAVITY, URINE: 1.02 (ref 1–1.03)
SPECIMEN: ABNORMAL
SPECIMEN: NORMAL
TOTAL CK: 80 IU/L (ref 26–140)
TOTAL IMMATURE NEUTOROPHIL: 0.02 K/CU MM
TOTAL NUCLEATED RBC: 0 K/CU MM
TOTAL PROTEIN: 5 GM/DL (ref 6.4–8.2)
TOTAL PROTEIN: 5.5 GM/DL (ref 6.4–8.2)
TOTAL PROTEIN: 6.5 GM/DL (ref 6.4–8.2)
TOTAL PROTEIN: 7.4 GM/DL (ref 6.4–8.2)
TRIGL SERPL-MCNC: 150 MG/DL
TRIGL SERPL-MCNC: 89 MG/DL
TROPONIN T: 0.01 NG/ML
TROPONIN T: 0.1 NG/ML
TROPONIN T: <0.01 NG/ML
UROBILINOGEN, URINE: 0.2 MG/DL (ref 0.2–1)
VANCOMYCIN RANDOM: 22.9 UG/ML
VANCOMYCIN RANDOM: 31.8 UG/ML
WBC # BLD: 15.6 K/CU MM (ref 4–10.5)
WBC # BLD: 20.1 K/CU MM (ref 4–10.5)
WBC # BLD: 6 K/CU MM (ref 4–10.5)

## 2022-01-01 PROCEDURE — 80048 BASIC METABOLIC PNL TOTAL CA: CPT

## 2022-01-01 PROCEDURE — 96365 THER/PROPH/DIAG IV INF INIT: CPT

## 2022-01-01 PROCEDURE — 36415 COLL VENOUS BLD VENIPUNCTURE: CPT

## 2022-01-01 PROCEDURE — 36592 COLLECT BLOOD FROM PICC: CPT

## 2022-01-01 PROCEDURE — 2500000003 HC RX 250 WO HCPCS: Performed by: EMERGENCY MEDICINE

## 2022-01-01 PROCEDURE — 82803 BLOOD GASES ANY COMBINATION: CPT

## 2022-01-01 PROCEDURE — 2700000000 HC OXYGEN THERAPY PER DAY

## 2022-01-01 PROCEDURE — 71045 X-RAY EXAM CHEST 1 VIEW: CPT

## 2022-01-01 PROCEDURE — 6370000000 HC RX 637 (ALT 250 FOR IP): Performed by: INTERNAL MEDICINE

## 2022-01-01 PROCEDURE — 82330 ASSAY OF CALCIUM: CPT

## 2022-01-01 PROCEDURE — 82962 GLUCOSE BLOOD TEST: CPT

## 2022-01-01 PROCEDURE — 99223 1ST HOSP IP/OBS HIGH 75: CPT | Performed by: STUDENT IN AN ORGANIZED HEALTH CARE EDUCATION/TRAINING PROGRAM

## 2022-01-01 PROCEDURE — 2580000003 HC RX 258: Performed by: INTERNAL MEDICINE

## 2022-01-01 PROCEDURE — 80307 DRUG TEST PRSMV CHEM ANLYZR: CPT

## 2022-01-01 PROCEDURE — 82550 ASSAY OF CK (CPK): CPT

## 2022-01-01 PROCEDURE — 83690 ASSAY OF LIPASE: CPT

## 2022-01-01 PROCEDURE — 2500000003 HC RX 250 WO HCPCS: Performed by: INTERNAL MEDICINE

## 2022-01-01 PROCEDURE — 71275 CT ANGIOGRAPHY CHEST: CPT

## 2022-01-01 PROCEDURE — 36620 INSERTION CATHETER ARTERY: CPT

## 2022-01-01 PROCEDURE — 96375 TX/PRO/DX INJ NEW DRUG ADDON: CPT

## 2022-01-01 PROCEDURE — 96374 THER/PROPH/DIAG INJ IV PUSH: CPT

## 2022-01-01 PROCEDURE — 94640 AIRWAY INHALATION TREATMENT: CPT

## 2022-01-01 PROCEDURE — 89220 SPUTUM SPECIMEN COLLECTION: CPT

## 2022-01-01 PROCEDURE — 85027 COMPLETE CBC AUTOMATED: CPT

## 2022-01-01 PROCEDURE — 84484 ASSAY OF TROPONIN QUANT: CPT

## 2022-01-01 PROCEDURE — 1111F DSCHRG MED/CURRENT MED MERGE: CPT | Performed by: STUDENT IN AN ORGANIZED HEALTH CARE EDUCATION/TRAINING PROGRAM

## 2022-01-01 PROCEDURE — G8427 DOCREV CUR MEDS BY ELIG CLIN: HCPCS | Performed by: STUDENT IN AN ORGANIZED HEALTH CARE EDUCATION/TRAINING PROGRAM

## 2022-01-01 PROCEDURE — 82805 BLOOD GASES W/O2 SATURATION: CPT

## 2022-01-01 PROCEDURE — 84145 PROCALCITONIN (PCT): CPT

## 2022-01-01 PROCEDURE — 6360000002 HC RX W HCPCS: Performed by: INTERNAL MEDICINE

## 2022-01-01 PROCEDURE — 83880 ASSAY OF NATRIURETIC PEPTIDE: CPT

## 2022-01-01 PROCEDURE — 87070 CULTURE OTHR SPECIMN AEROBIC: CPT

## 2022-01-01 PROCEDURE — 83735 ASSAY OF MAGNESIUM: CPT

## 2022-01-01 PROCEDURE — 80053 COMPREHEN METABOLIC PANEL: CPT

## 2022-01-01 PROCEDURE — 2000000000 HC ICU R&B

## 2022-01-01 PROCEDURE — 86850 RBC ANTIBODY SCREEN: CPT

## 2022-01-01 PROCEDURE — 93010 ELECTROCARDIOGRAM REPORT: CPT | Performed by: INTERNAL MEDICINE

## 2022-01-01 PROCEDURE — G8417 CALC BMI ABV UP PARAM F/U: HCPCS | Performed by: STUDENT IN AN ORGANIZED HEALTH CARE EDUCATION/TRAINING PROGRAM

## 2022-01-01 PROCEDURE — 82150 ASSAY OF AMYLASE: CPT

## 2022-01-01 PROCEDURE — 4004F PT TOBACCO SCREEN RCVD TLK: CPT | Performed by: STUDENT IN AN ORGANIZED HEALTH CARE EDUCATION/TRAINING PROGRAM

## 2022-01-01 PROCEDURE — 80076 HEPATIC FUNCTION PANEL: CPT

## 2022-01-01 PROCEDURE — 6370000000 HC RX 637 (ALT 250 FOR IP): Performed by: NURSE PRACTITIONER

## 2022-01-01 PROCEDURE — 95822 EEG COMA OR SLEEP ONLY: CPT | Performed by: STUDENT IN AN ORGANIZED HEALTH CARE EDUCATION/TRAINING PROGRAM

## 2022-01-01 PROCEDURE — 3430000000 HC RX DIAGNOSTIC RADIOPHARMACEUTICAL: Performed by: STUDENT IN AN ORGANIZED HEALTH CARE EDUCATION/TRAINING PROGRAM

## 2022-01-01 PROCEDURE — 85610 PROTHROMBIN TIME: CPT

## 2022-01-01 PROCEDURE — 94761 N-INVAS EAR/PLS OXIMETRY MLT: CPT

## 2022-01-01 PROCEDURE — 6360000002 HC RX W HCPCS: Performed by: STUDENT IN AN ORGANIZED HEALTH CARE EDUCATION/TRAINING PROGRAM

## 2022-01-01 PROCEDURE — 3074F SYST BP LT 130 MM HG: CPT | Performed by: STUDENT IN AN ORGANIZED HEALTH CARE EDUCATION/TRAINING PROGRAM

## 2022-01-01 PROCEDURE — 84100 ASSAY OF PHOSPHORUS: CPT

## 2022-01-01 PROCEDURE — 2580000003 HC RX 258: Performed by: STUDENT IN AN ORGANIZED HEALTH CARE EDUCATION/TRAINING PROGRAM

## 2022-01-01 PROCEDURE — 95819 EEG AWAKE AND ASLEEP: CPT

## 2022-01-01 PROCEDURE — 84478 ASSAY OF TRIGLYCERIDES: CPT

## 2022-01-01 PROCEDURE — 85025 COMPLETE CBC W/AUTO DIFF WBC: CPT

## 2022-01-01 PROCEDURE — 76376 3D RENDER W/INTRP POSTPROCES: CPT

## 2022-01-01 PROCEDURE — 85014 HEMATOCRIT: CPT

## 2022-01-01 PROCEDURE — 5A1945Z RESPIRATORY VENTILATION, 24-96 CONSECUTIVE HOURS: ICD-10-PCS | Performed by: EMERGENCY MEDICINE

## 2022-01-01 PROCEDURE — 37799 UNLISTED PX VASCULAR SURGERY: CPT

## 2022-01-01 PROCEDURE — 80051 ELECTROLYTE PANEL: CPT

## 2022-01-01 PROCEDURE — 2580000003 HC RX 258

## 2022-01-01 PROCEDURE — 2580000003 HC RX 258: Performed by: PHYSICIAN ASSISTANT

## 2022-01-01 PROCEDURE — 6360000002 HC RX W HCPCS: Performed by: EMERGENCY MEDICINE

## 2022-01-01 PROCEDURE — 85730 THROMBOPLASTIN TIME PARTIAL: CPT

## 2022-01-01 PROCEDURE — 2709999900 HC NON-CHARGEABLE SUPPLY

## 2022-01-01 PROCEDURE — 6360000002 HC RX W HCPCS

## 2022-01-01 PROCEDURE — 94003 VENT MGMT INPAT SUBQ DAY: CPT

## 2022-01-01 PROCEDURE — 6370000000 HC RX 637 (ALT 250 FOR IP): Performed by: STUDENT IN AN ORGANIZED HEALTH CARE EDUCATION/TRAINING PROGRAM

## 2022-01-01 PROCEDURE — 93005 ELECTROCARDIOGRAM TRACING: CPT | Performed by: EMERGENCY MEDICINE

## 2022-01-01 PROCEDURE — G8484 FLU IMMUNIZE NO ADMIN: HCPCS | Performed by: STUDENT IN AN ORGANIZED HEALTH CARE EDUCATION/TRAINING PROGRAM

## 2022-01-01 PROCEDURE — A4216 STERILE WATER/SALINE, 10 ML: HCPCS | Performed by: INTERNAL MEDICINE

## 2022-01-01 PROCEDURE — 2500000003 HC RX 250 WO HCPCS: Performed by: STUDENT IN AN ORGANIZED HEALTH CARE EDUCATION/TRAINING PROGRAM

## 2022-01-01 PROCEDURE — 80202 ASSAY OF VANCOMYCIN: CPT

## 2022-01-01 PROCEDURE — 76937 US GUIDE VASCULAR ACCESS: CPT

## 2022-01-01 PROCEDURE — 86900 BLOOD TYPING SEROLOGIC ABO: CPT

## 2022-01-01 PROCEDURE — 99214 OFFICE O/P EST MOD 30 MIN: CPT | Performed by: STUDENT IN AN ORGANIZED HEALTH CARE EDUCATION/TRAINING PROGRAM

## 2022-01-01 PROCEDURE — 2500000003 HC RX 250 WO HCPCS

## 2022-01-01 PROCEDURE — 36600 WITHDRAWAL OF ARTERIAL BLOOD: CPT

## 2022-01-01 PROCEDURE — 93971 EXTREMITY STUDY: CPT

## 2022-01-01 PROCEDURE — 87040 BLOOD CULTURE FOR BACTERIA: CPT

## 2022-01-01 PROCEDURE — 82247 BILIRUBIN TOTAL: CPT

## 2022-01-01 PROCEDURE — 02HV33Z INSERTION OF INFUSION DEVICE INTO SUPERIOR VENA CAVA, PERCUTANEOUS APPROACH: ICD-10-PCS | Performed by: EMERGENCY MEDICINE

## 2022-01-01 PROCEDURE — 6360000004 HC RX CONTRAST MEDICATION

## 2022-01-01 PROCEDURE — 85018 HEMOGLOBIN: CPT

## 2022-01-01 PROCEDURE — 87205 SMEAR GRAM STAIN: CPT

## 2022-01-01 PROCEDURE — 2580000003 HC RX 258: Performed by: EMERGENCY MEDICINE

## 2022-01-01 PROCEDURE — 94002 VENT MGMT INPAT INIT DAY: CPT

## 2022-01-01 PROCEDURE — 5A1955Z RESPIRATORY VENTILATION, GREATER THAN 96 CONSECUTIVE HOURS: ICD-10-PCS | Performed by: EMERGENCY MEDICINE

## 2022-01-01 PROCEDURE — 82248 BILIRUBIN DIRECT: CPT

## 2022-01-01 PROCEDURE — 82565 ASSAY OF CREATININE: CPT

## 2022-01-01 PROCEDURE — 99285 EMERGENCY DEPT VISIT HI MDM: CPT | Performed by: EMERGENCY MEDICINE

## 2022-01-01 PROCEDURE — 36410 VNPNXR 3YR/> PHY/QHP DX/THER: CPT

## 2022-01-01 PROCEDURE — 6360000002 HC RX W HCPCS: Performed by: PHYSICIAN ASSISTANT

## 2022-01-01 PROCEDURE — 70450 CT HEAD/BRAIN W/O DYE: CPT

## 2022-01-01 PROCEDURE — 83605 ASSAY OF LACTIC ACID: CPT

## 2022-01-01 PROCEDURE — C9113 INJ PANTOPRAZOLE SODIUM, VIA: HCPCS | Performed by: STUDENT IN AN ORGANIZED HEALTH CARE EDUCATION/TRAINING PROGRAM

## 2022-01-01 PROCEDURE — 83036 HEMOGLOBIN GLYCOSYLATED A1C: CPT

## 2022-01-01 PROCEDURE — 99285 EMERGENCY DEPT VISIT HI MDM: CPT

## 2022-01-01 PROCEDURE — A4216 STERILE WATER/SALINE, 10 ML: HCPCS | Performed by: STUDENT IN AN ORGANIZED HEALTH CARE EDUCATION/TRAINING PROGRAM

## 2022-01-01 PROCEDURE — 85007 BL SMEAR W/DIFF WBC COUNT: CPT

## 2022-01-01 PROCEDURE — 83615 LACTATE (LD) (LDH) ENZYME: CPT

## 2022-01-01 PROCEDURE — 36556 INSERT NON-TUNNEL CV CATH: CPT

## 2022-01-01 PROCEDURE — 99233 SBSQ HOSP IP/OBS HIGH 50: CPT | Performed by: STUDENT IN AN ORGANIZED HEALTH CARE EDUCATION/TRAINING PROGRAM

## 2022-01-01 PROCEDURE — 82977 ASSAY OF GGT: CPT

## 2022-01-01 PROCEDURE — 87081 CULTURE SCREEN ONLY: CPT

## 2022-01-01 PROCEDURE — 86922 COMPATIBILITY TEST ANTIGLOB: CPT

## 2022-01-01 PROCEDURE — 72125 CT NECK SPINE W/O DYE: CPT

## 2022-01-01 PROCEDURE — 93306 TTE W/DOPPLER COMPLETE: CPT

## 2022-01-01 PROCEDURE — 71046 X-RAY EXAM CHEST 2 VIEWS: CPT

## 2022-01-01 PROCEDURE — 81003 URINALYSIS AUTO W/O SCOPE: CPT

## 2022-01-01 PROCEDURE — A9557 TC99M BICISATE: HCPCS | Performed by: STUDENT IN AN ORGANIZED HEALTH CARE EDUCATION/TRAINING PROGRAM

## 2022-01-01 PROCEDURE — 78606 BRAIN IMAGE W/FLOW 4 + VIEWS: CPT

## 2022-01-01 PROCEDURE — 3078F DIAST BP <80 MM HG: CPT | Performed by: STUDENT IN AN ORGANIZED HEALTH CARE EDUCATION/TRAINING PROGRAM

## 2022-01-01 PROCEDURE — 6360000002 HC RX W HCPCS: Performed by: NURSE PRACTITIONER

## 2022-01-01 PROCEDURE — 86901 BLOOD TYPING SEROLOGIC RH(D): CPT

## 2022-01-01 PROCEDURE — 81025 URINE PREGNANCY TEST: CPT

## 2022-01-01 RX ORDER — ACETAMINOPHEN 325 MG/1
650 TABLET ORAL EVERY 6 HOURS PRN
Status: DISCONTINUED | OUTPATIENT
Start: 2022-01-01 | End: 2022-01-01 | Stop reason: HOSPADM

## 2022-01-01 RX ORDER — SODIUM CHLORIDE 9 MG/ML
INJECTION, SOLUTION INTRAVENOUS CONTINUOUS
Status: DISCONTINUED | OUTPATIENT
Start: 2022-01-01 | End: 2022-01-01

## 2022-01-01 RX ORDER — POTASSIUM CHLORIDE 29.8 MG/ML
20 INJECTION INTRAVENOUS PRN
OUTPATIENT
Start: 2022-01-01

## 2022-01-01 RX ORDER — SODIUM CHLORIDE, SODIUM LACTATE, POTASSIUM CHLORIDE, CALCIUM CHLORIDE 600; 310; 30; 20 MG/100ML; MG/100ML; MG/100ML; MG/100ML
INJECTION, SOLUTION INTRAVENOUS CONTINUOUS
Status: DISCONTINUED | OUTPATIENT
Start: 2022-01-01 | End: 2022-01-01

## 2022-01-01 RX ORDER — ONDANSETRON 4 MG/1
4 TABLET, ORALLY DISINTEGRATING ORAL EVERY 8 HOURS PRN
Status: DISCONTINUED | OUTPATIENT
Start: 2022-01-01 | End: 2022-01-01 | Stop reason: HOSPADM

## 2022-01-01 RX ORDER — METRONIDAZOLE 10 MG/G
GEL TOPICAL
Qty: 1 EACH | Refills: 0 | Status: SHIPPED | OUTPATIENT
Start: 2022-01-01

## 2022-01-01 RX ORDER — CHLORHEXIDINE GLUCONATE 0.12 MG/ML
15 RINSE ORAL 2 TIMES DAILY
Status: DISCONTINUED | OUTPATIENT
Start: 2022-01-01 | End: 2022-01-01

## 2022-01-01 RX ORDER — MAGNESIUM SULFATE IN WATER 40 MG/ML
2000 INJECTION, SOLUTION INTRAVENOUS PRN
OUTPATIENT
Start: 2022-01-01

## 2022-01-01 RX ORDER — SODIUM CHLORIDE 0.9 % (FLUSH) 0.9 %
5-40 SYRINGE (ML) INJECTION PRN
OUTPATIENT
Start: 2022-01-01

## 2022-01-01 RX ORDER — ONDANSETRON 4 MG/1
4 TABLET, ORALLY DISINTEGRATING ORAL EVERY 8 HOURS PRN
Status: DISCONTINUED | OUTPATIENT
Start: 2022-01-01 | End: 2022-01-01

## 2022-01-01 RX ORDER — MEPERIDINE HYDROCHLORIDE 25 MG/ML
25 INJECTION INTRAMUSCULAR; INTRAVENOUS; SUBCUTANEOUS EVERY 30 MIN PRN
Status: DISCONTINUED | OUTPATIENT
Start: 2022-01-01 | End: 2022-01-01

## 2022-01-01 RX ORDER — CALCIUM GLUCONATE 20 MG/ML
2000 INJECTION, SOLUTION INTRAVENOUS ONCE
Status: COMPLETED | OUTPATIENT
Start: 2022-01-01 | End: 2022-01-01

## 2022-01-01 RX ORDER — MINERAL OIL AND WHITE PETROLATUM 150; 830 MG/G; MG/G
OINTMENT OPHTHALMIC
OUTPATIENT
Start: 2022-01-01

## 2022-01-01 RX ORDER — MINERAL OIL AND WHITE PETROLATUM 150; 830 MG/G; MG/G
OINTMENT OPHTHALMIC EVERY 4 HOURS
Status: CANCELLED | OUTPATIENT
Start: 2022-01-01

## 2022-01-01 RX ORDER — ONDANSETRON 2 MG/ML
4 INJECTION INTRAMUSCULAR; INTRAVENOUS EVERY 6 HOURS PRN
Status: DISCONTINUED | OUTPATIENT
Start: 2022-01-01 | End: 2022-01-01 | Stop reason: HOSPADM

## 2022-01-01 RX ORDER — DEXTROSE MONOHYDRATE 50 MG/ML
INJECTION, SOLUTION INTRAVENOUS CONTINUOUS
Status: DISCONTINUED | OUTPATIENT
Start: 2022-01-01 | End: 2022-01-01 | Stop reason: HOSPADM

## 2022-01-01 RX ORDER — EPINEPHRINE 0.1 MG/ML
SYRINGE (ML) INJECTION DAILY PRN
Status: COMPLETED | OUTPATIENT
Start: 2022-01-01 | End: 2022-01-01

## 2022-01-01 RX ORDER — POTASSIUM CHLORIDE 29.8 MG/ML
20 INJECTION INTRAVENOUS PRN
Status: DISCONTINUED | OUTPATIENT
Start: 2022-01-01 | End: 2022-01-01 | Stop reason: HOSPADM

## 2022-01-01 RX ORDER — PROPOFOL 10 MG/ML
INJECTION, EMULSION INTRAVENOUS
Status: COMPLETED
Start: 2022-01-01 | End: 2022-01-01

## 2022-01-01 RX ORDER — BUSPIRONE HYDROCHLORIDE 15 MG/1
30 TABLET ORAL ONCE
Status: COMPLETED | OUTPATIENT
Start: 2022-01-01 | End: 2022-01-01

## 2022-01-01 RX ORDER — DEXTROSE MONOHYDRATE 100 MG/ML
INJECTION, SOLUTION INTRAVENOUS CONTINUOUS PRN
Status: DISCONTINUED | OUTPATIENT
Start: 2022-01-01 | End: 2022-01-01 | Stop reason: HOSPADM

## 2022-01-01 RX ORDER — ONDANSETRON 4 MG/1
4 TABLET, ORALLY DISINTEGRATING ORAL EVERY 8 HOURS PRN
OUTPATIENT
Start: 2022-01-01

## 2022-01-01 RX ORDER — POLYVINYL ALCOHOL 14 MG/ML
1 SOLUTION/ DROPS OPHTHALMIC EVERY 4 HOURS
Status: CANCELLED | OUTPATIENT
Start: 2022-01-01

## 2022-01-01 RX ORDER — CHLORHEXIDINE GLUCONATE 0.12 MG/ML
15 RINSE ORAL 2 TIMES DAILY
Status: DISCONTINUED | OUTPATIENT
Start: 2022-01-01 | End: 2022-01-01 | Stop reason: HOSPADM

## 2022-01-01 RX ORDER — ACETAMINOPHEN 325 MG/1
650 TABLET ORAL EVERY 6 HOURS PRN
OUTPATIENT
Start: 2022-01-01

## 2022-01-01 RX ORDER — CHLORHEXIDINE GLUCONATE 0.12 MG/ML
15 RINSE ORAL 2 TIMES DAILY
OUTPATIENT
Start: 2022-01-01

## 2022-01-01 RX ORDER — INSULIN LISPRO 100 [IU]/ML
0-4 INJECTION, SOLUTION INTRAVENOUS; SUBCUTANEOUS EVERY 4 HOURS
OUTPATIENT
Start: 2022-01-01

## 2022-01-01 RX ORDER — SODIUM CHLORIDE 0.9 % (FLUSH) 0.9 %
5-40 SYRINGE (ML) INJECTION EVERY 12 HOURS SCHEDULED
OUTPATIENT
Start: 2022-01-01

## 2022-01-01 RX ORDER — 0.9 % SODIUM CHLORIDE 0.9 %
1000 INTRAVENOUS SOLUTION INTRAVENOUS ONCE
Status: DISCONTINUED | OUTPATIENT
Start: 2022-01-01 | End: 2022-01-01 | Stop reason: HOSPADM

## 2022-01-01 RX ORDER — POTASSIUM CHLORIDE 7.45 MG/ML
10 INJECTION INTRAVENOUS PRN
Status: DISCONTINUED | OUTPATIENT
Start: 2022-01-01 | End: 2022-01-01 | Stop reason: HOSPADM

## 2022-01-01 RX ORDER — MAGNESIUM SULFATE IN WATER 40 MG/ML
2000 INJECTION, SOLUTION INTRAVENOUS PRN
Status: CANCELLED | OUTPATIENT
Start: 2022-01-01

## 2022-01-01 RX ORDER — PANTOPRAZOLE SODIUM 40 MG/10ML
40 INJECTION, POWDER, LYOPHILIZED, FOR SOLUTION INTRAVENOUS DAILY
Status: DISCONTINUED | OUTPATIENT
Start: 2022-01-01 | End: 2022-11-12 | Stop reason: HOSPADM

## 2022-01-01 RX ORDER — ENOXAPARIN SODIUM 100 MG/ML
30 INJECTION SUBCUTANEOUS 2 TIMES DAILY
Status: DISCONTINUED | OUTPATIENT
Start: 2022-01-01 | End: 2022-01-01 | Stop reason: HOSPADM

## 2022-01-01 RX ORDER — NOREPINEPHRINE BIT/0.9 % NACL 16MG/250ML
1-100 INFUSION BOTTLE (ML) INTRAVENOUS CONTINUOUS
OUTPATIENT
Start: 2022-01-01

## 2022-01-01 RX ORDER — BUSPIRONE HYDROCHLORIDE 15 MG/1
30 TABLET ORAL EVERY 8 HOURS
Status: DISCONTINUED | OUTPATIENT
Start: 2022-01-01 | End: 2022-01-01 | Stop reason: CLARIF

## 2022-01-01 RX ORDER — LEVOTHYROXINE SODIUM ANHYDROUS 100 UG/5ML
20 INJECTION, POWDER, LYOPHILIZED, FOR SOLUTION INTRAVENOUS ONCE
Status: COMPLETED | OUTPATIENT
Start: 2022-01-01 | End: 2022-01-01

## 2022-01-01 RX ORDER — NALOXONE HYDROCHLORIDE 0.4 MG/ML
INJECTION, SOLUTION INTRAMUSCULAR; INTRAVENOUS; SUBCUTANEOUS DAILY PRN
Status: COMPLETED | OUTPATIENT
Start: 2022-01-01 | End: 2022-01-01

## 2022-01-01 RX ORDER — CALCIUM GLUCONATE 20 MG/ML
1000 INJECTION, SOLUTION INTRAVENOUS ONCE
Status: COMPLETED | OUTPATIENT
Start: 2022-01-01 | End: 2022-01-01

## 2022-01-01 RX ORDER — ENOXAPARIN SODIUM 100 MG/ML
40 INJECTION SUBCUTANEOUS DAILY
Status: CANCELLED | OUTPATIENT
Start: 2022-01-01

## 2022-01-01 RX ORDER — SODIUM CHLORIDE 0.9 % (FLUSH) 0.9 %
5-40 SYRINGE (ML) INJECTION PRN
Status: DISCONTINUED | OUTPATIENT
Start: 2022-01-01 | End: 2022-01-01 | Stop reason: HOSPADM

## 2022-01-01 RX ORDER — METRONIDAZOLE 250 MG/1
500 TABLET ORAL ONCE
Status: DISCONTINUED | OUTPATIENT
Start: 2022-01-01 | End: 2022-01-01

## 2022-01-01 RX ORDER — NOREPINEPHRINE BIT/0.9 % NACL 16MG/250ML
INFUSION BOTTLE (ML) INTRAVENOUS CONTINUOUS PRN
Status: COMPLETED | OUTPATIENT
Start: 2022-01-01 | End: 2022-01-01

## 2022-01-01 RX ORDER — INSULIN LISPRO 100 [IU]/ML
0-4 INJECTION, SOLUTION INTRAVENOUS; SUBCUTANEOUS EVERY 4 HOURS
Status: DISCONTINUED | OUTPATIENT
Start: 2022-01-01 | End: 2022-01-01 | Stop reason: HOSPADM

## 2022-01-01 RX ORDER — ENOXAPARIN SODIUM 100 MG/ML
30 INJECTION SUBCUTANEOUS 2 TIMES DAILY
OUTPATIENT
Start: 2022-01-01

## 2022-01-01 RX ORDER — POTASSIUM CHLORIDE 7.45 MG/ML
10 INJECTION INTRAVENOUS PRN
OUTPATIENT
Start: 2022-01-01

## 2022-01-01 RX ORDER — DEXTROSE MONOHYDRATE 100 MG/ML
INJECTION, SOLUTION INTRAVENOUS CONTINUOUS PRN
OUTPATIENT
Start: 2022-01-01

## 2022-01-01 RX ORDER — NOREPINEPHRINE BIT/0.9 % NACL 16MG/250ML
1-100 INFUSION BOTTLE (ML) INTRAVENOUS CONTINUOUS
Status: DISCONTINUED | OUTPATIENT
Start: 2022-01-01 | End: 2022-11-12 | Stop reason: HOSPADM

## 2022-01-01 RX ORDER — IPRATROPIUM BROMIDE AND ALBUTEROL SULFATE 2.5; .5 MG/3ML; MG/3ML
1 SOLUTION RESPIRATORY (INHALATION) EVERY 4 HOURS
Status: DISCONTINUED | OUTPATIENT
Start: 2022-01-01 | End: 2022-01-01 | Stop reason: HOSPADM

## 2022-01-01 RX ORDER — SULFAMETHOXAZOLE AND TRIMETHOPRIM 800; 160 MG/1; MG/1
1 TABLET ORAL 2 TIMES DAILY
Qty: 14 TABLET | Refills: 0 | Status: SHIPPED | OUTPATIENT
Start: 2022-01-01 | End: 2022-01-01 | Stop reason: ALTCHOICE

## 2022-01-01 RX ORDER — ACETAMINOPHEN 650 MG/1
650 SUPPOSITORY RECTAL EVERY 6 HOURS PRN
Status: DISCONTINUED | OUTPATIENT
Start: 2022-01-01 | End: 2022-01-01 | Stop reason: HOSPADM

## 2022-01-01 RX ORDER — ONDANSETRON 2 MG/ML
4 INJECTION INTRAMUSCULAR; INTRAVENOUS EVERY 6 HOURS PRN
Status: DISCONTINUED | OUTPATIENT
Start: 2022-01-01 | End: 2022-01-01

## 2022-01-01 RX ORDER — ACETAMINOPHEN 325 MG/1
650 TABLET ORAL EVERY 4 HOURS PRN
Status: DISCONTINUED | OUTPATIENT
Start: 2022-01-01 | End: 2022-11-12 | Stop reason: HOSPADM

## 2022-01-01 RX ORDER — ACETAMINOPHEN 650 MG/1
650 SUPPOSITORY RECTAL EVERY 6 HOURS PRN
OUTPATIENT
Start: 2022-01-01

## 2022-01-01 RX ORDER — MAGNESIUM SULFATE IN WATER 40 MG/ML
2000 INJECTION, SOLUTION INTRAVENOUS PRN
Status: DISCONTINUED | OUTPATIENT
Start: 2022-01-01 | End: 2022-01-01 | Stop reason: HOSPADM

## 2022-01-01 RX ORDER — NOREPINEPHRINE BIT/0.9 % NACL 16MG/250ML
1-100 INFUSION BOTTLE (ML) INTRAVENOUS CONTINUOUS
Status: DISCONTINUED | OUTPATIENT
Start: 2022-01-01 | End: 2022-01-01 | Stop reason: HOSPADM

## 2022-01-01 RX ORDER — POTASSIUM CHLORIDE 7.45 MG/ML
10 INJECTION INTRAVENOUS PRN
Status: CANCELLED | OUTPATIENT
Start: 2022-01-01

## 2022-01-01 RX ORDER — INSULIN LISPRO 100 [IU]/ML
0-4 INJECTION, SOLUTION INTRAVENOUS; SUBCUTANEOUS
Status: DISCONTINUED | OUTPATIENT
Start: 2022-01-01 | End: 2022-01-01

## 2022-01-01 RX ORDER — DEXTROSE MONOHYDRATE 100 MG/ML
INJECTION, SOLUTION INTRAVENOUS CONTINUOUS PRN
Status: DISCONTINUED | OUTPATIENT
Start: 2022-01-01 | End: 2022-01-01 | Stop reason: SDUPTHER

## 2022-01-01 RX ORDER — DEXTROSE MONOHYDRATE 50 MG/ML
INJECTION, SOLUTION INTRAVENOUS CONTINUOUS
Status: DISCONTINUED | OUTPATIENT
Start: 2022-01-01 | End: 2022-11-09

## 2022-01-01 RX ORDER — DEXTROSE MONOHYDRATE 50 MG/ML
INJECTION, SOLUTION INTRAVENOUS CONTINUOUS
OUTPATIENT
Start: 2022-01-01

## 2022-01-01 RX ORDER — ENOXAPARIN SODIUM 100 MG/ML
30 INJECTION SUBCUTANEOUS 2 TIMES DAILY
Status: DISCONTINUED | OUTPATIENT
Start: 2022-01-01 | End: 2022-11-12 | Stop reason: HOSPADM

## 2022-01-01 RX ORDER — POLYETHYLENE GLYCOL 3350 17 G/17G
17 POWDER, FOR SOLUTION ORAL DAILY PRN
OUTPATIENT
Start: 2022-01-01

## 2022-01-01 RX ORDER — BUSPIRONE HYDROCHLORIDE 15 MG/1
15 TABLET ORAL EVERY 8 HOURS PRN
Status: DISCONTINUED | OUTPATIENT
Start: 2022-01-01 | End: 2022-01-01

## 2022-01-01 RX ORDER — MAGNESIUM SULFATE 1 G/100ML
1000 INJECTION INTRAVENOUS ONCE
Status: COMPLETED | OUTPATIENT
Start: 2022-01-01 | End: 2022-01-01

## 2022-01-01 RX ORDER — IPRATROPIUM BROMIDE AND ALBUTEROL SULFATE 2.5; .5 MG/3ML; MG/3ML
1 SOLUTION RESPIRATORY (INHALATION) EVERY 4 HOURS
OUTPATIENT
Start: 2022-01-01

## 2022-01-01 RX ORDER — FUROSEMIDE 10 MG/ML
60 INJECTION INTRAMUSCULAR; INTRAVENOUS ONCE
Status: COMPLETED | OUTPATIENT
Start: 2022-01-01 | End: 2022-01-01

## 2022-01-01 RX ORDER — NOREPINEPHRINE BIT/0.9 % NACL 16MG/250ML
2-100 INFUSION BOTTLE (ML) INTRAVENOUS CONTINUOUS
Status: DISCONTINUED | OUTPATIENT
Start: 2022-01-01 | End: 2022-01-01 | Stop reason: SDUPTHER

## 2022-01-01 RX ORDER — SODIUM CHLORIDE 0.9 % (FLUSH) 0.9 %
5-40 SYRINGE (ML) INJECTION EVERY 12 HOURS SCHEDULED
Status: DISCONTINUED | OUTPATIENT
Start: 2022-01-01 | End: 2022-01-01 | Stop reason: HOSPADM

## 2022-01-01 RX ORDER — MINERAL OIL AND WHITE PETROLATUM 150; 830 MG/G; MG/G
OINTMENT OPHTHALMIC
Status: DISCONTINUED | OUTPATIENT
Start: 2022-01-01 | End: 2022-01-01 | Stop reason: HOSPADM

## 2022-01-01 RX ORDER — ONDANSETRON 2 MG/ML
4 INJECTION INTRAMUSCULAR; INTRAVENOUS EVERY 6 HOURS PRN
OUTPATIENT
Start: 2022-01-01

## 2022-01-01 RX ORDER — POLYETHYLENE GLYCOL 3350 17 G/17G
17 POWDER, FOR SOLUTION ORAL DAILY PRN
Status: DISCONTINUED | OUTPATIENT
Start: 2022-01-01 | End: 2022-01-01 | Stop reason: HOSPADM

## 2022-01-01 RX ORDER — FENTANYL CITRATE-0.9 % NACL/PF 10 MCG/ML
12.5-2 PLASTIC BAG, INJECTION (ML) INTRAVENOUS CONTINUOUS
Status: DISCONTINUED | OUTPATIENT
Start: 2022-01-01 | End: 2022-01-01

## 2022-01-01 RX ORDER — PROPOFOL 10 MG/ML
5-50 INJECTION, EMULSION INTRAVENOUS CONTINUOUS
Status: DISCONTINUED | OUTPATIENT
Start: 2022-01-01 | End: 2022-01-01

## 2022-01-01 RX ADMIN — MAGNESIUM SULFATE HEPTAHYDRATE 2000 MG: 2 INJECTION, SOLUTION INTRAVENOUS at 05:10

## 2022-01-01 RX ADMIN — POTASSIUM CHLORIDE 10 MEQ: 7.46 INJECTION, SOLUTION INTRAVENOUS at 02:07

## 2022-01-01 RX ADMIN — DEXTROSE MONOHYDRATE: 50 INJECTION, SOLUTION INTRAVENOUS at 10:14

## 2022-01-01 RX ADMIN — SODIUM CHLORIDE, PRESERVATIVE FREE 20 MG: 5 INJECTION INTRAVENOUS at 11:58

## 2022-01-01 RX ADMIN — IPRATROPIUM BROMIDE AND ALBUTEROL SULFATE 1 AMPULE: 2.5; .5 SOLUTION RESPIRATORY (INHALATION) at 04:13

## 2022-01-01 RX ADMIN — PROPOFOL 15 MCG/KG/MIN: 10 INJECTION, EMULSION INTRAVENOUS at 12:30

## 2022-01-01 RX ADMIN — IPRATROPIUM BROMIDE AND ALBUTEROL SULFATE 1 AMPULE: 2.5; .5 SOLUTION RESPIRATORY (INHALATION) at 11:47

## 2022-01-01 RX ADMIN — SODIUM BICARBONATE 50 MEQ: 84 INJECTION, SOLUTION INTRAVENOUS at 14:58

## 2022-01-01 RX ADMIN — CEFEPIME HYDROCHLORIDE 2000 MG: 2 INJECTION, POWDER, FOR SOLUTION INTRAVENOUS at 16:36

## 2022-01-01 RX ADMIN — PHENYLEPHRINE HYDROCHLORIDE 70 MCG/MIN: 10 INJECTION INTRAVENOUS at 00:47

## 2022-01-01 RX ADMIN — SODIUM CHLORIDE, PRESERVATIVE FREE 10 ML: 5 INJECTION INTRAVENOUS at 21:57

## 2022-01-01 RX ADMIN — Medication 10 MCG/MIN: at 14:55

## 2022-01-01 RX ADMIN — PHENYLEPHRINE HYDROCHLORIDE 30 MCG/MIN: 10 INJECTION INTRAVENOUS at 10:58

## 2022-01-01 RX ADMIN — CEFEPIME 2000 MG: 2 INJECTION, POWDER, FOR SOLUTION INTRAVENOUS at 08:10

## 2022-01-01 RX ADMIN — DEXTROSE MONOHYDRATE: 50 INJECTION, SOLUTION INTRAVENOUS at 21:56

## 2022-01-01 RX ADMIN — PROPOFOL 15 MCG/KG/MIN: 10 INJECTION, EMULSION INTRAVENOUS at 17:18

## 2022-01-01 RX ADMIN — SODIUM CHLORIDE, PRESERVATIVE FREE 10 ML: 5 INJECTION INTRAVENOUS at 07:50

## 2022-01-01 RX ADMIN — IPRATROPIUM BROMIDE AND ALBUTEROL SULFATE 1 AMPULE: 2.5; .5 SOLUTION RESPIRATORY (INHALATION) at 15:24

## 2022-01-01 RX ADMIN — PROPOFOL 15 MCG/KG/MIN: 10 INJECTION, EMULSION INTRAVENOUS at 01:43

## 2022-01-01 RX ADMIN — ENOXAPARIN SODIUM 30 MG: 100 INJECTION SUBCUTANEOUS at 23:46

## 2022-01-01 RX ADMIN — CEFEPIME 2000 MG: 2 INJECTION, POWDER, FOR SOLUTION INTRAVENOUS at 01:08

## 2022-01-01 RX ADMIN — ACETAMINOPHEN 650 MG: 325 TABLET ORAL at 18:52

## 2022-01-01 RX ADMIN — CEFEPIME 2000 MG: 2 INJECTION, POWDER, FOR SOLUTION INTRAVENOUS at 01:02

## 2022-01-01 RX ADMIN — SODIUM CHLORIDE, PRESERVATIVE FREE 10 ML: 5 INJECTION INTRAVENOUS at 08:26

## 2022-01-01 RX ADMIN — ENOXAPARIN SODIUM 30 MG: 100 INJECTION SUBCUTANEOUS at 20:29

## 2022-01-01 RX ADMIN — SODIUM CHLORIDE, PRESERVATIVE FREE 10 ML: 5 INJECTION INTRAVENOUS at 12:00

## 2022-01-01 RX ADMIN — POTASSIUM CHLORIDE 10 MEQ: 7.46 INJECTION, SOLUTION INTRAVENOUS at 05:01

## 2022-01-01 RX ADMIN — IPRATROPIUM BROMIDE AND ALBUTEROL SULFATE 1 AMPULE: 2.5; .5 SOLUTION RESPIRATORY (INHALATION) at 08:01

## 2022-01-01 RX ADMIN — POTASSIUM CHLORIDE 20 MEQ: 29.8 INJECTION, SOLUTION INTRAVENOUS at 12:05

## 2022-01-01 RX ADMIN — SODIUM CHLORIDE, POTASSIUM CHLORIDE, SODIUM LACTATE AND CALCIUM CHLORIDE: 600; 310; 30; 20 INJECTION, SOLUTION INTRAVENOUS at 16:41

## 2022-01-01 RX ADMIN — NALOXONE HYDROCHLORIDE 0.2 MG: 0.4 INJECTION, SOLUTION INTRAMUSCULAR; INTRAVENOUS; SUBCUTANEOUS at 14:59

## 2022-01-01 RX ADMIN — MAGNESIUM SULFATE IN DEXTROSE 1000 MG: 10 INJECTION, SOLUTION INTRAVENOUS at 21:26

## 2022-01-01 RX ADMIN — ENOXAPARIN SODIUM 30 MG: 100 INJECTION SUBCUTANEOUS at 07:50

## 2022-01-01 RX ADMIN — POTASSIUM BICARBONATE 40 MEQ: 782 TABLET, EFFERVESCENT ORAL at 22:15

## 2022-01-01 RX ADMIN — ENOXAPARIN SODIUM 30 MG: 100 INJECTION SUBCUTANEOUS at 20:59

## 2022-01-01 RX ADMIN — Medication 1 MG: at 14:56

## 2022-01-01 RX ADMIN — IPRATROPIUM BROMIDE AND ALBUTEROL SULFATE 1 AMPULE: 2.5; .5 SOLUTION RESPIRATORY (INHALATION) at 03:16

## 2022-01-01 RX ADMIN — IPRATROPIUM BROMIDE AND ALBUTEROL SULFATE 1 AMPULE: 2.5; .5 SOLUTION RESPIRATORY (INHALATION) at 22:57

## 2022-01-01 RX ADMIN — Medication 32 MCG/MIN: at 06:25

## 2022-01-01 RX ADMIN — DEXTROSE MONOHYDRATE: 50 INJECTION, SOLUTION INTRAVENOUS at 09:38

## 2022-01-01 RX ADMIN — ENOXAPARIN SODIUM 30 MG: 100 INJECTION SUBCUTANEOUS at 10:31

## 2022-01-01 RX ADMIN — IPRATROPIUM BROMIDE AND ALBUTEROL SULFATE 1 AMPULE: 2.5; .5 SOLUTION RESPIRATORY (INHALATION) at 19:31

## 2022-01-01 RX ADMIN — CHLORHEXIDINE GLUCONATE 0.12% ORAL RINSE 15 ML: 1.2 LIQUID ORAL at 21:57

## 2022-01-01 RX ADMIN — DEXTROSE MONOHYDRATE: 50 INJECTION, SOLUTION INTRAVENOUS at 21:31

## 2022-01-01 RX ADMIN — VASOPRESSIN 0.04 UNITS/MIN: 20 INJECTION INTRAVENOUS at 21:47

## 2022-01-01 RX ADMIN — SODIUM CHLORIDE, PRESERVATIVE FREE 20 MG: 5 INJECTION INTRAVENOUS at 20:29

## 2022-01-01 RX ADMIN — POTASSIUM CHLORIDE 10 MEQ: 7.46 INJECTION, SOLUTION INTRAVENOUS at 04:00

## 2022-01-01 RX ADMIN — IPRATROPIUM BROMIDE AND ALBUTEROL SULFATE 1 AMPULE: 2.5; .5 SOLUTION RESPIRATORY (INHALATION) at 23:08

## 2022-01-01 RX ADMIN — CEFEPIME 2000 MG: 2 INJECTION, POWDER, FOR SOLUTION INTRAVENOUS at 10:32

## 2022-01-01 RX ADMIN — POTASSIUM CHLORIDE 10 MEQ: 7.46 INJECTION, SOLUTION INTRAVENOUS at 03:05

## 2022-01-01 RX ADMIN — SODIUM CHLORIDE, PRESERVATIVE FREE 10 ML: 5 INJECTION INTRAVENOUS at 20:30

## 2022-01-01 RX ADMIN — PHENYLEPHRINE HYDROCHLORIDE 225 MCG/MIN: 10 INJECTION INTRAVENOUS at 16:00

## 2022-01-01 RX ADMIN — ENOXAPARIN SODIUM 30 MG: 100 INJECTION SUBCUTANEOUS at 09:42

## 2022-01-01 RX ADMIN — SODIUM CHLORIDE, PRESERVATIVE FREE 20 MG: 5 INJECTION INTRAVENOUS at 07:50

## 2022-01-01 RX ADMIN — POTASSIUM CHLORIDE 10 MEQ: 7.46 INJECTION, SOLUTION INTRAVENOUS at 06:06

## 2022-01-01 RX ADMIN — SODIUM CHLORIDE, PRESERVATIVE FREE 20 MG: 5 INJECTION INTRAVENOUS at 21:00

## 2022-01-01 RX ADMIN — Medication 29 MCG/MIN: at 00:48

## 2022-01-01 RX ADMIN — VASOPRESSIN 0.04 UNITS/MIN: 20 INJECTION INTRAVENOUS at 02:29

## 2022-01-01 RX ADMIN — CALCIUM GLUCONATE 1000 MG: 20 INJECTION, SOLUTION INTRAVENOUS at 21:49

## 2022-01-01 RX ADMIN — Medication 27.3 MILLICURIE: at 11:32

## 2022-01-01 RX ADMIN — LEVOTHYROXINE SODIUM ANHYDROUS 20 MCG: 100 INJECTION, POWDER, LYOPHILIZED, FOR SOLUTION INTRAVENOUS at 22:18

## 2022-01-01 RX ADMIN — CHLORHEXIDINE GLUCONATE 0.12% ORAL RINSE 15 ML: 1.2 LIQUID ORAL at 20:59

## 2022-01-01 RX ADMIN — CEFEPIME 2000 MG: 2 INJECTION, POWDER, FOR SOLUTION INTRAVENOUS at 18:32

## 2022-01-01 RX ADMIN — IPRATROPIUM BROMIDE AND ALBUTEROL SULFATE 1 AMPULE: 2.5; .5 SOLUTION RESPIRATORY (INHALATION) at 03:05

## 2022-01-01 RX ADMIN — MINERAL OIL AND WHITE PETROLATUM: 150; 830 OINTMENT OPHTHALMIC at 08:00

## 2022-01-01 RX ADMIN — VANCOMYCIN HYDROCHLORIDE 2000 MG: 1 INJECTION, POWDER, LYOPHILIZED, FOR SOLUTION INTRAVENOUS at 17:07

## 2022-01-01 RX ADMIN — BUSPIRONE HYDROCHLORIDE 30 MG: 15 TABLET ORAL at 03:24

## 2022-01-01 RX ADMIN — IPRATROPIUM BROMIDE AND ALBUTEROL SULFATE 1 AMPULE: 2.5; .5 SOLUTION RESPIRATORY (INHALATION) at 19:46

## 2022-01-01 RX ADMIN — IPRATROPIUM BROMIDE AND ALBUTEROL SULFATE 1 AMPULE: 2.5; .5 SOLUTION RESPIRATORY (INHALATION) at 08:16

## 2022-01-01 RX ADMIN — PHENYLEPHRINE HYDROCHLORIDE 225 MCG/MIN: 10 INJECTION INTRAVENOUS at 20:34

## 2022-01-01 RX ADMIN — FUROSEMIDE 60 MG: 10 INJECTION, SOLUTION INTRAVENOUS at 21:19

## 2022-01-01 RX ADMIN — Medication 47 MCG/MIN: at 12:31

## 2022-01-01 RX ADMIN — IPRATROPIUM BROMIDE AND ALBUTEROL SULFATE 1 AMPULE: 2.5; .5 SOLUTION RESPIRATORY (INHALATION) at 08:06

## 2022-01-01 RX ADMIN — Medication 20 MCG/MIN: at 21:32

## 2022-01-01 RX ADMIN — VASOPRESSIN 0.04 UNITS/MIN: 20 INJECTION INTRAVENOUS at 11:00

## 2022-01-01 RX ADMIN — VASOPRESSIN 0.04 UNITS/MIN: 20 INJECTION INTRAVENOUS at 23:45

## 2022-01-01 RX ADMIN — IPRATROPIUM BROMIDE AND ALBUTEROL SULFATE 1 AMPULE: 2.5; .5 SOLUTION RESPIRATORY (INHALATION) at 23:24

## 2022-01-01 RX ADMIN — CEFEPIME 2000 MG: 2 INJECTION, POWDER, FOR SOLUTION INTRAVENOUS at 09:36

## 2022-01-01 RX ADMIN — CHLORHEXIDINE GLUCONATE 0.12% ORAL RINSE 15 ML: 1.2 LIQUID ORAL at 09:42

## 2022-01-01 RX ADMIN — IPRATROPIUM BROMIDE AND ALBUTEROL SULFATE 1 AMPULE: 2.5; .5 SOLUTION RESPIRATORY (INHALATION) at 11:18

## 2022-01-01 RX ADMIN — METHYLPREDNISOLONE SODIUM SUCCINATE 1000 MG: 1 INJECTION, POWDER, LYOPHILIZED, FOR SOLUTION INTRAMUSCULAR; INTRAVENOUS at 20:23

## 2022-01-01 RX ADMIN — ENOXAPARIN SODIUM 30 MG: 100 INJECTION SUBCUTANEOUS at 20:45

## 2022-01-01 RX ADMIN — IPRATROPIUM BROMIDE AND ALBUTEROL SULFATE 1 AMPULE: 2.5; .5 SOLUTION RESPIRATORY (INHALATION) at 10:54

## 2022-01-01 RX ADMIN — SODIUM CHLORIDE, PRESERVATIVE FREE 10 ML: 5 INJECTION INTRAVENOUS at 22:20

## 2022-01-01 RX ADMIN — ENOXAPARIN SODIUM 30 MG: 100 INJECTION SUBCUTANEOUS at 09:31

## 2022-01-01 RX ADMIN — Medication 27 MCG/MIN: at 22:45

## 2022-01-01 RX ADMIN — VASOPRESSIN 0.03 UNITS/MIN: 20 INJECTION INTRAVENOUS at 17:08

## 2022-01-01 RX ADMIN — SODIUM CHLORIDE, PRESERVATIVE FREE 20 MG: 5 INJECTION INTRAVENOUS at 22:20

## 2022-01-01 RX ADMIN — VASOPRESSIN 0.04 UNITS/MIN: 20 INJECTION INTRAVENOUS at 15:48

## 2022-01-01 RX ADMIN — Medication 8 MCG/MIN: at 11:30

## 2022-01-01 RX ADMIN — PROPOFOL 15 MCG/KG/MIN: 10 INJECTION, EMULSION INTRAVENOUS at 06:40

## 2022-01-01 RX ADMIN — CEFEPIME 2000 MG: 2 INJECTION, POWDER, FOR SOLUTION INTRAVENOUS at 18:05

## 2022-01-01 RX ADMIN — CALCIUM GLUCONATE 1000 MG: 98 INJECTION, SOLUTION INTRAVENOUS at 17:47

## 2022-01-01 RX ADMIN — SODIUM CHLORIDE, PRESERVATIVE FREE 10 ML: 5 INJECTION INTRAVENOUS at 21:02

## 2022-01-01 RX ADMIN — VANCOMYCIN HYDROCHLORIDE 2000 MG: 1 INJECTION, POWDER, LYOPHILIZED, FOR SOLUTION INTRAVENOUS at 22:12

## 2022-01-01 RX ADMIN — CEFEPIME HYDROCHLORIDE 2000 MG: 2 INJECTION, POWDER, FOR SOLUTION INTRAVENOUS at 21:37

## 2022-01-01 RX ADMIN — MINERAL OIL AND WHITE PETROLATUM: 150; 830 OINTMENT OPHTHALMIC at 15:56

## 2022-01-01 RX ADMIN — ENOXAPARIN SODIUM 30 MG: 100 INJECTION SUBCUTANEOUS at 22:19

## 2022-01-01 RX ADMIN — IPRATROPIUM BROMIDE AND ALBUTEROL SULFATE 1 AMPULE: 2.5; .5 SOLUTION RESPIRATORY (INHALATION) at 19:45

## 2022-01-01 RX ADMIN — Medication 12.5 MCG/HR: at 23:52

## 2022-01-01 RX ADMIN — POTASSIUM CHLORIDE 20 MEQ: 29.8 INJECTION, SOLUTION INTRAVENOUS at 10:52

## 2022-01-01 RX ADMIN — Medication 22 MCG/MIN: at 10:57

## 2022-01-01 RX ADMIN — CHLORHEXIDINE GLUCONATE 0.12% ORAL RINSE 15 ML: 1.2 LIQUID ORAL at 20:29

## 2022-01-01 RX ADMIN — CEFEPIME 2000 MG: 2 INJECTION, POWDER, FOR SOLUTION INTRAVENOUS at 00:38

## 2022-01-01 RX ADMIN — IPRATROPIUM BROMIDE AND ALBUTEROL SULFATE 1 AMPULE: 2.5; .5 SOLUTION RESPIRATORY (INHALATION) at 07:49

## 2022-01-01 RX ADMIN — VASOPRESSIN 0.04 UNITS/MIN: 20 INJECTION INTRAVENOUS at 08:44

## 2022-01-01 RX ADMIN — PHENYLEPHRINE HYDROCHLORIDE 100 MCG/MIN: 10 INJECTION INTRAVENOUS at 11:00

## 2022-01-01 RX ADMIN — CHLORHEXIDINE GLUCONATE 0.12% ORAL RINSE 15 ML: 1.2 LIQUID ORAL at 09:31

## 2022-01-01 RX ADMIN — PANTOPRAZOLE SODIUM 40 MG: 40 INJECTION, POWDER, FOR SOLUTION INTRAVENOUS at 21:41

## 2022-01-01 RX ADMIN — POTASSIUM CHLORIDE 20 MEQ: 29.8 INJECTION, SOLUTION INTRAVENOUS at 19:59

## 2022-01-01 RX ADMIN — LEVOTHYROXINE SODIUM ANHYDROUS 10 MCG/HR: 100 INJECTION, POWDER, LYOPHILIZED, FOR SOLUTION INTRAVENOUS at 22:34

## 2022-01-01 RX ADMIN — CHLORHEXIDINE GLUCONATE 0.12% ORAL RINSE 15 ML: 1.2 LIQUID ORAL at 22:19

## 2022-01-01 RX ADMIN — PROPOFOL 20 MCG/KG/MIN: 10 INJECTION, EMULSION INTRAVENOUS at 19:15

## 2022-01-01 RX ADMIN — SODIUM CHLORIDE, PRESERVATIVE FREE 20 MG: 5 INJECTION INTRAVENOUS at 09:41

## 2022-01-01 RX ADMIN — POTASSIUM CHLORIDE 20 MEQ: 29.8 INJECTION, SOLUTION INTRAVENOUS at 23:54

## 2022-01-01 RX ADMIN — CEFEPIME 2000 MG: 2 INJECTION, POWDER, FOR SOLUTION INTRAVENOUS at 16:28

## 2022-01-01 RX ADMIN — DEXTROSE MONOHYDRATE 125 ML: 100 INJECTION, SOLUTION INTRAVENOUS at 08:03

## 2022-01-01 RX ADMIN — POTASSIUM CHLORIDE 20 MEQ: 29.8 INJECTION, SOLUTION INTRAVENOUS at 23:06

## 2022-01-01 RX ADMIN — SODIUM CHLORIDE, PRESERVATIVE FREE 20 MG: 5 INJECTION INTRAVENOUS at 09:31

## 2022-01-01 RX ADMIN — FAMOTIDINE 20 MG: 10 INJECTION INTRAVENOUS at 21:57

## 2022-01-01 RX ADMIN — CHLORHEXIDINE GLUCONATE 0.12% ORAL RINSE 15 ML: 1.2 LIQUID ORAL at 07:50

## 2022-01-01 RX ADMIN — PROPOFOL 15 MCG/KG/MIN: 10 INJECTION, EMULSION INTRAVENOUS at 22:37

## 2022-01-01 RX ADMIN — CALCIUM GLUCONATE 2000 MG: 20 INJECTION, SOLUTION INTRAVENOUS at 07:43

## 2022-01-01 RX ADMIN — IOPAMIDOL 80 ML: 755 INJECTION, SOLUTION INTRAVENOUS at 18:05

## 2022-01-01 RX ADMIN — POTASSIUM CHLORIDE 20 MEQ: 29.8 INJECTION, SOLUTION INTRAVENOUS at 18:17

## 2022-01-01 RX ADMIN — CEFEPIME 2000 MG: 2 INJECTION, POWDER, FOR SOLUTION INTRAVENOUS at 00:37

## 2022-01-01 RX ADMIN — SODIUM CHLORIDE, PRESERVATIVE FREE 10 ML: 5 INJECTION INTRAVENOUS at 09:31

## 2022-01-01 RX ADMIN — SODIUM CHLORIDE: 9 INJECTION, SOLUTION INTRAVENOUS at 00:57

## 2022-01-01 RX ADMIN — MEPERIDINE HYDROCHLORIDE 25 MG: 25 INJECTION, SOLUTION INTRAMUSCULAR; INTRAVENOUS; SUBCUTANEOUS at 03:08

## 2022-01-01 RX ADMIN — POTASSIUM CHLORIDE 10 MEQ: 7.46 INJECTION, SOLUTION INTRAVENOUS at 07:08

## 2022-01-01 ASSESSMENT — PULMONARY FUNCTION TESTS
PIF_VALUE: 44
PIF_VALUE: 41
PIF_VALUE: 49
PIF_VALUE: 38
PIF_VALUE: 45
PIF_VALUE: 40
PIF_VALUE: 44
PIF_VALUE: 39
PIF_VALUE: 49
PIF_VALUE: 46
PIF_VALUE: 41
PIF_VALUE: 41
PIF_VALUE: 40
PIF_VALUE: 46
PIF_VALUE: 44
PIF_VALUE: 41
PIF_VALUE: 45
PIF_VALUE: 44
PIF_VALUE: 46
PIF_VALUE: 42
PIF_VALUE: 43
PIF_VALUE: 43
PIF_VALUE: 44
PIF_VALUE: 46
PIF_VALUE: 42
PIF_VALUE: 43
PIF_VALUE: 43
PIF_VALUE: 44
PIF_VALUE: 38
PIF_VALUE: 41
PIF_VALUE: 39
PIF_VALUE: 39
PIF_VALUE: 42
PIF_VALUE: 42
PIF_VALUE: 43
PIF_VALUE: 40
PIF_VALUE: 46
PIF_VALUE: 55
PIF_VALUE: 39
PIF_VALUE: 48
PIF_VALUE: 43
PIF_VALUE: 39
PIF_VALUE: 41
PIF_VALUE: 42
PIF_VALUE: 41
PIF_VALUE: 38
PIF_VALUE: 46
PIF_VALUE: 46
PIF_VALUE: 39
PIF_VALUE: 40
PIF_VALUE: 39
PIF_VALUE: 44
PIF_VALUE: 55
PIF_VALUE: 39
PIF_VALUE: 43
PIF_VALUE: 38
PIF_VALUE: 45
PIF_VALUE: 38
PIF_VALUE: 39
PIF_VALUE: 42
PIF_VALUE: 44
PIF_VALUE: 46
PIF_VALUE: 41
PIF_VALUE: 40
PIF_VALUE: 46
PIF_VALUE: 44
PIF_VALUE: 47
PIF_VALUE: 42
PIF_VALUE: 39
PIF_VALUE: 39
PIF_VALUE: 44
PIF_VALUE: 42
PIF_VALUE: 46
PIF_VALUE: 42
PIF_VALUE: 46
PIF_VALUE: 44
PIF_VALUE: 38
PIF_VALUE: 45
PIF_VALUE: 46
PIF_VALUE: 44
PIF_VALUE: 40
PIF_VALUE: 39
PIF_VALUE: 48
PIF_VALUE: 44
PIF_VALUE: 41
PIF_VALUE: 39
PIF_VALUE: 43
PIF_VALUE: 41
PIF_VALUE: 46
PIF_VALUE: 42
PIF_VALUE: 38
PIF_VALUE: 46
PIF_VALUE: 39
PIF_VALUE: 44
PIF_VALUE: 45
PIF_VALUE: 43
PIF_VALUE: 39
PIF_VALUE: 43
PIF_VALUE: 41
PIF_VALUE: 42
PIF_VALUE: 41
PIF_VALUE: 37
PIF_VALUE: 39
PIF_VALUE: 40
PIF_VALUE: 44
PIF_VALUE: 46
PIF_VALUE: 41
PIF_VALUE: 41
PIF_VALUE: 46
PIF_VALUE: 40
PIF_VALUE: 41
PIF_VALUE: 39
PIF_VALUE: 42
PIF_VALUE: 44
PIF_VALUE: 41
PIF_VALUE: 41
PIF_VALUE: 42
PIF_VALUE: 45
PIF_VALUE: 42
PIF_VALUE: 41
PIF_VALUE: 46
PIF_VALUE: 42
PIF_VALUE: 40
PIF_VALUE: 38
PIF_VALUE: 45
PIF_VALUE: 45
PIF_VALUE: 42
PIF_VALUE: 44
PIF_VALUE: 40
PIF_VALUE: 44
PIF_VALUE: 39
PIF_VALUE: 41
PIF_VALUE: 42
PIF_VALUE: 41
PIF_VALUE: 48
PIF_VALUE: 45
PIF_VALUE: 37
PIF_VALUE: 43
PIF_VALUE: 45
PIF_VALUE: 39
PIF_VALUE: 45
PIF_VALUE: 42
PIF_VALUE: 44
PIF_VALUE: 39
PIF_VALUE: 45
PIF_VALUE: 40
PIF_VALUE: 45
PIF_VALUE: 41
PIF_VALUE: 42
PIF_VALUE: 41
PIF_VALUE: 44
PIF_VALUE: 44
PIF_VALUE: 45
PIF_VALUE: 38
PIF_VALUE: 44
PIF_VALUE: 45
PIF_VALUE: 41
PIF_VALUE: 38
PIF_VALUE: 47
PIF_VALUE: 44
PIF_VALUE: 46
PIF_VALUE: 48
PIF_VALUE: 46
PIF_VALUE: 47
PIF_VALUE: 46
PIF_VALUE: 48
PIF_VALUE: 44
PIF_VALUE: 37
PIF_VALUE: 44
PIF_VALUE: 44
PIF_VALUE: 41
PIF_VALUE: 45
PIF_VALUE: 38
PIF_VALUE: 41
PIF_VALUE: 41
PIF_VALUE: 44
PIF_VALUE: 42
PIF_VALUE: 48
PIF_VALUE: 41
PIF_VALUE: 45
PIF_VALUE: 44
PIF_VALUE: 41
PIF_VALUE: 37
PIF_VALUE: 42
PIF_VALUE: 41
PIF_VALUE: 41
PIF_VALUE: 49
PIF_VALUE: 44
PIF_VALUE: 14
PIF_VALUE: 42
PIF_VALUE: 37
PIF_VALUE: 44
PIF_VALUE: 41
PIF_VALUE: 43
PIF_VALUE: 42
PIF_VALUE: 45
PIF_VALUE: 39
PIF_VALUE: 44
PIF_VALUE: 46
PIF_VALUE: 46
PIF_VALUE: 55
PIF_VALUE: 45
PIF_VALUE: 43
PIF_VALUE: 42
PIF_VALUE: 37
PIF_VALUE: 39
PIF_VALUE: 39
PIF_VALUE: 43
PIF_VALUE: 38
PIF_VALUE: 44
PIF_VALUE: 46
PIF_VALUE: 39
PIF_VALUE: 41
PIF_VALUE: 39
PIF_VALUE: 42
PIF_VALUE: 43
PIF_VALUE: 46
PIF_VALUE: 42
PIF_VALUE: 39
PIF_VALUE: 43
PIF_VALUE: 45
PIF_VALUE: 39
PIF_VALUE: 38
PIF_VALUE: 47
PIF_VALUE: 48
PIF_VALUE: 42
PIF_VALUE: 50
PIF_VALUE: 45
PIF_VALUE: 42
PIF_VALUE: 38
PIF_VALUE: 42
PIF_VALUE: 47
PIF_VALUE: 41
PIF_VALUE: 47
PIF_VALUE: 46
PIF_VALUE: 38
PIF_VALUE: 44
PIF_VALUE: 39
PIF_VALUE: 44
PIF_VALUE: 39
PIF_VALUE: 45
PIF_VALUE: 45
PIF_VALUE: 37
PIF_VALUE: 41
PIF_VALUE: 42
PIF_VALUE: 44
PIF_VALUE: 46
PIF_VALUE: 46
PIF_VALUE: 39
PIF_VALUE: 42
PIF_VALUE: 42
PIF_VALUE: 46
PIF_VALUE: 38
PIF_VALUE: 42
PIF_VALUE: 43
PIF_VALUE: 45
PIF_VALUE: 41
PIF_VALUE: 46
PIF_VALUE: 46
PIF_VALUE: 37
PIF_VALUE: 41
PIF_VALUE: 42
PIF_VALUE: 42
PIF_VALUE: 38
PIF_VALUE: 45
PIF_VALUE: 46
PIF_VALUE: 38
PIF_VALUE: 42
PIF_VALUE: 37
PIF_VALUE: 42
PIF_VALUE: 41
PIF_VALUE: 42
PIF_VALUE: 42
PIF_VALUE: 45
PIF_VALUE: 41
PIF_VALUE: 44
PIF_VALUE: 38
PIF_VALUE: 41
PIF_VALUE: 47
PIF_VALUE: 39
PIF_VALUE: 37
PIF_VALUE: 38
PIF_VALUE: 39
PIF_VALUE: 45
PIF_VALUE: 41
PIF_VALUE: 46
PIF_VALUE: 45
PIF_VALUE: 45
PIF_VALUE: 46
PIF_VALUE: 47

## 2022-01-01 ASSESSMENT — ENCOUNTER SYMPTOMS
TROUBLE SWALLOWING: 0
CHEST TIGHTNESS: 0
NAUSEA: 0
COLOR CHANGE: 0
SHORTNESS OF BREATH: 1
VOMITING: 0
ABDOMINAL PAIN: 0
SORE THROAT: 0
WHEEZING: 0
SORE THROAT: 0
ABDOMINAL PAIN: 0
DIARRHEA: 0
SHORTNESS OF BREATH: 0
NAUSEA: 0

## 2022-01-01 ASSESSMENT — PAIN SCALES - WONG BAKER
WONGBAKER_NUMERICALRESPONSE: 0

## 2022-01-01 ASSESSMENT — PAIN SCALES - GENERAL
PAINLEVEL_OUTOF10: 5
PAINLEVEL_OUTOF10: 0
PAINLEVEL_OUTOF10: 5
PAINLEVEL_OUTOF10: 0
PAINLEVEL_OUTOF10: 0
PAINLEVEL_OUTOF10: 4
PAINLEVEL_OUTOF10: 0

## 2022-01-01 ASSESSMENT — PAIN DESCRIPTION - ORIENTATION: ORIENTATION: RIGHT;LEFT

## 2022-01-01 ASSESSMENT — PAIN - FUNCTIONAL ASSESSMENT: PAIN_FUNCTIONAL_ASSESSMENT: 0-10

## 2022-01-01 ASSESSMENT — PAIN DESCRIPTION - LOCATION: LOCATION: LEG;HIP

## 2022-07-12 ENCOUNTER — HOSPITAL ENCOUNTER (OUTPATIENT)
Age: 42
Discharge: HOME | End: 2022-07-12
Payer: MEDICAID

## 2022-07-12 LAB
ANION GAP SERPL CALCULATED.3IONS-SCNC: 12 MMOL/L (ref 4–16)
BUN BLDV-MCNC: 21 MG/DL (ref 6–23)
CALCIUM SERPL-MCNC: 10 MG/DL (ref 8.3–10.6)
CHLORIDE BLD-SCNC: 88 MMOL/L (ref 99–110)
CO2: 39 MMOL/L (ref 21–32)
CREAT SERPL-MCNC: 0.9 MG/DL (ref 0.6–1.1)
GFR AFRICAN AMERICAN: >60 ML/MIN/1.73M2
GFR NON-AFRICAN AMERICAN: >60 ML/MIN/1.73M2
GLUCOSE BLD-MCNC: 109 MG/DL (ref 70–99)
POTASSIUM SERPL-SCNC: 3.3 MMOL/L (ref 3.5–5.1)
SODIUM BLD-SCNC: 139 MMOL/L (ref 135–145)

## 2022-07-12 PROCEDURE — 80048 BASIC METABOLIC PNL TOTAL CA: CPT

## 2022-07-12 PROCEDURE — 36415 COLL VENOUS BLD VENIPUNCTURE: CPT

## 2022-07-12 NOTE — PROGRESS NOTES
[address] [place] [address]  HOSPITALIST PROGRESS NOTE                       Name:  Florecita Paredes /Age/Sex: 1980  (47 y.o. female)   MRN & CSN:  [id number] & [id number] Admission Date/Time: 2020  9:12 PM   Location:  3016/3016-A Attending:  Dia Johansen MD                                                  HPI  Florecita Paredes is a 47 y.o. female who presents with flank pain/hematuria    SUBJECTIVE  - no fever/chills. No nausea. Pain better controlled. 10 point review of systems reviewed and negative unless noted above. ALLERGIES: No Known Allergies    PCP: Marina Beckford DO    PAST MEDICAL HISTORY, SURGICAL HISTORY, SOCIAL HISTORY and  HOME MEDICATIONS all reviewed. OBJECTIVE  Vitals:    20 1718 208 20 0230 20 1016   BP: (!) 130/94 118/86 (!) 123/92 (!) 140/107   Pulse: 82 78 78 80   Resp: 17 16 14 15   Temp: 97.7 °F (36.5 °C) 98.2 °F (36.8 °C) 98 °F (36.7 °C) 98.4 °F (36.9 °C)   TempSrc: Oral Oral Oral Oral   SpO2: 100%   96%   Weight:   200 lb (90.7 kg)    Height:           PHYSICAL EXAM   GEN Awake female, sitting upright in bed in no apparent distress. EYES Pupils are equally round. No scleral erythema, discharge, or conjunctivitis. HENT Mucous membranes are moist. Oral pharynx without exudates, no evidence of thrush. NECK Supple, no apparent thyromegaly or masses. RESP Clear to auscultation, no wheezes, rales or rhonchi. Symmetric chest movement while on room air. CARDIO/VASC S1/S2 auscultated. Regular rate without appreciable murmurs, rubs, or gallops. No JVD or carotid bruits. Peripheral pulses equal bilaterally and palpable. No peripheral edema. GI Abdomen is soft without significant tenderness, masses, or guarding. Bowel sounds are normoactive. Rectal exam deferred.  No costovertebral angle tenderness. Normal appearing external genitalia. Angel catheter is not present. HEME/LYMPH No palpable cervical lymphadenopathy and no hepatosplenomegaly.  No [address], [address]  [phone]  [facility], [address]     Subjective:      Gladis Vasquez, [facility]    Raheem Rahman is a 47 y.o. female is here for long follow up. PMHx significant for being DWAYNE positive, Sjogren's, axillary adenopathy, hypertension, obesity, acute pericarditis and pericardial effusion in 2016, previously rx'd colchicine, indomethacin. Last seen by Dr Derian Miller in 3/2020:   here for symptom-based f/u--right anterior chest pain. PMHx significant for being DWAYNE positive, Sjogren's, axillary adenopathy, hypertension, obesity, acute pericarditis and pericardial effusion in 2016, previously rx'd colchicine. Seen 3/13/20 by Woody Singh NP, EKG with NSR, NST, no acute changes. Repeat Chest CTA 3/13/20 with no evidence pulm embolus, mod sized pericardial effusion UNCHANGED from previous. Has f/u planned with Rheumatology. Rx'd Indomethacin 25mg tid--sx improved considerably. No F, chills, cough/sputum, hemoptysis, dyspnea    Today,  Reports recurrent pericardial effusion. Very mild positional chest discomfort. States her plaquenil dose was tapered down and she noticed some chest discomfort after that. She kwn her pericardial effusion is back so she called  Her PCP. The patient denies shortness of breath, orthopnea, PND, LE edema, palpitations, syncope, presyncope or fatigue.     Patient Active Problem List    Diagnosis Date Noted    Sjogren's syndrome ([facility] [address] 2020    Axillary adenopathy, left 2016    Lymphadenopathy 10/28/2015    Pericarditis with effusion 10/16/2015    Gestational HTN 10/16/2015    Obesity 10/16/2015    DWAYNE positive 10/16/2015      Sade Charles MD  Past Medical History:   Diagnosis Date    DWAYNE positive     Axillary adenopathy 2/3/2016    Hypertension in pregnancy     Obesity     Pericardial effusion     Pericarditis, acute       Past Surgical History:   Procedure Laterality Date    HX  SECTION       No Known Allergies   Family History   Problem Relation Age of Onset    Hypertension Mother     Hypertension Father     Hypertension Brother       Social History     Socioeconomic History    Marital status: SINGLE     Spouse name: Not on file    Number of children: Not on file    Years of education: Not on file    Highest education level: Not on file   Occupational History    Not on file   Tobacco Use    Smoking status: Never Smoker    Smokeless tobacco: Never Used   Substance and Sexual Activity    Alcohol use: Yes     Alcohol/week: 0.0 standard drinks    Drug use: Not on file    Sexual activity: Not on file   Other Topics Concern    Not on file   Social History Narrative    Not on file     Social Determinants of Health     Financial Resource Strain:     Difficulty of Paying Living Expenses: Not on file   Food Insecurity:     Worried About Running Out of Food in the Last Year: Not on file    Alexander of Food in the Last Year: Not on file   Transportation Needs:     Lack of Transportation (Medical): Not on file    Lack of Transportation (Non-Medical):  Not on file   Physical Activity:     Days of Exercise per Week: Not on file    Minutes of Exercise per Session: Not on file   Stress:     Feeling of Stress : Not on file   Social Connections:     Frequency of Communication with Friends and Family: Not on file    Frequency of Social Gatherings with Friends and Family: Not on file    Attends Protestant Services: Not on file    Active Member of Clubs or Organizations: Not on file    Attends Club or Organization Meetings: Not on file    Marital Status: Not on file   Intimate Partner Violence:     Fear of Current or Ex-Partner: Not on file    Emotionally Abused: Not on file    Physically Abused: Not on file    Sexually Abused: Not on file   Housing Stability:     Unable to Pay for Housing in the Last Year: Not on file    Number of [place] in the Last Year: Not on file    petechiae or ecchymoses. MSK Spontaneous movement of all extremities. No gross joint deformities. SKIN Normal coloration, warm, dry. NEURO Cranial nerves appear grossly intact, normal speech, mild RLE weakness- able to lift against gravity, mildly against resistance      INTAKE: In: 480 [P.O.:480]  Out: -   OUTPUT: In: 480   Out: -     LABS  Recent Labs     09/15/20  0331 09/16/20  0940   WBC 5.5 4.4   HGB 6.8* 7.7*   HCT 21.9* 23.9*    215      Recent Labs     09/15/20  0331      K 4.7      CO2 23   BUN 24*   CREATININE 1.5*     No results for input(s): AST, ALT, ALB, BILIDIR, BILITOT, ALKPHOS in the last 72 hours. No results for input(s): INR in the last 72 hours. Recent Labs     09/15/20  0331   CKTOTAL 14*          Abnormal labs for today noted      Imaging:     ECHO:    Microbiology:  Blood culture:    Urine culture:    Sputum culture:    Procedures done this admission:    MEDS  Scheduled Meds:   daptomycin (CUBICIN) IVPB  10 mg/kg (Adjusted) Intravenous Q24H    sodium chloride flush  10 mL Intravenous 2 times per day    enoxaparin  40 mg Subcutaneous Daily    buprenorphine-naloxone  1 Film Sublingual BID     Continuous Infusions:   sodium chloride Stopped (09/15/20 1823)     PRN Meds:LORazepam, cyclobenzaprine, sodium chloride flush, acetaminophen **OR** acetaminophen, polyethylene glycol, promethazine **OR** ondansetron, potassium chloride **OR** potassium alternative oral replacement **OR** potassium chloride        ASSESSMENT and PLAN  Hospital Day: 5    1-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 Continue dapto per ID.  With back pain and mild RLE weakness which is not knew according to her- has not worsened while here  2-Hematuria- seen by urology- with acute blood loss anemia- being transfused prn  3-LISBETH- monitor       Awaiting transfer to [facility], discussed options as its taking long Unstable Housing in the Last Year: Not on file      Current Outpatient Medications   Medication Sig    indomethacin (INDOCIN) 25 mg capsule Take 1 Capsule by mouth two (2) times daily (with meals) for 30 days. Take indomethacin  25mg tid for total 10 days, then taper to 25 bid x 5 days, then 12.5mg bid x 5 days, then d/c    hydroxychloroquine (PLAQUENIL) 200 mg tablet Take 200 mg by mouth two (2) times a day.  cholecalciferol, vitamin D3, (VITAMIN D3) 2,000 unit tab Take 5,000 Units by mouth daily.  LEVONORGESTREL (MIRENA IU) by IntraUTERine route.  multivitamin (ONE A DAY) tablet Take 1 Tab by mouth daily. No current facility-administered medications for this visit. Review of Symptoms:  11 systems reviewed, negative other than as stated in the HPI    Physical ExamPhysical Exam:    Vitals:    07/14/22 0946   BP: 112/74   Pulse: 82   Resp: 18   Temp: 97.6 °F (36.4 °C)   TempSrc: Temporal   SpO2: 99%   Weight: 203 lb (92.1 kg)   Height: 5' 4\" (1.626 m)     Body mass index is 34.84 kg/m². General PE   General:  Well developed, in no acute distress, cooperative and alert  HEENT: No carotid bruits, no JVD, trach is midline. Neck Supple, PEERL, EOM intact. Heart:  reg rate and rhythm; normal S1/S2; no murmurs, gallops or rubs. Respiratory: Clear bilaterally x 4, no wheezing or rales  Abdomen:   Soft, non-tender, no distention, no masses. + BS. Extremities:  Normal cap refill, no cyanosis, atraumatic. No edema. Neuro: A&Ox3, speech clear, gait stable. Skin: Skin color is normal. No rashes or lesions.  Non diaphoretic  Vascular: 2+ pulses symmetric in all extremities    Labs:   No results found for: CHOL, CHOLX, CHLST, CHOLV, 418578, HDL, HDLP, LDL, LDLC, DLDLP, TGLX, TRIGL, TRIGP, CHHD, CHHDX  No results found for: CPK, CPKX, CPX  Lab Results   Component Value Date/Time    Sodium 138 04/29/2019 05:34 PM    Potassium 3.8 04/29/2019 05:34 PM    Chloride 105 04/29/2019 05:34 PM    CO2 27 for bed to open up at [facility]- declines transfer to any other place- wants to wait till bed opens at [address] [facility]:     Diet DIET GENERAL;   DVT Prophylaxis [] Lovenox, []  Heparin, [] SCDs, [] Ambulation   GI Prophylaxis [] PPI,  [] H2 Blocker,  [] Carafate,  [] Diet/Tube Feeds   Code Status Full Code   Disposition Patient requires continued admission due to probable epidural abscess   CMS Level of Risk [] Low, [] Moderate,[x]  High  Patient's risk as above due to probable epidural abscess     WINSOME FORBES MD 9/17/2020 1:53 PM 04/29/2019 05:34 PM    Anion gap 6 04/29/2019 05:34 PM    Glucose 68 04/29/2019 05:34 PM    BUN 14 04/29/2019 05:34 PM    Creatinine 0.64 04/29/2019 05:34 PM    BUN/Creatinine ratio 22 (H) 04/29/2019 05:34 PM    GFR est AA >60 04/29/2019 05:34 PM    GFR est non-AA >60 04/29/2019 05:34 PM    Calcium 8.5 04/29/2019 05:34 PM    Bilirubin, total 0.3 04/29/2019 05:34 PM    Alk. phosphatase 74 04/29/2019 05:34 PM    Protein, total 8.2 04/29/2019 05:34 PM    Albumin 3.8 04/29/2019 05:34 PM    Globulin 4.4 (H) 04/29/2019 05:34 PM    A-G Ratio 0.9 (L) 04/29/2019 05:34 PM    ALT (SGPT) 20 04/29/2019 05:34 PM       EKG:  NSR      Assessment:     Assessment:        ICD-10-CM ICD-9-CM    1. Pericarditis with effusion  I31.9 423.9 AMB POC EKG ROUTINE W/ 12 LEADS, INTER & REP   2. S[facility]ogren's syndrome, with unspecified organ involvement [facility]  M35.00 710.2 AMB POC EKG ROUTINE W/ 12 LEADS, INTER & REP   3. DWAYNE positive  R76.8 795.79 AMB POC EKG ROUTINE W/ 12 LEADS, INTER & REP       Orders Placed This Encounter    AMB POC EKG ROUTINE W/ 12 LEADS, INTER & REP     Order Specific Question:   Reason for Exam:     Answer:   surgical clearance    indomethacin (INDOCIN) 25 mg capsule     Sig: Take 1 Capsule by mouth two (2) times daily (with meals) for 30 days. Take indomethacin  25mg tid for total 10 days, then taper to 25 bid x 5 days, then 12.5mg bid x 5 days, then d/c     Dispense:  45 Capsule     Refill:  1     06/22/22    ECHO ADULT COMPLETE 06/25/2022 6/25/2022    Interpretation Summary    Left Ventricle: Normal left ventricular systolic function with a visually estimated EF of 60 - 65%. Left ventricle size is normal. Normal wall thickness. Normal wall motion. Normal diastolic function.   Pericardium: Small (<1 cm) pericardial effusion present. No indication of cardiac tamponade. Pericardial posterior effusion measures 0.9 cm. Pericardial lateral effusion measures 0.9 cm. Signed by:  Nathaly Brady MD on 6/25/2022 11:45 AM    Last seen by Dr Victoria Villela in 3/2020:   here for symptom-based f/u--right anterior chest pain. PMHx significant for being DWAYNE positive, Sjogren's, axillary adenopathy, hypertension, obesity, acute pericarditis and pericardial effusion in 2016, previously rx'd colchicine. Seen 3/13/20 by Naren Mckenzie NP, EKG with NSR, NST, no acute changes. Repeat Chest CTA 3/13/20 with no evidence pulm embolus, mod sized pericardial effusion UNCHANGED from previous. Has f/u planned with Rheumatology. Rx'd Indomethacin 25mg tid--sx improved considerably. No F, chills, cough/sputum, hemoptysis, dyspnea    Echo/doppler--call w/ results     Continue indomethacin 25mg tid for total 10 days, then taper to 25 bid x 5 days, then 12.5mg bid x 5 days, then d/c  F/u with Rheum as planned       Plan:     Pericarditis with effusion  LVEF 60-65% small pericardial effusion per echo 6/2022    Echo in 3/2020 no pericardial effusion  Echo in 2019: Normal EF, valves ok,  Pericardium: Small circumferential pericardial effusion adjacent to left atrium and right atrium. Previously treated with indomethacin with great response---will rx indomethacin 25mg tid for total 10 days, then taper to 25 bid x 5 days, then 12.5mg bid x 5 days, then d/c  She will take OTC PPI (omeprazole)  For GI ulcer prophylaxis    HTN  Controlled with current therapy    Sjogren's Disease  Followed by rheumatologist    Patient was made aware during visit today that all testing completed would be instantaneously available on their MyChart for review. Discussed that these results will be made available to the provider at the same time. They were advised to wait at least 3 business days to allow for provider's interpretation of results with follow-up before calling our office with concerns about their results.     Continue current care and f/u in  With Dr Benjamin Sadler in one mos    Dylan Duenas NP

## 2022-08-03 ENCOUNTER — HOSPITAL ENCOUNTER (OUTPATIENT)
Age: 42
Discharge: HOME | End: 2022-08-03
Payer: MEDICAID

## 2022-08-03 LAB
ANION GAP SERPL CALCULATED.3IONS-SCNC: 11 MMOL/L (ref 4–16)
BUN BLDV-MCNC: 42 MG/DL (ref 6–23)
CALCIUM SERPL-MCNC: 9.9 MG/DL (ref 8.3–10.6)
CHLORIDE BLD-SCNC: 88 MMOL/L (ref 99–110)
CO2: 37 MMOL/L (ref 21–32)
CREAT SERPL-MCNC: 1.1 MG/DL (ref 0.6–1.1)
GFR AFRICAN AMERICAN: >60 ML/MIN/1.73M2
GFR NON-AFRICAN AMERICAN: 55 ML/MIN/1.73M2
GLUCOSE BLD-MCNC: 93 MG/DL (ref 70–99)
POTASSIUM SERPL-SCNC: 3.2 MMOL/L (ref 3.5–5.1)
SODIUM BLD-SCNC: 136 MMOL/L (ref 135–145)

## 2022-08-03 PROCEDURE — 36415 COLL VENOUS BLD VENIPUNCTURE: CPT

## 2022-08-03 PROCEDURE — 80048 BASIC METABOLIC PNL TOTAL CA: CPT

## 2022-08-17 ENCOUNTER — OFFICE VISIT (OUTPATIENT)
Dept: FAMILY MEDICINE CLINIC | Age: 42
End: 2022-08-17
Payer: MEDICAID

## 2022-08-17 VITALS
SYSTOLIC BLOOD PRESSURE: 110 MMHG | DIASTOLIC BLOOD PRESSURE: 78 MMHG | WEIGHT: 293 LBS | HEIGHT: 65 IN | HEART RATE: 94 BPM | OXYGEN SATURATION: 91 % | BODY MASS INDEX: 48.82 KG/M2

## 2022-08-17 DIAGNOSIS — R78.81 MRSA BACTEREMIA: Primary | ICD-10-CM

## 2022-08-17 DIAGNOSIS — Z98.1 STATUS POST THORACIC SPINAL FUSION: ICD-10-CM

## 2022-08-17 DIAGNOSIS — G47.09 OTHER INSOMNIA: ICD-10-CM

## 2022-08-17 DIAGNOSIS — B95.62 MRSA BACTEREMIA: Primary | ICD-10-CM

## 2022-08-17 PROBLEM — D64.9 ANEMIA: Status: ACTIVE | Noted: 2020-08-16

## 2022-08-17 PROBLEM — M96.0 PSEUDARTHROSIS AFTER FUSION OR ARTHRODESIS: Status: ACTIVE | Noted: 2022-01-01

## 2022-08-17 PROBLEM — Z79.2 LONG TERM (CURRENT) USE OF ANTIBIOTICS: Status: ACTIVE | Noted: 2022-01-01

## 2022-08-17 PROBLEM — I10 HYPERTENSIVE DISORDER: Status: ACTIVE | Noted: 2022-01-01

## 2022-08-17 PROCEDURE — 99204 OFFICE O/P NEW MOD 45 MIN: CPT

## 2022-08-17 PROCEDURE — G8417 CALC BMI ABV UP PARAM F/U: HCPCS

## 2022-08-17 PROCEDURE — 4004F PT TOBACCO SCREEN RCVD TLK: CPT

## 2022-08-17 PROCEDURE — G8427 DOCREV CUR MEDS BY ELIG CLIN: HCPCS

## 2022-08-17 RX ORDER — DOXYCYCLINE HYCLATE 100 MG/1
100 CAPSULE ORAL 2 TIMES DAILY
Qty: 60 CAPSULE | Refills: 2 | Status: SHIPPED | OUTPATIENT
Start: 2022-08-17 | End: 2022-09-16

## 2022-08-17 RX ORDER — FUROSEMIDE 20 MG/1
60 TABLET ORAL 2 TIMES DAILY
Status: ON HOLD | COMMUNITY
End: 2022-10-21 | Stop reason: HOSPADM

## 2022-08-17 RX ORDER — GABAPENTIN 300 MG/1
600 CAPSULE ORAL 3 TIMES DAILY
COMMUNITY
End: 2022-08-17 | Stop reason: SDUPTHER

## 2022-08-17 RX ORDER — GABAPENTIN 300 MG/1
600 CAPSULE ORAL 3 TIMES DAILY
Qty: 180 CAPSULE | Refills: 2 | Status: ON HOLD | OUTPATIENT
Start: 2022-08-17 | End: 2022-11-03

## 2022-08-17 RX ORDER — NORTRIPTYLINE HYDROCHLORIDE 25 MG/1
25 CAPSULE ORAL 3 TIMES DAILY
COMMUNITY
End: 2022-08-17 | Stop reason: SDUPTHER

## 2022-08-17 RX ORDER — POTASSIUM CHLORIDE 20 MEQ/1
20 TABLET, EXTENDED RELEASE ORAL 2 TIMES DAILY
Status: ON HOLD | COMMUNITY

## 2022-08-17 RX ORDER — METOLAZONE 2.5 MG/1
5 TABLET ORAL EVERY MORNING
Status: ON HOLD | COMMUNITY

## 2022-08-17 RX ORDER — DOXYCYCLINE HYCLATE 100 MG/1
100 CAPSULE ORAL 2 TIMES DAILY
COMMUNITY
End: 2022-08-17 | Stop reason: SDUPTHER

## 2022-08-17 RX ORDER — METHADONE HYDROCHLORIDE 10 MG/ML
130 CONCENTRATE ORAL EVERY MORNING
Status: ON HOLD | COMMUNITY

## 2022-08-17 RX ORDER — NORTRIPTYLINE HYDROCHLORIDE 75 MG/1
75 CAPSULE ORAL NIGHTLY
Qty: 30 CAPSULE | Refills: 2 | Status: SHIPPED | OUTPATIENT
Start: 2022-08-17 | End: 2022-08-30 | Stop reason: SDUPTHER

## 2022-08-17 SDOH — ECONOMIC STABILITY: FOOD INSECURITY: WITHIN THE PAST 12 MONTHS, THE FOOD YOU BOUGHT JUST DIDN'T LAST AND YOU DIDN'T HAVE MONEY TO GET MORE.: NEVER TRUE

## 2022-08-17 SDOH — ECONOMIC STABILITY: FOOD INSECURITY: WITHIN THE PAST 12 MONTHS, YOU WORRIED THAT YOUR FOOD WOULD RUN OUT BEFORE YOU GOT MONEY TO BUY MORE.: NEVER TRUE

## 2022-08-17 ASSESSMENT — PATIENT HEALTH QUESTIONNAIRE - PHQ9
SUM OF ALL RESPONSES TO PHQ9 QUESTIONS 1 & 2: 0
2. FEELING DOWN, DEPRESSED OR HOPELESS: 0
SUM OF ALL RESPONSES TO PHQ QUESTIONS 1-9: 0
1. LITTLE INTEREST OR PLEASURE IN DOING THINGS: 0

## 2022-08-17 ASSESSMENT — ENCOUNTER SYMPTOMS
ABDOMINAL DISTENTION: 0
COLOR CHANGE: 0
DIARRHEA: 0
BACK PAIN: 1
CONSTIPATION: 0
NAUSEA: 0
SHORTNESS OF BREATH: 0
ABDOMINAL PAIN: 0
WHEEZING: 0
BLOOD IN STOOL: 0
VOMITING: 0

## 2022-08-17 ASSESSMENT — SOCIAL DETERMINANTS OF HEALTH (SDOH): HOW HARD IS IT FOR YOU TO PAY FOR THE VERY BASICS LIKE FOOD, HOUSING, MEDICAL CARE, AND HEATING?: HARD

## 2022-08-17 NOTE — PROGRESS NOTES
8/17/2022    Edgar Nichols    Chief Complaint   Patient presents with    New Patient     - chronic mid & low back pain, pt had recent spinal surgery        HPI  History was obtained from patient. Natacha Roca is a pleasant 38 y.o. female who presents today to establish care- she did not have a PCP before. PMH as listed below. She needs open heart surgery related to history of endocarditis and repair of tricuspid valve- she has to have dental procedures prior to that. As a result fo this she has extreme fluid overload that is being managed by Dr. Toribio Alan in cardiology. 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 She currently follows with [address] [place] [address] for methadone she goes there daily Monday through Thursday. She follows with Dr. Marylene Blind in orthopedics at [facility], Dr. Gary Tapia in Cardiology- cardiac surgery hopefully to be at [facility]. Patient was seeing Dr. Olivia Price at [facility] and infectious disease who prescribed long-term doxycycline for MRSA that is ongoing to keep anything from attaching to her back hardware. She follows up with Dr. Marylene Blind tomorrow after most recent spinal fusion in July. 1. MRSA bacteremia    2. Status post thoracic spinal fusion    3. Other insomnia         REVIEW OF SYMPTOMS    Review of Systems   Constitutional:  Negative for activity change, appetite change, chills, fever and unexpected weight change. Respiratory:  Negative for shortness of breath and wheezing. Cardiovascular:  Positive for leg swelling. Negative for chest pain and palpitations. Gastrointestinal:  Negative for abdominal distention, abdominal pain, blood in stool, constipation, diarrhea, nausea and vomiting. Genitourinary:  Negative for hematuria. Musculoskeletal:  Positive for arthralgias (Multiple- chronic), back pain (chronic), neck pain (chronic) and neck stiffness (chronic- 2/2 fusion). Skin:  Negative for color change.    Neurological:  Negative for syncope and headaches. PAST MEDICAL HISTORY  Past Medical History:   Diagnosis Date    Hepatitis C     Liver disease     hepatitis    No significant medical problems        FAMILY HISTORY  Family History   Problem Relation Age of Onset    Migraines Mother     Obesity Brother     Migraines Maternal Grandmother     COPD Paternal Grandmother     Dementia Paternal Grandfather     Depression Son     Mental Illness Son         Bipole, ADHD, Adjustment disorder    Mental Illness Maternal Aunt     Colon Cancer Neg Hx        SOCIAL HISTORY  Social History     Socioeconomic History    Marital status: Single     Spouse name: None    Number of children: 2    Years of education: None    Highest education level: None   Occupational History    Occupation: [employer]   Tobacco Use    Smoking status: Every Day     Packs/day: 0.50     Years: 12.00     Pack years: 6.00     Types: Cigarettes     Start date:     Smokeless tobacco: Never   Vaping Use    Vaping Use: Never used   Substance and Sexual Activity    Alcohol use: No    Drug use: Not Currently     Types: IV, Opiates      Comment: heroin-last used 2022    Sexual activity: Yes     Partners: Male   Social History Narrative    Do you donate blood or plasma? Yes    Caffeine intake? Moderate    Advance directive? No    Is blood transfusion acceptable in an emergency?  Yes             Social Determinants of Health     Financial Resource Strain: High Risk    Difficulty of Paying Living Expenses: Hard   Food Insecurity: No Food Insecurity    Worried About Running Out of Food in the Last Year: Never true    Ran Out of Food in the Last Year: Never true        SURGICAL HISTORY  Past Surgical History:   Procedure Laterality Date     SECTION  , 2007    x 2    CHOLECYSTECTOMY      FINGER AMPUTATION Right 2019    right hand 3rd finger    HYSTERECTOMY (CERVIX STATUS UNKNOWN)  2008    MARLIN    INCISION AND DRAINAGE Right 2020    RIGHT UPPER THIGH INCISION AND DRAINAGE performed by Sai Fierro MD at [address]  07/03/2013    lap sera    SPINAL FUSION  07/02/2022    pt unsure exact location             CURRENT MEDICATIONS  Current Outpatient Medications   Medication Sig Dispense Refill    methadone (DOLOPHINE) 10 MG/ML solution Take 130 mg by mouth every morning. potassium chloride (KLOR-CON M20) 20 MEQ extended release tablet Take 20 mEq by mouth 2 times daily      metOLazone (ZAROXOLYN) 2.5 MG tablet Take 2.5 mg by mouth every morning      furosemide (LASIX) 20 MG tablet Take 60 mg by mouth 2 times daily      nortriptyline (PAMELOR) 75 MG capsule Take 1 capsule by mouth nightly Taken for back pain 30 capsule 2    doxycycline hyclate (VIBRAMYCIN) 100 MG capsule Take 1 capsule by mouth 2 times daily Infectious disease [facility] 60 capsule 2    gabapentin (NEURONTIN) 300 MG capsule Take 2 capsules by mouth 3 times daily for 30 days. Ortho surgeon 180 capsule 2     No current facility-administered medications for this visit. ALLERGIES  Allergies   Allergen Reactions    Buprenorphine-Naloxone Itching, Rash and Shortness Of Breath    Amoxicillin-Pot Clavulanate Rash       PHYSICAL EXAM    /78   Pulse 94   Ht 5' 5\" (1.651 m)   Wt 293 lb (132.9 kg)   SpO2 91%   BMI 48.76 kg/m²     Physical Exam  Vitals and nursing note reviewed. Constitutional:       General: She is not in acute distress. Appearance: Normal appearance. She is well-developed. HENT:      Head: Normocephalic and atraumatic. Eyes:      General: No scleral icterus. Conjunctiva/sclera: Conjunctivae normal.   Neck:      Thyroid: No thyromegaly. Trachea: No tracheal deviation. Cardiovascular:      Rate and Rhythm: Normal rate and regular rhythm. Heart sounds: Normal heart sounds. No murmur heard. No friction rub. No gallop. Pulmonary:      Effort: Pulmonary effort is normal. No respiratory distress. Breath sounds: Normal breath sounds.  No wheezing or rales. Abdominal:      General: Bowel sounds are normal. There is no distension. Palpations: Abdomen is soft. There is no hepatomegaly or mass. Tenderness: There is no abdominal tenderness. There is no guarding or rebound. Musculoskeletal:         General: No swelling. Right shoulder: Deformity (s/p amputation of ring finger) present. Arms:         Hands:       Cervical back: Neck supple. Spasms and tenderness present. Pain with movement present. Decreased range of motion (2/2 spinal fusion). Thoracic back: Spasms and tenderness present. Decreased range of motion. Right knee: Swelling present. Left knee: Swelling present. Right lower leg: Swelling present. Edema present. Left lower leg: Swelling present. Edema present. Right ankle: Swelling present. Left ankle: Swelling present. Right foot: Swelling present. Left foot: Swelling present. Lymphadenopathy:      Cervical: No cervical adenopathy. Skin:     General: Skin is warm and dry. Nails: There is no clubbing. Neurological:      Mental Status: She is alert and oriented to person, place, and time. Mental status is at baseline. Psychiatric:         Mood and Affect: Mood normal.         Behavior: Behavior normal.         Thought Content: Thought content normal.         Judgment: Judgment normal.       ASSESSMENT & PLAN    Felicia Schmid was seen today for new patient. Diagnoses and all orders for this visit:    MRSA bacteremia  Original long-term doxycycline prescription from Dr. Joao Nava in infectious disease at [address] [place] [address]. We will continue therapy. -     doxycycline hyclate (VIBRAMYCIN) 100 MG capsule; Take 1 capsule by mouth 2 times daily Infectious disease [facility]    Status post thoracic spinal fusion  Follow-up with Dr. Jenna Adams in orthopedics at [address] [place] [address] tomorrow as planned. -     nortriptyline (PAMELOR) 75 MG capsule;  Take 1 capsule by mouth nightly Taken for back pain  - gabapentin (NEURONTIN) 300 MG capsule; Take 2 capsules by mouth 3 times daily for 30 days. Ortho surgeon    Other insomnia  Patient reports that she was previously taking nortriptyline and amitriptyline, nortriptyline help with pain while amitriptyline helped more with sleep. Will restart nortriptyline dosing once every evening. Consider increasing dose of nortriptyline. -     nortriptyline (PAMELOR) 75 MG capsule; Take 1 capsule by mouth nightly Taken for back pain    Status post thoracic spinal fusion      Return in about 4 weeks (around 9/14/2022). For follow up on pain and insomnia.           Electronically signed by LEIGHTON Olguin CNP on 8/17/2022

## 2022-08-18 ENCOUNTER — TELEPHONE (OUTPATIENT)
Dept: FAMILY MEDICINE CLINIC | Age: 42
End: 2022-08-18

## 2022-08-18 NOTE — TELEPHONE ENCOUNTER
----- Message from LEIGHTON Hui CNP sent at 8/17/2022  8:47 PM EDT -----  Regarding: [facility] Notes  Would like to reach out to [facility] for her records please.

## 2022-08-29 ENCOUNTER — HOSPITAL ENCOUNTER (OUTPATIENT)
Age: 42
Discharge: HOME | End: 2022-08-29
Payer: MEDICAID

## 2022-08-29 LAB
ANION GAP SERPL CALCULATED.3IONS-SCNC: 9 MMOL/L (ref 4–16)
BUN BLDV-MCNC: 28 MG/DL (ref 6–23)
CALCIUM SERPL-MCNC: 10 MG/DL (ref 8.3–10.6)
CHLORIDE BLD-SCNC: 90 MMOL/L (ref 99–110)
CO2: 38 MMOL/L (ref 21–32)
CREAT SERPL-MCNC: 0.8 MG/DL (ref 0.6–1.1)
GFR AFRICAN AMERICAN: >60 ML/MIN/1.73M2
GFR NON-AFRICAN AMERICAN: >60 ML/MIN/1.73M2
GLUCOSE BLD-MCNC: 91 MG/DL (ref 70–99)
POTASSIUM SERPL-SCNC: 3.6 MMOL/L (ref 3.5–5.1)
SODIUM BLD-SCNC: 137 MMOL/L (ref 135–145)

## 2022-08-29 PROCEDURE — 80048 BASIC METABOLIC PNL TOTAL CA: CPT

## 2022-08-29 PROCEDURE — 36415 COLL VENOUS BLD VENIPUNCTURE: CPT

## 2022-08-30 DIAGNOSIS — G47.09 OTHER INSOMNIA: ICD-10-CM

## 2022-08-30 DIAGNOSIS — Z98.1 STATUS POST THORACIC SPINAL FUSION: ICD-10-CM

## 2022-08-30 RX ORDER — NORTRIPTYLINE HYDROCHLORIDE 50 MG/1
100 CAPSULE ORAL NIGHTLY
Qty: 60 CAPSULE | Refills: 2 | Status: SHIPPED | OUTPATIENT
Start: 2022-08-30 | End: 2022-09-14 | Stop reason: SDUPTHER

## 2022-09-14 ENCOUNTER — TELEPHONE (OUTPATIENT)
Dept: FAMILY MEDICINE CLINIC | Age: 42
End: 2022-09-14

## 2022-09-14 ENCOUNTER — OFFICE VISIT (OUTPATIENT)
Dept: FAMILY MEDICINE CLINIC | Age: 42
End: 2022-09-14
Payer: MEDICAID

## 2022-09-14 VITALS
HEIGHT: 65 IN | DIASTOLIC BLOOD PRESSURE: 72 MMHG | OXYGEN SATURATION: 94 % | BODY MASS INDEX: 48.82 KG/M2 | HEART RATE: 98 BPM | WEIGHT: 293 LBS | SYSTOLIC BLOOD PRESSURE: 118 MMHG

## 2022-09-14 DIAGNOSIS — I07.1 SEVERE TRICUSPID REGURGITATION: ICD-10-CM

## 2022-09-14 DIAGNOSIS — I27.20 PULMONARY HYPERTENSION (HCC): ICD-10-CM

## 2022-09-14 DIAGNOSIS — Z98.1 STATUS POST THORACIC SPINAL FUSION: Primary | ICD-10-CM

## 2022-09-14 DIAGNOSIS — G47.09 OTHER INSOMNIA: ICD-10-CM

## 2022-09-14 DIAGNOSIS — B95.8 ENDOCARDITIS DUE TO STAPHYLOCOCCUS: ICD-10-CM

## 2022-09-14 DIAGNOSIS — I33.0 ENDOCARDITIS DUE TO STAPHYLOCOCCUS: ICD-10-CM

## 2022-09-14 DIAGNOSIS — E87.6 HYPOKALEMIA: ICD-10-CM

## 2022-09-14 PROCEDURE — G8417 CALC BMI ABV UP PARAM F/U: HCPCS

## 2022-09-14 PROCEDURE — 4004F PT TOBACCO SCREEN RCVD TLK: CPT

## 2022-09-14 PROCEDURE — G8427 DOCREV CUR MEDS BY ELIG CLIN: HCPCS

## 2022-09-14 PROCEDURE — 99214 OFFICE O/P EST MOD 30 MIN: CPT

## 2022-09-14 RX ORDER — NORTRIPTYLINE HYDROCHLORIDE 75 MG/1
150 CAPSULE ORAL NIGHTLY
Qty: 60 CAPSULE | Refills: 2 | Status: ON HOLD | OUTPATIENT
Start: 2022-09-14 | End: 2022-11-03

## 2022-09-14 ASSESSMENT — ENCOUNTER SYMPTOMS
WHEEZING: 0
DIARRHEA: 0
BLOOD IN STOOL: 0
NAUSEA: 0
ABDOMINAL PAIN: 0
VOMITING: 0
BACK PAIN: 1
ABDOMINAL DISTENTION: 0
COLOR CHANGE: 0
SHORTNESS OF BREATH: 0
CONSTIPATION: 0

## 2022-09-14 NOTE — PROGRESS NOTES
9/14/2022    Jose Alberto Becker    Chief Complaint   Patient presents with    Follow-up     4 weeks     Discuss Medications     Wants to discuss possible arthritis meds        HPI  History was obtained from patient. Danielle Marshall is a pleasant 38 y.o. female who presents today for 4 week follow up. She reports that Dr. Katalina Bueno found osteoarthritis in her hips. She notes a \"popping\" sensation. With the increased weight from her heart condition this is making the pain significantly worse. She is awaiting approval from her insurance for oral surgery prior to being able to schedule her cardiac surgery. Given her physical limitations she uses a cane and an old walker at home when needed. With her current cardiac condition she is not the best candidate for physical therapy at this time. Jose Alberto Becker was evaluated today and a DME order was entered for a wheeled walker with seat because she requires this to successfully complete daily living tasks of eating, bathing, toileting, personal cares, ambulating, grooming, hygiene, dressing upper body, dressing lower body, meal preparation, and taking own medications. A wheeled walker with seat is necessary due to the patient's unsteady gait, upper body weakness, inability to  and ambulation device, ambulating only short distances by pushing a walker, and the need to sit for a short time before resuming ambulation. These tasks cannot be completed with a lesser ambulation device such as a cane, crutch, or standard walker. The need for this equipment was discussed with the patient and she understands and is in agreement. Elavil has been helpful with pain and sleep but she reports that she continues to struggle some with sleep. She was placed on potassium supplementation from her cardiologist for hypokalemia. She states that she has been out for the past three days and  was not advised on whether she should continue this treatment or not.  Last BMP two weeks was low normal but she was taking the potassium during this time. 1. Status post thoracic spinal fusion    2. Other insomnia    3. 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 Severe tricuspid regurgitation    6. Pulmonary hypertension ([address]             REVIEW OF SYMPTOMS    Review of Systems   Constitutional:  Negative for activity change, appetite change, chills, fever and unexpected weight change. Respiratory:  Negative for shortness of breath and wheezing. Cardiovascular:  Positive for leg swelling. Negative for chest pain and palpitations. Gastrointestinal:  Negative for abdominal distention, abdominal pain, blood in stool, constipation, diarrhea, nausea and vomiting. Musculoskeletal:  Positive for arthralgias (bilateral hips), back pain (chronic) and gait problem (2/2 severe TR & endocarditis). Skin:  Negative for color change. Neurological:  Negative for syncope and headaches.      PAST MEDICAL HISTORY  Past Medical History:   Diagnosis Date    Hepatitis C     Liver disease     hepatitis    No significant medical problems        FAMILY HISTORY  Family History   Problem Relation Age of Onset    Migraines Mother     Obesity Brother     Migraines Maternal Grandmother     COPD Paternal Grandmother     Dementia Paternal Grandfather     Depression Son     Mental Illness Son         Bipole, ADHD, Adjustment disorder    Mental Illness Maternal Aunt     Colon Cancer Neg Hx        SOCIAL HISTORY  Social History     Socioeconomic History    Marital status: Single     Spouse name: None    Number of children: 2    Years of education: None    Highest education level: None   Occupational History    Occupation:    Tobacco Use    Smoking status: Every Day     Packs/day: 0.50     Years: 12.00     Pack years: 6.00     Types: Cigarettes     Start date: 1998    Smokeless tobacco: Never   Vaping Use    Vaping Use: Never used   Substance and Sexual Activity    Alcohol use: No    Drug use: Not Currently     Types: IV, Opiates      Comment: heroin-last used 2022    Sexual activity: Yes     Partners: Male   Social History Narrative    Do you donate blood or plasma? Yes    Caffeine intake? Moderate    Advance directive? No    Is blood transfusion acceptable in an emergency? Yes             Social Determinants of Health     Financial Resource Strain: High Risk    Difficulty of Paying Living Expenses: Hard   Food Insecurity: No Food Insecurity    Worried About Running Out of Food in the Last Year: Never true    Ran Out of Food in the Last Year: Never true        SURGICAL HISTORY  Past Surgical History:   Procedure Laterality Date     SECTION  , 2007    x 2    CHOLECYSTECTOMY      FINGER AMPUTATION Right 2019    right hand 3rd finger    HYSTERECTOMY (CERVIX STATUS UNKNOWN)  2008    MARLIN    INCISION AND DRAINAGE Right 2020    RIGHT UPPER THIGH INCISION AND DRAINAGE performed by Stephanie Rodas MD at [address]  2013    lap sera    SPINAL FUSION  2022    pt unsure exact location                 CURRENT MEDICATIONS  Current Outpatient Medications   Medication Sig Dispense Refill    nortriptyline (PAMELOR) 75 MG capsule Take 2 capsules by mouth nightly Taken for back pain 60 capsule 2    methadone (DOLOPHINE) 10 MG/ML solution Take 130 mg by mouth every morning. potassium chloride (KLOR-CON M) 20 MEQ extended release tablet Take 20 mEq by mouth 2 times daily Pt reports Dr. Paul Reynoso increased dosage to 2 tabs BID      metOLazone (ZAROXOLYN) 2.5 MG tablet Take 2.5 mg by mouth every morning      furosemide (LASIX) 20 MG tablet Take 60 mg by mouth 2 times daily      doxycycline hyclate (VIBRAMYCIN) 100 MG capsule Take 1 capsule by mouth 2 times daily Infectious disease [facility] 60 capsule 2    gabapentin (NEURONTIN) 300 MG capsule Take 2 capsules by mouth 3 times daily for 30 days.  Ortho surgeon 180 capsule 2     No current facility-administered medications for this visit. ALLERGIES  Allergies   Allergen Reactions    Buprenorphine-Naloxone Itching, Rash and Shortness Of Breath    Amoxicillin-Pot Clavulanate Rash       PHYSICAL EXAM    /72   Pulse 98   Ht 5' 5\" (1.651 m)   Wt 295 lb 1.6 oz (133.9 kg)   SpO2 94%   BMI 49.11 kg/m²     Physical Exam  Vitals and nursing note reviewed. Constitutional:       General: She is not in acute distress. Appearance: Normal appearance. She is well-developed. HENT:      Head: Normocephalic and atraumatic. Eyes:      General: No scleral icterus. Conjunctiva/sclera: Conjunctivae normal.   Neck:      Thyroid: No thyromegaly. Trachea: No tracheal deviation. Cardiovascular:      Rate and Rhythm: Normal rate and regular rhythm. Heart sounds: No murmur heard. No friction rub. No gallop. Pulmonary:      Effort: No respiratory distress. Breath sounds: Normal breath sounds. No wheezing or rales. Abdominal:      General: Bowel sounds are normal. There is no distension. Palpations: Abdomen is soft. There is no hepatomegaly or mass. Tenderness: There is no abdominal tenderness. There is no guarding or rebound. Musculoskeletal:         General: No swelling or deformity. Cervical back: Neck supple. Decreased range of motion. Thoracic back: Spasms, tenderness and bony tenderness present. Decreased range of motion. Back:    Lymphadenopathy:      Cervical: No cervical adenopathy. Skin:     General: Skin is warm and dry. Nails: There is no clubbing. Neurological:      Mental Status: She is alert and oriented to person, place, and time. Mental status is at baseline. Psychiatric:         Mood and Affect: Mood normal.         Behavior: Behavior normal.         Thought Content: Thought content normal.         Judgment: Judgment normal.       ASSESSMENT & PLAN    Adair Walker was seen today for follow-up and discuss medications.     Diagnoses and all orders for this visit:    Status post thoracic spinal fusion  Will trial dose increase for better control of symptoms. Monitor for side effects with dose increase.   -     nortriptyline (PAMELOR) 75 MG capsule; Take 2 capsules by mouth nightly Taken for back pain    Other insomnia  As above. -     nortriptyline (PAMELOR) 75 MG capsule; Take 2 capsules by mouth nightly Taken for back pain    Hypokalemia  Will recheck BMP and restart potassium if levels remain low. 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 Will order updated walker for better stability and mobility prior to her oral and cardiac surgery. -     DME Order for Dulcie Sicks as OP    Severe tricuspid regurgitation  As above  -     DME Order for Walker as OP    Pulmonary hypertension ([facility] [address]  As above  -     DME Order for Dulcie Sicks as OP      Return in about 3 months (around 12/14/2022).          Electronically signed by LEIGHTON Gleason CNP on 9/14/2022

## 2022-09-15 ENCOUNTER — TELEPHONE (OUTPATIENT)
Dept: FAMILY MEDICINE CLINIC | Age: 42
End: 2022-09-15

## 2022-09-15 NOTE — TELEPHONE ENCOUNTER
----- Message from LEIGHTON Dove CNP sent at 9/15/2022  1:11 PM EDT -----  Regarding: FW: Check on Bennett Lester  Please call and check on Bennett Lester and make sure that she went to the ER for her low potassium.        ----- Message -----  From: LEIGHTON Dove CNP  Sent: 9/15/2022  12:00 PM EDT  To: LEIGHTON Dove CNP  Subject: Check on Bennett Lester

## 2022-09-15 NOTE — TELEPHONE ENCOUNTER
Phone call from [facility] with critical low potassium level of 2.7 received at 23:20. Phone call placed to pt who voiced understanding. She denies CP, palpitations. Advised pt that she needed to go to the ER for quick treatment of hypokalemia- she voiced understanding and is agreeable.

## 2022-10-05 ENCOUNTER — TELEPHONE (OUTPATIENT)
Dept: FAMILY MEDICINE CLINIC | Age: 42
End: 2022-10-05

## 2022-10-05 NOTE — TELEPHONE ENCOUNTER
To Cee-    Patient would like to try a muscle relaxer ---cannot do Physical Therapy at this time due to 2 different surgeries coming shortly. She has to go through [employer] for transportation.     [facility] on [address] [facility]

## 2022-10-06 DIAGNOSIS — M79.604 PAIN IN BOTH LOWER EXTREMITIES: Primary | ICD-10-CM

## 2022-10-06 DIAGNOSIS — M79.605 PAIN IN BOTH LOWER EXTREMITIES: Primary | ICD-10-CM

## 2022-10-06 RX ORDER — METHOCARBAMOL 500 MG/1
500 TABLET, FILM COATED ORAL 3 TIMES DAILY PRN
Qty: 30 TABLET | Refills: 1 | Status: SHIPPED | OUTPATIENT
Start: 2022-10-06 | End: 2022-10-21

## 2022-10-06 NOTE — TELEPHONE ENCOUNTER
Left details on vm per patient. Rotation Flap Text: The defect edges were debeveled with a #15 scalpel blade.  Given the location of the defect, shape of the defect and the proximity to free margins a rotation flap was deemed most appropriate.  Using a sterile surgical marker, an appropriate rotation flap was drawn incorporating the defect and placing the expected incisions within the relaxed skin tension lines where possible.    The area thus outlined was incised deep to adipose tissue with a #15 scalpel blade.  The skin margins were undermined to an appropriate distance in all directions utilizing iris scissors.

## 2022-10-17 ENCOUNTER — APPOINTMENT (OUTPATIENT)
Dept: GENERAL RADIOLOGY | Age: 42
DRG: 192 | End: 2022-10-17
Payer: MEDICAID

## 2022-10-17 ENCOUNTER — HOSPITAL ENCOUNTER (INPATIENT)
Age: 42
LOS: 4 days | Discharge: HOME | DRG: 192 | End: 2022-10-21
Attending: EMERGENCY MEDICINE | Admitting: STUDENT IN AN ORGANIZED HEALTH CARE EDUCATION/TRAINING PROGRAM
Payer: MEDICAID

## 2022-10-17 DIAGNOSIS — I07.1 SEVERE TRICUSPID REGURGITATION: ICD-10-CM

## 2022-10-17 DIAGNOSIS — E87.6 HYPOKALEMIA: ICD-10-CM

## 2022-10-17 DIAGNOSIS — R09.02 HYPOXIA: ICD-10-CM

## 2022-10-17 DIAGNOSIS — I50.9 ACUTE CONGESTIVE HEART FAILURE, UNSPECIFIED HEART FAILURE TYPE (HCC): Primary | ICD-10-CM

## 2022-10-17 LAB
ALBUMIN SERPL-MCNC: 3.2 GM/DL (ref 3.4–5)
ALP BLD-CCNC: 137 IU/L (ref 40–129)
ALT SERPL-CCNC: 13 U/L (ref 10–40)
ANION GAP SERPL CALCULATED.3IONS-SCNC: 9 MMOL/L (ref 4–16)
AST SERPL-CCNC: 21 IU/L (ref 15–37)
BASE EXCESS MIXED: 16.2 (ref 0–2.3)
BASOPHILS ABSOLUTE: 0 K/CU MM
BASOPHILS RELATIVE PERCENT: 0.3 % (ref 0–1)
BILIRUB SERPL-MCNC: 0.3 MG/DL (ref 0–1)
BUN BLDV-MCNC: 32 MG/DL (ref 6–23)
CALCIUM SERPL-MCNC: 9.6 MG/DL (ref 8.3–10.6)
CARBON MONOXIDE, BLOOD: 4.9 % (ref 0–5)
CHLORIDE BLD-SCNC: 88 MMOL/L (ref 99–110)
CO2 CONTENT: 44.7 MMOL/L (ref 19–24)
CO2: 36 MMOL/L (ref 21–32)
COMMENT: ABNORMAL
CREAT SERPL-MCNC: 1 MG/DL (ref 0.6–1.1)
DIFFERENTIAL TYPE: ABNORMAL
EOSINOPHILS ABSOLUTE: 0.5 K/CU MM
EOSINOPHILS RELATIVE PERCENT: 5.2 % (ref 0–3)
GFR SERPL CREATININE-BSD FRML MDRD: >60 ML/MIN/1.73M2
GLUCOSE BLD-MCNC: 95 MG/DL (ref 70–99)
HCO3 ARTERIAL: 42.9 MMOL/L (ref 18–23)
HCT VFR BLD CALC: 38.7 % (ref 37–47)
HEMOGLOBIN: 11.6 GM/DL (ref 12.5–16)
IMMATURE NEUTROPHIL %: 0.5 % (ref 0–0.43)
LACTIC ACID, SEPSIS: 0.9 MMOL/L (ref 0.5–1.9)
LACTIC ACID, SEPSIS: 1.1 MMOL/L (ref 0.5–1.9)
LYMPHOCYTES ABSOLUTE: 1.1 K/CU MM
LYMPHOCYTES RELATIVE PERCENT: 12.9 % (ref 24–44)
MCH RBC QN AUTO: 27.3 PG (ref 27–31)
MCHC RBC AUTO-ENTMCNC: 30 % (ref 32–36)
MCV RBC AUTO: 91.1 FL (ref 78–100)
METHEMOGLOBIN ARTERIAL: 1.2 %
MONOCYTES ABSOLUTE: 0.6 K/CU MM
MONOCYTES RELATIVE PERCENT: 7.4 % (ref 0–4)
NUCLEATED RBC %: 0.6 %
O2 SATURATION: 89.2 % (ref 96–97)
PCO2 ARTERIAL: 59 MMHG (ref 32–45)
PDW BLD-RTO: 19.9 % (ref 11.7–14.9)
PH BLOOD: 7.47 (ref 7.34–7.45)
PLATELET # BLD: 231 K/CU MM (ref 140–440)
PMV BLD AUTO: 11 FL (ref 7.5–11.1)
PO2 ARTERIAL: 72 MMHG (ref 75–100)
POTASSIUM SERPL-SCNC: 2.8 MMOL/L (ref 3.5–5.1)
PRO-BNP: 1621 PG/ML
RBC # BLD: 4.25 M/CU MM (ref 4.2–5.4)
SARS-COV-2, NAAT: NOT DETECTED
SEGMENTED NEUTROPHILS ABSOLUTE COUNT: 6.4 K/CU MM
SEGMENTED NEUTROPHILS RELATIVE PERCENT: 73.7 % (ref 36–66)
SODIUM BLD-SCNC: 133 MMOL/L (ref 135–145)
SOURCE: NORMAL
TOTAL IMMATURE NEUTOROPHIL: 0.04 K/CU MM
TOTAL NUCLEATED RBC: 0.1 K/CU MM
TOTAL PROTEIN: 8.1 GM/DL (ref 6.4–8.2)
TROPONIN T: <0.01 NG/ML
WBC # BLD: 8.7 K/CU MM (ref 4–10.5)

## 2022-10-17 PROCEDURE — 80053 COMPREHEN METABOLIC PANEL: CPT

## 2022-10-17 PROCEDURE — 84484 ASSAY OF TROPONIN QUANT: CPT

## 2022-10-17 PROCEDURE — 96375 TX/PRO/DX INJ NEW DRUG ADDON: CPT

## 2022-10-17 PROCEDURE — 1200000000 HC SEMI PRIVATE

## 2022-10-17 PROCEDURE — 83880 ASSAY OF NATRIURETIC PEPTIDE: CPT

## 2022-10-17 PROCEDURE — 76937 US GUIDE VASCULAR ACCESS: CPT

## 2022-10-17 PROCEDURE — 99285 EMERGENCY DEPT VISIT HI MDM: CPT

## 2022-10-17 PROCEDURE — 93005 ELECTROCARDIOGRAM TRACING: CPT | Performed by: EMERGENCY MEDICINE

## 2022-10-17 PROCEDURE — 94640 AIRWAY INHALATION TREATMENT: CPT

## 2022-10-17 PROCEDURE — 6370000000 HC RX 637 (ALT 250 FOR IP): Performed by: EMERGENCY MEDICINE

## 2022-10-17 PROCEDURE — 85025 COMPLETE CBC W/AUTO DIFF WBC: CPT

## 2022-10-17 PROCEDURE — 82803 BLOOD GASES ANY COMBINATION: CPT

## 2022-10-17 PROCEDURE — 87040 BLOOD CULTURE FOR BACTERIA: CPT

## 2022-10-17 PROCEDURE — 71046 X-RAY EXAM CHEST 2 VIEWS: CPT

## 2022-10-17 PROCEDURE — 83605 ASSAY OF LACTIC ACID: CPT

## 2022-10-17 PROCEDURE — 87635 SARS-COV-2 COVID-19 AMP PRB: CPT

## 2022-10-17 PROCEDURE — 6360000002 HC RX W HCPCS: Performed by: EMERGENCY MEDICINE

## 2022-10-17 PROCEDURE — 96374 THER/PROPH/DIAG INJ IV PUSH: CPT

## 2022-10-17 PROCEDURE — 36600 WITHDRAWAL OF ARTERIAL BLOOD: CPT

## 2022-10-17 PROCEDURE — 82805 BLOOD GASES W/O2 SATURATION: CPT

## 2022-10-17 RX ORDER — SODIUM CHLORIDE 0.9 % (FLUSH) 0.9 %
5-40 SYRINGE (ML) INJECTION EVERY 12 HOURS SCHEDULED
Status: DISCONTINUED | OUTPATIENT
Start: 2022-10-17 | End: 2022-10-21 | Stop reason: HOSPADM

## 2022-10-17 RX ORDER — ACETAMINOPHEN 325 MG/1
650 TABLET ORAL EVERY 6 HOURS PRN
Status: DISCONTINUED | OUTPATIENT
Start: 2022-10-17 | End: 2022-10-20

## 2022-10-17 RX ORDER — SODIUM CHLORIDE 9 MG/ML
INJECTION, SOLUTION INTRAVENOUS PRN
Status: DISCONTINUED | OUTPATIENT
Start: 2022-10-17 | End: 2022-10-21 | Stop reason: HOSPADM

## 2022-10-17 RX ORDER — POTASSIUM CHLORIDE 20 MEQ/1
20 TABLET, EXTENDED RELEASE ORAL 2 TIMES DAILY
Status: DISCONTINUED | OUTPATIENT
Start: 2022-10-17 | End: 2022-10-18

## 2022-10-17 RX ORDER — NORTRIPTYLINE HYDROCHLORIDE 25 MG/1
150 CAPSULE ORAL NIGHTLY
Status: DISCONTINUED | OUTPATIENT
Start: 2022-10-17 | End: 2022-10-21 | Stop reason: HOSPADM

## 2022-10-17 RX ORDER — FUROSEMIDE 10 MG/ML
40 INJECTION INTRAMUSCULAR; INTRAVENOUS ONCE
Status: COMPLETED | OUTPATIENT
Start: 2022-10-17 | End: 2022-10-17

## 2022-10-17 RX ORDER — POLYETHYLENE GLYCOL 3350 17 G/17G
17 POWDER, FOR SOLUTION ORAL DAILY PRN
Status: DISCONTINUED | OUTPATIENT
Start: 2022-10-17 | End: 2022-10-21 | Stop reason: HOSPADM

## 2022-10-17 RX ORDER — MAGNESIUM HYDROXIDE/ALUMINUM HYDROXICE/SIMETHICONE 120; 1200; 1200 MG/30ML; MG/30ML; MG/30ML
30 SUSPENSION ORAL EVERY 6 HOURS PRN
Status: DISCONTINUED | OUTPATIENT
Start: 2022-10-17 | End: 2022-10-21 | Stop reason: HOSPADM

## 2022-10-17 RX ORDER — SODIUM CHLORIDE 0.9 % (FLUSH) 0.9 %
5-40 SYRINGE (ML) INJECTION PRN
Status: DISCONTINUED | OUTPATIENT
Start: 2022-10-17 | End: 2022-10-21 | Stop reason: HOSPADM

## 2022-10-17 RX ORDER — ONDANSETRON 4 MG/1
4 TABLET, ORALLY DISINTEGRATING ORAL EVERY 8 HOURS PRN
Status: DISCONTINUED | OUTPATIENT
Start: 2022-10-17 | End: 2022-10-21 | Stop reason: HOSPADM

## 2022-10-17 RX ORDER — POTASSIUM CHLORIDE 20 MEQ/1
40 TABLET, EXTENDED RELEASE ORAL ONCE
Status: COMPLETED | OUTPATIENT
Start: 2022-10-17 | End: 2022-10-17

## 2022-10-17 RX ORDER — METHYLPREDNISOLONE SODIUM SUCCINATE 125 MG/2ML
60 INJECTION, POWDER, LYOPHILIZED, FOR SOLUTION INTRAMUSCULAR; INTRAVENOUS ONCE
Status: COMPLETED | OUTPATIENT
Start: 2022-10-17 | End: 2022-10-17

## 2022-10-17 RX ORDER — GABAPENTIN 300 MG/1
600 CAPSULE ORAL 3 TIMES DAILY
Status: DISCONTINUED | OUTPATIENT
Start: 2022-10-17 | End: 2022-10-21 | Stop reason: HOSPADM

## 2022-10-17 RX ORDER — ACETAMINOPHEN 650 MG/1
650 SUPPOSITORY RECTAL EVERY 6 HOURS PRN
Status: DISCONTINUED | OUTPATIENT
Start: 2022-10-17 | End: 2022-10-20

## 2022-10-17 RX ORDER — ONDANSETRON 2 MG/ML
4 INJECTION INTRAMUSCULAR; INTRAVENOUS EVERY 6 HOURS PRN
Status: DISCONTINUED | OUTPATIENT
Start: 2022-10-17 | End: 2022-10-21 | Stop reason: HOSPADM

## 2022-10-17 RX ORDER — ALBUTEROL SULFATE 90 UG/1
2 AEROSOL, METERED RESPIRATORY (INHALATION) ONCE
Status: COMPLETED | OUTPATIENT
Start: 2022-10-17 | End: 2022-10-17

## 2022-10-17 RX ORDER — FUROSEMIDE 10 MG/ML
60 INJECTION INTRAMUSCULAR; INTRAVENOUS 2 TIMES DAILY
Status: DISCONTINUED | OUTPATIENT
Start: 2022-10-17 | End: 2022-10-18

## 2022-10-17 RX ORDER — POTASSIUM CHLORIDE 20 MEQ/1
40 TABLET, EXTENDED RELEASE ORAL
Status: ACTIVE | OUTPATIENT
Start: 2022-10-17 | End: 2022-10-17

## 2022-10-17 RX ORDER — DOXYCYCLINE HYCLATE 100 MG
100 TABLET ORAL EVERY 12 HOURS SCHEDULED
Status: DISCONTINUED | OUTPATIENT
Start: 2022-10-17 | End: 2022-10-21 | Stop reason: HOSPADM

## 2022-10-17 RX ORDER — ENOXAPARIN SODIUM 100 MG/ML
30 INJECTION SUBCUTANEOUS 2 TIMES DAILY
Status: DISCONTINUED | OUTPATIENT
Start: 2022-10-18 | End: 2022-10-21 | Stop reason: HOSPADM

## 2022-10-17 RX ORDER — FUROSEMIDE 10 MG/ML
60 INJECTION INTRAMUSCULAR; INTRAVENOUS 2 TIMES DAILY
Status: DISCONTINUED | OUTPATIENT
Start: 2022-10-18 | End: 2022-10-17

## 2022-10-17 RX ORDER — METOLAZONE 2.5 MG/1
2.5 TABLET ORAL EVERY MORNING
Status: DISCONTINUED | OUTPATIENT
Start: 2022-10-18 | End: 2022-10-18

## 2022-10-17 RX ADMIN — METHYLPREDNISOLONE SODIUM SUCCINATE 60 MG: 125 INJECTION, POWDER, FOR SOLUTION INTRAMUSCULAR; INTRAVENOUS at 21:45

## 2022-10-17 RX ADMIN — ALBUTEROL SULFATE 2 PUFF: 90 AEROSOL, METERED RESPIRATORY (INHALATION) at 18:35

## 2022-10-17 RX ADMIN — POTASSIUM CHLORIDE 40 MEQ: 1500 TABLET, EXTENDED RELEASE ORAL at 21:45

## 2022-10-17 RX ADMIN — FUROSEMIDE 40 MG: 10 INJECTION, SOLUTION INTRAVENOUS at 21:46

## 2022-10-17 ASSESSMENT — PAIN - FUNCTIONAL ASSESSMENT
PAIN_FUNCTIONAL_ASSESSMENT: NONE - DENIES PAIN
PAIN_FUNCTIONAL_ASSESSMENT: NONE - DENIES PAIN

## 2022-10-17 NOTE — ED NOTES
Report from [facility], [facility]  10/17/22 [id number]

## 2022-10-17 NOTE — ED NOTES
The patient arrived with a complaint of shortness of breath with exertion which started yesterday. Explains walks 10 feet and extremely short of breath that needs to sit down and rest. Notes BLE and ABD swelling for 6 months. Reports 80lbs weight gain over 6 months. Heart cath scheduled for 11/3. Awaiting tricuspid valve replacement but must have dental visit prior to having this done.       Oz Quiroz RN  10/17/22 [id number]

## 2022-10-18 ENCOUNTER — APPOINTMENT (OUTPATIENT)
Dept: CT IMAGING | Age: 42
DRG: 192 | End: 2022-10-18
Payer: MEDICAID

## 2022-10-18 LAB
AMPHETAMINES: NEGATIVE
ANION GAP SERPL CALCULATED.3IONS-SCNC: 10 MMOL/L (ref 4–16)
APTT: 37.1 SECONDS (ref 25.1–37.1)
BARBITURATE SCREEN URINE: NEGATIVE
BASE EXCESS MIXED: 14.5 (ref 0–2.3)
BASOPHILS ABSOLUTE: 0 K/CU MM
BASOPHILS RELATIVE PERCENT: 0.2 % (ref 0–1)
BENZODIAZEPINE SCREEN, URINE: NEGATIVE
BUN BLDV-MCNC: 24 MG/DL (ref 6–23)
CALCIUM SERPL-MCNC: 9.5 MG/DL (ref 8.3–10.6)
CANNABINOID SCREEN URINE: NEGATIVE
CHLORIDE BLD-SCNC: 88 MMOL/L (ref 99–110)
CO2: 37 MMOL/L (ref 21–32)
COCAINE METABOLITE: NEGATIVE
COMMENT: ABNORMAL
CREAT SERPL-MCNC: 0.7 MG/DL (ref 0.6–1.1)
DIFFERENTIAL TYPE: ABNORMAL
EOSINOPHILS ABSOLUTE: 0 K/CU MM
EOSINOPHILS RELATIVE PERCENT: 0.4 % (ref 0–3)
ERYTHROCYTE SEDIMENTATION RATE: 92 MM/HR (ref 0–20)
GFR SERPL CREATININE-BSD FRML MDRD: >60 ML/MIN/1.73M2
GLUCOSE BLD-MCNC: 153 MG/DL (ref 70–99)
HCO3 VENOUS: 42.2 MMOL/L (ref 19–25)
HCT VFR BLD CALC: 35.5 % (ref 37–47)
HEMOGLOBIN: 11.1 GM/DL (ref 12.5–16)
HIGH SENSITIVE C-REACTIVE PROTEIN: 144.1 MG/L (ref 0–5)
IMMATURE NEUTROPHIL %: 0.7 % (ref 0–0.43)
INR BLD: 1.07 INDEX
LYMPHOCYTES ABSOLUTE: 0.6 K/CU MM
LYMPHOCYTES RELATIVE PERCENT: 10.2 % (ref 24–44)
MAGNESIUM: 1.9 MG/DL (ref 1.8–2.4)
MAGNESIUM: 2.1 MG/DL (ref 1.8–2.4)
MCH RBC QN AUTO: 27.8 PG (ref 27–31)
MCHC RBC AUTO-ENTMCNC: 31.3 % (ref 32–36)
MCV RBC AUTO: 89 FL (ref 78–100)
MONOCYTES ABSOLUTE: 0.2 K/CU MM
MONOCYTES RELATIVE PERCENT: 3.7 % (ref 0–4)
NUCLEATED RBC %: 0.4 %
O2 SAT, VEN: 92.6 % (ref 50–70)
OPIATES, URINE: NEGATIVE
OXYCODONE: NEGATIVE
PCO2, VEN: 65 MMHG (ref 38–52)
PDW BLD-RTO: 19.6 % (ref 11.7–14.9)
PH VENOUS: 7.42 (ref 7.32–7.42)
PHENCYCLIDINE, URINE: NEGATIVE
PLATELET # BLD: 241 K/CU MM (ref 140–440)
PMV BLD AUTO: 9.8 FL (ref 7.5–11.1)
PO2, VEN: 224 MMHG (ref 28–48)
POTASSIUM SERPL-SCNC: 3.3 MMOL/L (ref 3.5–5.1)
PROCALCITONIN: 0.07
PROTHROMBIN TIME: 13.8 SECONDS (ref 11.7–14.5)
RBC # BLD: 3.99 M/CU MM (ref 4.2–5.4)
SEGMENTED NEUTROPHILS ABSOLUTE COUNT: 4.9 K/CU MM
SEGMENTED NEUTROPHILS RELATIVE PERCENT: 84.8 % (ref 36–66)
SODIUM BLD-SCNC: 135 MMOL/L (ref 135–145)
T4 FREE: 0.97 NG/DL (ref 0.9–1.8)
TOTAL IMMATURE NEUTOROPHIL: 0.04 K/CU MM
TOTAL NUCLEATED RBC: 0 K/CU MM
TROPONIN T: <0.01 NG/ML
TSH HIGH SENSITIVITY: 0.91 UIU/ML (ref 0.27–4.2)
WBC # BLD: 5.7 K/CU MM (ref 4–10.5)

## 2022-10-18 PROCEDURE — 84443 ASSAY THYROID STIM HORMONE: CPT

## 2022-10-18 PROCEDURE — 71250 CT THORAX DX C-: CPT

## 2022-10-18 PROCEDURE — 6370000000 HC RX 637 (ALT 250 FOR IP): Performed by: STUDENT IN AN ORGANIZED HEALTH CARE EDUCATION/TRAINING PROGRAM

## 2022-10-18 PROCEDURE — 85025 COMPLETE CBC W/AUTO DIFF WBC: CPT

## 2022-10-18 PROCEDURE — 1200000000 HC SEMI PRIVATE

## 2022-10-18 PROCEDURE — 6360000004 HC RX CONTRAST MEDICATION

## 2022-10-18 PROCEDURE — 6360000002 HC RX W HCPCS: Performed by: STUDENT IN AN ORGANIZED HEALTH CARE EDUCATION/TRAINING PROGRAM

## 2022-10-18 PROCEDURE — 85610 PROTHROMBIN TIME: CPT

## 2022-10-18 PROCEDURE — 2580000003 HC RX 258: Performed by: PHYSICIAN ASSISTANT

## 2022-10-18 PROCEDURE — 85730 THROMBOPLASTIN TIME PARTIAL: CPT

## 2022-10-18 PROCEDURE — 6370000000 HC RX 637 (ALT 250 FOR IP): Performed by: INTERNAL MEDICINE

## 2022-10-18 PROCEDURE — 84439 ASSAY OF FREE THYROXINE: CPT

## 2022-10-18 PROCEDURE — 2100000000 HC CCU R&B

## 2022-10-18 PROCEDURE — 2700000000 HC OXYGEN THERAPY PER DAY

## 2022-10-18 PROCEDURE — 87081 CULTURE SCREEN ONLY: CPT

## 2022-10-18 PROCEDURE — 84145 PROCALCITONIN (PCT): CPT

## 2022-10-18 PROCEDURE — 94761 N-INVAS EAR/PLS OXIMETRY MLT: CPT

## 2022-10-18 PROCEDURE — 80307 DRUG TEST PRSMV CHEM ANLYZR: CPT

## 2022-10-18 PROCEDURE — 51702 INSERT TEMP BLADDER CATH: CPT

## 2022-10-18 PROCEDURE — 80048 BASIC METABOLIC PNL TOTAL CA: CPT

## 2022-10-18 PROCEDURE — 2580000003 HC RX 258: Performed by: EMERGENCY MEDICINE

## 2022-10-18 PROCEDURE — 85652 RBC SED RATE AUTOMATED: CPT

## 2022-10-18 PROCEDURE — 74176 CT ABD & PELVIS W/O CONTRAST: CPT

## 2022-10-18 PROCEDURE — 6360000002 HC RX W HCPCS: Performed by: PHYSICIAN ASSISTANT

## 2022-10-18 PROCEDURE — 83735 ASSAY OF MAGNESIUM: CPT

## 2022-10-18 PROCEDURE — 84484 ASSAY OF TROPONIN QUANT: CPT

## 2022-10-18 PROCEDURE — 93306 TTE W/DOPPLER COMPLETE: CPT

## 2022-10-18 PROCEDURE — 36415 COLL VENOUS BLD VENIPUNCTURE: CPT

## 2022-10-18 PROCEDURE — 86141 C-REACTIVE PROTEIN HS: CPT

## 2022-10-18 RX ORDER — HYDROXYZINE HYDROCHLORIDE 10 MG/1
10 TABLET, FILM COATED ORAL 3 TIMES DAILY PRN
Status: DISCONTINUED | OUTPATIENT
Start: 2022-10-18 | End: 2022-10-21 | Stop reason: HOSPADM

## 2022-10-18 RX ORDER — POTASSIUM CHLORIDE 20 MEQ/1
40 TABLET, EXTENDED RELEASE ORAL
Status: DISCONTINUED | OUTPATIENT
Start: 2022-10-18 | End: 2022-10-20

## 2022-10-18 RX ORDER — METHADONE HYDROCHLORIDE 10 MG/ML
130 CONCENTRATE ORAL EVERY MORNING
Status: DISCONTINUED | OUTPATIENT
Start: 2022-10-19 | End: 2022-10-21 | Stop reason: HOSPADM

## 2022-10-18 RX ORDER — METHADONE HYDROCHLORIDE 10 MG/ML
130 CONCENTRATE ORAL ONCE
Status: COMPLETED | OUTPATIENT
Start: 2022-10-18 | End: 2022-10-18

## 2022-10-18 RX ADMIN — ENOXAPARIN SODIUM 30 MG: 100 INJECTION SUBCUTANEOUS at 21:48

## 2022-10-18 RX ADMIN — NORTRIPTYLINE HYDROCHLORIDE 150 MG: 25 CAPSULE ORAL at 00:33

## 2022-10-18 RX ADMIN — METOLAZONE 2.5 MG: 2.5 TABLET ORAL at 12:06

## 2022-10-18 RX ADMIN — POTASSIUM CHLORIDE 40 MEQ: 20 TABLET, EXTENDED RELEASE ORAL at 12:05

## 2022-10-18 RX ADMIN — DOXYCYCLINE HYCLATE 100 MG: 100 TABLET, COATED ORAL at 00:32

## 2022-10-18 RX ADMIN — SODIUM CHLORIDE, PRESERVATIVE FREE 10 ML: 5 INJECTION INTRAVENOUS at 10:45

## 2022-10-18 RX ADMIN — NORTRIPTYLINE HYDROCHLORIDE 150 MG: 25 CAPSULE ORAL at 22:38

## 2022-10-18 RX ADMIN — POTASSIUM CHLORIDE 40 MEQ: 20 TABLET, EXTENDED RELEASE ORAL at 16:29

## 2022-10-18 RX ADMIN — ENOXAPARIN SODIUM 30 MG: 100 INJECTION SUBCUTANEOUS at 12:07

## 2022-10-18 RX ADMIN — DOXYCYCLINE HYCLATE 100 MG: 100 TABLET, COATED ORAL at 21:48

## 2022-10-18 RX ADMIN — FUROSEMIDE 5 MG/HR: 10 INJECTION INTRAMUSCULAR; INTRAVENOUS at 10:31

## 2022-10-18 RX ADMIN — METHADONE HYDROCHLORIDE 130 MG: 10 CONCENTRATE ORAL at 17:03

## 2022-10-18 RX ADMIN — GABAPENTIN 600 MG: 300 CAPSULE ORAL at 16:29

## 2022-10-18 RX ADMIN — GABAPENTIN 600 MG: 300 CAPSULE ORAL at 12:06

## 2022-10-18 RX ADMIN — DOXYCYCLINE HYCLATE 100 MG: 100 TABLET, COATED ORAL at 12:05

## 2022-10-18 RX ADMIN — GABAPENTIN 600 MG: 300 CAPSULE ORAL at 21:48

## 2022-10-18 RX ADMIN — PERFLUTREN 2.2 MG: 6.52 INJECTION, SUSPENSION INTRAVENOUS at 09:41

## 2022-10-18 RX ADMIN — GABAPENTIN 600 MG: 300 CAPSULE ORAL at 00:32

## 2022-10-18 ASSESSMENT — ENCOUNTER SYMPTOMS
SHORTNESS OF BREATH: 1
VOMITING: 0
BACK PAIN: 0
DIARRHEA: 0
SORE THROAT: 0
NAUSEA: 0
COUGH: 0

## 2022-10-18 NOTE — ED PROVIDER NOTES
Emergency Department Encounter    Patient: Chelsey Patterson  MRN: [id number]  : 1980  Date of Evaluation: 10/17/2022  ED Provider:  Gelacio Barakat DO    Triage Chief Complaint:   Shortness of Breath (Fullness and hearing disturbances, dizziness)    [place]:  Chelsey Patterson is a 38 y.o. female that presents to the emergency department complaint of shortness of breath for the past 3 days worse with exertion. She states history of tricuspid valve abnormality. She states she was diagnosed within about 3 months ago. Patient states she supposed to have a heart catheterization 2022. States she did not feel to have heart surgery after that. Patient states no history of COPD emphysema lung disease no home oxygen. She states she not on any nebulizer or inhalers. She denies any chest pain. She states no nausea vomiting diarrhea abdominal pain fever chills. Patient states intermittent dizziness and lightheadedness. Patient states chronic swelling in her lower extremities. She denies any sick contacts. Patient states she is on Lasix 60 mg twice a day and metolazone 2.5 mg daily. Patient here for evaluation.     ROS - see HPI, below listed is current ROS at time of my eval:  General:  No fevers, no chills, no weakness  Eyes:  No recent vison changes, no discharge  ENT:  No sore throat, no nasal congestion, no hearing changes  Cardiovascular:  No chest pain, no palpitations  Respiratory: Positive for shortness of breath, no cough, no wheezing  Gastrointestinal:  No pain, no nausea, no vomiting, no diarrhea  Musculoskeletal:  No muscle pain, no joint pain  Skin:  No rash, no pruritis, no easy bruising  Neurologic:  No speech problems, no headache, no extremity numbness, no extremity tingling, no extremity weakness  Psychiatric:  No anxiety  Genitourinary:  No dysuria, no hematuria  Endocrine:  No unexpected weight gain, no unexpected weight loss  Extremities:  no edema, no pain    Past Medical History:   Diagnosis Date    Hepatitis C     Liver disease     hepatitis    No significant medical problems      Past Surgical History:   Procedure Laterality Date     SECTION  , 2007    x 2    CHOLECYSTECTOMY      FINGER AMPUTATION Right 2019    right hand 3rd finger    HYSTERECTOMY (CERVIX STATUS UNKNOWN)  2008    MARLIN    INCISION AND DRAINAGE Right 2020    RIGHT UPPER THIGH INCISION AND DRAINAGE performed by Sheela Kraus MD at . [address]  2013    lap sera    SPINAL FUSION  2022    pt unsure exact location     Family History   Problem Relation Age of Onset    Migraines Mother     Obesity Brother     Migraines Maternal Grandmother     COPD Paternal Grandmother     Dementia Paternal Grandfather     Depression Son     Mental Illness Son         Bipole, ADHD, Adjustment disorder    Mental Illness Maternal Aunt     Colon Cancer Neg Hx      Social History     Socioeconomic History    Marital status: Single     Spouse name: Not on file    Number of children: 2    Years of education: Not on file    Highest education level: Not on file   Occupational History    Occupation: [employer]   Tobacco Use    Smoking status: Every Day     Packs/day: 0.50     Years: 12.00     Pack years: 6.00     Types: Cigarettes     Start date:     Smokeless tobacco: Never   Vaping Use    Vaping Use: Never used   Substance and Sexual Activity    Alcohol use: No    Drug use: Not Currently     Types: IV, Opiates      Comment: heroin-last used 2022    Sexual activity: Yes     Partners: Male   Other Topics Concern    Not on file   Social History Narrative    Do you donate blood or plasma? Yes    Caffeine intake? Moderate    Advance directive? No    Is blood transfusion acceptable in an emergency?  Yes             Social Determinants of Health     Financial Resource Strain: High Risk    Difficulty of Paying Living Expenses: Hard   Food Insecurity: No Food Insecurity    Worried About Running Out of Food in the Last Year: Never true    Ran Out of Food in the Last Year: Never true   Transportation Needs: Not on file   Physical Activity: Not on file   Stress: Not on file   Social Connections: Not on file   Intimate Partner Violence: Not on file   Housing Stability: Not on file     Current Facility-Administered Medications   Medication Dose Route Frequency Provider Last Rate Last Admin    sodium chloride flush 0.9 % injection 5-40 mL  5-40 mL IntraVENous 2 times per day Cherelle Carlton Rolette, DO        sodium chloride flush 0.9 % injection 5-40 mL  5-40 mL IntraVENous PRN Cherelle Carlton Rolette, DO        0.9 % sodium chloride infusion   IntraVENous PRN Cherelle Carlton Rolette, DO        potassium chloride (KLOR-CON M) extended release tablet 40 mEq  40 mEq Oral Q5 Min Isabelle Reina MD         Current Outpatient Medications   Medication Sig Dispense Refill    nortriptyline (PAMELOR) 75 MG capsule Take 2 capsules by mouth nightly Taken for back pain 60 capsule 2    methadone (DOLOPHINE) 10 MG/ML solution Take 130 mg by mouth every morning. potassium chloride (KLOR-CON M) 20 MEQ extended release tablet Take 20 mEq by mouth 2 times daily Pt reports Dr. Waqas Zhang increased dosage to 2 tabs BID      metOLazone (ZAROXOLYN) 2.5 MG tablet Take 2.5 mg by mouth every morning      furosemide (LASIX) 20 MG tablet Take 60 mg by mouth 2 times daily      gabapentin (NEURONTIN) 300 MG capsule Take 2 capsules by mouth 3 times daily for 30 days.  Ortho surgeon 180 capsule 2     Allergies   Allergen Reactions    Buprenorphine-Naloxone Itching, Rash and Shortness Of Breath    Amoxicillin-Pot Clavulanate Rash       Nursing Notes Reviewed    Physical Exam:  Triage VS:    ED Triage Vitals   Enc Vitals Group      BP 10/17/22 1756 116/63      Heart Rate 10/17/22 1731 (!) 110      Resp 10/17/22 1756 22      Temp 10/17/22 1756 98.1 °F (36.7 °C)      Temp Source 10/17/22 1756 Oral      SpO2 10/17/22 1734 (!) 69 %      Weight 10/17/22 1728 290 lb (131.5 kg)      Height 10/17/22 1740 5' 5\" (1.651 m)      Head Circumference --       Peak Flow --       Pain Score --       Pain Loc --       Pain Edu? --       Excl. in [address]? --        My pulse ox interpretation is - normal    General appearance:  No acute distress. Skin:  Warm. Dry. Eye:  Extraocular movements intact. Ears, nose, mouth and throat:  Oral mucosa moist   Neck:  Trachea midline. Extremity:  No swelling. Normal ROM     Heart:  Regular rate and rhythm, normal S1 & S2, no extra heart sounds. Perfusion:  intact  Respiratory:  Lungs clear to auscultation bilaterally. Respirations nonlabored. Abdominal:  Normal bowel sounds. Soft. Nontender. Non distended. Back:  No CVA tenderness to palpation     Neurological:  Alert and oriented times 3. No focal neuro deficits.              Psychiatric:  Appropriate    I have reviewed and interpreted all of the currently available lab results from this visit (if applicable):  Results for orders placed or performed during the hospital encounter of 10/17/22   COVID-19, Rapid    Specimen: Nasopharyngeal   Result Value Ref Range    Source UNKNOWN     SARS-CoV-2, NAAT NOT DETECTED NOT DETECTED   CBC with Auto Differential   Result Value Ref Range    WBC 8.7 4.0 - 10.5 K/CU MM    RBC 4.25 4.2 - 5.4 M/CU MM    Hemoglobin 11.6 (L) 12.5 - 16.0 GM/DL    Hematocrit 38.7 37 - 47 %    MCV 91.1 78 - 100 FL    MCH 27.3 27 - 31 PG    MCHC 30.0 (L) 32.0 - 36.0 %    RDW 19.9 (H) 11.7 - 14.9 %    Platelets 637 282 - 098 K/CU MM    MPV 11.0 7.5 - 11.1 FL    Differential Type AUTOMATED DIFFERENTIAL     Segs Relative 73.7 (H) 36 - 66 %    Lymphocytes % 12.9 (L) 24 - 44 %    Monocytes % 7.4 (H) 0 - 4 %    Eosinophils % 5.2 (H) 0 - 3 %    Basophils % 0.3 0 - 1 %    Segs Absolute 6.4 K/CU MM    Lymphocytes Absolute 1.1 K/CU MM    Monocytes Absolute 0.6 K/CU MM    Eosinophils Absolute 0.5 K/CU MM    Basophils Absolute 0.0 K/CU MM    Nucleated RBC % 0.6 % Total Nucleated RBC 0.1 K/CU MM    Total Immature Neutrophil 0.04 K/CU MM    Immature Neutrophil % 0.5 (H) 0 - 0.43 %   Comprehensive Metabolic Panel   Result Value Ref Range    Sodium 133 (L) 135 - 145 MMOL/L    Potassium 2.8 (LL) 3.5 - 5.1 MMOL/L    Chloride 88 (L) 99 - 110 mMol/L    CO2 36 (H) 21 - 32 MMOL/L    BUN 32 (H) 6 - 23 MG/DL    Creatinine 1.0 0.6 - 1.1 MG/DL    Est, Glom Filt Rate >60 >60 mL/min/1.73m2    Glucose 95 70 - 99 MG/DL    Calcium 9.6 8.3 - 10.6 MG/DL    Albumin 3.2 (L) 3.4 - 5.0 GM/DL    Total Protein 8.1 6.4 - 8.2 GM/DL    Total Bilirubin 0.3 0.0 - 1.0 MG/DL    ALT 13 10 - 40 U/L    AST 21 15 - 37 IU/L    Alkaline Phosphatase 137 (H) 40 - 129 IU/L    Anion Gap 9 4 - 16   Troponin   Result Value Ref Range    Troponin T <0.010 <0.01 NG/ML   Lactate, Sepsis   Result Value Ref Range    Lactic Acid, Sepsis 1.1 0.5 - 1.9 mMOL/L   ABG Blood Gas, Arterial   Result Value Ref Range    pH, Bld 7.47 (H) 7.34 - 7.45    pCO2, Arterial 59.0 (H) 32 - 45 MMHG    pO2, Arterial 72 (L) 75 - 100 MMHG    Base Exc, Mixed 16.2 (H) 0 - 2.3    HCO3, Arterial 42.9 (H) 18 - 23 MMOL/L    CO2 Content 44.7 (H) 19 - 24 MMOL/L    O2 Sat 89.2 (L) 96 - 97 %    Carbon Monoxide, Blood 4.9 0 - 5 %    Methemoglobin, Arterial 1.2 <1.5 %    Comment 2 NC    EKG 12 Lead   Result Value Ref Range    Ventricular Rate 91 BPM    Atrial Rate 91 BPM    P-R Interval 204 ms    QRS Duration 114 ms    Q-T Interval 396 ms    QTc Calculation (Bazett) 487 ms    P Axis 44 degrees    R Axis -33 degrees    T Axis 77 degrees    Diagnosis       Normal sinus rhythm  Left axis deviation  Pulmonary disease pattern  Incomplete left bundle branch block  Abnormal ECG  When compared with ECG of 16-MAR-2021 23:58,  Incomplete left bundle branch block is now present        Radiographs (if obtained):  Radiologist's Report Reviewed:  XR CHEST (2 VW)    Result Date: 10/17/2022  EXAMINATION: TWO XRAY VIEWS OF THE CHEST 10/17/2022 5:37 pm COMPARISON: December 8, 2020 HISTORY: ORDERING SYSTEM PROVIDED HISTORY: shortness of breath TECHNOLOGIST PROVIDED HISTORY: Reason for exam:->shortness of breath Reason for Exam: SOB Additional signs and symptoms: unknown Relevant Medical/Surgical History: none FINDINGS: The cardiomediastinal silhouette is mildly enlarged. Vascular congestive changes are present. Bilateral increased airspace and interstitial opacities are noted diffusely. No pleural effusion or pneumothorax is identified. No subdiaphragmatic free air. 1. Mild cardiomegaly and vascular congestive changes. 2. Diffuse bilateral increased airspace and interstitial opacities. These findings could represent evolving pulmonary edema although an evolving atypical pneumonia could have a similar appearance. Radiographic follow-up recommended to ensure resolution. EKG (if obtained): (All EKG's are interpreted by myself in the absence of a cardiologist)    Medications   sodium chloride flush 0.9 % injection 5-40 mL (has no administration in time range)   sodium chloride flush 0.9 % injection 5-40 mL (has no administration in time range)   0.9 % sodium chloride infusion (has no administration in time range)   potassium chloride (KLOR-CON M) extended release tablet 40 mEq (has no administration in time range)   albuterol sulfate HFA (PROVENTIL;VENTOLIN;PROAIR) 108 (90 Base) MCG/ACT inhaler 2 puff (2 puffs Inhalation Given 10/17/22 1835)   albuterol sulfate HFA (PROVENTIL;VENTOLIN;PROAIR) 108 (90 Base) MCG/ACT inhaler 2 puff (2 puffs Inhalation Given 10/17/22 1835)   methylPREDNISolone sodium (SOLU-MEDROL) injection 60 mg (60 mg IntraVENous Given 10/17/22 2145)   furosemide (LASIX) injection 40 mg (40 mg IntraVENous Given 10/17/22 2146)   potassium chloride (KLOR-CON M) extended release tablet 40 mEq (40 mEq Oral Given 10/17/22 2145)         MDM:  Patient presents to the emergency department complaint of shortness of breath difficulty breathing.   Patient with a history of

## 2022-10-18 NOTE — PROGRESS NOTES
4 Eyes Skin Assessment     NAME:  Jesse Benson  YOB: 1980  MEDICAL RECORD NUMBER:  [id number]    The patient is being assess for  Admission    I agree that 2 RN's have performed a thorough Head to Toe Skin Assessment on the patient. ALL assessment sites listed below have been assessed. Areas assessed by both nurses:    Head, Face, Ears, Shoulders, Back, Chest, Arms, Elbows, Hands, Sacrum. Buttock, Coccyx, Ischium, and Legs. Feet and Heels  Faded bruising to RLQ r/t Lovenox inj. Faded bruise to RUE Multiple scars from previous surgeries. Non-pitting edema noted to BLE. Does the Patient have a Wound?  No noted wound(s)        Miki Prevention initiated:  No   Wound Care Orders initiated:  No    Pressure Injury (Stage 3,4, Unstageable, DTI, NWPT, and Complex wounds) if present place referral/consult order under [de-identified] No    New and Established Ostomies if present place consult order under : No      Nurse 1 eSignature: Electronically signed by Shahid Dewey LPN on 32/99/02 at 48:13 AM EDT    **SHARE this note so that the co-signing nurse is able to place an eSignature**    Nurse 2 eSignature: Electronically signed by Kodak Ledesma RN on 10/18/22 at 12:44 AM EDT

## 2022-10-18 NOTE — PROGRESS NOTES
V2.0  [facility] [place] [facility] [place]      Name:  Laura Freitas /Age/Sex: 1980  (38 y.o. female)   MRN & CSN:  [id number] & [id number] Encounter Date/Time: 10/18/2022 9:47 AM EDT    [facility]  [facility] PCP: Ankit Park, [address] Day: 2    Assessment and Plan:   Laura Freitas is a 38 y.o. female with pmh of tricuspid valve endocarditis, severe TR, heart failure preserved ejection fraction, obesity who presents with Acute decompensated heart failure ([facility] [address]      Plan:    Acute on chronic diastolic heart failure  -Here with anasarca and weight gain. Says her good weight is about 220 to 230 pounds now she weights 290. Her office notes from last 6 months has been around 290 so not sure what her true dry weight is.  -Seen by cardiology. Started on Lasix drip. Continue Zaroxolyn TTE ordered monitor ins and outs. Low-sodium diet. History of tricuspid valve infective endocarditis  -Continue doxycycline for chronic suppression as per ID due to hardware in her spine.  -Off IV drugs as per patient.  -She has been considered by [facility] for tricuspid valve replacement. Hypokalemia  -Was replenished. We will check a repeat level. Morbid obesity due to excess calories Body mass index is 48.26 kg/m². - Complicating assessment and treatment. Placing patient at risk for multiple co-morbidities as well as early death and contributing to the patient's presentation.  on weight loss when appropriate. Comment: Please note this report has been produced using speech recognition software and may contain errors related to that system including errors in grammar, punctuation, and spelling, as well as words and phrases that may be inappropriate. If there are any questions or concerns please feel free to contact the dictating provider for clarification. Diet ADULT DIET; Regular; 5 carb choices (75 gm/meal);  Low Fat/Low Chol/High Fiber/SUSIE; Low Sodium (2 gm); 1500 ml   DVT Prophylaxis [x] Lovenox, []  Heparin, [] SCDs, [] Ambulation,  [] Eliquis, [] Xarelto  [] Coumadin   Code Status Full Code   Disposition From: Home  Expected Disposition: Home  Estimated Date of Discharge: 2 to 3 days  Patient requires continued admission due to decompensated heart failure on Lasix drip   Surrogate Decision Maker/ GUERDA Hyde     Subjective:     Chief Complaint: Shortness of Breath (Fullness and hearing disturbances, dizziness)     Reports she feels better this morning in terms of her shortness of breath. Has not gotten out of bed yet. Olga Narvaez Has been urinating a lot since being put on Lasix. Reports persistent orthopnea. Denies any cough. Denies any fevers, chills, nausea, vomiting. Review of Systems:    Review of Systems   Constitutional:  Negative for chills and fever. HENT:  Negative for sore throat. Eyes:  Negative for visual disturbance. Respiratory:  Positive for shortness of breath. Negative for cough. Cardiovascular:  Positive for leg swelling. Negative for chest pain and palpitations. Gastrointestinal:  Negative for diarrhea, nausea and vomiting. Endocrine: Negative for polyuria. Genitourinary:  Negative for dysuria. Musculoskeletal:  Negative for back pain. Neurological:  Negative for dizziness. Psychiatric/Behavioral:  Negative for confusion. Objective: Intake/Output Summary (Last 24 hours) at 10/18/2022 0947  Last data filed at 10/18/2022 0629  Gross per 24 hour   Intake --   Output 1900 ml   Net -1900 ml        Vitals:   Vitals:    10/18/22 0806   BP: 109/66   Pulse: 87   Resp: 17   Temp: 97.5 °F (36.4 °C)   SpO2: 97%       Physical Exam:   Physical Exam  Constitutional:       General: She is not in acute distress. Appearance: She is obese. HENT:      Mouth/Throat:      Mouth: Mucous membranes are moist.      Pharynx: No posterior oropharyngeal erythema. Eyes:      General: No scleral icterus.      Pupils: Pupils are equal, round, and reactive to light. Cardiovascular:      Rate and Rhythm: Normal rate and regular rhythm. Heart sounds: Murmur (Systolic murmur in tricuspid area) heard. Pulmonary:      Effort: Pulmonary effort is normal.      Breath sounds: Rales (Bibasilar) present. No wheezing. Comments: On nasal cannula oxygen  Abdominal:      Palpations: Abdomen is soft. Tenderness: There is no abdominal tenderness. Musculoskeletal:         General: Normal range of motion. Right lower leg: Edema present. Left lower leg: Edema present. Comments: 2+ pitting edema up to knees. Skin:     General: Skin is warm. Neurological:      General: No focal deficit present. Mental Status: She is alert and oriented to person, place, and time. Cranial Nerves: No cranial nerve deficit. Motor: No weakness.    Psychiatric:         Mood and Affect: Mood normal.       Medications:   Medications:    sodium chloride flush  5-40 mL IntraVENous 2 times per day    doxycycline hyclate  100 mg Oral 2 times per day    gabapentin  600 mg Oral TID    metOLazone  2.5 mg Oral QAM    nortriptyline  150 mg Oral Nightly    potassium chloride  20 mEq Oral BID    sodium chloride flush  5-40 mL IntraVENous 2 times per day    enoxaparin  30 mg SubCUTAneous BID      Infusions:    furosemide (LASIX) 1mg/ml infusion      sodium chloride      sodium chloride       PRN Meds: sodium chloride flush, 5-40 mL, PRN  sodium chloride, , PRN  sodium chloride flush, 5-40 mL, PRN  sodium chloride, , PRN  ondansetron, 4 mg, Q8H PRN   Or  ondansetron, 4 mg, Q6H PRN  polyethylene glycol, 17 g, Daily PRN  aluminum & magnesium hydroxide-simethicone, 30 mL, Q6H PRN  acetaminophen, 650 mg, Q6H PRN   Or  acetaminophen, 650 mg, Q6H PRN        Labs      Recent Results (from the past 24 hour(s))   CBC with Auto Differential    Collection Time: 10/17/22  5:55 PM   Result Value Ref Range    WBC 8.7 4.0 - 10.5 K/CU MM    RBC 4.25 4.2 - 5.4 M/CU MM    Hemoglobin 11.6 (L) 12.5 - 16.0 GM/DL    Hematocrit 38.7 37 - 47 %    MCV 91.1 78 - 100 FL    MCH 27.3 27 - 31 PG    MCHC 30.0 (L) 32.0 - 36.0 %    RDW 19.9 (H) 11.7 - 14.9 %    Platelets 609 419 - 362 K/CU MM    MPV 11.0 7.5 - 11.1 FL    Differential Type AUTOMATED DIFFERENTIAL     Segs Relative 73.7 (H) 36 - 66 %    Lymphocytes % 12.9 (L) 24 - 44 %    Monocytes % 7.4 (H) 0 - 4 %    Eosinophils % 5.2 (H) 0 - 3 %    Basophils % 0.3 0 - 1 %    Segs Absolute 6.4 K/CU MM    Lymphocytes Absolute 1.1 K/CU MM    Monocytes Absolute 0.6 K/CU MM    Eosinophils Absolute 0.5 K/CU MM    Basophils Absolute 0.0 K/CU MM    Nucleated RBC % 0.6 %    Total Nucleated RBC 0.1 K/CU MM    Total Immature Neutrophil 0.04 K/CU MM    Immature Neutrophil % 0.5 (H) 0 - 0.43 %   Comprehensive Metabolic Panel    Collection Time: 10/17/22  5:55 PM   Result Value Ref Range    Sodium 133 (L) 135 - 145 MMOL/L    Potassium 2.8 (LL) 3.5 - 5.1 MMOL/L    Chloride 88 (L) 99 - 110 mMol/L    CO2 36 (H) 21 - 32 MMOL/L    BUN 32 (H) 6 - 23 MG/DL    Creatinine 1.0 0.6 - 1.1 MG/DL    Est, Glom Filt Rate >60 >60 mL/min/1.73m2    Glucose 95 70 - 99 MG/DL    Calcium 9.6 8.3 - 10.6 MG/DL    Albumin 3.2 (L) 3.4 - 5.0 GM/DL    Total Protein 8.1 6.4 - 8.2 GM/DL    Total Bilirubin 0.3 0.0 - 1.0 MG/DL    ALT 13 10 - 40 U/L    AST 21 15 - 37 IU/L    Alkaline Phosphatase 137 (H) 40 - 129 IU/L    Anion Gap 9 4 - 16   Troponin    Collection Time: 10/17/22  5:55 PM   Result Value Ref Range    Troponin T <0.010 <0.01 NG/ML   Lactate, Sepsis    Collection Time: 10/17/22  5:55 PM   Result Value Ref Range    Lactic Acid, Sepsis 1.1 0.5 - 1.9 mMOL/L   ABG Blood Gas, Arterial    Collection Time: 10/17/22  6:30 PM   Result Value Ref Range    pH, Bld 7.47 (H) 7.34 - 7.45    pCO2, Arterial 59.0 (H) 32 - 45 MMHG    pO2, Arterial 72 (L) 75 - 100 MMHG    Base Exc, Mixed 16.2 (H) 0 - 2.3    HCO3, Arterial 42.9 (H) 18 - 23 MMOL/L    CO2 Content 44.7 (H) 19 - 24 MMOL/L    O2 Sat 89.2 (L) 96 - 97 %    Carbon Monoxide, Blood 4.9 0 - 5 %    Methemoglobin, Arterial 1.2 <1.5 %    Comment 2 NC    COVID-19, Rapid    Collection Time: 10/17/22  6:55 PM    Specimen: Nasopharyngeal   Result Value Ref Range    Source UNKNOWN     SARS-CoV-2, NAAT NOT DETECTED NOT DETECTED   EKG 12 Lead    Collection Time: 10/17/22  7:09 PM   Result Value Ref Range    Ventricular Rate 91 BPM    Atrial Rate 91 BPM    P-R Interval 204 ms    QRS Duration 114 ms    Q-T Interval 396 ms    QTc Calculation (Bazett) 487 ms    P Axis 44 degrees    R Axis -33 degrees    T Axis 77 degrees    Diagnosis       Normal sinus rhythm  Left axis deviation  Pulmonary disease pattern  Incomplete left bundle branch block  Abnormal ECG  When compared with ECG of 16-MAR-2021 23:58,  Incomplete left bundle branch block is now present     Lactate, Sepsis    Collection Time: 10/17/22  9:30 PM   Result Value Ref Range    Lactic Acid, Sepsis 0.9 0.5 - 1.9 mMOL/L   Brain Natriuretic Peptide    Collection Time: 10/17/22  9:30 PM   Result Value Ref Range    Pro-BNP 1,621 (H) <300 PG/ML   Blood Gas, Venous    Collection Time: 10/17/22  9:45 PM   Result Value Ref Range    pH, Ricci 7.42 7.32 - 7.42    pCO2, Ricci 65 (H) 38 - 52 mmHG    pO2, Ricci 224 (H) 28 - 48 mmHG    Base Exc, Mixed 14.5 (H) 0 - 2.3    HCO3, Venous 42.2 (H) 19 - 25 MMOL/L    O2 Sat, Ricci 92.6 (H) 50 - 70 %    Comment VBG    Troponin    Collection Time: 10/18/22  3:06 AM   Result Value Ref Range    Troponin T <0.010 <0.01 NG/ML   CBC with Auto Differential    Collection Time: 10/18/22  8:45 AM   Result Value Ref Range    WBC 5.7 4.0 - 10.5 K/CU MM    RBC 3.99 (L) 4.2 - 5.4 M/CU MM    Hemoglobin 11.1 (L) 12.5 - 16.0 GM/DL    Hematocrit 35.5 (L) 37 - 47 %    MCV 89.0 78 - 100 FL    MCH 27.8 27 - 31 PG    MCHC 31.3 (L) 32.0 - 36.0 %    RDW 19.6 (H) 11.7 - 14.9 %    Platelets 584 429 - 033 K/CU MM    MPV 9.8 7.5 - 11.1 FL    Differential Type AUTOMATED DIFFERENTIAL     Segs Relative 84.8 (H) 36 - 66 %    Lymphocytes % 10.2

## 2022-10-18 NOTE — CONSULTS
Consult completed. Nexiva 20g 1.75 inch catheter inserted via ultrasound in patient's RFA, including blood cultures and lab draw. Brisk blood return noted and catheter flushes with ease. Patient tolerated well. Jacobo Diallo primary RN notified. Consult IV/PICC team if patient's needs change.

## 2022-10-18 NOTE — ED NOTES
Pt alert oriented no distress at this time, pt given 2 warm blankets, dimmed lights for pt, bed low locked 1 rail up, waiting for PICC placement     Sampson Guadarrama RN  10/17/22 2002

## 2022-10-18 NOTE — CONSULTS
[address], [facility]                                  CONSULTATION    PATIENT NAME: Yanci Long                      :        1980  MED REC NO:   [id number]                          ROOM:       1108  ACCOUNT NO:   [de-identified]                           ADMIT DATE: 10/17/2022  PROVIDER:     BRAULIO Brito    CONSULT DATE:  10/18/2022    CHIEF COMPLAINT:  Heart failure, anasarca. HISTORY OF PRESENT ILLNESS:  This is a 46-year-old female with a past  medical history of severe tricuspid valve regurgitation status post  infective endocarditis of the tricuspid valve with a MRSA infection  secondary IV drug abuse. The patient is known to our practice. She actively follows with us at  the office. We were notified by Dr. Jf Perez that she follows with us and  he asked us to see her during this admission. She was last seen at the  office about 4 or 5 months ago. She had a RAFI done in 2022 that  showed severe tricuspid valve regurgitation. She had a previous  vegetation noted on her tricuspid valve that was resolved at that time. They have been discussing tricuspid valve surgery for her tricuspid  valve repair secondary to the severe TR and worsening heart failure  symptoms. She tells me that she usually weighs about 220-230 pounds. She currently is 290 according to our scale on admission. She told me  that she knew that she was up about 80 pounds over the last 5-6 months  or so. She was previously hospitalized at [facility] due to heart failure  exacerbation and was started on Lasix and metolazone at that time. States that she has been compliant with taking those medications and is  attempting to avoid salt intake at home.   Regardless, her swelling had  started to get worse and with her shortness of breath, over the last  week, she cannot walk more than one room to another without getting very  short of breath and having to take a break. Because of her worsening  symptoms, she decided to come into the ER for further evaluation. She  currently is on a nasal cannula at about 4 liters and satting okay;  however, she does appear to have significant edema that is generalized  and appears to be in anasarca at this time. PAST MEDICAL HISTORY:  Hepatitis C, liver disease, history of infective  endocarditis, tricuspid valve regurgitation, and heart failure. PAST SURGICAL HISTORY:  , cholecystectomy, right hand finger  amputation in 2019, hysterectomy, incision and drainage of the right  upper thigh high, lap sera, and spinal fusion. ALLERGIES:   BUPRENORPHINE, SUBUTEX, and AUGMENTIN. FAMILY HISTORY:  Reviewed and noncontributory. SOCIAL HISTORY:  Current everyday smoker, smoking half a pack a day,  does not drink any alcohol. She has a previous history of IV drug  abuse, but told me that she has not done any since 2022. HOME MEDICATIONS:  She is on Pamelor, methadone, Klor-Con 20 mEq b.i.d.,  Zaroxolyn 2.5 mg daily, Lasix 60 mg b.i.d., and Neurontin 300 t.i.d. REVIEW OF SYSTEMS:  A 10-point review of systems is reviewed and is  negative unless noted in the HPI. PHYSICAL EXAMINATION:  GENERAL:  Awake and alert female, lying in bed, in no acute distress. HEENT:  Head:  Atraumatic, normocephalic. Eyes:  No scleral erythema,  discharge, or conjunctivitis noted. NECK:  Trachea is midline. No apparent mass. CARDIAC:  Regular rate and rhythm. Holosystolic murmur heard at the  tricuspid point. LUNGS:  Reduced in all lung fields. Good rise and fall of chest.  ABDOMEN:  Soft, nontender. MUSCULOSKELETAL:  No obvious joint deformities. SKIN:  No erythema or ecchymosis noted. NEUROLOGIC:  Alert and oriented x4. PSYCH:  Normal mood and affect.     IMAGING STUDIES:  Chest x-ray was done that showed mild cardiomegaly and  vascular congestive changes, and diffuse bilateral increased airspace  and interstitial opacities that could represent an evolving pulmonary  edema. Cannot rule out atypical pneumonia. EKG was done that showed  normal sinus rhythm with an incomplete left bundle-branch block. This  is a change from her previous, QRS was very narrow previously at 86  milliseconds and it is now 114. Last echocardiogram was a RAFI that was  done on 05/09/2022 and it showed that EF was 60%. Estimated RVSP was 64  mmHg, biatrial enlargement, severe TR with very eccentric jet directed  towards the interatrial septum and wrapping around the entire RA. Previously seen mass in the tricuspid valve is no longer seen. No  interatrial septal shunt. She has not had a stress test or heart  catheterization before. LABORATORY DATA:  BNP elevated at about 1600. Troponin was negative. Her sodium was low at 133, potassium is very low at 2.8. This has been  replaced already. Creatinine is stable at 1.0. LFTs appear to be  within normal limits for the most part. Her albumin is low at 3.2. CBC  shows she is slightly anemic at 11.6, but otherwise platelets are within  normal limits and white blood cell count is within normal limits. The  pH is 7.42 on the last check of the venous blood gas, bicarb 42, and  pCO2 is 65. IMPRESSION:  A 82-year-old female who comes in complaining of worsening  shortness of breath and edema. She appears to be up about 70 pounds  from her baseline. She was taking Lasix 60 mg b.i.d. at home with  Zaroxolyn 2.5 mg daily as well, but was continuing to gain weight. She  has a possible tricuspid valve surgery coming up soon. She is going to  have a left heart catheterization for workup on 11/03/2022. Due to her  significant anasarca type symptoms and shortness of breath and heart  failure, at this time we will place her on a Lasix drip to optimize her  diuresis. We will follow her blood work to ensure that she tolerates  this okay with her kidneys.   Echocardiogram has been ordered, we will  follow up on this to evaluate LV function, tricuspid valve regurgitation  and RVSP. Further treatment dictated by hospital course.       Patient follows In office  Consult notified to me by August Banuelos with above      BRAULIO Nicholson    D: 10/18/2022 9:00:15       T: 10/18/2022 9:06:29     [email or web address]  Job#: [id number]     Doc#: [id number]    CC:  Adair Flynn MD

## 2022-10-18 NOTE — ED NOTES
ED TO INPATIENT SBAR HANDOFF    Patient Name: Emily Aponte   :  1980  38 y.o. MRN:  [id number]  Preferred Name    ED Room #:  ED14/ED-14  Family/Caregiver Present no   Restraints no   Sitter no   Sepsis Risk Score Sepsis Risk Score: 0.84    Situation  Code Status: Prior No additional code details. Allergies: Buprenorphine-naloxone and Amoxicillin-pot clavulanate  Weight: Patient Vitals for the past 96 hrs (Last 3 readings):   Weight   10/17/22 1740 290 lb (131.5 kg)   10/17/22 1728 290 lb (131.5 kg)     Arrived from: home  Chief Complaint:   Chief Complaint   Patient presents with    Shortness of Breath     Fullness and hearing disturbances, dizziness     Hospital Problem/Diagnosis:  Principal Problem:    Acute decompensated heart failure ([address]  Resolved Problems:    * No resolved hospital problems. *    Imaging:   XR CHEST (2 VW)   Final Result   1. Mild cardiomegaly and vascular congestive changes. 2. Diffuse bilateral increased airspace and interstitial opacities. These   findings could represent evolving pulmonary edema although an evolving   atypical pneumonia could have a similar appearance. Radiographic follow-up   recommended to ensure resolution.            Abnormal labs:   Abnormal Labs Reviewed   CBC WITH AUTO DIFFERENTIAL - Abnormal; Notable for the following components:       Result Value    Hemoglobin 11.6 (*)     MCHC 30.0 (*)     RDW 19.9 (*)     Segs Relative 73.7 (*)     Lymphocytes % 12.9 (*)     Monocytes % 7.4 (*)     Eosinophils % 5.2 (*)     Immature Neutrophil % 0.5 (*)     All other components within normal limits   COMPREHENSIVE METABOLIC PANEL - Abnormal; Notable for the following components:    Sodium 133 (*)     Potassium 2.8 (*)     Chloride 88 (*)     CO2 36 (*)     BUN 32 (*)     Albumin 3.2 (*)     Alkaline Phosphatase 137 (*)     All other components within normal limits   BLOOD GAS, ARTERIAL - Abnormal; Notable for the following components:    pH, Bld 7.47 (*) pCO2, Arterial 59.0 (*)     pO2, Arterial 72 (*)     Base Exc, Mixed 16.2 (*)     HCO3, Arterial 42.9 (*)     CO2 Content 44.7 (*)     O2 Sat 89.2 (*)     All other components within normal limits     Critical values: yes     Abnormal Assessment Findings: GENERALIZED PITTING EDEMA +4    Background  Hospital admissions in last 30 days?  no   History:   Past Medical History:   Diagnosis Date    Hepatitis C     Liver disease     hepatitis    No significant medical problems        Assessment    Vitals/MEWS: MEWS Score: 2  Level of Consciousness: Alert (0)   Vitals:    10/17/22 2030 10/17/22 2100 10/17/22 2130 10/17/22 2200   BP: 130/70   96/73   Pulse: 88 92 92 91   Resp:    22   Temp:       TempSrc:       SpO2: 96% 96% 96% 96%   Weight:       Height:         FiO2 (%): nasal cannula for respiratory distress  O2 Flow Rate: O2 Device: Nasal cannula O2 Flow Rate (L/min): 4 L/min  Cardiac Rhythm:    Pain Assessment: 0 [x] Verbal [] Norberto Mile Scale  Pain Scale: Pain Assessment  Pain Assessment: None - Denies Pain  Last documented pain score (0-10 scale)    Last documented pain medication administered:   Mental Status: oriented  C-SSRS: Risk of Suicide: No Risk  Bedside swallow:    [place] Coma Scale (GCS): [place] Coma Scale  Eye Opening: Spontaneous  Best Verbal Response: Oriented  Best Motor Response: Obeys commands  [place] Coma Scale Score: 15  Active LDA's:   Peripheral IV 10/17/22 Right Forearm (Active)   Site Assessment Clean, dry & intact 10/17/22 2150   Line Status Brisk blood return;Flushed;Capped;Normal saline locked 10/17/22 2150   Line Care Connections checked and tightened 10/17/22 2150   Phlebitis Assessment No symptoms 10/17/22 2150   Infiltration Assessment 0 10/17/22 2150   Alcohol Cap Used Yes 10/17/22 2150   Dressing Status New dressing applied 10/17/22 2150   Dressing Type Transparent 10/17/22 2150   Dressing Intervention New 10/17/22 2150     PO Status: Regular  Pertinent or High Risk Medications/Drips: no   o If Yes, please provide details:   Pending Blood Product Administration: no     Blood Cultures: see ED pt care timeline or ED Event log    Recommendation    Pending orders ADMIT [id number] [facility] for Discharge (if known):    Additional Comments:    If any further questions, please call Sending RN at [phone]    Electronically signed by: Electronically signed by Talia Peraza RN on 10/17/2022 at 10:10 PM     Grisel Petersen RN  10/17/22 0914

## 2022-10-18 NOTE — PROGRESS NOTES
Report called to concepcion whom will be nurse for 2004. [facility] tomorrow [facility]

## 2022-10-18 NOTE — CARE COORDINATION
Received from . She known to this CM from previous ICU stay 8/2020- transferred to [facility] at that time. Patient is from home. Has a PCP and insurance that assists with Rx when needed. She has hx of IVDU (prev admission) . CM will follow for discharge needs.  Yanely Erazo RN

## 2022-10-18 NOTE — PROGRESS NOTES
Pt received from Matt Wang. Pt on lasix drip, placed curtis per order, for accurate I & O's. Pt explained procedure. Procedure preformed using sterile technique. Urine returned. Pt needs met at this time. Call light within reach. Will continue to monitor.

## 2022-10-18 NOTE — CONSULTS
Dictated-[id number]  Pt. Known to us at the office-alerted by Dr. Marcia Sanchez that she follows with us  Hx of IE to TV- she no longer does IV drugs since 1/2022 per pt.   She has severe TR and is seeing [facility] already to consider TV surgery  To have LHC to complete w/u on 11/3/22  She came in d/t worsening SOB and edema-tells me her baseline weight is about 230- she weighed 290 today  Appears to have anasarca- she was on lasix 60 BID at home and zaroxolyn 2.5 mg -she was not missing doses but has continued to gain more weight and get more SOB  Needs significant diuresis prior to valve surgery and LHC- will start lasix drip and transfer to 3 N need good output measurements for UOP and will need to follow BMP closely  K low- replace and will monitor tele for now  Echo for EF, RVSP and TR  Thanks for consult-will follow    Patient seen in office recently  Below is the note  Came in with dyspnea  She is to have a heart cath and TR surgery at [facility]  She had hx of drug use and endocarditis now clear  Recent echo no mass noted EF 60% and severe pulmonary HTN  Correct K   Check mag  Check ct chest  Continue diuresis for now     Electronically signed by Gurinder Morales MD on 10/18/22 at 10:11 AM EDT

## 2022-10-18 NOTE — H&P
History and Physical      Name:  Emily Aponte /Age/Sex: 1980  (38 y.o. female)   MRN & CSN:  [id number] & [id number] Encounter Date/Time: 10/17/2022 11:49 PM EDT   Location:  [facility]-A PCP: Paula Madden 112 Day: 1    Assessment and Plan:     Patient is a 82-year-old female who presented with worsening SOB for a week. # Acute hypoxemic hypercapnic respiratory failure secondary to acute decompensated heart failure  - Presented with worsening SOB at rest, JADE after walking 10 feet and LE edema for the past week. Allegedly has over 80 pounds weight gain in the past 6 months. - Volume overload on exam (bibasilar rales, JVD and 1+ BLE edema), requiring 4L NC. Pro-BNP 1621. Tn negative. ECG without acute ischemic changes, however, has mildly prolonged NV interval (?IRBBB). CXR suspicious for pulmonary vascular congestion.  - Started on Lasix, continued home Zaroxolyn.  - Cardiology consulted, follow-up. TTE ordered. - Telemetry. Repeat Tn. # Hx of tricuspid valve infective endocarditis with MRSA  - Continue Doxycycline for chronic suppression per ID due to the presence of hardware in her spine.  - Reported not using IVDU for at least x9 months. - She is scheduled for LHC on 11/3 at [facility] followed by possible surgical tricuspid valve replacement. # Hypokalemia  - Mild, repleted. - Follow-up repeat labs. # Class III obesity  - BMI 48.2.  - Advised on lifestyle modifications. Checklist:  Advanced directive: full  Antibiotics: as above  Catheter: none  DVT ppx: Lovenox  Electrolytes: corrected as above  Nutrition/Diet: cardiac    Disposition: patient requires continued admission due to acute respiratory failure secondary to acute decompensated heart failure. MDM: moderate.     History of Present Illness:     Chief Complaint: Shortness of breath    Patient is a 82-year-old female with a PMHx of MRSA IE of TV, HFpEF, HTN, HCV, hypokalemia and class III obesity who presented to the ED with worsening shortness of breath at rest, JADE after walking 10 feet and LE edema for the past week. Allegedly has over 80 pounds weight gain in the past 6 months. Reported following low-salt intake and compliance with home Lasix and Metolazone. Denied any IVDU use for at least 9 months. She is scheduled for LHC on 11/3 at [facility] followed by possible surgical tricuspid valve replacement. Denied any fevers, chills, CP, nausea, vomiting, abdominal pain, constipation, diarrhea or urinary changes. ROS:     Ten point ROS reviewed negative, unless as noted above. Objective: Intake/Output Summary (Last 24 hours) at 10/17/2022 2349  Last data filed at 10/17/2022 2341  Gross per 24 hour   Intake --   Output 1000 ml   Net -1000 ml        Vitals:   Vitals:    10/17/22 2100 10/17/22 2130 10/17/22 2200 10/17/22 2300   BP:   96/73 118/68   Pulse: 92 92 91 91   Resp:   22 20   Temp:    97.4 °F (36.3 °C)   TempSrc:    Oral   SpO2: 96% 96% 96% 95%   Weight:       Height:         BMI: Body mass index is 48.26 kg/m². General: Awake. AAOx3. Obese. HEENT: PERRLA. Vision grossly intact. Hearing grossly intact. Oropharynx clear. Poor dentition. Neck: Supple. JVD to mid neck. CV: RRR, remaining exam is limited due to body habitus. CR <2 secs. 1+ BLE edema without skin changes. Pulm: Increased effort on 4L NC. Moderate bibasilar rales bilaterally. CW NTTP. GI: +BS x4. Soft. NT/ND. : No CVA or suprapubic tenderness. No Angel catheter. Skin: Intact. Normal coloration, warm, dry. MSK: No gross joint deformities. Full ROM. Neuro: CNs grossly intact. Normal speech. No focal deficit. Psych: Good judgement and reason. Past History:      PMHx:   Past Medical History:   Diagnosis Date    Hepatitis C     Liver disease     hepatitis    No significant medical problems        PSHx:   Past Surgical History:   Procedure Laterality Date     SECTION  , 2007    x 2    CHOLECYSTECTOMY      FINGER (PAMELOR) 75 MG capsule Take 2 capsules by mouth nightly Taken for back pain 9/14/22 10/17/22  LEIGHTON Quinn CNP   methadone (DOLOPHINE) 10 MG/ML solution Take 130 mg by mouth every morning. Historical Provider, MD   potassium chloride (KLOR-CON M) 20 MEQ extended release tablet Take 20 mEq by mouth 2 times daily Pt reports Dr. Chica Power increased dosage to 2 tabs BID    Aurora Trent MD   metOLazone (ZAROXOLYN) 2.5 MG tablet Take 2.5 mg by mouth every morning    Aurora Trent MD   furosemide (LASIX) 20 MG tablet Take 60 mg by mouth 2 times daily    Elvis Giron MD   gabapentin (NEURONTIN) 300 MG capsule Take 2 capsules by mouth 3 times daily for 30 days. Ortho surgeon 8/17/22 9/16/22  LEIGHTON Quinn CNP       Data:     CBC:   Recent Labs     10/17/22  1755   WBC 8.7   HGB 11.6*      MCV 91.1   RDW 19.9*   LYMPHOPCT 12.9*   MONOPCT 7.4*   BASOPCT 0.3   MONOSABS 0.6   LYMPHSABS 1.1   EOSABS 0.5   BASOSABS 0.0     CMP:    Recent Labs     10/17/22  1755   *   K 2.8*   CL 88*   CO2 36*   BUN 32*   CREATININE 1.0   GLUCOSE 95   LABALBU 3.2*   CALCIUM 9.6   BILITOT 0.3   ALKPHOS 137*   AST 21   ALT 13     Lipids: No results found for: CHOL, HDL, TRIG  Hemoglobin A1C: No results found for: LABA1C  TSH: No results found for: TSH  Troponin:   Lab Results   Component Value Date/Time    TROPONINT <0.010 10/17/2022 05:55 PM    TROPONINT <0.010 12/09/2020 12:25 AM    TROPONINT <0.010 08/04/2020 02:55 AM     BNP:   Recent Labs     10/17/22  2130   PROBNP 1,621*     Lactic Acid: No results for input(s): LACTA in the last 72 hours.   UA:  Lab Results   Component Value Date/Time    NITRU NEGATIVE 03/16/2021 09:36 PM    NITRU NEGATIVE 06/20/2013 03:10 PM    COLORU DIAN 03/16/2021 09:36 PM    WBCUA 53 03/16/2021 09:36 PM    RBCUA 29 03/16/2021 09:36 PM    MUCUS OCCASIONAL 03/16/2021 09:36 PM    TRICHOMONAS NONE SEEN 03/16/2021 09:36 PM    BACTERIA RARE 03/16/2021 09:36 PM    CLARITYU HAZY 03/16/2021 09:36 PM    SPECGRAV 1.028 03/16/2021 09:36 PM    LEUKOCYTESUR LARGE 03/16/2021 09:36 PM    UROBILINOGEN 2.0 03/16/2021 09:36 PM    BILIRUBINUR SMALL 03/16/2021 09:36 PM    BLOODU SMALL 03/16/2021 09:36 PM    GLUCOSEU neg 06/20/2013 03:10 PM    GLUCOSEU NEGATIVE 06/20/2013 03:10 PM    KETUA SMALL 03/16/2021 09:36 PM    AMORPHOUS RARE 09/13/2020 09:30 PM     Urine Cultures: No results found for: LABURIN  Blood Cultures: No results found for: BC  No results found for: BLOODCULT2  Organism: No results found for: [facility]    Radiology results:  XR CHEST (2 VW)   Final Result   1. Mild cardiomegaly and vascular congestive changes. 2. Diffuse bilateral increased airspace and interstitial opacities. These   findings could represent evolving pulmonary edema although an evolving   atypical pneumonia could have a similar appearance. Radiographic follow-up   recommended to ensure resolution.              Medications:     Medications:    sodium chloride flush  5-40 mL IntraVENous 2 times per day    doxycycline hyclate  100 mg Oral 2 times per day    gabapentin  600 mg Oral TID    [START ON 10/18/2022] metOLazone  2.5 mg Oral QAM    nortriptyline  150 mg Oral Nightly    potassium chloride  20 mEq Oral BID    sodium chloride flush  5-40 mL IntraVENous 2 times per day    [START ON 10/18/2022] enoxaparin  30 mg SubCUTAneous BID    furosemide  60 mg IntraVENous BID        Infusions:    sodium chloride      sodium chloride         PRN Meds:   sodium chloride flush, 5-40 mL, PRN  sodium chloride, , PRN  sodium chloride flush, 5-40 mL, PRN  sodium chloride, , PRN  ondansetron, 4 mg, Q8H PRN   Or  ondansetron, 4 mg, Q6H PRN  polyethylene glycol, 17 g, Daily PRN  aluminum & magnesium hydroxide-simethicone, 30 mL, Q6H PRN  acetaminophen, 650 mg, Q6H PRN   Or  acetaminophen, 650 mg, Q6H PRN        Yanet Delaney MD  10/17/22 11:49 PM

## 2022-10-19 LAB
ANION GAP SERPL CALCULATED.3IONS-SCNC: 9 MMOL/L (ref 4–16)
BUN BLDV-MCNC: 24 MG/DL (ref 6–23)
CALCIUM SERPL-MCNC: 9.8 MG/DL (ref 8.3–10.6)
CHLORIDE BLD-SCNC: 88 MMOL/L (ref 99–110)
CO2: 39 MMOL/L (ref 21–32)
CREAT SERPL-MCNC: 0.8 MG/DL (ref 0.6–1.1)
GFR SERPL CREATININE-BSD FRML MDRD: >60 ML/MIN/1.73M2
GLUCOSE BLD-MCNC: 77 MG/DL (ref 70–99)
MAGNESIUM: 1.6 MG/DL (ref 1.8–2.4)
POTASSIUM SERPL-SCNC: 3.2 MMOL/L (ref 3.5–5.1)
SODIUM BLD-SCNC: 136 MMOL/L (ref 135–145)

## 2022-10-19 PROCEDURE — 2709999900 HC NON-CHARGEABLE SUPPLY

## 2022-10-19 PROCEDURE — 51702 INSERT TEMP BLADDER CATH: CPT

## 2022-10-19 PROCEDURE — 2580000003 HC RX 258: Performed by: PHYSICIAN ASSISTANT

## 2022-10-19 PROCEDURE — 2580000003 HC RX 258: Performed by: STUDENT IN AN ORGANIZED HEALTH CARE EDUCATION/TRAINING PROGRAM

## 2022-10-19 PROCEDURE — 6370000000 HC RX 637 (ALT 250 FOR IP): Performed by: STUDENT IN AN ORGANIZED HEALTH CARE EDUCATION/TRAINING PROGRAM

## 2022-10-19 PROCEDURE — 83735 ASSAY OF MAGNESIUM: CPT

## 2022-10-19 PROCEDURE — 6370000000 HC RX 637 (ALT 250 FOR IP): Performed by: INTERNAL MEDICINE

## 2022-10-19 PROCEDURE — 93312 ECHO TRANSESOPHAGEAL: CPT

## 2022-10-19 PROCEDURE — 7100000001 HC PACU RECOVERY - ADDTL 15 MIN

## 2022-10-19 PROCEDURE — 36415 COLL VENOUS BLD VENIPUNCTURE: CPT

## 2022-10-19 PROCEDURE — 1200000000 HC SEMI PRIVATE

## 2022-10-19 PROCEDURE — 94761 N-INVAS EAR/PLS OXIMETRY MLT: CPT

## 2022-10-19 PROCEDURE — 80048 BASIC METABOLIC PNL TOTAL CA: CPT

## 2022-10-19 PROCEDURE — 7100000000 HC PACU RECOVERY - FIRST 15 MIN

## 2022-10-19 PROCEDURE — 36410 VNPNXR 3YR/> PHY/QHP DX/THER: CPT

## 2022-10-19 PROCEDURE — 2700000000 HC OXYGEN THERAPY PER DAY

## 2022-10-19 PROCEDURE — 76937 US GUIDE VASCULAR ACCESS: CPT

## 2022-10-19 PROCEDURE — 6360000002 HC RX W HCPCS: Performed by: PHYSICIAN ASSISTANT

## 2022-10-19 RX ORDER — LANOLIN ALCOHOL/MO/W.PET/CERES
400 CREAM (GRAM) TOPICAL 2 TIMES DAILY
Status: DISCONTINUED | OUTPATIENT
Start: 2022-10-19 | End: 2022-10-21 | Stop reason: HOSPADM

## 2022-10-19 RX ORDER — TORSEMIDE 20 MG/1
20 TABLET ORAL DAILY
Status: DISCONTINUED | OUTPATIENT
Start: 2022-10-19 | End: 2022-10-21 | Stop reason: HOSPADM

## 2022-10-19 RX ADMIN — POLYETHYLENE GLYCOL (3350) 17 G: 17 POWDER, FOR SOLUTION ORAL at 00:03

## 2022-10-19 RX ADMIN — DOXYCYCLINE HYCLATE 100 MG: 100 TABLET, COATED ORAL at 20:21

## 2022-10-19 RX ADMIN — FUROSEMIDE 5 MG/HR: 10 INJECTION INTRAMUSCULAR; INTRAVENOUS at 05:09

## 2022-10-19 RX ADMIN — TORSEMIDE 20 MG: 20 TABLET ORAL at 17:24

## 2022-10-19 RX ADMIN — POTASSIUM CHLORIDE 40 MEQ: 20 TABLET, EXTENDED RELEASE ORAL at 16:34

## 2022-10-19 RX ADMIN — Medication 400 MG: at 20:21

## 2022-10-19 RX ADMIN — METHADONE HYDROCHLORIDE 130 MG: 10 CONCENTRATE ORAL at 13:30

## 2022-10-19 RX ADMIN — NORTRIPTYLINE HYDROCHLORIDE 150 MG: 25 CAPSULE ORAL at 20:19

## 2022-10-19 RX ADMIN — POLYETHYLENE GLYCOL (3350) 17 G: 17 POWDER, FOR SOLUTION ORAL at 20:18

## 2022-10-19 RX ADMIN — GABAPENTIN 600 MG: 300 CAPSULE ORAL at 20:20

## 2022-10-19 RX ADMIN — DOXYCYCLINE HYCLATE 100 MG: 100 TABLET, COATED ORAL at 13:58

## 2022-10-19 RX ADMIN — GABAPENTIN 600 MG: 300 CAPSULE ORAL at 13:28

## 2022-10-19 RX ADMIN — HYDROXYZINE HYDROCHLORIDE 10 MG: 10 TABLET ORAL at 00:03

## 2022-10-19 RX ADMIN — POTASSIUM CHLORIDE 40 MEQ: 20 TABLET, EXTENDED RELEASE ORAL at 13:57

## 2022-10-19 RX ADMIN — SODIUM CHLORIDE, PRESERVATIVE FREE 10 ML: 5 INJECTION INTRAVENOUS at 20:26

## 2022-10-19 RX ADMIN — ACETAMINOPHEN 650 MG: 325 TABLET ORAL at 20:19

## 2022-10-19 ASSESSMENT — PAIN SCALES - WONG BAKER: WONGBAKER_NUMERICALRESPONSE: 2

## 2022-10-19 ASSESSMENT — ENCOUNTER SYMPTOMS
VOMITING: 0
COUGH: 0
BACK PAIN: 0
NAUSEA: 0
SHORTNESS OF BREATH: 1
SORE THROAT: 0
DIARRHEA: 0

## 2022-10-19 ASSESSMENT — PAIN SCALES - GENERAL
PAINLEVEL_OUTOF10: 8
PAINLEVEL_OUTOF10: 5

## 2022-10-19 ASSESSMENT — PAIN DESCRIPTION - LOCATION: LOCATION: LEG

## 2022-10-19 ASSESSMENT — PAIN DESCRIPTION - ORIENTATION: ORIENTATION: RIGHT

## 2022-10-19 NOTE — PROGRESS NOTES
V2.0  [facility] Hospitalist Progress Note      Name:  Mariel Cornejo /Age/Sex: 1980  (38 y.o. female)   MRN & CSN:  [id number] & [id number] Encounter Date/Time: 10/19/2022 9:47 AM EDT    Location:  -A PCP: LEIGHTON [facility] Day: 3    Assessment and Plan:   Mariel Cornejo is a 38 y.o. female with pmh of tricuspid valve endocarditis, severe TR, heart failure preserved ejection fraction, obesity who presents with Acute decompensated heart failure ([facility] [address]      Plan:    Acute on chronic diastolic heart failure  -Here with anasarca and weight gain. Says her good weight is about 220 to 230 pounds now she weights 290. Her office notes from last 6 months has been around 290 so not sure what her true dry weight is.  -Seen by cardiology. Started on Lasix drip. Has had good urine output and improvement in symptoms. Holding Zaroxolyn ,TTE with preserved ejection fraction.  -Angel catheter in place as per cardiology recommendations to monitor accurate urine output. History of tricuspid valve infective endocarditis  -Continue doxycycline for chronic suppression as per ID due to hardware in her spine.  -Off IV drugs as per patient.  -She has been considered by [facility] for tricuspid valve replacement. Plan for RAFI to evaluate tricuspid valve today. Hypokalemia  -Started on oral potassium replacement. Hypomagnesemia  -Started on oral replacement. We will check labs daily while on Lasix drip. Morbid obesity due to excess calories Body mass index is 48.32 kg/m². - Complicating assessment and treatment. Placing patient at risk for multiple co-morbidities as well as early death and contributing to the patient's presentation.  on weight loss when appropriate.          Comment: Please note this report has been produced using speech recognition software and may contain errors related to that system including errors in grammar, punctuation, and spelling, as well as words and phrases that may be inappropriate. If there are any questions or concerns please feel free to contact the dictating provider for clarification. Diet Diet NPO   DVT Prophylaxis [x] Lovenox, []  Heparin, [] SCDs, [] Ambulation,  [] Eliquis, [] Xarelto  [] Coumadin   Code Status Full Code   Disposition From: Home  Expected Disposition: Home  Estimated Date of Discharge: 2 to 3 days  Patient requires continued admission due to decompensated heart failure on Lasix drip   Surrogate Decision Maker/ GUERDA Hyde     Subjective:     Chief Complaint: Shortness of Breath (Fullness and hearing disturbances, dizziness)     Reports improvement in shortness of breath on exertion when she went to the bathroom today. Gabbi Brower Has been urinating a lot since being put on Lasix. Reports persistent orthopnea. Denies any cough. Denies any fevers, chills, nausea, vomiting. Review of Systems:    Review of Systems   Constitutional:  Negative for chills and fever. HENT:  Negative for sore throat. Eyes:  Negative for visual disturbance. Respiratory:  Positive for shortness of breath. Negative for cough. Cardiovascular:  Positive for leg swelling. Negative for chest pain and palpitations. Gastrointestinal:  Negative for diarrhea, nausea and vomiting. Endocrine: Negative for polyuria. Genitourinary:  Negative for dysuria. Musculoskeletal:  Negative for back pain. Neurological:  Negative for dizziness. Psychiatric/Behavioral:  Negative for confusion. Objective: Intake/Output Summary (Last 24 hours) at 10/19/2022 1022  Last data filed at 10/19/2022 0616  Gross per 24 hour   Intake --   Output 5940 ml   Net -5940 ml          Vitals:   Vitals:    10/19/22 0808   BP:    Pulse:    Resp:    Temp:    SpO2: 95%       Physical Exam:   Physical Exam  Constitutional:       General: She is not in acute distress. Appearance: She is obese.    HENT:      Mouth/Throat:      Mouth: Mucous membranes are moist.      Pharynx: No posterior oropharyngeal erythema. Eyes:      General: No scleral icterus. Pupils: Pupils are equal, round, and reactive to light. Cardiovascular:      Rate and Rhythm: Normal rate and regular rhythm. Heart sounds: Murmur (Systolic murmur in tricuspid area) heard. Pulmonary:      Effort: Pulmonary effort is normal.      Breath sounds: Rales (Bibasilar) present. No wheezing. Comments: On nasal cannula oxygen  Abdominal:      Palpations: Abdomen is soft. Tenderness: There is no abdominal tenderness. Musculoskeletal:         General: Normal range of motion. Right lower leg: Edema present. Left lower leg: Edema present. Comments: 2+ pitting edema up to knees. Skin:     General: Skin is warm. Neurological:      General: No focal deficit present. Mental Status: She is alert and oriented to person, place, and time. Cranial Nerves: No cranial nerve deficit. Motor: No weakness.    Psychiatric:         Mood and Affect: Mood normal.       Medications:   Medications:    magnesium oxide  400 mg Oral BID    potassium chloride  40 mEq Oral TID WC    methadone  130 mg Oral QAM    sodium chloride flush  5-40 mL IntraVENous 2 times per day    doxycycline hyclate  100 mg Oral 2 times per day    gabapentin  600 mg Oral TID    nortriptyline  150 mg Oral Nightly    sodium chloride flush  5-40 mL IntraVENous 2 times per day    enoxaparin  30 mg SubCUTAneous BID      Infusions:    furosemide (LASIX) 1mg/ml infusion 5 mg/hr (10/19/22 0830)    sodium chloride      sodium chloride       PRN Meds: hydrOXYzine HCl, 10 mg, TID PRN  sodium chloride flush, 5-40 mL, PRN  sodium chloride, , PRN  sodium chloride flush, 5-40 mL, PRN  sodium chloride, , PRN  ondansetron, 4 mg, Q8H PRN   Or  ondansetron, 4 mg, Q6H PRN  polyethylene glycol, 17 g, Daily PRN  aluminum & magnesium hydroxide-simethicone, 30 mL, Q6H PRN  acetaminophen, 650 mg, Q6H PRN   Or  acetaminophen, 650 mg, Q6H PRN      Labs Recent Results (from the past 24 hour(s))   TSH    Collection Time: 10/18/22 12:13 PM   Result Value Ref Range    TSH, High Sensitivity 0.909 0.270 - 4.20 uIu/ml   T4, Free    Collection Time: 10/18/22 12:13 PM   Result Value Ref Range    T4 Free 0.97 0.9 - 1.8 NG/DL   Magnesium    Collection Time: 10/18/22 12:13 PM   Result Value Ref Range    Magnesium 2.1 1.8 - 2.4 mg/dl   Protime/INR & PTT    Collection Time: 10/18/22 12:13 PM   Result Value Ref Range    Protime 13.8 11.7 - 14.5 SECONDS    INR 1.07 INDEX    aPTT 37.1 25.1 - 37.1 SECONDS   Drug screen multi urine    Collection Time: 10/18/22  1:13 PM   Result Value Ref Range    Cannabinoid Scrn, Ur NEGATIVE NEGATIVE    Amphetamines NEGATIVE NEGATIVE    Cocaine Metabolite NEGATIVE NEGATIVE    Benzodiazepine Screen, Urine NEGATIVE NEGATIVE    Barbiturate Screen, Ur NEGATIVE NEGATIVE    Opiates, Urine NEGATIVE NEGATIVE    Phencyclidine, Urine NEGATIVE NEGATIVE    Oxycodone NEGATIVE NEGATIVE   Basic Metabolic Panel    Collection Time: 10/19/22  8:26 AM   Result Value Ref Range    Sodium 136 135 - 145 MMOL/L    Potassium 3.2 (L) 3.5 - 5.1 MMOL/L    Chloride 88 (L) 99 - 110 mMol/L    CO2 39 (H) 21 - 32 MMOL/L    Anion Gap 9 4 - 16    BUN 24 (H) 6 - 23 MG/DL    Creatinine 0.8 0.6 - 1.1 MG/DL    Est, Glom Filt Rate >60 >60 mL/min/1.73m2    Glucose 77 70 - 99 MG/DL    Calcium 9.8 8.3 - 10.6 MG/DL   Magnesium    Collection Time: 10/19/22  8:26 AM   Result Value Ref Range    Magnesium 1.6 (L) 1.8 - 2.4 mg/dl        Imaging/Diagnostics Last 24 Hours   XR CHEST (2 VW)    Result Date: 10/17/2022  EXAMINATION: TWO XRAY VIEWS OF THE CHEST 10/17/2022 5:37 pm COMPARISON: December 8, 2020 HISTORY: ORDERING SYSTEM PROVIDED HISTORY: shortness of breath TECHNOLOGIST PROVIDED HISTORY: Reason for exam:->shortness of breath Reason for Exam: SOB Additional signs and symptoms: unknown Relevant Medical/Surgical History: none FINDINGS: The cardiomediastinal silhouette is mildly enlarged. Vascular congestive changes are present. Bilateral increased airspace and interstitial opacities are noted diffusely. No pleural effusion or pneumothorax is identified. No subdiaphragmatic free air. 1. Mild cardiomegaly and vascular congestive changes. 2. Diffuse bilateral increased airspace and interstitial opacities. These findings could represent evolving pulmonary edema although an evolving atypical pneumonia could have a similar appearance. Radiographic follow-up recommended to ensure resolution.        Electronically signed by Elmer Todd MD on 10/19/2022 at 10:22 AM

## 2022-10-19 NOTE — PROGRESS NOTES
Met with patient and her mother. Introduced myself as the Heart failure education R.N. Patient has been seen by heart surgeon at the [facility] and is preparing for heart valve surgery. Admitting diagnosis-Acute decompensated heart failure   Cardiologist- Josemanuel Hong and [facility]   Ejection fraction  - 55% on 10/17/2022   Pro BNP-1,621 on 10/17  Hospital follow up apptWshamika Pickup on 10/28  Patient informed of appointment and appointment added to AVS  Cardiac rehabilitation referral- N/A   ICD information- N/A   Smoking Cessation information and referral- Patient reports smoking prior to admission. Voices 'being scared' by heart issues and intention to quit with mother's help. Pneumonia vaccine- will  discuss with PCP  PCP-JOSEPH Neves CNP  Patient has a digital scale? Yes   Transportation- [employer] and family transport    Able to obtain medications without difficulty? - Yes- Patient denies difficulty in obtaining or taking medications. Reviewed Heart failure patient education book with patient and mother. Questions answered. Patient's heart failure medications reviewed and information given on each. Reviewed the Stop Light Handout with patient and instructed when to call her provider. Stressed need to call for help as soon as symptoms begin. Patient was engaged and attentive during education session. The following handouts were reviewed with patient and patient was given a copy of the following : Heart Failure education booklet, the [employer] handout, the 'Stop Light' Handout, Staying Motivated- Finding Your Why, a link to the [address] with Heart Failure Interactive workbook and a list of heart failure related education available on the hospital TV and how to access it. 1 hour of education provided.

## 2022-10-19 NOTE — PROCEDURES
[address] [place] [address], [address] [place] [address]                                 ECHOCARDIOGRAM    PATIENT NAME: Tona Ramos                      :        1980  MED REC NO:   [id number]                          ROOM:         ACCOUNT NO:   [de-identified]                           ADMIT DATE: 10/17/2022  PROVIDER:     Darlene Sweet MD    RAFI-GUIDED CARDIOVERSION    INDICATION:  Endocarditis. This is a 63-year-old female patient brought to the noninvasive lab. Informed consent was obtained from the patient. The patient received 6  mg of Versed and 75 mcg of fentanyl. Posterior oropharynx was  anesthetized using lidocaine gel. A mouthguard was placed. RAFI probe  was advanced to posterior oropharynx and mid esophagus. Images were  obtained. Left atrium is normal-sized. No clot or thrombus noted in the left  atrium or left appendage is occluded. Mitral valve is normal.  Mild  mitral regurgitation. Mild mitral has no vegetation noted. LV function  is preserved, greater than 55%. No pericardial effusion noted. No ASD  or PFO noted. Tricuspid valve is normal.  Moderate tricuspid  regurgitation present but the tricuspid valve is normal.  No obvious  vegetation present. Pulmonic valve is normal.  Ascending aorta is  normal.    IMPRESSION:  1. No vegetation noted on the tricuspid valve, pulmonic valve, aortic  valve, and mitral valve. 2.  Moderate tricuspid regurgitation noted. 3.  LV function is preserved, greater than 55%. 4.  No clot or thrombus noted in the left atrium or left atrial  appendage. 5.  No pericardial effusion noted. The patient tolerated the procedure well. No complications noted.         Shabbir Centeno MD    D: 10/19/2022 11:38:09       T: 10/19/2022 13:29:09     [email or web address]  Job#: [id number]     Doc#: [id number]    CC:

## 2022-10-19 NOTE — DISCHARGE INSTRUCTIONS
Per Dr. Roberson for below orders.  Sangita Khan MA      Please schedule appointment to see Dr. Ann Ledbetter and Dr. Bladimir Chin in the office within 2 weeks. Take your diuretics as prescribed. Check your weight daily as outlined below. Please keep your total fluid intake below 1.5 L daily. HEART FAILURE -   INSTRUCTIONS:         MEDICATIONS:  Please notify the the heart failure clinic R.N. or your doctor if you are not able to take your medications for any reason. WEIGHT MONITORING:   Weigh yourself  everyday in the morning after urination and record the weight on your weight log. Notify the doctor/clinic nurse of a weight gain of 3 pounds or more in 1 day   OR  a total of 5 pounds or more in 1 week             DIET  Cardiac heart healthy diet- Low saturated / low trans fat, no added salt, caffeine restricted,   Low sodium diet- no  more than 2,000mg (2 grams) of salt / sodium per day (which equals to a little less than  a teaspoon of salt)  The doctor may also recommend a fluid limit -  Fluid restriction- 2,000 ml (milliliters) = 64 ounces = you can have 8 glasses of fluid per day (each glass 8 ounces)    Avoid using salt at the table, avoid / limit use of canned soups, processed / packaged foods, salted snacks, olives and pickles. Do not use a salt substitute without checking with the doctor. (Mrs. Lv Cano is safe to use). NOTIFY THE  DOCTOR THE FIRST DAY OF ONSET OF ANY OF THESE   SYMPTOMS:   Weight gain of 3 pounds or more in 1 day         OR 5 pounds or more in one week  More shortness of breath  More swelling in stomach, legs, ankles or feet  Feeling more tired, No energy  Dry hacky cough  Dizziness  More chest pain / discomfort           Please visit  American Heart Association Healthy Living with Heart Failure  at [email or web address]      [id number] [facility]  Heart Failure Clinic  SANJAY GALVIN, R.N.  Phone: [phone] or [place]

## 2022-10-19 NOTE — PROGRESS NOTES
Daily Progress Note  Subjective:    Pt. Awake, alert and feeling ok  HR stable, BP stable  No CP, SOB stable    Attending Note:    Plan for right and left heart cath tomorrow  She is to have TV surgery at [facility] for severe TR  RAFI showed moderate TR  Hx of endocarditis resolved  She had good output with lasix  Need life style changes and salt and fluid restriction  Hypokalemia and hypomag-correct  Change to oral diuretics   Hx of drug use now clean  Fluid overload due to salt and fluid intake  She has pulmonary HTN -right heart cath tomorrow   Hx of back surgeries     Impression and Plan:     Acute on Chronic HFpEF    Severe PAH and severe TR- s/p IE that appeared resolved on last RAFI at [facility] several months ago    She is getting w/u through [facility] for possible TV surgery    Now with worsening JADE, orthopnea and generalized edema    On lasix drip with nearly 6 L Output in 24 hrs. Labs this morning stable for Cr.- need to replace K    TTE done yesterday for TR but unable to see the valves    Concerned that her HF is out of proportion to her TR- Dr. Bettye Augustin performing RAFI for her today to re-evaluate TV    Considering LHC/RHC for cardiopulmonary eval as well-discussed with pt. And she is agreeable to proceed with this today    Will follow  Further recs after testig today    PAST MEDICAL HISTORY:  Hepatitis C, liver disease, history of infective  endocarditis, tricuspid valve regurgitation, and heart failure. PAST SURGICAL HISTORY:  , cholecystectomy, right hand finger  amputation in 2019, hysterectomy, incision and drainage of the right  upper thigh high, lap sera, and spinal fusion. ALLERGIES:   BUPRENORPHINE, SUBUTEX, and AUGMENTIN. FAMILY HISTORY:  Reviewed and noncontributory. SOCIAL HISTORY:  Current everyday smoker, smoking half a pack a day,  does not drink any alcohol. She has a previous history of IV drug  abuse, but told me that she has not done any since 2022.      HOME MEDICATIONS:  She is on Pamelor, methadone, Klor-Con 20 mEq b.i.d.,  Zaroxolyn 2.5 mg daily, Lasix 60 mg b.i.d., and Neurontin 300 t.i.d.  Objective:   /70   Pulse 88   Temp 97.1 °F (36.2 °C)   Resp 18   Ht 5' 5\" (1.651 m)   Wt 290 lb 5.5 oz (131.7 kg)   SpO2 95%   BMI 48.32 kg/m²     Intake/Output Summary (Last 24 hours) at 10/19/2022 0948  Last data filed at 10/19/2022 5252  Gross per 24 hour   Intake --   Output 5940 ml   Net -5940 ml       Medications:   Scheduled Meds:   potassium chloride  40 mEq Oral TID WC    methadone  130 mg Oral QAM    sodium chloride flush  5-40 mL IntraVENous 2 times per day    doxycycline hyclate  100 mg Oral 2 times per day    gabapentin  600 mg Oral TID    nortriptyline  150 mg Oral Nightly    sodium chloride flush  5-40 mL IntraVENous 2 times per day    enoxaparin  30 mg SubCUTAneous BID      Infusions:   furosemide (LASIX) 1mg/ml infusion 5 mg/hr (10/19/22 0830)    sodium chloride      sodium chloride        PRN Meds:  hydrOXYzine HCl, sodium chloride flush, sodium chloride, sodium chloride flush, sodium chloride, ondansetron **OR** ondansetron, polyethylene glycol, aluminum & magnesium hydroxide-simethicone, acetaminophen **OR** acetaminophen     Physical Exam:  Vitals:    10/19/22 0808   BP:    Pulse:    Resp:    Temp:    SpO2: 95%        General: AAO, NAD  Chest: Nontender  Cardiac: First and Second Heart Sounds are Normal, No Murmurs or Gallops noted  Lungs:Clear to auscultation and percussion. Abdomen: Soft, NT, ND, +BS  Extremities: No clubbing, no edema  Vascular:  Equal 2+ peripheral pulses.     Lab Data:  CBC:   Recent Labs     10/17/22  1755 10/18/22  0845   WBC 8.7 5.7   HGB 11.6* 11.1*   HCT 38.7 35.5*   MCV 91.1 89.0    241     BMP:   Recent Labs     10/17/22  1755 10/18/22  0845 10/19/22  0826   * 135 136   K 2.8* 3.3* 3.2*   CL 88* 88* 88*   CO2 36* 37* 39*   BUN 32* 24* 24*   CREATININE 1.0 0.7 0.8     LIVER PROFILE:   Recent Labs

## 2022-10-19 NOTE — CONSULTS
Consult completed. Indication for line: Limited/no vessel suitable for conventional peripheral access, patient has pending RAFI and heart cath per Dr. Gunnar Irene [facility] to use right cephalic vein as right brachial vein too small in diameter for access. Midline insertion education provided to patient, risks and benefits discussed and reviewed with patient. Patient verbalized understanding, questions answered. Arrow Endurance Extended Dwell single lumen midline inserted into right cephalic vein x 1 attempt using sterile ultrasound guided technique without difficulty per protocol. Brisk blood return noted and flushes without resistance. Patient tolerated procedure well. Ultrasound photos of vein diameter provided below. An additional EDC 20g 6cm PIV inserted to right forearm x 1 attempt using sterile ultrasound guided technique without difficulty per protocol. Please consult IV/PICC team if patient's needs change, questions, or concerns.

## 2022-10-20 LAB
ALBUMIN SERPL-MCNC: 2.9 GM/DL (ref 3.4–5)
ALP BLD-CCNC: 121 IU/L (ref 40–128)
ALT SERPL-CCNC: 12 U/L (ref 10–40)
ANION GAP SERPL CALCULATED.3IONS-SCNC: 6 MMOL/L (ref 4–16)
AST SERPL-CCNC: 13 IU/L (ref 15–37)
BASE EXCESS MIXED: 19.5 (ref 0–2.3)
BASOPHILS ABSOLUTE: 0 K/CU MM
BASOPHILS RELATIVE PERCENT: 0.4 % (ref 0–1)
BILIRUB SERPL-MCNC: 0.3 MG/DL (ref 0–1)
BUN BLDV-MCNC: 27 MG/DL (ref 6–23)
CALCIUM SERPL-MCNC: 9.3 MG/DL (ref 8.3–10.6)
CARBON MONOXIDE, BLOOD: 2.9 % (ref 0–5)
CHLORIDE BLD-SCNC: 90 MMOL/L (ref 99–110)
CO2 CONTENT: 48.4 MMOL/L (ref 19–24)
CO2: 38 MMOL/L (ref 21–32)
COMMENT: ABNORMAL
CREAT SERPL-MCNC: 0.8 MG/DL (ref 0.6–1.1)
CULTURE: NORMAL
DIFFERENTIAL TYPE: ABNORMAL
EKG ATRIAL RATE: 91 BPM
EKG DIAGNOSIS: NORMAL
EKG P AXIS: 44 DEGREES
EKG P-R INTERVAL: 204 MS
EKG Q-T INTERVAL: 396 MS
EKG QRS DURATION: 114 MS
EKG QTC CALCULATION (BAZETT): 487 MS
EKG R AXIS: -33 DEGREES
EKG T AXIS: 77 DEGREES
EKG VENTRICULAR RATE: 91 BPM
EOSINOPHILS ABSOLUTE: 0.5 K/CU MM
EOSINOPHILS RELATIVE PERCENT: 9.1 % (ref 0–3)
GFR SERPL CREATININE-BSD FRML MDRD: >60 ML/MIN/1.73M2
GLUCOSE BLD-MCNC: 81 MG/DL (ref 70–99)
HCO3 ARTERIAL: 46.5 MMOL/L (ref 18–23)
HCT VFR BLD CALC: 35.1 % (ref 37–47)
HEMOGLOBIN: 10.7 GM/DL (ref 12.5–16)
IMMATURE NEUTROPHIL %: 0.4 % (ref 0–0.43)
LYMPHOCYTES ABSOLUTE: 1.2 K/CU MM
LYMPHOCYTES RELATIVE PERCENT: 24.1 % (ref 24–44)
Lab: NORMAL
MAGNESIUM: 1.8 MG/DL (ref 1.8–2.4)
MAGNESIUM: 2 MG/DL (ref 1.8–2.4)
MCH RBC QN AUTO: 27.6 PG (ref 27–31)
MCHC RBC AUTO-ENTMCNC: 30.5 % (ref 32–36)
MCV RBC AUTO: 90.7 FL (ref 78–100)
METHEMOGLOBIN ARTERIAL: 1.2 %
MONOCYTES ABSOLUTE: 0.6 K/CU MM
MONOCYTES RELATIVE PERCENT: 11.6 % (ref 0–4)
NUCLEATED RBC %: 0 %
O2 SATURATION: 83.4 % (ref 96–97)
PCO2 ARTERIAL: 61 MMHG (ref 32–45)
PDW BLD-RTO: 19.7 % (ref 11.7–14.9)
PH BLOOD: 7.49 (ref 7.34–7.45)
PLATELET # BLD: 229 K/CU MM (ref 140–440)
PMV BLD AUTO: 9.9 FL (ref 7.5–11.1)
PO2 ARTERIAL: 51 MMHG (ref 75–100)
POTASSIUM SERPL-SCNC: 3.4 MMOL/L (ref 3.5–5.1)
PRO-BNP: 87.49 PG/ML
RBC # BLD: 3.87 M/CU MM (ref 4.2–5.4)
SEGMENTED NEUTROPHILS ABSOLUTE COUNT: 2.7 K/CU MM
SEGMENTED NEUTROPHILS RELATIVE PERCENT: 54.4 % (ref 36–66)
SODIUM BLD-SCNC: 134 MMOL/L (ref 135–145)
SPECIMEN: NORMAL
TOTAL IMMATURE NEUTOROPHIL: 0.02 K/CU MM
TOTAL NUCLEATED RBC: 0 K/CU MM
TOTAL PROTEIN: 7.1 GM/DL (ref 6.4–8.2)
WBC # BLD: 4.9 K/CU MM (ref 4–10.5)

## 2022-10-20 PROCEDURE — 80048 BASIC METABOLIC PNL TOTAL CA: CPT

## 2022-10-20 PROCEDURE — 94761 N-INVAS EAR/PLS OXIMETRY MLT: CPT

## 2022-10-20 PROCEDURE — 6360000002 HC RX W HCPCS: Performed by: INTERNAL MEDICINE

## 2022-10-20 PROCEDURE — 2580000003 HC RX 258: Performed by: INTERNAL MEDICINE

## 2022-10-20 PROCEDURE — 2709999900 HC NON-CHARGEABLE SUPPLY

## 2022-10-20 PROCEDURE — C1894 INTRO/SHEATH, NON-LASER: HCPCS

## 2022-10-20 PROCEDURE — C1751 CATH, INF, PER/CENT/MIDLINE: HCPCS

## 2022-10-20 PROCEDURE — 6370000000 HC RX 637 (ALT 250 FOR IP): Performed by: STUDENT IN AN ORGANIZED HEALTH CARE EDUCATION/TRAINING PROGRAM

## 2022-10-20 PROCEDURE — 83880 ASSAY OF NATRIURETIC PEPTIDE: CPT

## 2022-10-20 PROCEDURE — 6370000000 HC RX 637 (ALT 250 FOR IP): Performed by: INTERNAL MEDICINE

## 2022-10-20 PROCEDURE — 6360000002 HC RX W HCPCS

## 2022-10-20 PROCEDURE — B2111ZZ FLUOROSCOPY OF MULTIPLE CORONARY ARTERIES USING LOW OSMOLAR CONTRAST: ICD-10-PCS | Performed by: INTERNAL MEDICINE

## 2022-10-20 PROCEDURE — C1769 GUIDE WIRE: HCPCS

## 2022-10-20 PROCEDURE — 82803 BLOOD GASES ANY COMBINATION: CPT

## 2022-10-20 PROCEDURE — C1887 CATHETER, GUIDING: HCPCS

## 2022-10-20 PROCEDURE — 93010 ELECTROCARDIOGRAM REPORT: CPT | Performed by: INTERNAL MEDICINE

## 2022-10-20 PROCEDURE — 85025 COMPLETE CBC W/AUTO DIFF WBC: CPT

## 2022-10-20 PROCEDURE — 83735 ASSAY OF MAGNESIUM: CPT

## 2022-10-20 PROCEDURE — 93460 R&L HRT ART/VENTRICLE ANGIO: CPT

## 2022-10-20 PROCEDURE — 2700000000 HC OXYGEN THERAPY PER DAY

## 2022-10-20 PROCEDURE — 80053 COMPREHEN METABOLIC PANEL: CPT

## 2022-10-20 PROCEDURE — 1200000000 HC SEMI PRIVATE

## 2022-10-20 PROCEDURE — B24BZZ4 ULTRASONOGRAPHY OF HEART WITH AORTA, TRANSESOPHAGEAL: ICD-10-PCS | Performed by: INTERNAL MEDICINE

## 2022-10-20 PROCEDURE — 36415 COLL VENOUS BLD VENIPUNCTURE: CPT

## 2022-10-20 PROCEDURE — 6360000004 HC RX CONTRAST MEDICATION

## 2022-10-20 PROCEDURE — 4A023N6 MEASUREMENT OF CARDIAC SAMPLING AND PRESSURE, RIGHT HEART, PERCUTANEOUS APPROACH: ICD-10-PCS | Performed by: INTERNAL MEDICINE

## 2022-10-20 PROCEDURE — 5A2204Z RESTORATION OF CARDIAC RHYTHM, SINGLE: ICD-10-PCS | Performed by: INTERNAL MEDICINE

## 2022-10-20 RX ORDER — SODIUM CHLORIDE 9 MG/ML
INJECTION, SOLUTION INTRAVENOUS PRN
Status: DISCONTINUED | OUTPATIENT
Start: 2022-10-20 | End: 2022-10-21 | Stop reason: HOSPADM

## 2022-10-20 RX ORDER — ATROPINE SULFATE 0.4 MG/ML
0.5 AMPUL (ML) INJECTION
Status: DISCONTINUED | OUTPATIENT
Start: 2022-10-20 | End: 2022-10-21

## 2022-10-20 RX ORDER — ACETAMINOPHEN 325 MG/1
650 TABLET ORAL EVERY 4 HOURS PRN
Status: DISCONTINUED | OUTPATIENT
Start: 2022-10-20 | End: 2022-10-21 | Stop reason: HOSPADM

## 2022-10-20 RX ORDER — SODIUM CHLORIDE 0.9 % (FLUSH) 0.9 %
5-40 SYRINGE (ML) INJECTION PRN
Status: DISCONTINUED | OUTPATIENT
Start: 2022-10-20 | End: 2022-10-21 | Stop reason: HOSPADM

## 2022-10-20 RX ORDER — POTASSIUM CHLORIDE 20 MEQ/1
40 TABLET, EXTENDED RELEASE ORAL 2 TIMES DAILY WITH MEALS
Status: DISCONTINUED | OUTPATIENT
Start: 2022-10-20 | End: 2022-10-21 | Stop reason: HOSPADM

## 2022-10-20 RX ORDER — SODIUM CHLORIDE 0.9 % (FLUSH) 0.9 %
5-40 SYRINGE (ML) INJECTION EVERY 12 HOURS SCHEDULED
Status: DISCONTINUED | OUTPATIENT
Start: 2022-10-20 | End: 2022-10-21 | Stop reason: HOSPADM

## 2022-10-20 RX ORDER — POTASSIUM CHLORIDE 20 MEQ/1
40 TABLET, EXTENDED RELEASE ORAL
Status: DISCONTINUED | OUTPATIENT
Start: 2022-10-20 | End: 2022-10-20

## 2022-10-20 RX ADMIN — TORSEMIDE 20 MG: 20 TABLET ORAL at 09:49

## 2022-10-20 RX ADMIN — DOXYCYCLINE HYCLATE 100 MG: 100 TABLET, COATED ORAL at 21:29

## 2022-10-20 RX ADMIN — Medication 400 MG: at 21:29

## 2022-10-20 RX ADMIN — Medication 400 MG: at 09:48

## 2022-10-20 RX ADMIN — ACETAMINOPHEN 650 MG: 325 TABLET ORAL at 21:38

## 2022-10-20 RX ADMIN — DOXYCYCLINE HYCLATE 100 MG: 100 TABLET, COATED ORAL at 11:00

## 2022-10-20 RX ADMIN — ENOXAPARIN SODIUM 30 MG: 100 INJECTION SUBCUTANEOUS at 09:48

## 2022-10-20 RX ADMIN — GABAPENTIN 600 MG: 300 CAPSULE ORAL at 14:49

## 2022-10-20 RX ADMIN — GABAPENTIN 600 MG: 300 CAPSULE ORAL at 21:29

## 2022-10-20 RX ADMIN — NORTRIPTYLINE HYDROCHLORIDE 150 MG: 25 CAPSULE ORAL at 21:29

## 2022-10-20 RX ADMIN — METHADONE HYDROCHLORIDE 130 MG: 10 CONCENTRATE ORAL at 09:49

## 2022-10-20 RX ADMIN — SODIUM CHLORIDE, PRESERVATIVE FREE 10 ML: 5 INJECTION INTRAVENOUS at 09:53

## 2022-10-20 RX ADMIN — GABAPENTIN 600 MG: 300 CAPSULE ORAL at 09:49

## 2022-10-20 RX ADMIN — ACETAMINOPHEN 650 MG: 325 TABLET ORAL at 17:06

## 2022-10-20 RX ADMIN — SODIUM CHLORIDE, PRESERVATIVE FREE 10 ML: 5 INJECTION INTRAVENOUS at 11:27

## 2022-10-20 RX ADMIN — POTASSIUM CHLORIDE 40 MEQ: 1500 TABLET, EXTENDED RELEASE ORAL at 10:59

## 2022-10-20 RX ADMIN — POTASSIUM CHLORIDE 40 MEQ: 20 TABLET, EXTENDED RELEASE ORAL at 11:35

## 2022-10-20 RX ADMIN — POTASSIUM CHLORIDE 40 MEQ: 1500 TABLET, EXTENDED RELEASE ORAL at 17:05

## 2022-10-20 ASSESSMENT — ENCOUNTER SYMPTOMS
DIARRHEA: 0
VOMITING: 0
COUGH: 0
NAUSEA: 0
SHORTNESS OF BREATH: 1
BACK PAIN: 0
SORE THROAT: 0

## 2022-10-20 ASSESSMENT — PAIN SCALES - GENERAL
PAINLEVEL_OUTOF10: 3
PAINLEVEL_OUTOF10: 6

## 2022-10-20 ASSESSMENT — PAIN DESCRIPTION - ORIENTATION
ORIENTATION: RIGHT
ORIENTATION: LEFT

## 2022-10-20 ASSESSMENT — PAIN DESCRIPTION - LOCATION
LOCATION: ARM
LOCATION: ARM

## 2022-10-20 ASSESSMENT — PAIN DESCRIPTION - DESCRIPTORS
DESCRIPTORS: ACHING;CRAMPING
DESCRIPTORS: ACHING

## 2022-10-20 NOTE — PROGRESS NOTES
V2.0  [facility] Hospitalist Progress Note      Name:  Tiffany Balbuena /Age/Sex: 1980  (38 y.o. female)   MRN & CSN:  [id number] & [id number] Encounter Date/Time: 10/20/2022 9:47 AM EDT    Location:  -A PCP: Sami Downing, APRN - [facility] Day: 4    Assessment and Plan:   Tiffany Balbuena is a 38 y.o. female with pmh of tricuspid valve endocarditis, severe TR, heart failure preserved ejection fraction, obesity who presents with Acute decompensated heart failure ([facility] [address]      Plan:    Acute on chronic diastolic heart failure  -Here with anasarca and weight gain. Says her good weight is about 220 to 230 pounds now she weights 290. Her office notes from last 6 months has been around 290 so not sure what her true dry weight is.  -Seen by cardiology. Started on Lasix drip, now transitioned to oral torsemide. Has had good urine output and improvement in symptoms. Holding Zaroxolyn ,TTE with preserved ejection fraction.  -Angel catheter in place as per cardiology recommendations to monitor accurate urine output, will remove now as patient no longer on Lasix drip. Ibeth Aviles -RAFI negative for tricuspid valve endocarditis. Had a cardiac cath done which did not reveal any significant obstructive coronary disease and only mild disease and did not require any stents.  -Elevated right heart pressures. Noted to have CO2 retention and low PCO2 on ABG. Pulmonology has been consulted. History of tricuspid valve infective endocarditis  -Continue doxycycline for chronic suppression as per ID due to hardware in her spine.  -Off IV drugs as per patient.  -She has been considered by [facility] for tricuspid valve replacement. RAFI negative for endocarditis. Only has moderate regurg. Hypokalemia  -Now on p.o. supplementation. Hypomagnesemia  -Replenished with oral magnesium. Morbid obesity due to excess calories Body mass index is 47.73 kg/m². - Complicating assessment and treatment.  Placing patient at risk for multiple co-morbidities as well as early death and contributing to the patient's presentation.  on weight loss when appropriate. Comment: Please note this report has been produced using speech recognition software and may contain errors related to that system including errors in grammar, punctuation, and spelling, as well as words and phrases that may be inappropriate. If there are any questions or concerns please feel free to contact the dictating provider for clarification. Diet ADULT DIET; Regular; Low Fat/Low Chol/High Fiber/2 gm Na   DVT Prophylaxis [x] Lovenox, []  Heparin, [] SCDs, [] Ambulation,  [] Eliquis, [] Xarelto  [] Coumadin   Code Status Full Code   Disposition From: Home  Expected Disposition: Home  Estimated Date of Discharge: 24 hours  Patient requires continued admission due to decompensated heart failure. Needs pulmonary eval   Surrogate Decision Maker/ GUERDA Hyde     Subjective:     Chief Complaint: Shortness of Breath (Fullness and hearing disturbances, dizziness)     Not in a good mood after cath today. Reports she is not comfortable with the BiPAP machine and makes her claustrophobic. Improvement in shortness of breath. Denies any cough. Denies any chest pain, fevers, chills, nausea, vomiting. Review of Systems:    Review of Systems   Constitutional:  Negative for chills and fever. HENT:  Negative for sore throat. Eyes:  Negative for visual disturbance. Respiratory:  Positive for shortness of breath. Negative for cough. Cardiovascular:  Positive for leg swelling. Negative for chest pain and palpitations. Gastrointestinal:  Negative for diarrhea, nausea and vomiting. Endocrine: Negative for polyuria. Genitourinary:  Negative for dysuria. Musculoskeletal:  Negative for back pain. Neurological:  Negative for dizziness. Psychiatric/Behavioral:  Negative for confusion. Objective:      Intake/Output Summary (Last 24 hours) at 10/20/2022 Πάνου 90 filed at 10/20/2022 0427  Gross per 24 hour   Intake 564 ml   Output 3350 ml   Net -2786 ml          Vitals:   Vitals:    10/20/22 0426   BP: 103/75   Pulse: 82   Resp: 12   Temp: 97.7 °F (36.5 °C)   SpO2: 97%       Physical Exam:   Physical Exam  Constitutional:       General: She is not in acute distress. Appearance: She is obese. HENT:      Mouth/Throat:      Mouth: Mucous membranes are moist.      Pharynx: No posterior oropharyngeal erythema. Eyes:      General: No scleral icterus. Pupils: Pupils are equal, round, and reactive to light. Cardiovascular:      Rate and Rhythm: Normal rate and regular rhythm. Heart sounds: Murmur (Systolic murmur in tricuspid area) heard. Pulmonary:      Effort: Pulmonary effort is normal.      Breath sounds: No wheezing or rales. Comments: On nasal cannula oxygen  Abdominal:      Palpations: Abdomen is soft. Tenderness: There is no abdominal tenderness. Musculoskeletal:         General: Normal range of motion. Right lower leg: Edema present. Left lower leg: Edema present. Comments: 1+ pitting edema up to knees. Skin:     General: Skin is warm. Neurological:      General: No focal deficit present. Mental Status: She is alert and oriented to person, place, and time. Cranial Nerves: No cranial nerve deficit. Motor: No weakness.    Psychiatric:         Mood and Affect: Mood normal.       Medications:   Medications:    sodium chloride flush  5-40 mL IntraVENous 2 times per day    magnesium oxide  400 mg Oral BID    torsemide  20 mg Oral Daily    potassium chloride  40 mEq Oral TID WC    methadone  130 mg Oral QAM    sodium chloride flush  5-40 mL IntraVENous 2 times per day    doxycycline hyclate  100 mg Oral 2 times per day    gabapentin  600 mg Oral TID    nortriptyline  150 mg Oral Nightly    sodium chloride flush  5-40 mL IntraVENous 2 times per day    enoxaparin  30 mg SubCUTAneous BID      Infusions: sodium chloride      sodium chloride      sodium chloride       PRN Meds: sodium chloride flush, 5-40 mL, PRN  sodium chloride, , PRN  acetaminophen, 650 mg, Q4H PRN  atropine, 0.5 mg, Once PRN  hydrOXYzine HCl, 10 mg, TID PRN  sodium chloride flush, 5-40 mL, PRN  sodium chloride, , PRN  sodium chloride flush, 5-40 mL, PRN  sodium chloride, , PRN  ondansetron, 4 mg, Q8H PRN   Or  ondansetron, 4 mg, Q6H PRN  polyethylene glycol, 17 g, Daily PRN  aluminum & magnesium hydroxide-simethicone, 30 mL, Q6H PRN      Labs      Recent Results (from the past 24 hour(s))   Blood gas, arterial    Collection Time: 10/20/22  8:30 AM   Result Value Ref Range    pH, Bld 7.49 (H) 7.34 - 7.45    pCO2, Arterial 61.0 (H) 32 - 45 MMHG    pO2, Arterial 51 (L) 75 - 100 MMHG    Base Exc, Mixed 19.5 (H) 0 - 2.3    HCO3, Arterial 46.5 (H) 18 - 23 MMOL/L    CO2 Content 48.4 (H) 19 - 24 MMOL/L    O2 Sat 83.4 (L) 96 - 97 %    Carbon Monoxide, Blood 2.9 0 - 5 %    Methemoglobin, Arterial 1.2 <1.5 %    Comment RA    Comprehensive Metabolic Panel    Collection Time: 10/20/22  8:47 AM   Result Value Ref Range    Sodium 134 (L) 135 - 145 MMOL/L    Potassium 3.4 (L) 3.5 - 5.1 MMOL/L    Chloride 90 (L) 99 - 110 mMol/L    CO2 38 (H) 21 - 32 MMOL/L    BUN 27 (H) 6 - 23 MG/DL    Creatinine 0.8 0.6 - 1.1 MG/DL    Est, Glom Filt Rate >60 >60 mL/min/1.73m2    Glucose 81 70 - 99 MG/DL    Calcium 9.3 8.3 - 10.6 MG/DL    Albumin 2.9 (L) 3.4 - 5.0 GM/DL    Total Protein 7.1 6.4 - 8.2 GM/DL    Total Bilirubin 0.3 0.0 - 1.0 MG/DL    ALT 12 10 - 40 U/L    AST 13 (L) 15 - 37 IU/L    Alkaline Phosphatase 121 40 - 128 IU/L    Anion Gap 6 4 - 16   Magnesium    Collection Time: 10/20/22  8:47 AM   Result Value Ref Range    Magnesium 1.8 1.8 - 2.4 mg/dl   Brain Natriuretic Peptide    Collection Time: 10/20/22  8:47 AM   Result Value Ref Range    Pro-BNP 87.49 <300 PG/ML   CBC with Auto Differential    Collection Time: 10/20/22  8:47 AM   Result Value Ref Range    WBC 4.9 4.0 - 10.5 K/CU MM    RBC 3.87 (L) 4.2 - 5.4 M/CU MM    Hemoglobin 10.7 (L) 12.5 - 16.0 GM/DL    Hematocrit 35.1 (L) 37 - 47 %    MCV 90.7 78 - 100 FL    MCH 27.6 27 - 31 PG    MCHC 30.5 (L) 32.0 - 36.0 %    RDW 19.7 (H) 11.7 - 14.9 %    Platelets 857 488 - 345 K/CU MM    MPV 9.9 7.5 - 11.1 FL    Differential Type AUTOMATED DIFFERENTIAL     Segs Relative 54.4 36 - 66 %    Lymphocytes % 24.1 24 - 44 %    Monocytes % 11.6 (H) 0 - 4 %    Eosinophils % 9.1 (H) 0 - 3 %    Basophils % 0.4 0 - 1 %    Segs Absolute 2.7 K/CU MM    Lymphocytes Absolute 1.2 K/CU MM    Monocytes Absolute 0.6 K/CU MM    Eosinophils Absolute 0.5 K/CU MM    Basophils Absolute 0.0 K/CU MM    Nucleated RBC % 0.0 %    Total Nucleated RBC 0.0 K/CU MM    Total Immature Neutrophil 0.02 K/CU MM    Immature Neutrophil % 0.4 0 - 0.43 %        Imaging/Diagnostics Last 24 Hours   XR CHEST (2 VW)    Result Date: 10/17/2022  EXAMINATION: TWO XRAY VIEWS OF THE CHEST 10/17/2022 5:37 pm COMPARISON: December 8, 2020 HISTORY: ORDERING SYSTEM PROVIDED HISTORY: shortness of breath TECHNOLOGIST PROVIDED HISTORY: Reason for exam:->shortness of breath Reason for Exam: SOB Additional signs and symptoms: unknown Relevant Medical/Surgical History: none FINDINGS: The cardiomediastinal silhouette is mildly enlarged. Vascular congestive changes are present. Bilateral increased airspace and interstitial opacities are noted diffusely. No pleural effusion or pneumothorax is identified. No subdiaphragmatic free air. 1. Mild cardiomegaly and vascular congestive changes. 2. Diffuse bilateral increased airspace and interstitial opacities. These findings could represent evolving pulmonary edema although an evolving atypical pneumonia could have a similar appearance. Radiographic follow-up recommended to ensure resolution.        Electronically signed by Kalyn Bassett MD on 10/20/2022 at 10:20 AM

## 2022-10-20 NOTE — PROCEDURES
[address] [place] [address], [address] [place] [address]                            CARDIAC CATHETERIZATION    PATIENT NAME: Claudette Athens                      :        1980  MED REC NO:   [id number]                          ROOM:       [facility]  ACCOUNT NO:   [de-identified]                           ADMIT DATE: 10/17/2022  PROVIDER:     Jass Jay MD    DATE OF PROCEDURE:  10/20/2022    INDICATION:  Dyspnea, pulmonary hypertension, tricuspid regurgitation,  and history of tricuspid valve endocarditis. This is a pre-surgery evaluation. This is a 77-year-old female patient brought to the cath lab today. Informed consent was obtained from the patient. The patient was prepped  and draped. The patient was injected with 5 mL of 2% lidocaine in the  right brachial vein. A sheath was placed in the right brachial region  and the right arterial sheath was placed in the right radial artery. A  5/6-Taiwanese sheath placed and the entire procedure was done using  guidewire, sheath was flushed in between procedure. Pressures are:  RA pressure is 22/17 with a mean of 17. RV pressure is  48/15 with a mean of 19. PA pressure is 46/25 with a mean of 34. Pulmonary capillary wedge pressure 20/18 with a mean of 16 present. EDP  is 20 mmHg present. On the pullback, there was no gradient present  across the aortic valve. Using a TIG catheter, left coronary angiography was performed. The left  coronary angiogram revealed the left main is patent. It bifurcates into  LAD and circumflex artery. Circ is a small-sized stenosis and it is patent. A medium-sized OM  branch present and it is patent. LAD is a medium-sized vessel. It  reaches and wraps the apex and gives off high diagonal branch and the  diagonal branch has mild disease present. Using a JR-4 catheter, right coronary angiography was performed.   The  right coronary angiogram revealed the right coronary artery is a  large-sized vessel. It is a dominant vessel. The right coronary has  mild disease present. EDP was measured using the pigtail catheter. IMPRESSION:  1. Left main is patent. 2.  LAD, circ, and RCA has mild disease present. 3.  EDP is around 20 mmHg present. The right heart pressures are elevated. Wedge is 16 but PA mean is 34  present. The patient has nonobstructive coronary artery disease present. There is moderately elevated LVEDP present around 20 mmHg. Continue diuretics     The patient has pulmonary hypertension, possibly secondary to underlying  lung disease. She was hypoxic and CO2 retention was noted. I gave her pulmonary consult and probably need home oxygen evaluation  also. The patient tolerated the procedure well. No complications were  noted. Sheath was removed in the cath lab.       Blood loss Benji Lugo MD    D: 10/20/2022 8:48:41       T: 10/20/2022 10:03:06     [email or web address]  Job#: [id number]     Doc#: [id number]    CC:

## 2022-10-20 NOTE — BRIEF OP NOTE
Brief Postoperative Note      Patient: Chelsey Patterson  YOB: 1980  MRN: [id number]    Date of Procedure: 10/20/22    Pre-Op Diagnosis: pulmonary HTN/Dyspnea     Post-Op Diagnosis: Same         Estimated Blood Loss (mL): Minimal    Complications: None      Findings: DICTATED--[id number]  LEFT MAIN PATENT  LAD MILD DX  LCX MILD DX  RCA MILD DX  LVEDP 20  RA-22/17/17  RV-41/9/18  PA-46/25/34  PCWP-20/18/16    MILD CAD  PULMONARY HTN  PULMONARY CONSULT  SHE WILL NEED HOME OXYGEN EVALUATION   SHE MAY NEED BIPAP -CO2 RETENTION  KEEP ON DEMADEX FOR NOW DIURETICS  WILL NEED PULMONARY HTN WORKUP   RIGHT BRACHIAL AND RIGHT RADIAL APPROACH  NO COMPLICATIONS      Electronically signed by Lex Dent MD on 10/20/2022 at 8:44 AM

## 2022-10-20 NOTE — CONSULTS
Subjective:   CHIEF COMPLAINT / HPI:  38year old female with morbid obesity with BMI 48.it is difficult to get history from this pt but she has snoring and witnessed to stop breathing. She feels tired and fatigued during day and has gained considerable weight over the years.       Past Medical History:  Past Medical History:   Diagnosis Date    Hepatitis C     Liver disease     hepatitis    No significant medical problems        Past Surgical History:        Procedure Laterality Date     SECTION  , 2007    x 2    CHOLECYSTECTOMY      FINGER AMPUTATION Right 2019    right hand 3rd finger    HYSTERECTOMY (CERVIX STATUS UNKNOWN)  2008    MARLIN    INCISION AND DRAINAGE Right 2020    RIGHT UPPER THIGH INCISION AND DRAINAGE performed by Paty Paniagua MD at [address] [place] [address]  2013    lap sera    SPINAL FUSION  2022    pt unsure exact location       Current Medications:    Current Facility-Administered Medications: sodium chloride flush 0.9 % injection 5-40 mL, 5-40 mL, IntraVENous, 2 times per day  sodium chloride flush 0.9 % injection 5-40 mL, 5-40 mL, IntraVENous, PRN  0.9 % sodium chloride infusion, , IntraVENous, PRN  acetaminophen (TYLENOL) tablet 650 mg, 650 mg, Oral, Q4H PRN  atropine injection 0.5 mg, 0.5 mg, IntraVENous, Once PRN  potassium chloride (KLOR-CON M) extended release tablet 40 mEq, 40 mEq, Oral, BID WC  magnesium oxide (MAG-OX) tablet 400 mg, 400 mg, Oral, BID  torsemide (DEMADEX) tablet 20 mg, 20 mg, Oral, Daily  methadone (DOLOPHINE) 10 MG/ML solution 130 mg, 130 mg, Oral, QAM  hydrOXYzine HCl (ATARAX) tablet 10 mg, 10 mg, Oral, TID PRN  sodium chloride flush 0.9 % injection 5-40 mL, 5-40 mL, IntraVENous, 2 times per day  sodium chloride flush 0.9 % injection 5-40 mL, 5-40 mL, IntraVENous, PRN  0.9 % sodium chloride infusion, , IntraVENous, PRN  doxycycline hyclate (VIBRA-TABS) tablet 100 mg, 100 mg, Oral, 2 times per day  gabapentin (NEURONTIN) capsule 600 mg, 600 mg, Oral, TID  nortriptyline (PAMELOR) capsule 150 mg, 150 mg, Oral, Nightly  sodium chloride flush 0.9 % injection 5-40 mL, 5-40 mL, IntraVENous, 2 times per day  sodium chloride flush 0.9 % injection 5-40 mL, 5-40 mL, IntraVENous, PRN  0.9 % sodium chloride infusion, , IntraVENous, PRN  enoxaparin Sodium (LOVENOX) injection 30 mg, 30 mg, SubCUTAneous, BID  ondansetron (ZOFRAN-ODT) disintegrating tablet 4 mg, 4 mg, Oral, Q8H PRN **OR** ondansetron (ZOFRAN) injection 4 mg, 4 mg, IntraVENous, Q6H PRN  polyethylene glycol (GLYCOLAX) packet 17 g, 17 g, Oral, Daily PRN  aluminum & magnesium hydroxide-simethicone (MAALOX) 200-200-20 MG/5ML suspension 30 mL, 30 mL, Oral, Q6H PRN    Allergies   Allergen Reactions    Buprenorphine-Naloxone Itching, Rash and Shortness Of Breath    Subutex [Buprenorphine] Nausea And Vomiting    Amoxicillin-Pot Clavulanate Rash       Social History:    Social History     Socioeconomic History    Marital status: Single    Number of children: 2   Occupational History    Occupation:    Tobacco Use    Smoking status: Every Day     Packs/day: 0.50     Years: 12.00     Pack years: 6.00     Types: Cigarettes     Start date: 1998    Smokeless tobacco: Never   Vaping Use    Vaping Use: Never used   Substance and Sexual Activity    Alcohol use: No    Drug use: Not Currently     Types: IV, Opiates      Comment: heroin-last used 1/2022    Sexual activity: Yes     Partners: Male   Social History Narrative    Do you donate blood or plasma? Yes    Caffeine intake? Moderate    Advance directive? No    Is blood transfusion acceptable in an emergency?  Yes             Social Determinants of Health     Financial Resource Strain: High Risk    Difficulty of Paying Living Expenses: Hard   Food Insecurity: No Food Insecurity    Worried About Running Out of Food in the Last Year: Never true    Ran Out of Food in the Last Year: Never true       Family History:   Family History Problem Relation Age of Onset    Migraines Mother     Obesity Brother     Migraines Maternal Grandmother     COPD Paternal Grandmother     Dementia Paternal Grandfather     Depression Son     Mental Illness Son         Bipole, ADHD, Adjustment disorder    Mental Illness Maternal Aunt     Colon Cancer Neg Hx        Immunization:  Immunization History   Administered Date(s) Administered    Tdap (Boostrix, Adacel) 09/28/2019         REVIEW OF SYSTEMS:    CONSTITUTIONAL:  negative for fevers, chills, diaphoresis, activity change, appetite change, fatigue, night sweats and unexpected weight change. EYES:  negative for blurred vision, eye discharge, visual disturbance and icterus  HEENT:  negative for hearing loss, tinnitus, ear drainage, sinus pressure, nasal congestion, epistaxis and snoring  RESPIRATORY:  See HPI  CARDIOVASCULAR:  negative for chest pain, palpitations, exertional chest pressure/discomfort, edema, syncope  GASTROINTESTINAL:  negative for nausea, vomiting, diarrhea, constipation, blood in stool and abdominal pain  GENITOURINARY:  negative for frequency, dysuria and hematuria  HEMATOLOGIC/LYMPHATIC:  negative for easy bruising, bleeding and lymphadenopathy  ALLERGIC/IMMUNOLOGIC:  negative for recurrent infections, angioedema, anaphylaxis and drug reactions  ENDOCRINE:  negative for weight changes and diabetic symptoms including polyuria, polydipsia and polyphagia    MUSCULOSKELETAL:  negative for  pain, joint swelling, decreased range of motion and muscle weakness  NEUROLOGICAL:  negative for headaches, slurred speech, unilateral weakness  PSYCHIATRIC/BEHAVIORAL: negative for hallucinations, behavioral problems, confusion and agitation.      Objective:   PHYSICAL EXAM:      VITALS:    Vitals:    10/20/22 0419 10/20/22 0426 10/20/22 0427 10/20/22 1400   BP:  103/75     Pulse: 77 82     Resp: 13 12     Temp:  97.7 °F (36.5 °C)  97.9 °F (36.6 °C)   TempSrc:  Oral     SpO2:  97%  99%   Weight:   286 lb 13.1 oz (130.1 kg)    Height:             CONSTITUTIONAL:  awake, alert, cooperative, no apparent distress, and appears stated age  NECK:  Supple, symmetrical, trachea midline, no adenopathy, thyroid symmetric, not enlarged and no tenderness  CHEST: Chest expansion equal and symmetrical, no intercostal retraction. LUNGS:  no increased work of breathing, has expiratory wheezes both lungs, no crackles. CARDIOVASCULAR: S1 and S2, no edema and no JVD  ABDOMEN:  normal bowel sounds, non-distended and no masses palpated, and no tenderness to palpation. No hepatospleenomegaly  LYMPHADENOPATHY:  no axillary or supraclavicular adenopathy. No cervical adnenopathy  PSYCHIATRIC: Oriented to person place and time. No obvious depression or anxiety. MUSCULOSKELETAL: No obvious misalignment or effusion of the joints. No clubbing, cyanosis of the digits. RIGHT AND LEFT LOWER EXTREMITIES: No edema, no inflammation, no tenderness. SKIN:  normal skin color, texture, turgor and no redness, warmth, or swelling.  No palpable nodules    DATA:                        Assessment:     Pulm htn  Obesity hypovent synd/cain  Ac on ch chf  H/o TV endocarditis          Plan:     D/w pt  Adequate o2 adm  Cpm  Will order apnea link for tonite  If suggestion of sleep apnea then will arrange sleep study as outpt  Thanks will follow

## 2022-10-20 NOTE — PROGRESS NOTES
Daily Progress Note  Subjective:    Pt. Awake, alert and feeling ok  HR stable, BP stable  No CP, SOB stable    Attending Note:  Plan for right and left heart cath today  Mild CAD=--pulmonary HTN noted  Pulmonary consult  She may have EMMA   Continue medical treatment for now-TV-moderate watch for now--EF is normal  Check for home oxygen also   Increase activity       Impression and Plan:     Acute on Chronic HFpEF    Severe PAH and severe TR- s/p IE that appeared resolved on last RAFI at [facility] several months ago    She is getting w/u through [facility] for possible TV surgery    Now with worsening JADE, orthopnea and generalized edema    On torsemide-good UOP    TTE done for TR but unable to see the valves    Concerned that her HF is out of proportion to her TR- Dr. Tru Doty performed RAFI for her to re-evaluate TV-more moderate it seems    [facility] today stable    RHC showed elevated presures- will need PAH w/u-pulm consulted     Will follow    Most Recent Echo  RAFI-10/19/22   Summary   No evidence of thrombus within the left atrial appendage. Mild mitral regurgitation. Mild-moderate tricuspid regurgitation. No evidence of any pericardial effusion. No evidence of endocarditis. normal EF noted   TV vegetation resolved    Most Recent Heart Cath  LEFT MAIN PATENT  LAD MILD DX  LCX MILD DX  RCA MILD DX  LVEDP 20  RA-//17  RV-41/9/18  PA-46/25/34  PCWP-/     MILD CAD  PULMONARY HTN  PULMONARY CONSULT  SHE WILL NEED HOME OXYGEN EVALUATION   SHE MAY NEED BIPAP -CO2 RETENTION  KEEP ON DEMADEX FOR NOW DIURETICS  WILL NEED PULMONARY HTN WORKUP   RIGHT BRACHIAL AND RIGHT RADIAL APPROACH  NO COMPLICATIONS      PAST MEDICAL HISTORY:  Hepatitis C, liver disease, history of infective  endocarditis, tricuspid valve regurgitation, and heart failure.      PAST SURGICAL HISTORY:  , cholecystectomy, right hand finger  amputation in 2019, hysterectomy, incision and drainage of the right  upper thigh high, lap sera, and spinal fusion. ALLERGIES:   BUPRENORPHINE, SUBUTEX, and AUGMENTIN. FAMILY HISTORY:  Reviewed and noncontributory. SOCIAL HISTORY:  Current everyday smoker, smoking half a pack a day,  does not drink any alcohol. She has a previous history of IV drug  abuse, but told me that she has not done any since 01/2022.      HOME MEDICATIONS:  She is on Pamelor, methadone, Klor-Con 20 mEq b.i.d.,  Zaroxolyn 2.5 mg daily, Lasix 60 mg b.i.d., and Neurontin 300 t.i.d.  Objective:   /75   Pulse 82   Temp 97.7 °F (36.5 °C) (Oral)   Resp 12   Ht 5' 5\" (1.651 m)   Wt 286 lb 13.1 oz (130.1 kg)   SpO2 97%   BMI 47.73 kg/m²     Intake/Output Summary (Last 24 hours) at 10/20/2022 1030  Last data filed at 10/20/2022 0427  Gross per 24 hour   Intake 564 ml   Output 3350 ml   Net -2786 ml       Medications:   Scheduled Meds:   sodium chloride flush  5-40 mL IntraVENous 2 times per day    magnesium oxide  400 mg Oral BID    torsemide  20 mg Oral Daily    potassium chloride  40 mEq Oral TID WC    methadone  130 mg Oral QAM    sodium chloride flush  5-40 mL IntraVENous 2 times per day    doxycycline hyclate  100 mg Oral 2 times per day    gabapentin  600 mg Oral TID    nortriptyline  150 mg Oral Nightly    sodium chloride flush  5-40 mL IntraVENous 2 times per day    enoxaparin  30 mg SubCUTAneous BID      Infusions:   sodium chloride      sodium chloride      sodium chloride        PRN Meds:  sodium chloride flush, sodium chloride, acetaminophen, atropine, hydrOXYzine HCl, sodium chloride flush, sodium chloride, sodium chloride flush, sodium chloride, ondansetron **OR** ondansetron, polyethylene glycol, aluminum & magnesium hydroxide-simethicone     Physical Exam:  Vitals:    10/20/22 0426   BP: 103/75   Pulse: 82   Resp: 12   Temp: 97.7 °F (36.5 °C)   SpO2: 97%        General: AAO, NAD  Chest: Nontender  Cardiac: First and Second Heart Sounds are Normal, No Murmurs or Gallops noted  Lungs:Clear to auscultation and percussion. Abdomen: Soft, NT, ND, +BS  Extremities: No clubbing, no edema  Vascular:  Equal 2+ peripheral pulses. Lab Data:  CBC:   Recent Labs     10/17/22  1755 10/18/22  0845 10/20/22  0847   WBC 8.7 5.7 4.9   HGB 11.6* 11.1* 10.7*   HCT 38.7 35.5* 35.1*   MCV 91.1 89.0 90.7    241 229     BMP:   Recent Labs     10/18/22  0845 10/19/22  0826 10/20/22  0847    136 134*   K 3.3* 3.2* 3.4*   CL 88* 88* 90*   CO2 37* 39* 38*   BUN 24* 24* 27*   CREATININE 0.7 0.8 0.8     LIVER PROFILE:   Recent Labs     10/17/22  1755 10/20/22  0847   AST 21 13*   ALT 13 12   BILITOT 0.3 0.3   ALKPHOS 137* 121     PT/INR:   Recent Labs     10/18/22  1213   PROTIME 13.8   INR 1.07     APTT:   Recent Labs     10/18/22  1213   APTT 37.1     BNP:  No results for input(s): BNP in the last 72 hours.       Assessment:  Patient Active Problem List    Diagnosis Date Noted    Acute decompensated heart failure ([address] 10/17/2022    Pulmonary hypertension ([address] 09/14/2022    Severe tricuspid regurgitation 09/14/2022    Status post thoracic spinal fusion 08/17/2022    Other insomnia 08/17/2022    Hypertensive disorder 08/17/2022    Long term (current) use of antibiotics 03/30/2022    Pseudarthrosis after fusion or arthrodesis 03/30/2022    Anemia 08/16/2020    Back pain 12/09/2020    MRSA infection     Heroin use     Tobacco abuse     Osteomyelitis ([address] 10/21/2020    Endocarditis 09/15/2020    Osteomyelitis of vertebra ([address] 09/15/2020    Right flank pain 09/14/2020    Staphylococcal arthritis of right hip ([address] 08/10/2020    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

## 2022-10-21 VITALS
TEMPERATURE: 97.5 F | WEIGHT: 291.89 LBS | DIASTOLIC BLOOD PRESSURE: 45 MMHG | BODY MASS INDEX: 48.63 KG/M2 | HEIGHT: 65 IN | HEART RATE: 76 BPM | OXYGEN SATURATION: 87 % | SYSTOLIC BLOOD PRESSURE: 122 MMHG | RESPIRATION RATE: 18 BRPM

## 2022-10-21 DIAGNOSIS — M79.604 PAIN IN BOTH LOWER EXTREMITIES: ICD-10-CM

## 2022-10-21 DIAGNOSIS — M79.605 PAIN IN BOTH LOWER EXTREMITIES: ICD-10-CM

## 2022-10-21 PROCEDURE — 94761 N-INVAS EAR/PLS OXIMETRY MLT: CPT

## 2022-10-21 PROCEDURE — 6370000000 HC RX 637 (ALT 250 FOR IP): Performed by: INTERNAL MEDICINE

## 2022-10-21 PROCEDURE — 2580000003 HC RX 258: Performed by: INTERNAL MEDICINE

## 2022-10-21 PROCEDURE — 94618 PULMONARY STRESS TESTING: CPT

## 2022-10-21 RX ORDER — DOXYCYCLINE HYCLATE 100 MG
100 TABLET ORAL EVERY 12 HOURS SCHEDULED
Qty: 60 TABLET | Refills: 1 | Status: SHIPPED | OUTPATIENT
Start: 2022-10-21 | End: 2022-12-20

## 2022-10-21 RX ORDER — METHOCARBAMOL 500 MG/1
TABLET, FILM COATED ORAL
Qty: 30 TABLET | Refills: 1 | Status: ON HOLD | OUTPATIENT
Start: 2022-10-21

## 2022-10-21 RX ORDER — TORSEMIDE 20 MG/1
20 TABLET ORAL DAILY
Qty: 30 TABLET | Refills: 1 | Status: SHIPPED | OUTPATIENT
Start: 2022-10-22

## 2022-10-21 RX ADMIN — GABAPENTIN 600 MG: 300 CAPSULE ORAL at 16:27

## 2022-10-21 RX ADMIN — POTASSIUM CHLORIDE 40 MEQ: 1500 TABLET, EXTENDED RELEASE ORAL at 09:23

## 2022-10-21 RX ADMIN — SODIUM CHLORIDE, PRESERVATIVE FREE 10 ML: 5 INJECTION INTRAVENOUS at 09:32

## 2022-10-21 RX ADMIN — METHADONE HYDROCHLORIDE 130 MG: 10 CONCENTRATE ORAL at 09:25

## 2022-10-21 RX ADMIN — ACETAMINOPHEN 650 MG: 325 TABLET ORAL at 09:24

## 2022-10-21 RX ADMIN — TORSEMIDE 20 MG: 20 TABLET ORAL at 09:31

## 2022-10-21 RX ADMIN — DOXYCYCLINE HYCLATE 100 MG: 100 TABLET, COATED ORAL at 09:23

## 2022-10-21 RX ADMIN — GABAPENTIN 600 MG: 300 CAPSULE ORAL at 09:23

## 2022-10-21 RX ADMIN — Medication 400 MG: at 09:23

## 2022-10-21 ASSESSMENT — PAIN DESCRIPTION - ORIENTATION: ORIENTATION: RIGHT

## 2022-10-21 ASSESSMENT — PAIN DESCRIPTION - DESCRIPTORS: DESCRIPTORS: TIGHTNESS

## 2022-10-21 ASSESSMENT — PAIN SCALES - GENERAL: PAINLEVEL_OUTOF10: 5

## 2022-10-21 ASSESSMENT — PAIN DESCRIPTION - LOCATION: LOCATION: ARM;ELBOW

## 2022-10-21 NOTE — PROGRESS NOTES
Discharge instructions given and patient states grandpa already here at main entrance. No questions. Meds from pharmacy with patient.

## 2022-10-21 NOTE — PROGRESS NOTES
Medical oxygen company called to inform the address on the delivery ticket is not the right address for the home oxygen concentrator to be delivered to.   They were asked to deliver to :  [address] [place] [address]

## 2022-10-21 NOTE — Clinical Note
Outpatient Pharmacy Progress Note for Meds-to-Beds    Total number of Prescriptions Filled: 2  The following medications were dispensed to the patient during the discharge process:  doxycycline hyclate  torsemide    Additional Documentation:  {Blank single:60897::\"Medication(s) were delivered to the patient's room prior to discharge\",\"Patient picked-up the medication(s) in the OP Pharmacy\",\"Patient's family member picked-up the medication(s) in the OP Pharmacy\",\"Medications given to nurse *** to provide to patient\",\"Medications picked-up in the OP Pharmacy by nurse *** to provide to patient\"}      Thank you for letting us serve your patients.   [facility]    [address] [facility]    Phone: [phone]    Fax: [phone]

## 2022-10-21 NOTE — DISCHARGE SUMMARY
V2.0  Discharge Summary    Name:  Latricia Butler /Age/Sex: 1980 (38 y.o. female)   Admit Date: 10/17/2022  Discharge Date: 10/21/22    MRN & CSN:  [id number] & [id number] Encounter Date and Time 10/21/22 11:34 AM EDT    Attending:  Bud Barth MD Discharging Provider: Bud Barth MD       Hospital Course:     Brief HPI: Patient is a 22-year-old female with a PMHx of MRSA IE of TV, HFpEF, HTN, HCV, hypokalemia and class III obesity who presented to the ED with worsening shortness of breath at rest, JADE after walking 10 feet and LE edema for the past week. Allegedly has over 80 pounds weight gain in the past 6 months. Reported following low-salt intake and compliance with home Lasix and Metolazone. Denied any IVDU use for at least 9 months. She is scheduled for LHC on 11/3 at [facility] followed by possible surgical tricuspid valve replacement. Denied any fevers, chills, CP, nausea, vomiting, abdominal pain, constipation, diarrhea or urinary changes. Brief Problem Based Course:     Acute on chronic diastolic heart failure  -Here with anasarca and weight gain. Says her good weight is about 220 to 230 pounds now she weights 290. Her office notes from last 6 months has been around 290 so not sure what her true dry weight is.  -Seen by cardiology. Started on Lasix drip, now transitioned to oral torsemide. Has had good urine output and improvement in symptoms. TTE with preserved ejection fraction. -RAFI negative for tricuspid valve endocarditis. Had a cardiac cath done which did not reveal any significant obstructive coronary disease and only mild disease and did not require any stents.  -Elevated right heart pressures. Noted to have CO2 retention and low PO2. Patient reports she does understand BiPAP but that makes her feel claustrophobic.   Pulmonology was consulted, patient encouraged to go to their office outpatient for sleep study.  -Her home diuretic regiment was modified to 20 mg of torsemide daily as well as metolazone 2.5 mg daily.  -Had home oxygen eval done prior to discharge and was discharged on 2 L continuous oxygen. Patient encouraged to follow-up with pulmonology and cardiology. History of tricuspid valve infective endocarditis  -Continue doxycycline for chronic suppression as per ID due to hardware in her spine.  -Off IV drugs as per patient.  -She has been considered by [facility] for tricuspid valve replacement. RAFI negative for endocarditis. Only has moderate regurg. As per cardiology does not require surgery at this point. Hypokalemia  -Now on p.o. supplementation. Hypomagnesemia  -Replenished with oral magnesium. Morbid obesity due to excess calories Body mass index is 47.73 kg/m². - Complicating assessment and treatment. Placing patient at risk for multiple co-morbidities as well as early death and contributing to the patient's presentation. Counselled on weight loss     The patient expressed appropriate understanding of, and agreement with the discharge recommendations, medications, and plan. Consults this admission:  IP CONSULT TO IV TEAM  IP CONSULT TO HOSPITALIST  IP CONSULT TO CARDIOLOGY  IP CONSULT TO IV TEAM  IP CONSULT TO PULMONOLOGY    Discharge Diagnosis:   Acute decompensated heart failure ([address]  History of tricuspid valve infective endocarditis  Hypokalemia  Hypomagnesemia  Morbid obesity    Discharge Instruction:   Follow up appointments: PCP, cardiology, pulmonology  Primary care physician: LEIGHTON Rogel CNP within 2 weeks  Diet: cardiac diet   Activity: activity as tolerated  Disposition: Discharged to:   [x]Home, [][facility], []SNF, []Acute Rehab, []Hospice   Condition on discharge: Stable  Labs and Tests to be Followed up as an outpatient by PCP or Specialist: Needs a sleep study done.     Discharge Medications:        Medication List        START taking these medications      doxycycline hyclate 100 MG tablet  Commonly known as: VIBRA-TABS  Take 1 tablet by mouth every 12 hours     methocarbamol 500 MG tablet  Commonly known as: ROBAXIN  TAKE 1 TABLET BY MOUTH THREE TIMES DAILY AS NEEDED FOR LEG PAIN     torsemide 20 MG tablet  Commonly known as: DEMADEX  Take 1 tablet by mouth daily  Start taking on: October 22, 2022            Nydia Claude taking these medications      gabapentin 300 MG capsule  Commonly known as: NEURONTIN  Take 2 capsules by mouth 3 times daily for 30 days. Ortho surgeon     methadone 10 MG/ML solution  Commonly known as: DOLOPHINE     metOLazone 2.5 MG tablet  Commonly known as: ZAROXOLYN     nortriptyline 75 MG capsule  Commonly known as: PAMELOR  Take 2 capsules by mouth nightly Taken for back pain     potassium chloride 20 MEQ extended release tablet  Commonly known as: KLOR-CON M            STOP taking these medications      furosemide 20 MG tablet  Commonly known as: LASIX               Where to Get Your Medications        These medications were sent to [address] [place] [address] 25, [address] [id number]      Phone: [phone]   doxycycline hyclate 100 MG tablet  torsemide 20 MG tablet       These medications were sent to [address] [phone] [id number] [address]      Phone: [phone]   methocarbamol 500 MG tablet        Objective Findings at Discharge:   BP (!) 122/45   Pulse 99   Temp 97.5 °F (36.4 °C) (Oral)   Resp 18   Ht 5' 5\" (1.651 m)   Wt 291 lb 14.2 oz (132.4 kg)   SpO2 94%   BMI 48.57 kg/m²       Physical Exam:   Constitutional:       General: She is not in acute distress. Appearance: She is obese. HENT:      Mouth/Throat:      Mouth: Mucous membranes are moist.      Pharynx: No posterior oropharyngeal erythema. Eyes:      General: No scleral icterus.      Pupils: Pupils are equal, round, and reactive to light. Cardiovascular:      Rate and Rhythm: Normal rate and regular rhythm. Heart sounds: Murmur (Systolic murmur in tricuspid area) heard. Pulmonary:      Effort: Pulmonary effort is normal.      Breath sounds: No wheezing or rales. Comments: On nasal cannula oxygen  Abdominal:      Palpations: Abdomen is soft. Tenderness: There is no abdominal tenderness. Musculoskeletal:         General: Normal range of motion. Right lower leg: Edema present. Left lower leg: Edema present. Comments: Trace pitting edema up to the knees. Skin:     General: Skin is warm. Neurological:      General: No focal deficit present. Mental Status: She is alert and oriented to person, place, and time. Cranial Nerves: No cranial nerve deficit. Motor: No weakness. Psychiatric:         Mood and Affect: Mood normal.         Labs and Imaging   CT ABDOMEN PELVIS WO CONTRAST Additional Contrast? None    Result Date: 10/18/2022  EXAMINATION: CT OF THE ABDOMEN AND PELVIS WITHOUT CONTRAST 10/18/2022 11:13 am TECHNIQUE: CT of the abdomen and pelvis was performed without the administration of intravenous contrast. Multiplanar reformatted images are provided for review. Automated exposure control, iterative reconstruction, and/or weight based adjustment of the mA/kV was utilized to reduce the radiation dose to as low as reasonably achievable. COMPARISON: CT abdomen pelvis dated 09/13/2020 HISTORY: ORDERING SYSTEM PROVIDED HISTORY: anasarca TECHNOLOGIST PROVIDED HISTORY: Reason for exam:->anasarca Additional Contrast?->None Is the patient pregnant?->No Reason for Exam: decompensated heart failure FINDINGS: Lower chest: Findings in the chest are reported separately. Liver: The liver demonstrates a normal noncontrast appearance, however evaluation is limited in the absence of intravenous contrast. Biliary system/Gallbladder: No intrahepatic or extrahepatic biliary ductal dilation.  The gallbladder is surgically absent. Spleen: Normal. Pancreas: No evidence of pancreatic lesions or pancreatic ductal dilation. Adrenals: No suspicious adrenal nodules are present. Kidneys/Bladder: The kidneys demonstrate no acute abnormality or hydronephrosis, however the absence of intravenous contrast limits evaluation. The urinary bladder is normal. Pelvic organs: The uterus is surgically absent. No suspicious adnexal masses are evident. GI tract: The stomach is normal in appearance. The intestines demonstrate no evidence of focal thickening or obstruction. The appendix is visualized and normal in appearance. Peritoneum: No evidence of ascites or peritoneal nodularity. Lymph nodes: No evidence of suspicious lymphadenopathy. Vasculature: Normal. Soft Tissues/Bones: A tiny fat containing umbilical hernia is present. No acute soft tissue abnormalities are identified. Severe bilateral hip arthrosis is present with subchondral cystic formation. No acute abnormalities identified in the abdomen or pelvis. Bilateral severe hip arthrosis. XR CHEST (2 VW)    Result Date: 10/17/2022  EXAMINATION: TWO XRAY VIEWS OF THE CHEST 10/17/2022 5:37 pm COMPARISON: December 8, 2020 HISTORY: ORDERING SYSTEM PROVIDED HISTORY: shortness of breath TECHNOLOGIST PROVIDED HISTORY: Reason for exam:->shortness of breath Reason for Exam: SOB Additional signs and symptoms: unknown Relevant Medical/Surgical History: none FINDINGS: The cardiomediastinal silhouette is mildly enlarged. Vascular congestive changes are present. Bilateral increased airspace and interstitial opacities are noted diffusely. No pleural effusion or pneumothorax is identified. No subdiaphragmatic free air. 1. Mild cardiomegaly and vascular congestive changes. 2. Diffuse bilateral increased airspace and interstitial opacities. These findings could represent evolving pulmonary edema although an evolving atypical pneumonia could have a similar appearance.   Radiographic follow-up recommended to ensure resolution. CT CHEST WO CONTRAST    Result Date: 10/18/2022  EXAMINATION: CT OF THE CHEST WITHOUT CONTRAST 10/18/2022 11:11 am TECHNIQUE: CT of the chest was performed without the administration of intravenous contrast. Multiplanar reformatted images are provided for review. Automated exposure control, iterative reconstruction, and/or weight based adjustment of the mA/kV was utilized to reduce the radiation dose to as low as reasonably achievable. COMPARISON: Chest radiograph dated 10/17/2022 HISTORY: ORDERING SYSTEM PROVIDED HISTORY: dyspnea and pleural effusion TECHNOLOGIST PROVIDED HISTORY: Reason for exam:->dyspnea and pleural effusion Is the patient pregnant?->No Reason for Exam: dyspnea and pleural effusion FINDINGS: Lower neck: No lymphadenopathy. Medical devices: None. Heart: No cardiomegaly. No significant coronary calcifications are present. No pericardial fluid is present. Vascular Structures: A normal three vessel aortic arch is present. The aorta is normal in caliber. The main pulmonary artery is enlarged in size measuring approximate 3.4 cm. Mediastinum: Calcified lymph nodes are present consistent with prior granulomatous disease. No suspicious lymphadenopathy. Central airways: The central airways are clear. Lungs: Diffuse multifocal ground-glass opacities are present throughout all lobes, predominantly along the lung apices. There is relative sparing of the costophrenic sulci. No suspicious pulmonary nodule identified, however evaluation is limited in the presence of abnormal background lung parenchyma. Pleura: No pneumothorax, pleural effusion, or pleural thickening is evident. Upper Abdomen: The gallbladder is surgically absent. Soft Tissues/Bones: No acute soft tissue abnormalities identified. Extensive postsurgical changes from prior thoracic spinal fixation including multilevel kyphoplasty is present.   A remote fracture deformity of the upper sternum is present. Remote fracture deformities of the right lateral 5th and 7th ribs are present. Healing fracture deformities of the left anterolateral 2nd rib and lateral left 8th rib are evident. No acute osseous abnormalities or suspicious osseous lesions are identified. Enlarged main pulmonary artery, measuring 3.4 cm, which is nonspecific but may be found in setting of pulmonary artery hypertension. Diffuse ground-glass opacities involving all lobes, most significant in the lung apices, which is nonspecific may reflect findings of pulmonary artery hypertension. A superimposed infectious or inflammatory process is not entirely excluded. Multiple bilateral healing rib fracture deformities as described above.        CBC:   Recent Labs     10/20/22  0847   WBC 4.9   HGB 10.7*        BMP:    Recent Labs     10/19/22  0826 10/20/22  0847    134*   K 3.2* 3.4*   CL 88* 90*   CO2 39* 38*   BUN 24* 27*   CREATININE 0.8 0.8   GLUCOSE 77 81     Hepatic:   Recent Labs     10/20/22  0847   AST 13*   ALT 12   BILITOT 0.3   ALKPHOS 121       Troponin:   Lab Results   Component Value Date/Time    TROPONINT <0.010 10/18/2022 03:06 AM    TROPONINT <0.010 10/17/2022 05:55 PM    TROPONINT <0.010 12/09/2020 12:25 AM       BNP:   Recent Labs     10/20/22  0847   PROBNP 87.49     UA:  Lab Results   Component Value Date/Time    NITRU NEGATIVE 03/16/2021 09:36 PM    NITRU NEGATIVE 06/20/2013 03:10 PM    COLORU DIAN 03/16/2021 09:36 PM    WBCUA 53 03/16/2021 09:36 PM    RBCUA 29 03/16/2021 09:36 PM    MUCUS OCCASIONAL 03/16/2021 09:36 PM    TRICHOMONAS NONE SEEN 03/16/2021 09:36 PM    BACTERIA RARE 03/16/2021 09:36 PM    CLARITYU HAZY 03/16/2021 09:36 PM    SPECGRAV 1.028 03/16/2021 09:36 PM    LEUKOCYTESUR LARGE 03/16/2021 09:36 PM    UROBILINOGEN 2.0 03/16/2021 09:36 PM    BILIRUBINUR SMALL 03/16/2021 09:36 PM    BLOODU SMALL 03/16/2021 09:36 PM    GLUCOSEU neg 06/20/2013 03:10 PM    GLUCOSEU NEGATIVE 06/20/2013 03:10 PM CATRACHITO DIOP 03/16/2021 09:36 PM    AMORPHOUS RARE 09/13/2020 09:30 PM         Time Spent Discharging patient 35 minutes    Electronically signed by Evelyn Milian MD on 10/21/2022 at 11:34 AM

## 2022-10-21 NOTE — PROGRESS NOTES
Doctor Please copy and paste below in your progress note per DME requirment. Patient was seen in hospital for   Pulmonary Hypertension . I am prescribing oxygen because the diagnosis and testing requires the patient to have oxygen in the home. Conditions will improve or be benefited by oxygen use. The patient is able to perform good mobility and therefore requires the use of a portable oxygen system for ambulation.

## 2022-10-21 NOTE — PROGRESS NOTES
Apnea Link results are as follows: ABDULKADIR (AHI)=1.8. AI=1.8, HI=0.0. A copy of results is in the pt's Hard Chart.

## 2022-10-21 NOTE — PROGRESS NOTES
pulmonary      SUBJECTIVE:  doing well     OBJECTIVE    VITALS:  BP (!) 122/45   Pulse 99   Temp 97.5 °F (36.4 °C) (Oral)   Resp 18   Ht 5' 5\" (1.651 m)   Wt 291 lb 14.2 oz (132.4 kg)   SpO2 94%   BMI 48.57 kg/m²   HEAD AND FACE EXAM:  No throat injection, no active exudate,no thrush  NECK EXAM;No JVD, no masses, symmetrical  CHEST EXAM; Expansion equal and symmetrical, no masses  LUNG EXAM; Good breath sounds bilaterally.  There are expiratory wheezes both lungs, there are crackles at both lung bases  CARDIOVASCULAR EXAM: Positive S1 and S2, no S3 or S4, no clicks ,no murmurs  RIGHT AND LEFT LOWER EXTRIMITY EXAM: No edema, no swelling, no inflamation  CNS EXAM: Alert and oriented X3          LABS   Lab Results   Component Value Date    WBC 4.9 10/20/2022    HGB 10.7 (L) 10/20/2022    HCT 35.1 (L) 10/20/2022    MCV 90.7 10/20/2022     10/20/2022     Lab Results   Component Value Date    CREATININE 0.8 10/20/2022    BUN 27 (H) 10/20/2022     (L) 10/20/2022    K 3.4 (L) 10/20/2022    CL 90 (L) 10/20/2022    CO2 38 (H) 10/20/2022     Lab Results   Component Value Date    INR 1.07 10/18/2022    PROTIME 13.8 10/18/2022          Lab Results   Component Value Date/Time    PHOS 3.7 08/04/2020 02:52 AM        Recent Labs     10/20/22  0830   PH 7.49*   PO2ART 51*   UND1ITG 61.0*   O2SAT 83.4*         Wt Readings from Last 3 Encounters:   10/21/22 291 lb 14.2 oz (132.4 kg)   09/14/22 295 lb 1.6 oz (133.9 kg)   08/17/22 293 lb (132.9 kg)               ASSESMENT  Pulm htn  [facility] apnea link negative  Ch resp failure        PLAN  Cpm  She qualifies for home o2 and being arranged    10/21/2022  Danita Cannon MD, M.D.

## 2022-10-21 NOTE — PROGRESS NOTES
Tele called to say pt off monitor. Nurse into check and pt in bathroom. Pt informed will need to walk around in room to see if saturation drops after activity. Walked around bed twice and registered 88% on monitor. Recovered to 94% quickly.   94% is baseline from this morning

## 2022-10-21 NOTE — PROGRESS NOTES
Just spoke with the oxygen delivery company to see when to expect delivery of tank. They said should be here soon.   No specific time listed

## 2022-10-21 NOTE — PROGRESS NOTES
Pt wheeled out off of unit per [facility] PCT. Pt has cell phone, , picking up medication that was locked up on the first floor and found white change purse in belongings bag.

## 2022-10-21 NOTE — PROGRESS NOTES
10/21/2022 7:45 AM  Patient Room #: 2004/2004-A  Patient Name: Merna Guy    (Step 1 Done by RN if possible otherwise call Pulmonary Diagnostics)  Place patient on room air at rest for at least 30 minutes. If patient falls below 88% before 30 minutes then you can record the level and stop. Record room air saturation level _94_ %. If patient is at 88% or below, they will qualify for home oxygen and you can stop. If level does not fall below 88%, fill in level above. If indicated continue to Step 2. Signature:_Rory Georges RRT____ Date: _10/21/2022__  (Step 2&3 Done by P)  Ambulate patient on room air until saturation falls below 89%. Record level of room air saturation with ambulation_88_ %. Next, place patient back on _2__lpm oxygen and ambulate, record level _92_%. (Note:  this level must show improvement from room air level done with ambulation.)  If patients saturation on room air with ambulation is 88% or below AND patient shows improvement with oxygen during ambulation, they will qualify for home oxygen and you can stop. If patient does not drop below 89%, then patient should have an overnight oximetry trending on room air to see if level falls below 88%. Complete level in Step 3 below. Room air overnight oximetry level 88 % for___  cumulative minutes. If patients room air oxygen level is < 89% for at least 5 cumulative minutes, patient will qualify for home oxygen and you can stop. (Attach Night Trending Report)    Complete order below: Diagnosis:_Pulmonary hypertension (HCC__CHF_  Home oxygen at:  Length of Need: ? Lifetime ?  3 Months     __2_lpm or __%   via  [x] nasal cannula  []mask  [] other: Conserving Device         []continuous [x]  with activity  [x]  Nocturnal   [x] Portable Tanks [x]  Concentrator  [x] Conserving Device        Therapist Signature:_Rory Georges RRT______     Date:  _10/21/2022__  Physician Signature:  __Electronically Signed in EMR_ Date:___  Physician Printed Name:  Jones Altamirano MD   NPI:  [id number]    [x] Patient Qualifies      [] Patient Does NOT qualify

## 2022-10-21 NOTE — PROGRESS NOTES
Pt qualifies for Home Oxygen. Paperwork has been faxed to [employer] (). The Paperwork has been received, but no time was given for the delivery of an oxygen tank.

## 2022-10-21 NOTE — PROGRESS NOTES
hand finger  amputation in 2019, hysterectomy, incision and drainage of the right  upper thigh high, lap sera, and spinal fusion. ALLERGIES:   BUPRENORPHINE, SUBUTEX, and AUGMENTIN. FAMILY HISTORY:  Reviewed and noncontributory. SOCIAL HISTORY:  Current everyday smoker, smoking half a pack a day,  does not drink any alcohol. She has a previous history of IV drug  abuse, but told me that she has not done any since 01/2022.      HOME MEDICATIONS:  She is on Pamelor, methadone, Klor-Con 20 mEq b.i.d.,  Zaroxolyn 2.5 mg daily, Lasix 60 mg b.i.d., and Neurontin 300 t.i.d.  Objective:   BP (!) 122/45   Pulse 99   Temp 97.5 °F (36.4 °C) (Oral)   Resp 18   Ht 5' 5\" (1.651 m)   Wt 291 lb 14.2 oz (132.4 kg)   SpO2 94%   BMI 48.57 kg/m²   No intake or output data in the 24 hours ending 10/21/22 1058    Medications:   Scheduled Meds:   sodium chloride flush  5-40 mL IntraVENous 2 times per day    potassium chloride  40 mEq Oral BID WC    magnesium oxide  400 mg Oral BID    torsemide  20 mg Oral Daily    methadone  130 mg Oral QAM    sodium chloride flush  5-40 mL IntraVENous 2 times per day    doxycycline hyclate  100 mg Oral 2 times per day    gabapentin  600 mg Oral TID    nortriptyline  150 mg Oral Nightly    sodium chloride flush  5-40 mL IntraVENous 2 times per day    enoxaparin  30 mg SubCUTAneous BID      Infusions:   sodium chloride      sodium chloride      sodium chloride        PRN Meds:  sodium chloride flush, sodium chloride, acetaminophen, hydrOXYzine HCl, sodium chloride flush, sodium chloride, sodium chloride flush, sodium chloride, ondansetron **OR** ondansetron, polyethylene glycol, aluminum & magnesium hydroxide-simethicone     Physical Exam:  Vitals:    10/21/22 0817   BP: (!) 122/45   Pulse: 99   Resp: 18   Temp: 97.5 °F (36.4 °C)   SpO2: 94%        General: AAO, NAD  Chest: Nontender  Cardiac: First and Second Heart Sounds are Normal, No Murmurs or Gallops noted  Lungs:Clear to auscultation and percussion. Abdomen: Soft, NT, ND, +BS  Extremities: No clubbing, no edema  Vascular:  Equal 2+ peripheral pulses. Lab Data:  CBC:   Recent Labs     10/20/22  0847   WBC 4.9   HGB 10.7*   HCT 35.1*   MCV 90.7        BMP:   Recent Labs     10/19/22  0826 10/20/22  0847    134*   K 3.2* 3.4*   CL 88* 90*   CO2 39* 38*   BUN 24* 27*   CREATININE 0.8 0.8     LIVER PROFILE:   Recent Labs     10/20/22  0847   AST 13*   ALT 12   BILITOT 0.3   ALKPHOS 121     PT/INR:   Recent Labs     10/18/22  1213   PROTIME 13.8   INR 1.07     APTT:   Recent Labs     10/18/22  1213   APTT 37.1     BNP:  No results for input(s): BNP in the last 72 hours.       Assessment:  Patient Active Problem List    Diagnosis Date Noted    Acute decompensated heart failure ([address] 10/17/2022    Pulmonary hypertension ([address] 09/14/2022    Severe tricuspid regurgitation 09/14/2022    Status post thoracic spinal fusion 08/17/2022    Other insomnia 08/17/2022    Hypertensive disorder 08/17/2022    Long term (current) use of antibiotics 03/30/2022    Pseudarthrosis after fusion or arthrodesis 03/30/2022    Anemia 08/16/2020    Back pain 12/09/2020    MRSA infection     Heroin use     Tobacco abuse     Osteomyelitis ([address] 10/21/2020    Endocarditis 09/15/2020    Osteomyelitis of vertebra ([address] 09/15/2020    Right flank pain 09/14/2020    Staphylococcal arthritis of right hip ([address] 08/10/2020    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

## 2022-10-22 LAB
CULTURE: NORMAL
CULTURE: NORMAL
Lab: NORMAL
Lab: NORMAL
SPECIMEN: NORMAL
SPECIMEN: NORMAL

## 2022-10-28 NOTE — ACP (ADVANCE CARE PLANNING)
Patient does not have any ACP documents/Medical Power of . LSW notes hospital will follow [place]'s Next of Kin hierarchy in the following descending order for priority:    Guardian  Spouse  [de-identified] of adult Children  Parents  [de-identified] of adult Siblings  Nearest Relative not described above    Per [place] Next of Kin hierarchy: Patients' parent will be [address].

## 2022-10-28 NOTE — CONSULTS
Midline meets therapeutic needs at this time. Arrow Endurance MIdline inserted in LUE Cephalic vessel x 1 attempt using sterile ultrasound guided technique. Brisk blood return, flushes without resistance. OK to draw blood from midline.

## 2022-10-28 NOTE — CARE COORDINATION
Patient possible readmission. Patient admitted to hospital 10/17/22 - 10/21/22 for Acute on chronic diastolic heart failure. Patient to ED today for complaint of shortness of breath. No Acute Findings. Patient Discharged home.

## 2022-10-28 NOTE — Clinical Note
Bella Caceres was seen and treated in our emergency department on 10/28/2022. She may return to work on 10/31/2022. If you have any questions or concerns, please don't hesitate to call.       Rocky Waggoner, DO

## 2022-10-28 NOTE — TELEPHONE ENCOUNTER
Pt states she is currently on phone with cardiologist Dr Debbi Madsen  discussing edema. pt will keep us posted.

## 2022-10-28 NOTE — ED PROVIDER NOTES
I independently examined and evaluated Jigar Mack. In brief their history revealed  a 38 y.o. female  that presents to the emergency department complaint of shortness of breath for the past 3 days was recently discharged from this facility for CHF. She states history of tricuspid valve infective endocarditis. She states she was diagnosed about 3 months ago. She is scheduled for LHC on 11/3 at [facility] followed by possible surgical tricuspid valve replacement. Also has a history of hypertension, hypokalemia ,MRSA during her recent hospitalization she states she was placed on Lasix drip and transition to oral torsemide at discharge. Prior to discharge she had a RAFI. RAFI was negative for tricuspid valve endocarditis. Had a cardiac cath done which did not reveal any obstruction coronary disease stenting was required. .  Her edema had resolved upon discharge however she is returning because increased edema and increased shortness of breath. Patient does have a history of IV drug use. She states that she has been compliant with her medications. That she is observing a low-salt diet as well as watching her fluid intake however she is noted to be    Their focused exam revealed alert oriented female resting in bed no distress normocephalic atraumatic sclera clear lungs clear heart regular rhythm 2+ pulses throughout abdomen soft obese nontender bowel sounds present normal.  5-5 strength throughout skin has no rash there is swelling to peripheral edema about 1-2+ pitting edema. Cranial nerves grossly intact. .    ED course: Patient seen with NP please see her note. Patient here with chest pain shortness of breath peripheral edema. She does follow with cardiology had a recent normal cath. Has history of endocarditis. She denies any fevers no history of DVT PE or AAA. Just increased weight gain and fluid retention despite taking her diuretics at home.   Work-up here performed otherwise negative including labs imaging EKG. She is otherwise stable. I did give her Lasix here I did talk to her cardiologist he would like patient to go home and double up on her home diuretics hold off on salt fluid intake. Patient is ready to go home she is okay with plan her symptoms have resolved. No chest pain or shortness of breath any longer likely fluid overload patient stable discharged home given return precautions and follow-up information. She appears nontoxic nonseptic. Doubt she has endocarditis at this time she has no fever elevated white count no other symptoms. Patient stable discharge. Okay with plan. 12 lead EKG per my interpretation:  Normal Sinus Rhythm 90 first degree AV block  Axis is   Left  QTc is   491  There is no specific T wave changes appreciated. There is no specific ST wave changes appreciated. Prior EKG to compare with was not available         Talked to Cardiology, ok with outpatient follow up, given 60 lasix, told her to double up on home diuretics she understands and agrees. All diagnostic, treatment, and disposition decisions were made by myself in conjunction with the Advanced Practice Provider. For all further details of the patient's emergency department visit, please see the Advanced Practice Provider's documentation.         [employer], DO  10/28/22 6422

## 2022-10-28 NOTE — ED PROVIDER NOTES
large beverage. Nursing Notes were all reviewed and agreed with or any disagreements were addressed in the HPI. REVIEW OF SYSTEMS    (2-9 systems for level 4, 10 or more for level 5)     Review of Systems   Constitutional:  Negative for chills, fatigue and fever. HENT:  Negative for congestion, ear pain, sore throat and trouble swallowing. Eyes:  Negative for visual disturbance. Respiratory:  Positive for shortness of breath. Negative for chest tightness. Cardiovascular:  Positive for leg swelling. Negative for chest pain. Gastrointestinal:  Negative for abdominal pain, diarrhea, nausea and vomiting. Genitourinary:  Negative for difficulty urinating and flank pain. Musculoskeletal:  Negative for arthralgias, myalgias and neck pain. Skin:  Negative for color change and rash. Neurological:  Negative for speech difficulty, weakness, numbness and headaches. Psychiatric/Behavioral:  Negative for behavioral problems and confusion. PAST MEDICAL HISTORY     Past Medical History:   Diagnosis Date    Hepatitis C     Liver disease     hepatitis    No significant medical problems        SURGICAL HISTORY     Past Surgical History:   Procedure Laterality Date     SECTION  , 2007    x 2    CHOLECYSTECTOMY      FINGER AMPUTATION Right 2019    right hand 3rd finger    HYSTERECTOMY (CERVIX STATUS UNKNOWN)  2008    MARLIN    INCISION AND DRAINAGE Right 2020    RIGHT UPPER THIGH INCISION AND DRAINAGE performed by Elisabet Deutsch MD at [address] [place] [address]  2013    lap sera    SPINAL FUSION  2022    pt unsure exact location       [address]       Discharge Medication List as of 10/28/2022 10:38 PM        CONTINUE these medications which have NOT CHANGED    Details   metroNIDAZOLE (METROGEL) 1 % gel Apply topically daily. , Disp-1 each, R-0, Normal      methocarbamol (ROBAXIN) 500 MG tablet TAKE 1 TABLET BY MOUTH THREE TIMES DAILY AS NEEDED FOR LEG PAIN, Disp-30 tablet, R-1Normal      torsemide (DEMADEX) 20 MG tablet Take 1 tablet by mouth daily, Disp-30 tablet, R-1Normal      doxycycline hyclate (VIBRA-TABS) 100 MG tablet Take 1 tablet by mouth every 12 hours, Disp-60 tablet, R-1Normal      nortriptyline (PAMELOR) 75 MG capsule Take 2 capsules by mouth nightly Taken for back pain, Disp-60 capsule, R-2Normal      methadone (DOLOPHINE) 10 MG/ML solution Take 130 mg by mouth every morning. Historical Med      potassium chloride (KLOR-CON M) 20 MEQ extended release tablet Take 20 mEq by mouth 2 times daily Pt reports Dr. Ash Watson increased dosage to 2 tabs BIDHistorical Med      metOLazone (ZAROXOLYN) 2.5 MG tablet Take 2.5 mg by mouth every morningHistorical Med      gabapentin (NEURONTIN) 300 MG capsule Take 2 capsules by mouth 3 times daily for 30 days.  Ortho surgeon, Disp-180 capsule, R-2Normal             ALLERGIES     Buprenorphine-naloxone, Subutex [buprenorphine], and Amoxicillin-pot clavulanate    FAMILYHISTORY       Family History   Problem Relation Age of Onset    Migraines Mother     Obesity Brother     Migraines Maternal Grandmother     COPD Paternal Grandmother     Dementia Paternal Grandfather     Depression Son     Mental Illness Son         Bipole, ADHD, Adjustment disorder    Mental Illness Maternal Aunt     Colon Cancer Neg Hx         SOCIAL HISTORY       Social History     Socioeconomic History    Marital status: Single     Spouse name: None    Number of children: 2    Years of education: None    Highest education level: None   Occupational History    Occupation:    Tobacco Use    Smoking status: Every Day     Packs/day: 0.50     Years: 12.00     Pack years: 6.00     Types: Cigarettes     Start date: 1998    Smokeless tobacco: Never   Vaping Use    Vaping Use: Never used   Substance and Sexual Activity    Alcohol use: No    Drug use: Not Currently     Types: IV, Opiates      Comment: heroin-last used 1/2022    Sexual activity: Yes Partners: Male   Social History Narrative    Do you donate blood or plasma? Yes    Caffeine intake? Moderate    Advance directive? No    Is blood transfusion acceptable in an emergency? Yes             Social Determinants of Health     Financial Resource Strain: High Risk    Difficulty of Paying Living Expenses: Hard   Food Insecurity: No Food Insecurity    Worried About Running Out of Food in the Last Year: Never true    Ran Out of Food in the Last Year: Never true       SCREENINGS    [place] Coma Scale  Eye Opening: Spontaneous  Best Verbal Response: Oriented  Best Motor Response: Obeys commands  [place] Coma Scale Score: 15        PHYSICAL EXAM    (up to 7 for level 4, 8 or more for level 5)     ED Triage Vitals [10/28/22 1613]   BP Temp Temp Source Heart Rate Resp SpO2 Height Weight   130/81 97.9 °F (36.6 °C) Oral 93 20 93 % -- --       Physical Exam  Vitals and nursing note reviewed. Constitutional:       General: She is not in acute distress. Appearance: She is not ill-appearing. HENT:      Head: Normocephalic and atraumatic. Right Ear: External ear normal.      Left Ear: External ear normal.      Nose: Nose normal.      Mouth/Throat:      Mouth: Mucous membranes are moist.      Pharynx: Oropharynx is clear. No oropharyngeal exudate or posterior oropharyngeal erythema. Eyes:      General: No scleral icterus. Cardiovascular:      Rate and Rhythm: Normal rate and regular rhythm. Pulses: Normal pulses. Heart sounds: Normal heart sounds. Pulmonary:      Effort: Pulmonary effort is normal.      Breath sounds: No rhonchi. Abdominal:      General: There is no distension. Palpations: Abdomen is soft. Tenderness: There is no abdominal tenderness. Musculoskeletal:         General: No swelling or tenderness. Cervical back: Normal range of motion and neck supple. Right lower leg: Edema present. Left lower leg: Edema present.    Skin:     General: Skin is warm and dry.      Capillary Refill: Capillary refill takes less than 2 seconds. Findings: No rash. Neurological:      General: No focal deficit present. Mental Status: She is alert and oriented to person, place, and time. Psychiatric:         Behavior: Behavior normal.       DIAGNOSTIC RESULTS   LABS:    Labs Reviewed   CBC WITH AUTO DIFFERENTIAL - Abnormal; Notable for the following components:       Result Value    RBC 3.84 (*)     Hemoglobin 11.0 (*)     Hematocrit 35.0 (*)     MCHC 31.4 (*)     RDW 19.2 (*)     Segs Relative 68.3 (*)     Lymphocytes % 15.8 (*)     Monocytes % 9.3 (*)     Eosinophils % 6.0 (*)     All other components within normal limits   COMPREHENSIVE METABOLIC PANEL W/ REFLEX TO MG FOR LOW K - Abnormal; Notable for the following components:    Potassium 3.4 (*)     Chloride 96 (*)     Albumin 3.0 (*)     All other components within normal limits   BLOOD GAS, VENOUS - Abnormal; Notable for the following components:    pCO2, Ricci 63 (*)     Base Exc, Mixed 9.7 (*)     HCO3, Venous 37.3 (*)     O2 Sat, Ricci 48.7 (*)     All other components within normal limits   MAGNESIUM - Abnormal; Notable for the following components:    Magnesium 1.6 (*)     All other components within normal limits   BRAIN NATRIURETIC PEPTIDE   TROPONIN   CK   SPECIMEN REJECTION   PROTIME/INR & PTT   LACTIC ACID   URINALYSIS   URINE DRUG SCREEN       When ordered, only abnormal lab results are displayed. All other labs were within normal range or not returned as of this dictation. EKG: When ordered, EKG's are interpreted by the Emergency Department Physician in the absence of a cardiologist.  Please see their note for interpretation of EKG.     RADIOLOGY:   Non-plain film images such as CT, Ultrasound and MRI are read by the radiologist. Plain radiographic images are visualized andpreliminarily interpreted by the  ED Provider with the below findings:    Per Radiologist interpretation below, if available at the time of this note:    XR CHEST (2 VW)   Final Result   1. Scattered areas of subsegmental atelectasis. PROCEDURES   Unless otherwise noted below, none     Procedures    CRITICAL CARE TIME   N/A    CONSULTS:  IP CONSULT TO IV TEAM  IP CONSULT TO CARDIOLOGY    EMERGENCY DEPARTMENT COURSE and DIFFERENTIAL DIAGNOSIS/MDM:   Vitals:    Vitals:    10/28/22 1732 10/28/22 2151 10/28/22 2202 10/28/22 2327   BP:  (!) 114/53 91/71    Pulse: 89      Resp:       Temp:       TempSrc:       SpO2:  92% (!) 88% 94%   Weight:       Height:           Is this patient to be included in the SEP-1 Core Measure due to severe sepsis or septic shock? No   Exclusion criteria - the patient is NOT to be included for SEP-1 Core Measure due to:  2+ SIRS criteria are not met     This is an alert and oriented x4, morbidly obese 38 y.o. yo female who presents emergency department with shortness of breath. Patient has easy nonlabored respirations. Lung sounds are clear bilaterally with O2 sat at 93% on room air. Equal rise and fall of the chest is noted. Other vital signs are within acceptable parameters. Patient is afebrile. She is nontoxic-appearing. Skin is warm, pink, and dry with no rash. PERRL. Abdomen is soft and nontender. She has bilateral lower extremity 1-2+ pitting edema. Labs are notable for mild hypokalemia at 3.4. Potassium supplementation provided in the emergency department. Her magnesium is also low at 1.6 therefore her magnesium was supplemented as well. Other labs unremarkable. Chest x-ray per radiologist rotation is noted for atelectasis otherwise no acute findings. Dr. Estefanía Knight did speak with the patient's cardiologist who gave recommendations. The see Dr. Mukul Barrientos note for those recommendations.   Patient was given the following medications:  Medications   magnesium sulfate 1000 mg in dextrose 5% 100 mL IVPB (0 mg IntraVENous Stopped 10/28/22 2226)   potassium bicarb-citric acid (EFFER-K) effervescent tablet 40 mEq (40 mEq Oral Given 10/28/22 2215)   furosemide (LASIX) injection 60 mg (60 mg IntraVENous Given 10/28/22 2119)     Test results reviewed with the patient. Pt denied any further questions or concerns . Strict ED return guidelines reviewed and patient is discharged in good condition. FINAL IMPRESSION      1. Dyspnea, unspecified type    2. Peripheral edema          DISPOSITION/PLAN   DISPOSITION Decision To Discharge 10/28/2022 10:37:00 PM      PATIENT REFERREDTO:  Elmo Cooks, APRN - CNP [address]  [phone]    Schedule an appointment as soon as possible for a visit in 1 day      [facility] Emergency Department  [facility] [id number]  [phone]    If symptoms worsen    Lex Dent MD  [address] [place] [phone]    Schedule an appointment as soon as possible for a visit in 1 day      DISCHARGE MEDICATIONS:  Discharge Medication List as of 10/28/2022 10:38 PM          DISCONTINUED MEDICATIONS:  Discharge Medication List as of 10/28/2022 10:38 PM        Comment: Please note this report has been produced using speech recognition software and may contain errors related to that system including errors in grammar, punctuation, and spelling, as well as words and phrases that may be inappropriate. If there are any questions or concerns please feel free to contact the dictating provider for clarification.     LEIGHTON Nieves CNP (electronically signed)      LEIGHTON Nieves CNP  10/29/22 0110

## 2022-10-29 NOTE — ED NOTES
Discharge packet reviewed with pt, including follow up care. Pt verbalized understanding and denies questions.       Sloan Roger RN  10/28/22 7712

## 2022-11-03 NOTE — PROGRESS NOTES
11/3/2022    Emelia Peabody    Chief Complaint   Patient presents with    Other    Edema     Severe bilateral leg swelling, hand swelling. Had lasix drip 10/17/22 and it worked really well, now the swelling is back up and she is falling all the time. HPI  History was obtained from patient. Rehana Rankin is a 38 y.o. female with a PMHx as listed below who presents today for acute complaint right leg swelling. Patient recent discharged hospital having increased warmth and swelling. No hx. Of dvt in the past. No cp, sob    Recently started on torsemide by nephrology, edema not improvoing  Hx. Of cellulitis in the past     1. Cellulitis of right lower extremity    2. Peripheral edema             REVIEW OF SYMPTOMS    Review of Systems   Constitutional:  Negative for chills and fatigue. HENT:  Negative for congestion and sore throat. Respiratory:  Negative for shortness of breath and wheezing. Cardiovascular:  Negative for chest pain and palpitations. Gastrointestinal:  Negative for abdominal pain and nausea. Genitourinary:  Negative for frequency and urgency. Neurological:  Negative for light-headedness.      PAST MEDICAL HISTORY  Past Medical History:   Diagnosis Date    Hepatitis C     Liver disease     hepatitis    No significant medical problems        FAMILY HISTORY  Family History   Problem Relation Age of Onset    Migraines Mother     Obesity Brother     Migraines Maternal Grandmother     COPD Paternal Grandmother     Dementia Paternal Grandfather     Depression Son     Mental Illness Son         Bipole, ADHD, Adjustment disorder    Mental Illness Maternal Aunt     Colon Cancer Neg Hx        SOCIAL HISTORY  Social History     Socioeconomic History    Marital status: Single    Number of children: 2   Occupational History    Occupation: [employer]   Tobacco Use    Smoking status: Every Day     Packs/day: 0.50     Years: 12.00     Pack years: 6.00     Types: Cigarettes     Start date: 1998    Smokeless tobacco: Never   Vaping Use    Vaping Use: Never used   Substance and Sexual Activity    Alcohol use: No    Drug use: Not Currently     Types: IV, Opiates      Comment: heroin-last used 2022    Sexual activity: Yes     Partners: Male   Social History Narrative    Do you donate blood or plasma? Yes    Caffeine intake? Moderate    Advance directive? No    Is blood transfusion acceptable in an emergency? Yes             Social Determinants of Health     Financial Resource Strain: High Risk    Difficulty of Paying Living Expenses: Hard   Food Insecurity: No Food Insecurity    Worried About Running Out of Food in the Last Year: Never true    Ran Out of Food in the Last Year: Never true        SURGICAL HISTORY  Past Surgical History:   Procedure Laterality Date     SECTION  , 2007    x 2    CHOLECYSTECTOMY      FINGER AMPUTATION Right 2019    right hand 3rd finger    HYSTERECTOMY (CERVIX STATUS UNKNOWN)  2008    MARLIN    INCISION AND DRAINAGE Right 2020    RIGHT UPPER THIGH INCISION AND DRAINAGE performed by Miguel Angel Fletcher MD at [address]  2013    lap sera    SPINAL FUSION  2022    pt unsure exact location                 CURRENT MEDICATIONS  Current Outpatient Medications   Medication Sig Dispense Refill    metroNIDAZOLE (METROGEL) 1 % gel Apply topically daily. 1 each 0    methocarbamol (ROBAXIN) 500 MG tablet TAKE 1 TABLET BY MOUTH THREE TIMES DAILY AS NEEDED FOR LEG PAIN 30 tablet 1    torsemide (DEMADEX) 20 MG tablet Take 1 tablet by mouth daily (Patient taking differently: Take 20 mg by mouth in the morning and at bedtime) 30 tablet 1    doxycycline hyclate (VIBRA-TABS) 100 MG tablet Take 1 tablet by mouth every 12 hours 60 tablet 1    nortriptyline (PAMELOR) 75 MG capsule Take 2 capsules by mouth nightly Taken for back pain 60 capsule 2    methadone (DOLOPHINE) 10 MG/ML solution Take 130 mg by mouth every morning.       potassium chloride (KLOR-CON M) 20 MEQ extended release tablet Take 20 mEq by mouth 2 times daily Pt reports Dr. Bernardo  increased dosage to 2 tabs BID      metOLazone (ZAROXOLYN) 2.5 MG tablet Take 5 mg by mouth every morning      gabapentin (NEURONTIN) 300 MG capsule Take 2 capsules by mouth 3 times daily for 30 days. Ortho surgeon 180 capsule 2     No current facility-administered medications for this visit. ALLERGIES  Allergies   Allergen Reactions    Buprenorphine-Naloxone Itching, Rash and Shortness Of Breath    Subutex [Buprenorphine] Nausea And Vomiting    Amoxicillin-Pot Clavulanate Rash       PHYSICAL EXAM    /64 (Site: Left Upper Arm, Position: Sitting, Cuff Size: Large Adult)   Pulse 85   Resp 16   Ht 5' 5\" (1.651 m)   Wt (!) 303 lb 1.6 oz (137.5 kg)   SpO2 95%   BMI 50.44 kg/m²     Physical Exam  Constitutional:       Appearance: Normal appearance. HENT:      Head: Normocephalic and atraumatic. Eyes:      Extraocular Movements: Extraocular movements intact. Pupils: Pupils are equal, round, and reactive to light. Cardiovascular:      Rate and Rhythm: Normal rate and regular rhythm. Pulses: Normal pulses. Heart sounds: No murmur heard. No friction rub. No gallop. Pulmonary:      Effort: Pulmonary effort is normal.      Breath sounds: Normal breath sounds. Skin:     General: Skin is warm and dry. Neurological:      General: No focal deficit present. Mental Status: She is alert. Psychiatric:         Mood and Affect: Mood normal.         Behavior: Behavior normal.       ASSESSMENT & PLAN    1. Cellulitis of right lower extremity      2. Peripheral edema    - US DUP LOWER EXTREMITY RIGHT JANET (aka DUPLEX);  Future  - [facility] 4.5 high risk   given recent hospitalization  ambulation will obtain imaging  Already on doxycycline 100mg bid  Referral to [facility] for chronic venous stasis patient perhaps could benefit from [facility] boot    Return for as scheduled.          Electronically signed by Aimee Gonzalez DO on 11/3/2022

## 2022-11-03 NOTE — PATIENT INSTRUCTIONS
Dr. Tanisha Kan  Address: [address] [facility], [facility], [address] [facility]  Phone: [phone]

## 2022-11-04 PROBLEM — I46.9 CARDIAC ARREST (HCC): Status: ACTIVE | Noted: 2022-01-01

## 2022-11-04 NOTE — ED NOTES
Report called to Abram Santana. Waiting for RT to transport PT to floor.      Nona Macdonald RN  11/04/22 1079

## 2022-11-04 NOTE — ED NOTES
PT was found down by a friend at home. 1350 was witnessed arrest per DONG BUSTILLOS. ROSC was achieved 3 times per EMS on arrival to ED. BG was 139 for EMS.      Leatha Mcgee RN  11/04/22 0006

## 2022-11-04 NOTE — ED PROVIDER NOTES
Emergency Department Encounter    Patient: Sky Arizmendi  MRN: [id number]  : 1980  Date of Evaluation: 2022  ED Provider:  [employer] [place] [employer], DO    Triage Chief Complaint:   No chief complaint on file. [place]:  Sky Arizmendi is a 38 y.o. female that presents the emergency department for cardiac arrest.  EMS did call ahead of time is a 79-year-old female witnessed cardiac arrest by significant other. They were performing CPR the call came in at 1350 initially. When they called the ER they had done 4 rounds of epinephrine he had an IO in the right lower extremity she was intubated with a 8 oh ET tube. I see her initial heart rhythm was asystole when I arrived to the scene. They states she is currently PEA. They state they did give 4 of Narcan states she does have a history of IV drug use. They had been working approximately 30 minutes when the call the emergency department. They states she does have a history of congestive heart failure and on oxygen chronically. EMS was instructed to give sodium bicarb another round of epi and to call back. They arrived to the emergency department stating patient had lost pulse again in route to the emergency department. When she arrived to the emergency department she is a total of 7 epi for Narcan 1 bicarb did state her blood sugar was 139. They state patient had fallen multiple times. They state they had come to patient's house earlier for a lift assist but she refused coming to the emergency department at that time.   They state they did see some fresh track marks on her bilateral extremities upper    Review of systems unable to be obtained due to patient cardiac arrest unresponsive, intubated,    ROS - see HPI,       Past Medical History:   Diagnosis Date    Hepatitis C     Liver disease     hepatitis    No significant medical problems      Past Surgical History:   Procedure Laterality Date     SECTION  , 2007    x 2    CHOLECYSTECTOMY FINGER AMPUTATION Right 2019    right hand 3rd finger    HYSTERECTOMY (CERVIX STATUS UNKNOWN)  01/01/2008    [facility]    INCISION AND DRAINAGE Right 08/04/2020    RIGHT UPPER THIGH INCISION AND DRAINAGE performed by Fei Diaz MD at [address]  07/03/2013    lap sera    SPINAL FUSION  07/02/2022    pt unsure exact location     Family History   Problem Relation Age of Onset    Migraines Mother     Obesity Brother     Migraines Maternal Grandmother     COPD Paternal Grandmother     Dementia Paternal Grandfather     Depression Son     Mental Illness Son         Bipole, ADHD, Adjustment disorder    Mental Illness Maternal Aunt     Colon Cancer Neg Hx      Social History     Socioeconomic History    Marital status: Single     Spouse name: Not on file    Number of children: 2    Years of education: Not on file    Highest education level: Not on file   Occupational History    Occupation:    Tobacco Use    Smoking status: Every Day     Packs/day: 0.50     Years: 12.00     Pack years: 6.00     Types: Cigarettes     Start date: 1998    Smokeless tobacco: Never   Vaping Use    Vaping Use: Never used   Substance and Sexual Activity    Alcohol use: No    Drug use: Not Currently     Types: IV, Opiates      Comment: heroin-last used 1/2022    Sexual activity: Yes     Partners: Male   Other Topics Concern    Not on file   Social History Narrative    Do you donate blood or plasma? Yes    Caffeine intake? Moderate    Advance directive? No    Is blood transfusion acceptable in an emergency?  Yes             Social Determinants of Health     Financial Resource Strain: High Risk    Difficulty of Paying Living Expenses: Hard   Food Insecurity: No Food Insecurity    Worried About Running Out of Food in the Last Year: Never true    Ran Out of Food in the Last Year: Never true   Transportation Needs: Not on file   Physical Activity: Not on file   Stress: Not on file   Social Connections: Not on file Intimate Partner Violence: Not on file   Housing Stability: Not on file     Current Facility-Administered Medications   Medication Dose Route Frequency Provider Last Rate Last Admin    cefepime (MAXIPIME) 2000 mg IVPB minibag  2,000 mg IntraVENous Once Cherelle Patel,         metroNIDAZOLE (FLAGYL) tablet 500 mg  500 mg Oral Once Cherelle Benton DO        0.9 % sodium chloride bolus  1,000 mL IntraVENous Once Xavier Rivero, DO        0.9 % sodium chloride bolus  1,000 mL IntraVENous Once Cherelle Patel, DO         Current Outpatient Medications   Medication Sig Dispense Refill    metroNIDAZOLE (METROGEL) 1 % gel Apply topically daily. 1 each 0    methocarbamol (ROBAXIN) 500 MG tablet TAKE 1 TABLET BY MOUTH THREE TIMES DAILY AS NEEDED FOR LEG PAIN 30 tablet 1    torsemide (DEMADEX) 20 MG tablet Take 1 tablet by mouth daily (Patient taking differently: Take 20 mg by mouth in the morning and at bedtime) 30 tablet 1    doxycycline hyclate (VIBRA-TABS) 100 MG tablet Take 1 tablet by mouth every 12 hours 60 tablet 1    nortriptyline (PAMELOR) 75 MG capsule Take 2 capsules by mouth nightly Taken for back pain 60 capsule 2    methadone (DOLOPHINE) 10 MG/ML solution Take 130 mg by mouth every morning. potassium chloride (KLOR-CON M) 20 MEQ extended release tablet Take 20 mEq by mouth 2 times daily Pt reports Dr. Veronika Mcintosh increased dosage to 2 tabs BID      metOLazone (ZAROXOLYN) 2.5 MG tablet Take 5 mg by mouth every morning      gabapentin (NEURONTIN) 300 MG capsule Take 2 capsules by mouth 3 times daily for 30 days.  Ortho surgeon 180 capsule 2     Allergies   Allergen Reactions    Buprenorphine-Naloxone Itching, Rash and Shortness Of Breath    Subutex [Buprenorphine] Nausea And Vomiting    Amoxicillin-Pot Clavulanate Rash       Nursing Notes Reviewed    Physical Exam:  Triage VS:    ED Triage Vitals   Enc Vitals Group      BP 11/04/22 1452 (!) 72/46      Heart Rate 11/04/22 1452 85      Resp 11/04/22 1452 19      Temp --       Temp src --       SpO2 11/04/22 1455 (!) 72 %      Weight --       Height --       Head Circumference --       Peak Flow --       Pain Score --       Pain Loc --       Pain Edu? --       Excl. in [address]? --        My pulse ox interpretation is - normal    General appearance:  unresponsive  Skin:  Warm. Dry. Eye:  Pupils fixed dilated non reactive     Ears, nose, mouth and throat:  Oral mucosa moist   Neck:  Trachea midline. Extremity:  No swelling. Normal ROM     Heart:  Regular rate and rhythm, normal S1 & S2, no extra heart sounds.     Perfusion:  intact  Respiratory: Intubated   abdominal: Obese abdomen, slightly distended  Back:  No CVA tenderness to palpation     Neurological: Unresponsive    I have reviewed and interpreted all of the currently available lab results from this visit (if applicable):  Results for orders placed or performed during the hospital encounter of 11/04/22   Culture, Blood 1    Specimen: Blood   Result Value Ref Range    Specimen BLOOD     Special Requests NONE     Culture NO GROWTH AT 48 HOURS    Culture, Blood 2    Specimen: Blood   Result Value Ref Range    Specimen BLOOD     Special Requests NONE     Culture NO GROWTH AT 50 HOURS    Culture, MRSA, Screening    Specimen: Nasal   Result Value Ref Range    Specimen NASAL     Special Requests NONE     Culture Prelim Report Further report to follow    Culture, Respiratory    Specimen: Endotracheal   Result Value Ref Range    Specimen ENDOTRACHEAL     Special Requests NONE     Culture Prelim Report Further report to follow    Brain Natriuretic Peptide   Result Value Ref Range    Pro-.5 (H) <300 PG/ML   CBC with Auto Differential   Result Value Ref Range    WBC 15.6 (H) 4.0 - 10.5 K/CU MM    RBC 3.74 (L) 4.2 - 5.4 M/CU MM    Hemoglobin 10.6 (L) 12.5 - 16.0 GM/DL    Hematocrit 36.5 (L) 37 - 47 %    MCV 97.6 78 - 100 FL    MCH 28.3 27 - 31 PG    MCHC 29.0 (L) 32.0 - 36.0 %    RDW 18.5 (H) 11.7 - 14.9 %    Platelets 298 604 - 517 K/CU MM    MPV 10.3 7.5 - 11.1 FL    Myelocyte Percent 2 (H) 0.0 %    Metamyelocytes Relative 3 (H) 0.0 %    Bands Relative 10 5 - 11 %    Segs Relative 70.0 (H) 36 - 66 %    Eosinophils % 2.0 0 - 3 %    Lymphocytes % 13.0 (L) 24 - 44 %    Myelocytes Absolute 0.31 K/CU MM    Metamyelocytes Absolute 0.47 K/CU MM    Bands Absolute 1.56 K/CU MM    Segs Absolute 11.0 K/CU MM    Eosinophils Absolute 0.3 K/CU MM    Lymphocytes Absolute 2.0 K/CU MM    Differential Type MANUAL DIFFERENTIAL     Polychromasia 1+    Comprehensive Metabolic Panel w/ Reflex to MG   Result Value Ref Range    Sodium 135 135 - 145 MMOL/L    Potassium 3.3 (L) 3.5 - 5.1 MMOL/L    Chloride 88 (L) 99 - 110 mMol/L    CO2 21 21 - 32 MMOL/L    BUN 31 (H) 6 - 23 MG/DL    Creatinine 1.7 (H) 0.6 - 1.1 MG/DL    Est, Glom Filt Rate 39 (L) >60 mL/min/1.73m2    Glucose 323 (H) 70 - 99 MG/DL    Calcium 8.2 (L) 8.3 - 10.6 MG/DL    Albumin 2.8 (L) 3.4 - 5.0 GM/DL    Total Protein 6.5 6.4 - 8.2 GM/DL    Total Bilirubin 0.3 0.0 - 1.0 MG/DL    ALT 53 (H) 10 - 40 U/L    AST 97 (H) 15 - 37 IU/L    Alkaline Phosphatase 175 (H) 40 - 129 IU/L    Anion Gap 26 (H) 4 - 16   Lipase   Result Value Ref Range    Lipase 36 13 - 60 IU/L   Troponin   Result Value Ref Range    Troponin T 0.014 (H) <0.01 NG/ML   Lactic Acid   Result Value Ref Range    Lactate 12.2 (HH) 0.4 - 2.0 mMOL/L   Blood Gas, Venous   Result Value Ref Range    pH, Ricci 7.13 (L) 7.32 - 7.42    pCO2, Ricci 70 (H) 38 - 52 mmHG    pO2, Ricci 38 28 - 48 mmHG    Base Excess 7 (H) 0 - 2.4    HCO3, Venous 23.3 19 - 25 MMOL/L    O2 Sat, Ricci 55.2 50 - 70 %    Comment VBG    Magnesium   Result Value Ref Range    Magnesium 2.1 1.8 - 2.4 mg/dl   Protime/INR & PTT   Result Value Ref Range    Protime 14.1 11.7 - 14.5 SECONDS    INR 1.09 INDEX    aPTT 26.2 25.1 - 37.1 SECONDS   Urine Drug Screen   Result Value Ref Range    Cannabinoid Scrn, Ur NEGATIVE NEGATIVE    Amphetamines NEGATIVE NEGATIVE Cocaine Metabolite NEGATIVE NEGATIVE    Benzodiazepine Screen, Urine NEGATIVE NEGATIVE    Barbiturate Screen, Ur NEGATIVE NEGATIVE    Opiates, Urine NEGATIVE NEGATIVE    Phencyclidine, Urine NEGATIVE NEGATIVE    Oxycodone NEGATIVE NEGATIVE   Urinalysis with Reflex to Culture    Specimen: Urine   Result Value Ref Range    Color, UA YELLOW YELLOW    Clarity, UA CLEAR CLEAR    Glucose, Urine NEGATIVE NEGATIVE MG/DL    Bilirubin Urine NEGATIVE NEGATIVE MG/DL    Ketones, Urine NEGATIVE NEGATIVE MG/DL    Specific Gravity, UA 1.010 1.001 - 1.035    Blood, Urine NEGATIVE NEGATIVE    pH, Urine 7.0 5.0 - 8.0    Protein, UA NEGATIVE NEGATIVE MG/DL    Urobilinogen, Urine 0.2 0.2 - 1.0 MG/DL    Nitrite Urine, Quantitative NEGATIVE NEGATIVE    Leukocyte Esterase, Urine NEGATIVE NEGATIVE   Blood gas, arterial   Result Value Ref Range    pH, Bld 7.29 (L) 7.34 - 7.45    pCO2, Arterial 54.0 (H) 32 - 45 MMHG    pO2, Arterial 99 75 - 100 MMHG    Base Excess 1 0 - 2.4    HCO3, Arterial 26.0 (H) 18 - 23 MMOL/L    CO2 Content 27.7 (H) 19 - 24 MMOL/L    O2 Sat 92.3 (L) 96 - 97 %    Carbon Monoxide, Blood 4.1 0 - 5 %    Methemoglobin, Arterial 1.2 <1.5 %    Comment AC/VC 18 500 100% +10    Triglyceride   Result Value Ref Range    Triglycerides 150 (H) <150 MG/DL   APTT   Result Value Ref Range    aPTT 44.0 (H) 25.1 - 37.1 SECONDS   Basic Metabolic Panel   Result Value Ref Range    Sodium 136 135 - 145 MMOL/L    Potassium 2.8 (LL) 3.5 - 5.1 MMOL/L    Chloride 93 (L) 99 - 110 mMol/L    CO2 27 21 - 32 MMOL/L    Anion Gap 16 4 - 16    BUN 40 (H) 6 - 23 MG/DL    Creatinine 2.0 (H) 0.6 - 1.1 MG/DL    Est, Glom Filt Rate 32 (L) >60 mL/min/1.73m2    Glucose 142 (H) 70 - 99 MG/DL    Calcium 8.0 (L) 8.3 - 10.6 MG/DL   Basic Metabolic Panel   Result Value Ref Range    Sodium 138 135 - 145 MMOL/L    Potassium 3.2 (L) 3.5 - 5.1 MMOL/L    Chloride 95 (L) 99 - 110 mMol/L    CO2 28 21 - 32 MMOL/L    Anion Gap 15 4 - 16    BUN 43 (H) 6 - 23 MG/DL Creatinine 2.1 (H) 0.6 - 1.1 MG/DL    Est, Glom Filt Rate 30 (L) >60 mL/min/1.73m2    Glucose 120 (H) 70 - 99 MG/DL    Calcium 8.3 8.3 - 10.6 MG/DL   Blood Gas, Arterial   Result Value Ref Range    pH, Bld 7.38 7.34 - 7.45    pCO2, Arterial 54.0 (H) 32 - 45 MMHG    pO2, Arterial 88 75 - 100 MMHG    Base Exc, Mixed 5.3 (H) 0 - 2.3    HCO3, Arterial 31.9 (H) 18 - 23 MMOL/L    CO2 Content 33.6 (H) 19 - 24 MMOL/L    O2 Sat 93.5 (L) 96 - 97 %    Carbon Monoxide, Blood 2.4 0 - 5 %    Methemoglobin, Arterial 1.4 <1.5 %   Blood Gas, Arterial   Result Value Ref Range    pH, Bld 7.37 7.34 - 7.45    pCO2, Arterial 53.0 (H) 32 - 45 MMHG    pO2, Arterial 59 (L) 75 - 100 MMHG    Base Exc, Mixed 4 (H) 0 - 2.3    HCO3, Arterial 30.6 (H) 18 - 23 MMOL/L    CO2 Content 32.2 (H) 19 - 24 MMOL/L    O2 Sat 87.6 (L) 96 - 97 %    Carbon Monoxide, Blood 2.3 0 - 5 %    Methemoglobin, Arterial 0.8 <1.5 %   Calcium, Ionized   Result Value Ref Range    Ionized Ca 1.03 (L) 1.12 - 1.32 mMOL/L    Calcium, Ionized 4.12 (L) 4.48 - 5.28 MG/DL   Calcium, Ionized   Result Value Ref Range    Ionized Ca 1.02 (L) 1.12 - 1.32 mMOL/L    Calcium, Ionized 4.08 (L) 4.48 - 5.28 MG/DL   Magnesium   Result Value Ref Range    Magnesium 1.8 1.8 - 2.4 mg/dl   Phosphorus   Result Value Ref Range    Phosphorus 3.8 2.5 - 4.9 MG/DL   Phosphorus   Result Value Ref Range    Phosphorus 4.3 2.5 - 4.9 MG/DL   Protime-INR   Result Value Ref Range    Protime 15.7 (H) 11.7 - 14.5 SECONDS    INR 1.21 INDEX   Triglyceride   Result Value Ref Range    Triglycerides 89 <150 MG/DL   Lactic Acid   Result Value Ref Range    Lactate 2.1 (HH) 0.4 - 2.0 mMOL/L   Lactic Acid   Result Value Ref Range    Lactate 1.7 0.4 - 2.0 mMOL/L   Blood Gas, Arterial   Result Value Ref Range    pH, Bld 7.45 7.34 - 7.45    pCO2, Arterial 42.0 32 - 45 MMHG    pO2, Arterial 128 (H) 75 - 100 MMHG    Base Exc, Mixed 4.7 (H) 0 - 2.3    HCO3, Arterial 29.2 (H) 18 - 23 MMOL/L    CO2 Content 30.5 (H) 19 - 24 MMOL/L    O2 Sat 96.0 96 - 97 %    Carbon Monoxide, Blood 1.8 0 - 5 %    Methemoglobin, Arterial 1.3 <1.5 %    Comment 22 420 .85 12P    Calcium, Ionized   Result Value Ref Range    Ionized Ca 1.09 (L) 1.12 - 1.32 mMOL/L    Calcium, Ionized 4.36 (L) 4.48 - 5.28 MG/DL   Phosphorus   Result Value Ref Range    Phosphorus 4.9 2.5 - 4.9 MG/DL   Lactic Acid   Result Value Ref Range    Lactate 2.7 (HH) 0.4 - 2.0 mMOL/L   Drug screen multi urine   Result Value Ref Range    Cannabinoid Scrn, Ur NEGATIVE NEGATIVE    Amphetamines NEGATIVE NEGATIVE    Cocaine Metabolite NEGATIVE NEGATIVE    Benzodiazepine Screen, Urine NEGATIVE NEGATIVE    Barbiturate Screen, Ur NEGATIVE NEGATIVE    Opiates, Urine NEGATIVE NEGATIVE    Phencyclidine, Urine NEGATIVE NEGATIVE    Oxycodone NEGATIVE NEGATIVE   Hemoglobin A1c   Result Value Ref Range    Hemoglobin A1C 4.9 4.2 - 6.3 %    eAG 94 mg/dL   Magnesium   Result Value Ref Range    Magnesium 1.8 1.8 - 2.4 mg/dl   Magnesium   Result Value Ref Range    Magnesium 1.9 1.8 - 2.4 mg/dl   Troponin   Result Value Ref Range    Troponin T 0.096 (H) <0.01 NG/ML   Basic Metabolic Panel   Result Value Ref Range    Sodium 135 135 - 145 MMOL/L    Potassium 2.6 (LL) 3.5 - 5.1 MMOL/L    Chloride 92 (L) 99 - 110 mMol/L    CO2 28 21 - 32 MMOL/L    Anion Gap 15 4 - 16    BUN 37 (H) 6 - 23 MG/DL    Creatinine 2.0 (H) 0.6 - 1.1 MG/DL    Est, Glom Filt Rate 32 (L) >60 mL/min/1.73m2    Glucose 96 70 - 99 MG/DL    Calcium 8.3 8.3 - 10.6 MG/DL   Basic Metabolic Panel   Result Value Ref Range    Sodium 139 135 - 145 MMOL/L    Potassium 3.3 (L) 3.5 - 5.1 MMOL/L    Chloride 96 (L) 99 - 110 mMol/L    CO2 30 21 - 32 MMOL/L    Anion Gap 13 4 - 16    BUN 47 (H) 6 - 23 MG/DL    Creatinine 2.4 (H) 0.6 - 1.1 MG/DL    Est, Glom Filt Rate 26 (L) >60 mL/min/1.73m2    Glucose 85 70 - 99 MG/DL    Calcium 8.1 (L) 8.3 - 10.6 MG/DL   Magnesium   Result Value Ref Range    Magnesium 1.8 1.8 - 2.4 mg/dl   Blood Gas, Arterial   Result Value Ref Range    pH, Bld 7.38 7.34 - 7.45    pCO2, Arterial 56.0 (H) 32 - 45 MMHG    pO2, Arterial 79 75 - 100 MMHG    Base Exc, Mixed 6.3 (H) 0 - 2.3    HCO3, Arterial 33.1 (H) 18 - 23 MMOL/L    CO2 Content 34.8 (H) 19 - 24 MMOL/L    O2 Sat 93.2 (L) 96 - 97 %    Carbon Monoxide, Blood 1.6 0 - 5 %    Methemoglobin, Arterial 1.3 <1.5 %    Comment AC 22 450 75% +12    Calcium, Ionized   Result Value Ref Range    Ionized Ca 1.04 (L) 1.12 - 1.32 mMOL/L    Calcium, Ionized 4.16 (L) 4.48 - 5.28 MG/DL   Phosphorus   Result Value Ref Range    Phosphorus 5.0 (H) 2.5 - 4.9 MG/DL   Lactic Acid   Result Value Ref Range    Lactate 1.9 0.4 - 2.0 mMOL/L   Vancomycin Level, Random   Result Value Ref Range    Vancomycin Rm 22.9 UG/ML    DOSE AMOUNT DOSE AMT. GIVEN - .      DOSE TIME DOSE TIME GIVEN - .    Basic Metabolic Panel   Result Value Ref Range    Sodium 139 135 - 145 MMOL/L    Potassium 4.5 3.5 - 5.1 MMOL/L    Chloride 98 (L) 99 - 110 mMol/L    CO2 27 21 - 32 MMOL/L    Anion Gap 14 4 - 16    BUN 47 (H) 6 - 23 MG/DL    Creatinine 2.5 (H) 0.6 - 1.1 MG/DL    Est, Glom Filt Rate 24 (L) >60 mL/min/1.73m2    Glucose 101 (H) 70 - 99 MG/DL    Calcium 8.2 (L) 8.3 - 10.6 MG/DL   Magnesium   Result Value Ref Range    Magnesium 1.7 (L) 1.8 - 2.4 mg/dl   Blood Gas, Arterial   Result Value Ref Range    pH, Bld 7.33 (L) 7.34 - 7.45    pCO2, Arterial 54.0 (H) 32 - 45 MMHG    pO2, Arterial 88 75 - 100 MMHG    Base Exc, Mixed 1.5 0 - 2.3    HCO3, Arterial 28.5 (H) 18 - 23 MMOL/L    CO2 Content 30.2 (H) 19 - 24 MMOL/L    O2 Sat 94.1 (L) 96 - 97 %    Carbon Monoxide, Blood 1.6 0 - 5 %    Methemoglobin, Arterial 0.8 <1.5 %    Comment ACVC 22    Calcium, Ionized   Result Value Ref Range    Ionized Ca 1.10 (L) 1.12 - 1.32 mMOL/L    Calcium, Ionized 4.40 (L) 4.48 - 5.28 MG/DL   Phosphorus   Result Value Ref Range    Phosphorus 4.8 2.5 - 4.9 MG/DL   Hepatic Function Panel   Result Value Ref Range    Albumin 2.3 (L) 3.4 - 5.0 GM/DL    Total Bilirubin 0.2 0.0 - 1.0 MG/DL    Bilirubin, Direct 0.2 0.0 - 0.3 MG/DL    Bilirubin, Indirect 0.0 0 - 0.7 MG/DL    Alkaline Phosphatase 139 (H) 40 - 129 IU/L     (H) 15 - 37 IU/L     (H) 10 - 40 U/L    Total Protein 5.5 (L) 6.4 - 8.2 GM/DL   Basic Metabolic Panel   Result Value Ref Range    Sodium 139 135 - 145 MMOL/L    Potassium 3.7 3.5 - 5.1 MMOL/L    Chloride 101 99 - 110 mMol/L    CO2 27 21 - 32 MMOL/L    Anion Gap 11 4 - 16    BUN 47 (H) 6 - 23 MG/DL    Creatinine 2.7 (H) 0.6 - 1.1 MG/DL    Est, Glom Filt Rate 22 (L) >60 mL/min/1.73m2    Glucose 87 70 - 99 MG/DL    Calcium 8.2 (L) 8.3 - 10.6 MG/DL   Magnesium   Result Value Ref Range    Magnesium 1.5 (L) 1.8 - 2.4 mg/dl   Blood Gas, Arterial   Result Value Ref Range    pH, Bld 7.30 (L) 7.34 - 7.45    pCO2, Arterial 60.0 (H) 32 - 45 MMHG    pO2, Arterial 68 (L) 75 - 100 MMHG    Base Exc, Mixed 1.6 0 - 2.3    HCO3, Arterial 29.5 (H) 18 - 23 MMOL/L    CO2 Content 31.3 (H) 19 - 24 MMOL/L    O2 Sat 89.4 (L) 96 - 97 %    Carbon Monoxide, Blood 2.0 0 - 5 %    Methemoglobin, Arterial 0.9 <1.5 %    Comment ACVC 22    Calcium, Ionized   Result Value Ref Range    Ionized Ca 1.17 1.12 - 1.32 mMOL/L    Calcium, Ionized 4.68 4.48 - 5.28 MG/DL   Phosphorus   Result Value Ref Range    Phosphorus 3.5 2.5 - 4.9 MG/DL   POC CRITICAL CARE PROFILE   Result Value Ref Range    pH, Bld 7.34 7.34 - 7.45    pCO2, Arterial 50.4 (H) 32 - 45 MMHG    pO2, Arterial 90.4 75 - 100 MMHG    HCO3, Arterial 27.2 (H) 18 - 23 MMOL/L    Base Exc, Mixed 0.8 0 - 2.3    O2 Sat 96.3 96 - 97 %    CO2 29 21 - 32 MMOL/L    Sodium 140 138 - 146 MMOL/L    Potassium 3.8 3.5 - 4.5 MMOL/L    POC CALCIUM 1.06 (L) 1.12 - 1.32 MMOL/L    POC Glucose 91 70 - 99 MG/DL    Hematocrit 33.0 (L) 37 - 47 %    Hemoglobin 11.2 (L) 12.5 - 16.0 GM/DL    POC Chloride 101 98 - 109 MMOL/L    POC Creatinine 2.4 (H) 0.6 - 1.1 MG/DL    eGFR, POC 26 (L) >60 mL/min/1.73m2    Source: ARTERIAL    POC CRITICAL CARE PROFILE   Result Value Ref Range    pH, Bld 7.32 (L) 7.34 - 7.45    pCO2, Arterial 53.9 (H) 32 - 45 MMHG    pO2, Arterial 87.6 75 - 100 MMHG    HCO3, Arterial 27.9 (H) 18 - 23 MMOL/L    Base Exc, Mixed 1.1 0 - 2.3    O2 Sat 95.7 (L) 96 - 97 %    CO2 30 21 - 32 MMOL/L    Sodium 142 138 - 146 MMOL/L    Potassium 3.4 (L) 3.5 - 4.5 MMOL/L    POC CALCIUM 1.18 1.12 - 1.32 MMOL/L    POC Glucose 88 70 - 99 MG/DL    Hematocrit 34.0 (L) 37 - 47 %    Hemoglobin 11.6 (L) 12.5 - 16.0 GM/DL    POC Chloride 102 98 - 109 MMOL/L    POC Creatinine 2.4 (H) 0.6 - 1.1 MG/DL    eGFR, POC 26 (L) >60 mL/min/1.73m2    Source: ARTERIAL    POC CRITICAL CARE PROFILE   Result Value Ref Range    pH, Bld 7.32 (L) 7.34 - 7.45    pCO2, Arterial 53.9 (H) 32 - 45 MMHG    pO2, Arterial 81.1 75 - 100 MMHG    HCO3, Arterial 27.5 (H) 18 - 23 MMOL/L    Base Exc, Mixed 0.5 0 - 2.3    O2 Sat 94.5 (L) 96 - 97 %    CO2 29 21 - 32 MMOL/L    Sodium 142 138 - 146 MMOL/L    Potassium 4.0 3.5 - 4.5 MMOL/L    POC CALCIUM 1.16 1.12 - 1.32 MMOL/L    POC Glucose 87 70 - 99 MG/DL    Hematocrit 33.0 (L) 37 - 47 %    Hemoglobin 11.3 (L) 12.5 - 16.0 GM/DL    POC Chloride 103 98 - 109 MMOL/L    POC Creatinine 2.3 (H) 0.6 - 1.1 MG/DL    eGFR, POC 27 (L) >60 mL/min/1.73m2    Source: ARTERIAL    POC CRITICAL CARE PROFILE   Result Value Ref Range    pH, Bld 7.30 (L) 7.34 - 7.45    pCO2, Arterial 60.0 (H) 32 - 45 MMHG    pO2, Arterial 76.9 75 - 100 MMHG    HCO3, Arterial 29.2 (H) 18 - 23 MMOL/L    Base Exc, Mixed 1.5 0 - 2.3    O2 Sat 93.2 (L) 96 - 97 %    CO2 31 21 - 32 MMOL/L    Sodium 143 138 - 146 MMOL/L    Potassium 3.6 3.5 - 4.5 MMOL/L    POC CALCIUM 1.15 1.12 - 1.32 MMOL/L    POC Glucose 84 70 - 99 MG/DL    Hematocrit 37.0 37 - 47 %    Hemoglobin 12.5 12.5 - 16.0 GM/DL    POC Chloride 104 98 - 109 MMOL/L    POC Creatinine 2.6 (H) 0.6 - 1.1 MG/DL    eGFR, POC 23 (L) >60 mL/min/1.73m2    Source: ARTERIAL    POC CRITICAL CARE PROFILE   Result Value Ref Range    pH, Bld 7.26 (L) 7.34 - 7.45 pCO2, Arterial 66.7 (H) 32 - 45 MMHG    pO2, Arterial 38.9 (L) 75 - 100 MMHG    HCO3, Arterial 29.9 (H) 18 - 23 MMOL/L    Base Exc, Mixed 1.5 0 - 2.3    O2 Sat 63.2 (L) 96 - 97 %    CO2 32 21 - 32 MMOL/L    Sodium 142 138 - 146 MMOL/L    Potassium 3.5 3.5 - 4.5 MMOL/L    POC CALCIUM 1.12 1.12 - 1.32 MMOL/L    POC Glucose 77 70 - 99 MG/DL    Hematocrit 33.0 (L) 37 - 47 %    Hemoglobin 11.3 (L) 12.5 - 16.0 GM/DL    POC Chloride 102 98 - 109 MMOL/L    POC Creatinine 2.4 (H) 0.6 - 1.1 MG/DL    eGFR, POC 26 (L) >60 mL/min/1.73m2    Source: VENOUS    Blood gas, arterial   Result Value Ref Range    pH, Bld 7.26 (L) 7.34 - 7.45    pCO2, Arterial 55.0 (H) 32 - 45 MMHG    pO2, Arterial 95 75 - 100 MMHG    Base Excess 3 (H) 0 - 2.4    HCO3, Arterial 24.7 (H) 18 - 23 MMOL/L    CO2 Content 26.4 (H) 19 - 24 MMOL/L    O2 Sat 94.0 (L) 96 - 97 %    Carbon Monoxide, Blood 1.7 0 - 5 %    Methemoglobin, Arterial 0.9 <1.5 %    Comment 420 22 .7 12P    POCT Glucose   Result Value Ref Range    POC Glucose 101 (H) 70 - 99 MG/DL   POCT Glucose   Result Value Ref Range    POC Glucose 175 (H) 70 - 99 MG/DL   POCT Glucose   Result Value Ref Range    POC Glucose 146 (H) 70 - 99 MG/DL   POCT Glucose   Result Value Ref Range    POC Glucose 120 (H) 70 - 99 MG/DL   POCT Glucose   Result Value Ref Range    POC Glucose 108 (H) 70 - 99 MG/DL   POCT Glucose   Result Value Ref Range    POC Glucose 91 70 - 99 MG/DL   POCT Glucose   Result Value Ref Range    POC Glucose 90 70 - 99 MG/DL   POCT Glucose   Result Value Ref Range    POC Glucose 81 70 - 99 MG/DL   POCT Glucose   Result Value Ref Range    POC Glucose 104 (H) 70 - 99 MG/DL   EKG 12 Lead   Result Value Ref Range    Ventricular Rate 79 BPM    P-R Interval 122 ms    QRS Duration 102 ms    Q-T Interval 416 ms    QTc Calculation (Bazett) 477 ms    P Axis 198 degrees    R Axis 161 degrees    T Axis -9 degrees    Diagnosis       Suspect Agonal rhythm  Abnormal ECG  When compared with ECG of 28-OCT-2022 22:32,  Significant changes have occurred  Confirmed by [facility] PARISI ([id number]) on 11/4/2022 6:10:47 PM     EKG 12 Lead   Result Value Ref Range    Ventricular Rate 99 BPM    Atrial Rate 99 BPM    P-R Interval 272 ms    QRS Duration 202 ms    Q-T Interval 474 ms    QTc Calculation (Bazett) 608 ms    P Axis 49 degrees    R Axis 145 degrees    T Axis 5 degrees    Diagnosis       Sinus rhythm with sinus arrhythmia with 1st degree AV block  Right bundle branch block  Abnormal ECG  When compared with ECG of 04-NOV-2022 15:05,  Previous ECG has undetermined rhythm, needs review  Right bundle branch block has replaced Incomplete right bundle branch block  Criteria for Septal infarct are no longer present  Criteria for Lateral infarct are no longer present        Radiographs (if obtained):  Radiologist's Report Reviewed:  XR CHEST PORTABLE   Final Result   Endotracheal tube has been retracted with tip now 3.7 cm from the richie. Otherwise stable exam.         XR CHEST PORTABLE   Final Result   1. Endotracheal tube tip terminates approximately 5 mm above the richie. Retraction by 2 cm would place it in the mid trachea. 2. Diffuse airspace opacities. 3. Nasogastric tube tip terminates off the inferior margin of the radiograph. The findings were sent to the Radiology Results [address] at 5:59   am on 11/5/2022 to be communicated to a licensed caregiver. XR CHEST PORTABLE   Final Result   1. Malpositioned enteric tube tip in the right mainstem bronchus. Recommend   repositioning. 2.  Enteric tube side port in the region of the GE junction, recommend 3-4 cm   advancement into the stomach. 3.  Progressed consolidation throughout the left lung may represent   atelectasis due to right mainstem bronchus intubation. 4.  Heterogeneous opacities throughout the right lung. Differential   considerations include pulmonary edema and infection.       The findings were sent to the Radiology Results Communication Center at 3:48   am on 11/5/2022 to be communicated to a licensed caregiver. CTA CHEST ABDOMEN PELVIS W CONTRAST   Final Result   Addendum (preliminary) 1 of 1   ADDENDUM:   Findings were discussed with Dr. Sonia Alvarado at 8:49 pm on    11/4/2022. Final   1. Diffuse airspace disease bilaterally. Findings suggest pulmonary edema   with superimposed aspiration/infection not excluded. 2. Acute fractures of the right anterior 2nd through 7th ribs and left 4th   through 8th ribs (grade 2 chest wall injury). 3. Endotracheal tube tip within the right mainstem bronchus. Recommend   repositioning. 4. Enteric tube side port at the GE junction. Recommend advancement. 5. No acute intraabdominal process identified. 6. Contrast present within the subcutaneous tissues at the right antecubital   fossa consistent with IV infiltration. CT THORACIC RECONSTRUCTION WO POST PROCESS   Final Result   1. No acute thoracic or lumbar spine trauma. CT LUMBAR RECONSTRUCTION WO POST PROCESS   Final Result   1. No acute thoracic or lumbar spine trauma. CT HEAD WO CONTRAST   Preliminary Result   Diffuse low attenuation throughout the entire cerebral hemispheres consistent   with edema and diffuse anoxic injury. The fourth ventricle is effaced and   the cerebellar tonsils are downward herniating through the foramen magnum. The findings were called and discussed with Dr. Nona Clark. CT CERVICAL SPINE WO CONTRAST   Final Result   1. No acute fracture of the cervical spine identified. 2. Patchy airspace opacities noted at the lung apices. XR CHEST PORTABLE   Final Result   1. Cardiomegaly with vascular congestion and bilateral infiltrates likely   representing pulmonary edema. 2. Endotracheal tube in satisfactory position.          NM BRAIN SCAN COMPLETE W VASCULAR FLOW (MIN 4 VIEWS)    (Results Pending)         EKG (if obtained): (All EKG's are interpreted by myself in the absence of a cardiologist)    Medications   cefepime (MAXIPIME) 2000 mg IVPB minibag (has no administration in time range)   metroNIDAZOLE (FLAGYL) tablet 500 mg (has no administration in time range)   0.9 % sodium chloride bolus (has no administration in time range)   0.9 % sodium chloride bolus (has no administration in time range)   norepinephrine (LEVOPHED) 16 mg in sodium chloride 0.9 % 250 mL infusion (has no administration in time range)   EPINEPHrine 1 MG/10ML injection (1 mg IntraVENous Given 11/4/22 1456)   naloxone (NARCAN) injection (0.2 mg IntraVENous Given 11/4/22 1459)   sodium bicarbonate 8.4 % injection (50 mEq IntraVENous Given 11/4/22 1458)   norepinephrine (LEVOPHED) 16 mg in sodium chloride 0.9 % 250 mL infusion (20 mcg/min IntraVENous Transfusion Rate Change 11/4/22 1501)     Central Line    Date/Time: 11/6/2022 10:04 PM  Performed by: Jorge A Castle DO  Authorized by: Jorge A Castle DO     Consent:     Consent obtained:  Emergent situation  Pre-procedure details:     Indication(s): central venous access, hemodynamic monitoring and insufficient peripheral access      Hand hygiene: Hand hygiene performed prior to insertion      Sterile barrier technique:  All elements of maximal sterile technique followed      Skin preparation:  Chlorhexidine    Skin preparation agent: Skin preparation agent completely dried prior to procedure    Sedation:     Sedation type:  None  Anesthesia:     Anesthesia method:  Local infiltration    Local anesthetic:  Lidocaine 1% w/o epi  Procedure details:     Location:  L femoral    Site selection rationale:  Emergent iv access and labs, cardiac arrest    Patient position:  Supine    Procedural supplies:  Triple lumen    Catheter size:  7 Fr    Landmarks identified: yes      Ultrasound guidance: yes      Ultrasound guidance timing: real time      Sterile ultrasound techniques: Sterile gel and sterile probe covers were used Number of attempts:  1    Successful placement: yes    Post-procedure details:     Post-procedure:  Dressing applied and line sutured    Assessment:  Blood return through all ports    Procedure completion:  Tolerated      MDM:  Patient arrived to the emergency department status post cardiac arrest intubated on the backboard. Patient lost pulse in the emergency department she was given epinephrine, bicarb, she was hypotensive ordered a Levophed drip. Respiratory to bedside continues to bag patient. I did perform a central line in the left femoral vein. Blood was obtained. OG tube was placed. Laboratory imaging studies were ordered. Patient placed on ventilator and remains hypoxic. A stat portable chest x-ray was ordered and looks like patient is mainstem and the ETT tube was pulled back to 23 at the lip. Repeat chest x-ray ordered. Initial EKG upon arrival showed ventricular rate of 79 known rhythm MO interval 122, QRS duration 102, QT/QTc 416/477. I did order blood cultures lactic acid vancomycin Flagyl and cefepime antibiotics. I have consulted cardiology Dr. Marsha Angelucci as of 4:00 PM.  Patient will need hypothermia protocol in ICU. Patient remains hypotensive on the ventilator venous blood gas shows hypoxic. Repeat EKG sinus rhythm with sinus arrhythmia with first-degree AV block, ventricular rate of 99, MO interval 272, QRS duration 202, QT/QTc 474/608. Cardiologist Dr. Marsha Angelucci at the bedside to evaluate this patient. ICU intensivist called down and agreeable to have this patient admitted. Patient admitted pending CT scan results. After admissio imaging studies started to come back. Patient with anoxic brain injury. Patient with multiple rib fracture likely due to CPR and compressions. Patient diffuse airspace disease pulmonary edema and ET tube right mainstem bronchus. Clinical Impression:  1. Cardiac arrest ([facility] [address]    2.  Acute respiratory failure with hypoxia and hypercapnia [facility]          ED Provider Disposition Time  DISPOSITION  1730    CRITICAL CARE NOTE:  There was a high probability of clinically significant life-threatening deterioration of the patient's condition requiring my urgent intervention due to cardiac arrest.  CPR, epinephrine, was performed to address this. Total critical care time is  60 minutes. This includes vital sign monitoring, pulse oximetry monitoring, telemetry monitoring, clinical response to the IV medications, reviewing the nursing notes, consultation time, dictation/documentation time, and interpretation of the lab work. This time excludes time spent performing procedures and separately billable procedures and family discussion time. Comment: Please note this report has been produced using speech recognition software and may contain errors related to that system including errors in grammar, punctuation, and spelling, as well as words and phrases that may be inappropriate. Efforts were made to edit the dictations.         Fransisco Calderon DO  11/06/22 1602

## 2022-11-04 NOTE — CONSULTS
[facility] --[id number]  OVERALL PROGNOSIS IS POOR  ASYSTOLE CARDIAC ARREST   RECENT CATH NEGATIVE  EF 50% RANGE  SUGGEST HYPOTHERMIA PROTOCOL IF INDICATED  CHECK CT HEAD  CHECK LABS  CHECK ABG  SUPPORTIVE CARE FOR NOW    . Electronically signed by Jayden Morejon MD on 11/4/22 at 4:20 PM EDT      RAFI--IMPRESSION:10/22  1. No vegetation noted on the tricuspid valve, pulmonic valve, aortic  valve, and mitral valve. 2.  Moderate tricuspid regurgitation noted. 3.  LV function is preserved, greater than 55%. 4.  No clot or thrombus noted in the left atrium or left atrial  appendage. 5.  No pericardial effusion noted. Cath-10/22--IMPRESSION:  1. Left main is patent. 2.  LAD, circ, and RCA has mild disease present. 3.  EDP is around 20 mmHg present. The right heart pressures are elevated. Wedge is 16 but PA mean is 34  present.

## 2022-11-04 NOTE — PROCEDURES
Arterial Catheter Insertion Procedure Note   Procedure: Insertion of Arterial Line   Procedure Clinician: Donovan Fernandes MD   Procedure Assistant: None   Indications: Cardiac arrest   Size and location: Left femoral arterial line, 4Fr sheath  Attempts: 1   Procedure Details:   Informed consent was obtained for the procedure, including sedation. Risks of hemorrhage, ischemia, infection, and adverse drug reaction were discussed. Under sterile conditions the skin above the Left femoral artery was prepped with chlorhexidine. Local anesthesia was applied to the skin and subcutaneous tissues. An Arrow kit was used to insert a 18-gauge needle into the artery. A guide wire was then passed easily through the needle without resistance. A 4Fr sheath catheter was then inserted into the vessel over the guide wire. The guidewire was then removed with the tip intact. The catheter was then secured into place. Findings: There were no changes to vital signs. Catheter was flushed with 20 mL NS. Patient tolerated the procedure well.

## 2022-11-04 NOTE — ED NOTES
Jessica Root, RN at bedside with doppler over femoral pulse.      Kristin Enrique RN  11/04/22 Neo Ackerman, [place]  11/04/22 2328

## 2022-11-04 NOTE — PROGRESS NOTES
Patient arrived via EMS already intubated with size 8.0 cuffed ETT. Positive for tube fogging and ETCO2 monitor readings around 45.

## 2022-11-05 NOTE — H&P
History and Physical 22        NAME: Venkat Moe  : 1980  MRN: [id number]      Assessment/Plan:  Venkat Moe is a 38 y.o. female with a history of MRSA endocarditis, HFpEF, hypertension, hepatitis C, morbid obesity, chronic hypercarbic respiratory failure, obesity hypoventilation, polysubstance abuse who presented to [facility] 2022 with in the field cardiac arrest with prolonged overall resuscitation admitted to the ICU for hypothermic protocol management. Problem list  Cardiac arrest  Anox brain injury  Cardiogenic shock  Acute respiratory failure  Acute renal injury  Lactic acidosis  Hypokalemia      Neuro: intubated sedated. Pupils fixed and dialated, TTM to 36 on cooling till tonight. CT show evidence of anoxic encephalopathy. Poor prognosis. Propofol, fentanyl Tytrate to RAAS -1 to -2. Delirium precautions. Cardio: Status post cardiac arrest, total downtime about 60 minutes with multiple events with return of spontaneous circulation. Troponin elevated in setting of ischemia. Bedside echo pending, trend troponin. Titrate levo to MAP goal of greater than 65. Resp: Acute respiratory failure requiring mechanical ventilation. Chronic hypercarbic respiratory failure at baseline secondary to obesity hypoventilation syndrome versus obstructive sleep apnea. Chest x-ray showed bilateral infiltrates with pulmonary edema concern for aspiration in setting of cardiac arrest and poor airway protection. Broad-spectrum antibiotics started. Pulmonary toileting. GI: No acute issues. N.p.o. at this time. GI prophylaxis. Tube feeds  : LISBETH, elevated creatinine,Likely ATN, monitor lactic acidosis to 2.7. Monitor electrolytes as needed and replenish. Avoid aggressive resuscitation in setting of chronic heart failure. Heme:    ID: Sepsis? Possible pneumonia from aspiration broad-spectrum antibiotics. Follow-up cultures. Narrow when able. Follow-up Pro-Moises and MRSA in nares.   Endo: POC  to 180 mg/dL. Insulin sliding scale as needed. MSK: No acute issues. On TTM cooling protocol. Tubes/Lines/Drains: ET tube, femoral A-line, central line. Code Status: Full code. Life connections contacted. Chief Complaint:    Cardiac arrest    History of Present Illness:    82-year-old female with a history of MRSA endocarditis, HFpEF, hypertension, CHV, morbid obesity who was recently admitted for acute on chronic diastolic heart failure requiring diuresis and was discharged home on oxygen most recent echo showed preserved ejection fraction, recent cardiac cath without obstructive CAD though showed evidence of pulmonary hypertension and chronic hypercarbic respiratory failure unable to tolerate BiPAP. Per family patient has had multiple recurrent falls since just discharged and was admitted to the ICU today for asystole and PEA arrest with a downtime of about 50 to 60 minutes. Patient was intubated in the emergency department started on vasoactive agents and admitted to the ICU for hypothermic protocol to 36. Patient seen and evaluated at bedside. No acute events overnight. Remains on TTM to 36. Labs stable per protocol. Had a long discussion with family about current status and poor prognosis. All questions answered and addressed. ROS:    Review of Systems  unable to obtain due to clinical condition.     Past Medical, Surgical, Social, Family History:   Past Medical History:   Diagnosis Date    Hepatitis C     Liver disease     hepatitis    No significant medical problems      Past Surgical History:   Procedure Laterality Date     SECTION  , 2007    x 2    CHOLECYSTECTOMY      FINGER AMPUTATION Right 2019    right hand 3rd finger    HYSTERECTOMY (CERVIX STATUS UNKNOWN)  2008    MARLIN    INCISION AND DRAINAGE Right 2020    RIGHT UPPER THIGH INCISION AND DRAINAGE performed by Dariela Marte MD at [address]  2013    juanita sera    SPINAL FUSION  07/02/2022    pt unsure exact location     Social History     Socioeconomic History    Marital status: Single     Spouse name: Not on file    Number of children: 2    Years of education: Not on file    Highest education level: Not on file   Occupational History    Occupation:    Tobacco Use    Smoking status: Every Day     Packs/day: 0.50     Years: 12.00     Pack years: 6.00     Types: Cigarettes     Start date: 1998    Smokeless tobacco: Never   Vaping Use    Vaping Use: Never used   Substance and Sexual Activity    Alcohol use: No    Drug use: Not Currently     Types: IV, Opiates      Comment: heroin-last used 1/2022    Sexual activity: Yes     Partners: Male   Other Topics Concern    Not on file   Social History Narrative    Do you donate blood or plasma? Yes    Caffeine intake? Moderate    Advance directive? No    Is blood transfusion acceptable in an emergency? Yes             Social Determinants of Health     Financial Resource Strain: High Risk    Difficulty of Paying Living Expenses: Hard   Food Insecurity: No Food Insecurity    Worried About Running Out of Food in the Last Year: Never true    Ran Out of Food in the Last Year: Never true   Transportation Needs: Not on file   Physical Activity: Not on file   Stress: Not on file   Social Connections: Not on file   Intimate Partner Violence: Not on file   Housing Stability: Not on file     Family History   Problem Relation Age of Onset    Migraines Mother     Obesity Brother     Migraines Maternal Grandmother     COPD Paternal Grandmother     Dementia Paternal Grandfather     Depression Son     Mental Illness Son         Bipole, ADHD, Adjustment disorder    Mental Illness Maternal Aunt     Colon Cancer Neg Hx        Home Medications:  Prior to Admission medications    Medication Sig Start Date End Date Taking? Authorizing Provider   metroNIDAZOLE (METROGEL) 1 % gel Apply topically daily.  10/24/22   LEIGHTON Taylor - CNP methocarbamol (ROBAXIN) 500 MG tablet TAKE 1 TABLET BY MOUTH THREE TIMES DAILY AS NEEDED FOR LEG PAIN 10/21/22   LEIGHTON Hugo CNP   torsemide (DEMADEX) 20 MG tablet Take 1 tablet by mouth daily  Patient taking differently: Take 20 mg by mouth in the morning and at bedtime 10/22/22   Aaron Viveros MD   doxycycline hyclate (VIBRA-TABS) 100 MG tablet Take 1 tablet by mouth every 12 hours 10/21/22 12/20/22  Aaron Viveros MD   nortriptyline (PAMELOR) 75 MG capsule Take 2 capsules by mouth nightly Taken for back pain 9/14/22 11/3/22  LEIGHTON Hugo CNP   methadone (DOLOPHINE) 10 MG/ML solution Take 130 mg by mouth every morning. Historical Provider, MD   potassium chloride (KLOR-CON M) 20 MEQ extended release tablet Take 20 mEq by mouth 2 times daily Pt reports Dr. Justin Crocker increased dosage to 2 tabs BID    Aurora Trent MD   metOLazone (ZAROXOLYN) 2.5 MG tablet Take 5 mg by mouth every morning    Aurora Trent MD   gabapentin (NEURONTIN) 300 MG capsule Take 2 capsules by mouth 3 times daily for 30 days. Ortho surgeon 8/17/22 11/3/22  LEIGHTON Hugo CNP         Physical Exam:     /74   Pulse 83   Temp 96.8 °F (36 °C) (Bladder)   Resp 22   Ht 5' 5\" (1.651 m)   Wt (!) 313 lb 15 oz (142.4 kg)   SpO2 99%   BMI 52.24 kg/m²     General: intubated sedated  Eyes: fixed and dilated  ENT: neck supple  Cardiovascular: Regular rate. Respiratory: rhonchi bilaterally. Gastrointestinal: Soft, non tender  Genitourinary: no suprapubic tenderness  Musculoskeletal: 1-2+ edema  Skin: warm, dry  Neuro: not withdrawing to painful or verbal stimuli.,  Pupils fixed and dilated. Negative doll sign. No overbreathing the ventilator. No gag, cough no or corneal reflexes present.        Labs, Imaging, and Studies reviewed:    CT HEAD WO CONTRAST    Result Date: 11/4/2022  EXAMINATION: CT OF THE HEAD WITHOUT CONTRAST,  11/4/2022 6:01 pm TECHNIQUE: CT of the head was performed without the administration of intravenous contrast. Automated exposure control, iterative reconstruction, and/or weight based adjustment of the mA/kV was utilized to reduce the radiation dose to as low as reasonably achievable. COMPARISON: None HISTORY: ORDERING SYSTEM PROVIDED HISTORY:  Fall unresponsive cardiac arrest. TECHNOLOGIST PROVIDED HISTORY: Reason for Exam:  Fall unresponsive cardiac arrest. Has a \"code stroke\" or \"stroke alert\" been called? No Decision Support Exception - unselect if not a suspected or confirmed emergency medical condition->Emergency Medical Condition (MA). Is the patient pregnant? No Reason for Exam:  Fall; unresponsive Additional signs and symptoms:  Cardiac Arrest (ROSC on arrival) Relevant Medical/Surgical History:  None Initial evaluation FINDINGS: BRAIN/VENTRICLES: The ventricles are normal in size, shape and configuration for the age of the patient. There is diffuse low attenuation throughout the entire brain parenchyma suggestive of diffuse anoxic injury. There is diffuse cerebral edema. There is effacement the fourth ventricle with mild downward herniation. The results were called and discussed with Dr. Cathy Saunders 11/04/2022 at 8:11 p.m. ORBITS: The visualized portion of the orbits demonstrate no acute abnormality. SINUSES: The visualized paranasal sinuses and mastoid air cells are for the most part clear. SOFT TISSUES/SKULL:  No acute abnormality of the visualized skull or soft tissues. Diffuse low attenuation throughout the entire cerebral hemispheres consistent with edema and diffuse anoxic injury. The fourth ventricle is effaced and the cerebellar tonsils are downward herniating through the foramen magnum. The findings were called and discussed with Dr. Cathy Saunders.      CT CERVICAL SPINE WO CONTRAST    Result Date: 11/4/2022  EXAMINATION: CT OF THE CERVICAL SPINE WITHOUT CONTRAST 11/4/2022 6:01 pm TECHNIQUE: CT of the cervical spine was performed without the administration of intravenous contrast. Multiplanar reformatted images are provided for review. Automated exposure control, iterative reconstruction, and/or weight based adjustment of the mA/kV was utilized to reduce the radiation dose to as low as reasonably achievable. COMPARISON: MRI cervical spine March 17, 2021; CT cervical spine June 1, 2020 HISTORY: ORDERING SYSTEM PROVIDED HISTORY: fall unresponsive TECHNOLOGIST PROVIDED HISTORY: Reason for exam:->fall unresponsive Decision Support Exception - unselect if not a suspected or confirmed emergency medical condition->Emergency Medical Condition (MA) Is the patient pregnant?->No Reason for Exam: fall unresponsive Additional signs and symptoms: Cardiac Arrest (ROSC on arrival) Relevant Medical/Surgical History: none FINDINGS: BONES/ALIGNMENT: Postoperative changes of cervical and thoracic fusion with hardware extending distally beyond the field-of-view. Pedicle screws present the C2 and C3 levels and at the C7 and T1 levels with corpectomy noted at the T2 level extending distally beyond the field-of-view. The imaged portions of the hardware appear grossly intact. No acute fracture identified. DEGENERATIVE CHANGES: Multilevel mild spondylosis of the cervical spine. Solid osseous fusion of the posterior elements of the cervical spine with fusion hardware in place as noted above. SOFT TISSUES: Visualized lung apices demonstrate patchy airspace opacities. Endotracheal tube and enteric tube are in place. 1. No acute fracture of the cervical spine identified. 2. Patchy airspace opacities noted at the lung apices. XR CHEST PORTABLE    Result Date: 11/5/2022  EXAMINATION: ONE XRAY VIEW OF THE CHEST 11/5/2022 7:38 am COMPARISON: Chest x-rays earlier today. HISTORY: ORDERING SYSTEM PROVIDED HISTORY: ett placement TECHNOLOGIST PROVIDED HISTORY: Reason for exam:->ett placement Reason for Exam: ETT placement FINDINGS: Overlying items external to the patient somewhat limit evaluation.  Endotracheal tube appears retracted from prior exam with tip now 3.7 cm from the richie. Other lines and tubes remain in place. No change in the heart or lungs with persistent diffuse airspace and interstitial opacities bilaterally. No definite pneumothoraces are seen. Cardiac and mediastinal silhouettes are stable. Stable appearance to bony structures. Endotracheal tube has been retracted with tip now 3.7 cm from the richie. Otherwise stable exam.     XR CHEST PORTABLE    Result Date: 11/5/2022  EXAMINATION: ONE XRAY VIEW OF THE CHEST 11/5/2022 5:06 am COMPARISON: November 5, 2022 HISTORY: ORDERING SYSTEM PROVIDED HISTORY: ett/OG placement TECHNOLOGIST PROVIDED HISTORY: Reason for exam:->ett/OG placement Reason for Exam: ett/OG placement FINDINGS: Orogastric tube tip terminates beyond the inferior margin of the radiograph. The endotracheal tube tip has been retracted and terminates approximately 5 mm above the richie. The cardiomediastinal silhouette is enlarged. Bilateral diffuse airspace opacities are noted. No pneumothorax. No new osseous abnormality extensive hardware is identified in the visualized thoracic and cervical spine. 1. Endotracheal tube tip terminates approximately 5 mm above the richie. Retraction by 2 cm would place it in the mid trachea. 2. Diffuse airspace opacities. 3. Nasogastric tube tip terminates off the inferior margin of the radiograph. The findings were sent to the Radiology Results [id number] at 5:59 am on 11/5/2022 to be communicated to a licensed caregiver. XR CHEST PORTABLE    Result Date: 11/5/2022  EXAMINATION: ONE XRAY VIEW OF THE CHEST 11/5/2022 1:26 am COMPARISON: 11/04/2022. HISTORY: ORDERING SYSTEM PROVIDED HISTORY: verify ett placement TECHNOLOGIST PROVIDED HISTORY: Reason for exam:->verify ett placement FINDINGS: Malpositioned enteric tube tip in the right mainstem bronchus.   Enteric tube tip overlies the left upper quadrant with side port projecting in the region of the GE junction. Low lung volume. Progressed consolidation throughout the left lung. Heterogeneous opacities throughout the right lung. No definite pleural effusion or pneumothorax. Cardiomegaly. 1.  Malpositioned enteric tube tip in the right mainstem bronchus. Recommend repositioning. 2.  Enteric tube side port in the region of the GE junction, recommend 3-4 cm advancement into the stomach. 3.  Progressed consolidation throughout the left lung may represent atelectasis due to right mainstem bronchus intubation. 4.  Heterogeneous opacities throughout the right lung. Differential considerations include pulmonary edema and infection. The findings were sent to the Radiology Results [id number] at 3:48 am on 11/5/2022 to be communicated to a licensed caregiver. XR CHEST PORTABLE    Result Date: 11/4/2022  EXAMINATION: ONE XRAY VIEW OF THE CHEST 11/4/2022 3:56 pm COMPARISON: 10/28/2022. HISTORY: ORDERING SYSTEM PROVIDED HISTORY: tube and line placement TECHNOLOGIST PROVIDED HISTORY: Reason for exam:->tube and line placement Reason for Exam: tube and line placement AND NG PLACED FINDINGS: There are low lung volumes. The heart size is enlarged. The pulmonary vasculature is congested. There are bilateral infiltrates. There is an endotracheal tube with its tip above the rcihie. A nasogastric tube courses below the diaphragm. 1. Cardiomegaly with vascular congestion and bilateral infiltrates likely representing pulmonary edema. 2. Endotracheal tube in satisfactory position. CTA CHEST ABDOMEN PELVIS W CONTRAST    Addendum Date: 11/4/2022    ADDENDUM: Findings were discussed with Dr. Polina Leslie at 8:49 pm on 11/4/2022.      Result Date: 11/4/2022  EXAMINATION: CTA OF THE CHEST, ABDOMEN AND PELVIS WITH CONTRAST 11/4/2022 6:02 pm TECHNIQUE: CTA of the chest, abdomen and pelvis was performed after the administration of intravenous contrast.  Multiplanar reformatted images are provided for review. MIP images are provided for review. Automated exposure control, iterative reconstruction, and/or weight based adjustment of the mA/kV was utilized to reduce the radiation dose to as low as reasonably achievable. COMPARISON: None CT chest, abdomen, and pelvis October 18, 2022 HISTORY: ORDERING SYSTEM PROVIDED HISTORY: cardiac arrest hypoxia hx  chf, TECHNOLOGIST PROVIDED HISTORY: Reason for exam:->cardiac arrest hypoxia hx  chf, Decision Support Exception - unselect if not a suspected or confirmed emergency medical condition->Emergency Medical Condition (MA) Reason for Exam: fall; unresponsive Additional signs and symptoms: Cardiac Arrest (ROSC on arrival) Relevant Medical/Surgical History: 80mL isovue 370 FINDINGS: Chest: Mediastinum: The heart and pericardium demonstrate no acute findings. Lungs/pleura: Multifocal airspace opacities throughout the upper and lower lobes and right middle lobe with atelectasis seen dependently at the left lung base and to a lesser extent the right upper lobe. Interlobular septal thickening also present. While findings may reflect pulmonary edema, superimposed aspiration/infection not excluded. No pneumothorax. Endotracheal tube present within the right mainstem bronchus. Recommend repositioning. Soft Tissues/Bones: There are multiple remote bilateral rib fractures. Superimposed acute fractures of the right anterior 2nd through 7th ribs and left 4th through 8th ribs. Contrast present within the subcutaneous tissues at the right antecubital fossa consistent with IV infiltration. Abdomen/Pelvis: Organs: The liver appears unremarkable. The gallbladder is surgically absent. The spleen demonstrates no acute abnormality. Fatty atrophy of the pancreas. The adrenal glands and kidneys demonstrate no acute findings. No hydronephrosis. GI/Bowel: No bowel obstruction. Moderate volume of stool throughout the colon. Normal appendix.   Small bowel is normal in caliber. Enteric tube present with tip within the proximal stomach. Side port at the GE junction. Recommend advancement by approximately 5 cm. Pelvis: Angel catheter present within the bladder which is partially collapsed. Peritoneum/Retroperitoneum: The abdominal aorta is normal in caliber. No free fluid or free air. Bones/Soft Tissues: Postoperative changes of cervicothoracic fusion. No acute spinal fracture identified. Left-sided femoral central venous catheter present with tip extending to the left common iliac vein. 1. Diffuse airspace disease bilaterally. Findings suggest pulmonary edema with superimposed aspiration/infection not excluded. 2. Acute fractures of the right anterior 2nd through 7th ribs and left 4th through 8th ribs (grade 2 chest wall injury). 3. Endotracheal tube tip within the right mainstem bronchus. Recommend repositioning. 4. Enteric tube side port at the GE junction. Recommend advancement. 5. No acute intraabdominal process identified. 6. Contrast present within the subcutaneous tissues at the right antecubital fossa consistent with IV infiltration. CT LUMBAR RECONSTRUCTION WO POST PROCESS    Result Date: 11/4/2022  EXAMINATION: CT OF THE LUMBAR SPINE WITHOUT CONTRAST; CT OF THE THORACIC SPINE WITHOUT CONTRAST 11/4/2022 TECHNIQUE: CT of the lumbar spine was performed without the administration of intravenous contrast. Multiplanar reformatted images are provided for review. Adjustment of mA and/or kV according to patient size was utilized. Automated exposure control, iterative reconstruction, and/or weight based adjustment of the mA/kV was utilized to reduce the radiation dose to as low as reasonably achievable.; CT of the thoracic spine was performed without the administration of intravenous contrast. Multiplanar reformatted images are provided for review.  Automated exposure control, iterative reconstruction, and/or weight based adjustment of the mA/kV was utilized to reduce the radiation dose to as low as reasonably achievable. COMPARISON: None. HISTORY: ORDERING SYSTEM PROVIDED HISTORY: fall, unresponsivre TECHNOLOGIST PROVIDED HISTORY: Reason for exam:->fall, unresponsivre Decision Support Exception - unselect if not a suspected or confirmed emergency medical condition->Emergency Medical Condition (MA) Is the patient pregnant?->No Reason for Exam: fall; unresponsive Additional signs and symptoms: Cardiac Arrest (ROSC on arrival) Relevant Medical/Surgical History: none; ORDERING SYSTEM PROVIDED HISTORY: fall unresponsive TECHNOLOGIST PROVIDED HISTORY: Reason for exam:->fall unresponsive Decision Support Exception - unselect if not a suspected or confirmed emergency medical condition->Emergency Medical Condition (MA) Is the patient pregnant?->No Reason for Exam: fall; unresponsive Additional signs and symptoms: Cardiac Arrest (ROSC on arrival) Relevant Medical/Surgical History: none FINDINGS: BONES/ALIGNMENT: There are postsurgical changes of T2 corpectomy with posterior spinal fusion between C7 and T9. The alignment of the thoracic and lumbar spine is within normal limits. No acute fractures or dislocations are seen. DEGENERATIVE CHANGES: No significant degenerative changes of the thoracic or lumbar spine. SOFT TISSUES: No paraspinal mass is seen. 1.No acute thoracic or lumbar spine trauma. CT THORACIC RECONSTRUCTION WO POST PROCESS    Result Date: 11/4/2022  EXAMINATION: CT OF THE LUMBAR SPINE WITHOUT CONTRAST; CT OF THE THORACIC SPINE WITHOUT CONTRAST 11/4/2022 TECHNIQUE: CT of the lumbar spine was performed without the administration of intravenous contrast. Multiplanar reformatted images are provided for review. Adjustment of mA and/or kV according to patient size was utilized.   Automated exposure control, iterative reconstruction, and/or weight based adjustment of the mA/kV was utilized to reduce the radiation dose to as low as reasonably achievable.; CT of the thoracic spine was performed without the administration of intravenous contrast. Multiplanar reformatted images are provided for review. Automated exposure control, iterative reconstruction, and/or weight based adjustment of the mA/kV was utilized to reduce the radiation dose to as low as reasonably achievable. COMPARISON: None. HISTORY: ORDERING SYSTEM PROVIDED HISTORY: fall, unresponsivre TECHNOLOGIST PROVIDED HISTORY: Reason for exam:->fall, unresponsivre Decision Support Exception - unselect if not a suspected or confirmed emergency medical condition->Emergency Medical Condition (MA) Is the patient pregnant?->No Reason for Exam: fall; unresponsive Additional signs and symptoms: Cardiac Arrest (ROSC on arrival) Relevant Medical/Surgical History: none; ORDERING SYSTEM PROVIDED HISTORY: fall unresponsive TECHNOLOGIST PROVIDED HISTORY: Reason for exam:->fall unresponsive Decision Support Exception - unselect if not a suspected or confirmed emergency medical condition->Emergency Medical Condition (MA) Is the patient pregnant?->No Reason for Exam: fall; unresponsive Additional signs and symptoms: Cardiac Arrest (ROSC on arrival) Relevant Medical/Surgical History: none FINDINGS: BONES/ALIGNMENT: There are postsurgical changes of T2 corpectomy with posterior spinal fusion between C7 and T9. The alignment of the thoracic and lumbar spine is within normal limits. No acute fractures or dislocations are seen. DEGENERATIVE CHANGES: No significant degenerative changes of the thoracic or lumbar spine. SOFT TISSUES: No paraspinal mass is seen. 1.No acute thoracic or lumbar spine trauma. VL DUP LOWER EXTREMITY VENOUS RIGHT    Result Date: 11/3/2022  EXAMINATION: DUPLEX VENOUS ULTRASOUND OF THE RIGHT LOWER EXTREMITY 11/3/2022 12:44 pm TECHNIQUE: Duplex ultrasound using B-mode/gray scaled imaging and Doppler spectral analysis and color flow was obtained of the deep venous structures of the right extremity.  COMPARISON: None. HISTORY: ORDERING SYSTEM PROVIDED HISTORY: Peripheral edema TECHNOLOGIST PROVIDED HISTORY: Reason for exam:->recent hospital discharge, venous stasis with warmth Rt>Lt no hx. dvt positive wells Reason for Exam: rt leg warmth, edema FINDINGS: The visualized veins of the right lower extremity are patent and free of echogenic thrombus. The veins demonstrate good compressibility with normal color flow study and spectral analysis. No evidence of DVT in the right lower extremity. CBC:   Recent Labs     11/04/22  1532   WBC 15.6*   HGB 10.6*        BMP:    Recent Labs     11/04/22  2100 11/05/22  0300 11/05/22  0835    136 138   K 2.6* 2.8* 3.2*   CL 92* 93* 95*   CO2 28 27 28   BUN 37* 40* 43*   CREATININE 2.0* 2.0* 2.1*   GLUCOSE 96 142* 120*     Hepatic:   Recent Labs     11/04/22  1532   AST 97*   ALT 53*   BILITOT 0.3   ALKPHOS 175*     Lipids:   Lab Results   Component Value Date/Time    TRIG 89 11/04/2022 09:00 PM     Hemoglobin A1C:   Lab Results   Component Value Date/Time    LABA1C 4.9 11/05/2022 03:00 AM     TSH: No results found for: TSH  Troponin:   Lab Results   Component Value Date/Time    TROPONINT 0.096 11/05/2022 03:00 AM    TROPONINT 0.014 11/04/2022 03:32 PM    TROPONINT <0.010 10/28/2022 05:14 PM     Lactic Acid: No results for input(s): LACTA in the last 72 hours.   BNP:   Recent Labs     11/04/22  1532   PROBNP 528.5*     UA:  Lab Results   Component Value Date/Time    NITRU NEGATIVE 11/04/2022 04:14 PM    NITRU NEGATIVE 06/20/2013 03:10 PM    COLORU YELLOW 11/04/2022 04:14 PM    WBCUA 53 03/16/2021 09:36 PM    RBCUA 29 03/16/2021 09:36 PM    MUCUS OCCASIONAL 03/16/2021 09:36 PM    TRICHOMONAS NONE SEEN 03/16/2021 09:36 PM    BACTERIA RARE 03/16/2021 09:36 PM    CLARITYU CLEAR 11/04/2022 04:14 PM    SPECGRAV 1.010 11/04/2022 04:14 PM    LEUKOCYTESUR NEGATIVE 11/04/2022 04:14 PM    UROBILINOGEN 0.2 11/04/2022 04:14 PM    BILIRUBINUR NEGATIVE 11/04/2022 04:14 PM    BLOODU NEGATIVE 11/04/2022 04:14 PM    GLUCOSEU neg 06/20/2013 03:10 PM    GLUCOSEU NEGATIVE 06/20/2013 03:10 PM    KETUA NEGATIVE 11/04/2022 04:14 PM    AMORPHOUS RARE 09/13/2020 09:30 PM     Urine Cultures: No results found for: LABURIN  Blood Cultures: No results found for: BC  No results found for: BLOODCULT2  Organism: No results found for: [facility]      Critical care attestation:    I spent 45 cumulative minutes (excluding procedures), in full attention to this critically ill patient. I have personally reviewed the patient's history and interval events in the EMR, along with vitals, labs and radiology imaging. Critical care time was spent personally providing care for this patient, excluding billable procedures, and no overlapping with any other provider. This includes documenting my assessment and plan of care and developing the care plan to treat the problems below. The high probability of deterioration required my full and direct attention, intervention, and personal management due to do to underlying diagnosis and clinical problems including the treatment of active or impending:  [x] Neurological monitoring and treatment  [x] Respiratory failure -assessment of ventilator support, adjustment, ventilator weaning  [x] Hemodynamic and volume assessment with volume resuscitation  [x] Mechanical and/or chemical support of the circulation,  [x] Frequent vasoactive agent adjustment,  [] Renal replacement therapy,  [] For rapid decompensation,  [x] Electrolyte instability  [] Suctioning every 2 hours  [] Every hour neuro checks  [] Every hour neurovascular checks  [x] Frequent evaluation of patient's response to treatment and titration of therapies,  [x] Interpretation of laboratory and radiological data,  [x] Application and interpretation of advanced monitoring technologies,  [x 1] Extensive interpretation of multiple databases,  [] Development of treatment plan with patient, surrogate, or consultants.   [] Others: Electronically signed by Viral Lindsey MD on 11/5/2022 at 9:25 AM

## 2022-11-05 NOTE — PROGRESS NOTES
RENAL DOSE ADJUSTMENT MADE PER P/T PROTOCOL    PREVIOUS ORDER:  Cefepime 1gm Q8h    Estimated Creatinine Clearance: 62 mL/min (A) (based on SCr of 1.7 mg/dL (H)). Recent Labs     11/04/22  1532 11/04/22  2100   BUN 31*  --    CREATININE 1.7*  --      --    INR 1.09 1.21     NEW RENALLY ADJUSTED ORDER:  Cefepime 2gm Q8h per [facility] guidelines for BMI>40 and indication of pneumonia. Pharmacy will monitor.     ISAC Collado [facility]  11/4/2022 11:07 PM

## 2022-11-05 NOTE — PROGRESS NOTES
[address] [facility]  consulted by Dr. Mackenzie Segovia for monitoring and adjustment. Indication for treatment: pneumonia  Goal trough: [] 10-15 mcg/mL or [x] 15-20 mcg/ml  AUC/RICCARDO: [] <500 or [x] 400-600    Pertinent Laboratory Values:   Temp Readings from Last 3 Encounters:   11/05/22 97.2 °F (36.2 °C) (Bladder)   10/28/22 97.5 °F (36.4 °C) (Oral)   10/21/22 97.5 °F (36.4 °C) (Oral)     Recent Labs     11/04/22  1532 11/04/22  2100 11/05/22  0300   WBC 15.6*  --   --    LACTATE 12.2* 2.1* 1.7     Recent Labs     11/04/22  1532 11/04/22  2100 11/05/22  0300   BUN 31* 37* 40*   CREATININE 1.7* 2.0* 2.0*     Estimated Creatinine Clearance: 53 mL/min (A) (based on SCr of 2 mg/dL (H)). Intake/Output Summary (Last 24 hours) at 11/5/2022 0425  Last data filed at 11/5/2022 0400  Gross per 24 hour   Intake --   Output 900 ml   Net -900 ml       Pertinent Cultures:  Date    Source    Results  11/4   blood    pending  11/4   blood    pending    Vancomycin level:   TROUGH:  No results for input(s): VANCOTROUGH in the last 72 hours. RANDOM:  No results for input(s): VANCORANDOM in the last 72 hours. Assessment:  SCr, BUN, and urine output: Cr 2.0, BUN 40  Day(s) of therapy: Day 1  Vancomycin concentration: to be collected    Plan:  Vancomycin 2000mg IVPB x 1 was administered earlier, will obtain a random level with morning labs to assess dosing. Pharmacy will continue to monitor patient and adjust therapy as indicated    Michaelle 3 11/5 @0600    Thank you for the consult.   Dinorah Prasad, [facility]  11/5/2022 4:25 AM

## 2022-11-05 NOTE — CONSULTS
V2.0  [facility] [place] Consult Note      Name:  Jigar Mack /Age/Sex: 1980  (38 y.o. female)   MRN & CSN:  [id number] & [id number] Encounter Date/Time: 2022 8:33 PM EDT   Location:  -A PCP: Jorge Gil, APRN - CNP     Attending:Sandeep Freeman MD  Consulting Provider: Devyn Adler Day: 1    Assessment and Recommendations   Jigar Mack is a 38 y.o. female with pmh of  who presents with Cardiac arrest [facility])          Recommendations:    82-year-old female with history of MRSA endocarditis of tricuspid valve, HFpEF, HCTZ, hypertension, morbid obesity, acute on chronic diastolic heart failure, echo with normal LV function, cardiac catheterization with no significant obstructive CAD, pulmonary hypertension, chronic hypercapnic respiratory failure with negative apnea sleep Link, on home O2 of 2 L, status post recurrent falls recently and today had multiple cardiac arrests with total down time of 50 minutes, PEA and asystolic, intubated and started on Levophed for hypotension/shock and on TTM at 36°    CNS-CT scan with evidence of anoxic brain damage status post multiple cardiac arrests. Continue TTM 36°. Continue sedation with propofol and fentanyl as needed. Follow-up mental status and neuro exam.  CVS-status post multiple cardiac arrests. Troponin elevation probably secondary to demand ischemia. Follow-up troponins and check echocardiogram for wall motion abnormalities. Wean off Levophed as tolerated. Pulmonary-acute hypoxic respiratory failure on chronic hypercapnic respiratory failure, status post cardiac arrest, probably triggered by a respiratory event. Bilateral infiltrates on chest x-ray consistent with edema versus aspiration pneumonia. Continue vent support. Wean O2 as tolerated. Pulmonary toilet. Renal-acute renal insufficiency probably secondary to hypotension/diuretics. Replete low potassium. Follow-up lactic acid.   Follow-up urine output and electrolytes. GI-keep nothing by mouth. Pepcid prophylaxis. Heme-stable H&H. Heparin prophylaxis. ID-will start on Zosyn and vancomycin empirically for possible aspiration pneumonia and patient was recently hospitalized. Check cultures. Endo-sliding scale insulin coverage. Patient is critical/unstable. Critical care time, extruding procedures, 80 minutes. Discussed with nursing and patient's family extensively. Diet Diet NPO   DVT Prophylaxis [] Lovenox, [x]  Heparin, [] SCDs, [] Ambulation,  [] Eliquis, [] Xarelto   Code Status Full Code   Surrogate Decision Maker/ POA  Mother   Hi  History Obtained From:    family member -  electronic medical record, reason patient could not give history:  on ventilator    History of Present Illness:     Chief Complaint: Cardiac arrest ([facility] [address]    Sheela Olsen is a 38 y.o. female who is seen today by the ICU team at the request of ED physician, with a Chief Complaint of cardiac arrest.  72-year-old female with a history of MRSA endocarditis of the tricuspid valve, HFpEF hypertension, hep C virus, morbid obesity, admitted in September with weight gain of about 70 pounds and found to be in acute on chronic diastolic heart failure, put on a Lasix drip and switched eventually to oral torsemide with good urine output and improved symptoms. Echo showed a normal EF with RAFI showing no evidence of tricuspid valve endocarditis. Cardiac catheterization did not show significant obstructive CAD. Also found to have some pulmonary hypertension and chronic hypercapnic respiratory failure. OHS apnea link was negative and patient was eventually discharged home with oxygen 2 L. Over the last several days, patient has had recurrent falls. Michael Rogers several times today and eventually found to be apneic and pulseless. CPR was started by family and EMS was called.   En route to the hospital, patient had PEA and asystolic cardiac arrest ×3 and all other cardiac arrest in the emergency room for a total downtime from about 50 minutes. Patient was intubated, hypotensive requiring Levophed. Started on TTM 36° and admitted to the ICU. Review of Systems:    Review of Systems    unavailable    Objective:   No intake or output data in the 24 hours ending 11/04/22 2033   Vitals:   Vitals:    11/04/22 1810 11/04/22 1820 11/04/22 1846 11/04/22 1933   BP:       Pulse: (!) 103 (!) 102 (!) 101 99   Resp: 18 18 18 18   Temp:       TempSrc:       SpO2: 100% 100% 100% 95%       Medications Prior to Admission     Prior to Admission medications    Medication Sig Start Date End Date Taking? Authorizing Provider   metroNIDAZOLE (METROGEL) 1 % gel Apply topically daily. 10/24/22   LEIGHTON Taylor CNP   methocarbamol (ROBAXIN) 500 MG tablet TAKE 1 TABLET BY MOUTH THREE TIMES DAILY AS NEEDED FOR LEG PAIN 10/21/22   LEIGHTON Taylor CNP   torsemide (DEMADEX) 20 MG tablet Take 1 tablet by mouth daily  Patient taking differently: Take 20 mg by mouth in the morning and at bedtime 10/22/22   Kalyn Bassett MD   doxycycline hyclate (VIBRA-TABS) 100 MG tablet Take 1 tablet by mouth every 12 hours 10/21/22 12/20/22  Kalyn Bassett MD   nortriptyline (PAMELOR) 75 MG capsule Take 2 capsules by mouth nightly Taken for back pain 9/14/22 11/3/22  LEIGHTON Taylor CNP   methadone (DOLOPHINE) 10 MG/ML solution Take 130 mg by mouth every morning. Historical Provider, MD   potassium chloride (KLOR-CON M) 20 MEQ extended release tablet Take 20 mEq by mouth 2 times daily Pt reports Dr. Chopra Given increased dosage to 2 tabs BID    Aurora Trent MD   metOLazone (ZAROXOLYN) 2.5 MG tablet Take 5 mg by mouth every morning    Aurora Trent MD   gabapentin (NEURONTIN) 300 MG capsule Take 2 capsules by mouth 3 times daily for 30 days. Ortho surgeon 8/17/22 11/3/22  LEIGHTON Taylor CNP       Physical Exam: Need 8 Elements   Physical examination per ICU nurse    Patient intubated and sedated on propofol.   Morbidly obese.    HEENT-pupils fixed and dilated. CVS-S1 and S2 normal, regular rate and rhythm  Chest-air entry bilaterally. Abdomen-soft, hypoactive bowel sounds. Extremities-+ edema  CNS-no response to noxious stimuli. Past Medical History:   PMHx   Past Medical History:   Diagnosis Date    Hepatitis C     Liver disease     hepatitis    No significant medical problems      PSHX:  has a past surgical history that includes  section (, ); Hysterectomy (2008); Cholecystectomy; other surgical history (2013); incision and drainage (Right, 2020); Spinal fusion (2022); and Finger amputation (Right, 2019). Allergies: Allergies   Allergen Reactions    Buprenorphine-Naloxone Itching, Rash and Shortness Of Breath    Subutex [Buprenorphine] Nausea And Vomiting    Amoxicillin-Pot Clavulanate Rash     Fam HX:family history includes COPD in her paternal grandmother; Dementia in her paternal grandfather; Depression in her son; Mental Illness in her maternal aunt and son; Migraines in her maternal grandmother and mother; Obesity in her brother. Soc HX:   Social History     Socioeconomic History    Marital status: Single    Number of children: 2   Occupational History    Occupation: [employer]   Tobacco Use    Smoking status: Every Day     Packs/day: 0.50     Years: 12.00     Pack years: 6.00     Types: Cigarettes     Start date:     Smokeless tobacco: Never   Vaping Use    Vaping Use: Never used   Substance and Sexual Activity    Alcohol use: No    Drug use: Not Currently     Types: IV, Opiates      Comment: heroin-last used 2022    Sexual activity: Yes     Partners: Male   Social History Narrative    Do you donate blood or plasma? Yes    Caffeine intake? Moderate    Advance directive? No    Is blood transfusion acceptable in an emergency?  Yes             Social Determinants of Health     Financial Resource Strain: High Risk    Difficulty of Paying Living Expenses: Hard   Food Insecurity: No Food Insecurity    Worried About Running Out of Food in the Last Year: Never true    Ran Out of Food in the Last Year: Never true       Medications:   Medications:    metroNIDAZOLE  500 mg Oral Once    sodium chloride  1,000 mL IntraVENous Once    sodium chloride  1,000 mL IntraVENous Once    vancomycin  2,000 mg IntraVENous Once    sodium chloride flush  5-40 mL IntraVENous 2 times per day    chlorhexidine  15 mL Mouth/Throat BID    famotidine (PEPCID) injection  20 mg IntraVENous BID    propofol        chlorhexidine  15 mL Mouth/Throat BID      Infusions:    norepinephrine 30 mcg/min (11/04/22 1640)    lactated ringers 100 mL/hr at 11/04/22 1641     PRN Meds: sodium chloride flush, 5-40 mL, PRN  ondansetron, 4 mg, Q8H PRN   Or  ondansetron, 4 mg, Q6H PRN  polyethylene glycol, 17 g, Daily PRN  acetaminophen, 650 mg, Q6H PRN   Or  acetaminophen, 650 mg, Q6H PRN  ondansetron, 4 mg, Q8H PRN   Or  ondansetron, 4 mg, Q6H PRN        Labs and Imaging   CT HEAD WO CONTRAST    Result Date: 11/4/2022  EXAMINATION: CT OF THE HEAD WITHOUT CONTRAST,  11/4/2022 6:01 pm TECHNIQUE: CT of the head was performed without the administration of intravenous contrast. Automated exposure control, iterative reconstruction, and/or weight based adjustment of the mA/kV was utilized to reduce the radiation dose to as low as reasonably achievable. COMPARISON: None HISTORY: ORDERING SYSTEM PROVIDED HISTORY:  Fall unresponsive cardiac arrest. TECHNOLOGIST PROVIDED HISTORY: Reason for Exam:  Fall unresponsive cardiac arrest. Has a \"code stroke\" or \"stroke alert\" been called? No Decision Support Exception - unselect if not a suspected or confirmed emergency medical condition->Emergency Medical Condition (MA). Is the patient pregnant?   No Reason for Exam:  Fall; unresponsive Additional signs and symptoms:  Cardiac Arrest (ROSC on arrival) Relevant Medical/Surgical History:  None Initial evaluation FINDINGS: BRAIN/VENTRICLES: The ventricles are normal in size, shape and configuration for the age of the patient. There is diffuse low attenuation throughout the entire brain parenchyma suggestive of diffuse anoxic injury. There is diffuse cerebral edema. There is effacement the fourth ventricle with mild downward herniation. The results were called and discussed with Dr. Jordyn Angelo 11/04/2022 at 8:11 p.m. ORBITS: The visualized portion of the orbits demonstrate no acute abnormality. SINUSES: The visualized paranasal sinuses and mastoid air cells are for the most part clear. SOFT TISSUES/SKULL:  No acute abnormality of the visualized skull or soft tissues. Diffuse low attenuation throughout the entire cerebral hemispheres consistent with edema and diffuse anoxic injury. The fourth ventricle is effaced and the cerebellar tonsils are downward herniating through the foramen magnum. The findings were called and discussed with Dr. Jordyn Angelo. CT CERVICAL SPINE WO CONTRAST    Result Date: 11/4/2022  EXAMINATION: CT OF THE CERVICAL SPINE WITHOUT CONTRAST 11/4/2022 6:01 pm TECHNIQUE: CT of the cervical spine was performed without the administration of intravenous contrast. Multiplanar reformatted images are provided for review. Automated exposure control, iterative reconstruction, and/or weight based adjustment of the mA/kV was utilized to reduce the radiation dose to as low as reasonably achievable.  COMPARISON: MRI cervical spine March 17, 2021; CT cervical spine June 1, 2020 HISTORY: ORDERING SYSTEM PROVIDED HISTORY: fall unresponsive TECHNOLOGIST PROVIDED HISTORY: Reason for exam:->fall unresponsive Decision Support Exception - unselect if not a suspected or confirmed emergency medical condition->Emergency Medical Condition (MA) Is the patient pregnant?->No Reason for Exam: fall unresponsive Additional signs and symptoms: Cardiac Arrest (ROSC on arrival) Relevant Medical/Surgical History: none FINDINGS: BONES/ALIGNMENT: Postoperative changes of cervical and thoracic fusion with hardware extending distally beyond the field-of-view. Pedicle screws present the C2 and C3 levels and at the C7 and T1 levels with corpectomy noted at the T2 level extending distally beyond the field-of-view. The imaged portions of the hardware appear grossly intact. No acute fracture identified. DEGENERATIVE CHANGES: Multilevel mild spondylosis of the cervical spine. Solid osseous fusion of the posterior elements of the cervical spine with fusion hardware in place as noted above. SOFT TISSUES: Visualized lung apices demonstrate patchy airspace opacities. Endotracheal tube and enteric tube are in place. 1. No acute fracture of the cervical spine identified. 2. Patchy airspace opacities noted at the lung apices. XR CHEST PORTABLE    Result Date: 11/4/2022  EXAMINATION: ONE XRAY VIEW OF THE CHEST 11/4/2022 3:56 pm COMPARISON: 10/28/2022. HISTORY: ORDERING SYSTEM PROVIDED HISTORY: tube and line placement TECHNOLOGIST PROVIDED HISTORY: Reason for exam:->tube and line placement Reason for Exam: tube and line placement AND NG PLACED FINDINGS: There are low lung volumes. The heart size is enlarged. The pulmonary vasculature is congested. There are bilateral infiltrates. There is an endotracheal tube with its tip above the richie. A nasogastric tube courses below the diaphragm. 1. Cardiomegaly with vascular congestion and bilateral infiltrates likely representing pulmonary edema. 2. Endotracheal tube in satisfactory position. CT LUMBAR RECONSTRUCTION WO POST PROCESS    Result Date: 11/4/2022  EXAMINATION: CT OF THE LUMBAR SPINE WITHOUT CONTRAST; CT OF THE THORACIC SPINE WITHOUT CONTRAST 11/4/2022 TECHNIQUE: CT of the lumbar spine was performed without the administration of intravenous contrast. Multiplanar reformatted images are provided for review. Adjustment of mA and/or kV according to patient size was utilized.   Automated exposure control, iterative reconstruction, and/or weight based adjustment of the mA/kV was utilized to reduce the radiation dose to as low as reasonably achievable.; CT of the thoracic spine was performed without the administration of intravenous contrast. Multiplanar reformatted images are provided for review. Automated exposure control, iterative reconstruction, and/or weight based adjustment of the mA/kV was utilized to reduce the radiation dose to as low as reasonably achievable. COMPARISON: None. HISTORY: ORDERING SYSTEM PROVIDED HISTORY: fall, unresponsivre TECHNOLOGIST PROVIDED HISTORY: Reason for exam:->fall, unresponsivre Decision Support Exception - unselect if not a suspected or confirmed emergency medical condition->Emergency Medical Condition (MA) Is the patient pregnant?->No Reason for Exam: fall; unresponsive Additional signs and symptoms: Cardiac Arrest (ROSC on arrival) Relevant Medical/Surgical History: none; ORDERING SYSTEM PROVIDED HISTORY: fall unresponsive TECHNOLOGIST PROVIDED HISTORY: Reason for exam:->fall unresponsive Decision Support Exception - unselect if not a suspected or confirmed emergency medical condition->Emergency Medical Condition (MA) Is the patient pregnant?->No Reason for Exam: fall; unresponsive Additional signs and symptoms: Cardiac Arrest (ROSC on arrival) Relevant Medical/Surgical History: none FINDINGS: BONES/ALIGNMENT: There are postsurgical changes of T2 corpectomy with posterior spinal fusion between C7 and T9. The alignment of the thoracic and lumbar spine is within normal limits. No acute fractures or dislocations are seen. DEGENERATIVE CHANGES: No significant degenerative changes of the thoracic or lumbar spine. SOFT TISSUES: No paraspinal mass is seen. 1.No acute thoracic or lumbar spine trauma.      CT THORACIC RECONSTRUCTION WO POST PROCESS    Result Date: 11/4/2022  EXAMINATION: CT OF THE LUMBAR SPINE WITHOUT CONTRAST; CT OF THE THORACIC SPINE WITHOUT CONTRAST 11/4/2022 TECHNIQUE: CT of the lumbar spine was performed without the administration of intravenous contrast. Multiplanar reformatted images are provided for review. Adjustment of mA and/or kV according to patient size was utilized. Automated exposure control, iterative reconstruction, and/or weight based adjustment of the mA/kV was utilized to reduce the radiation dose to as low as reasonably achievable.; CT of the thoracic spine was performed without the administration of intravenous contrast. Multiplanar reformatted images are provided for review. Automated exposure control, iterative reconstruction, and/or weight based adjustment of the mA/kV was utilized to reduce the radiation dose to as low as reasonably achievable. COMPARISON: None. HISTORY: ORDERING SYSTEM PROVIDED HISTORY: fall, unresponsivre TECHNOLOGIST PROVIDED HISTORY: Reason for exam:->fall, unresponsivre Decision Support Exception - unselect if not a suspected or confirmed emergency medical condition->Emergency Medical Condition (MA) Is the patient pregnant?->No Reason for Exam: fall; unresponsive Additional signs and symptoms: Cardiac Arrest (ROSC on arrival) Relevant Medical/Surgical History: none; ORDERING SYSTEM PROVIDED HISTORY: fall unresponsive TECHNOLOGIST PROVIDED HISTORY: Reason for exam:->fall unresponsive Decision Support Exception - unselect if not a suspected or confirmed emergency medical condition->Emergency Medical Condition (MA) Is the patient pregnant?->No Reason for Exam: fall; unresponsive Additional signs and symptoms: Cardiac Arrest (ROSC on arrival) Relevant Medical/Surgical History: none FINDINGS: BONES/ALIGNMENT: There are postsurgical changes of T2 corpectomy with posterior spinal fusion between C7 and T9. The alignment of the thoracic and lumbar spine is within normal limits. No acute fractures or dislocations are seen. DEGENERATIVE CHANGES: No significant degenerative changes of the thoracic or lumbar spine.  SOFT TISSUES: No paraspinal mass is seen. 1.No acute thoracic or lumbar spine trauma. VL DUP LOWER EXTREMITY VENOUS RIGHT    Result Date: 11/3/2022  EXAMINATION: DUPLEX VENOUS ULTRASOUND OF THE RIGHT LOWER EXTREMITY 11/3/2022 12:44 pm TECHNIQUE: Duplex ultrasound using B-mode/gray scaled imaging and Doppler spectral analysis and color flow was obtained of the deep venous structures of the right extremity. COMPARISON: None. HISTORY: ORDERING SYSTEM PROVIDED HISTORY: Peripheral edema TECHNOLOGIST PROVIDED HISTORY: Reason for exam:->recent hospital discharge, venous stasis with warmth Rt>Lt no hx. dvt positive [place] Reason for Exam: rt leg warmth, edema FINDINGS: The visualized veins of the right lower extremity are patent and free of echogenic thrombus. The veins demonstrate good compressibility with normal color flow study and spectral analysis. No evidence of DVT in the right lower extremity. CBC:   Recent Labs     11/04/22  1532   WBC 15.6*   HGB 10.6*        BMP:    Recent Labs     11/04/22  1532      K 3.3*   CL 88*   CO2 21   BUN 31*   CREATININE 1.7*   GLUCOSE 323*     Hepatic:   Recent Labs     11/04/22  1532   AST 97*   ALT 53*   BILITOT 0.3   ALKPHOS 175*     Lipids:   Lab Results   Component Value Date/Time    TRIG 150 11/04/2022 03:32 PM     Hemoglobin A1C: No results found for: LABA1C  TSH: No results found for: TSH  Troponin:   Lab Results   Component Value Date/Time    TROPONINT 0.014 11/04/2022 03:32 PM    TROPONINT <0.010 10/28/2022 05:14 PM    TROPONINT <0.010 10/18/2022 03:06 AM     Lactic Acid: No results for input(s): LACTA in the last 72 hours.   BNP:   Recent Labs     11/04/22  1532   PROBNP 528.5*     UA:  Lab Results   Component Value Date/Time    NITRU NEGATIVE 11/04/2022 04:14 PM    NITRU NEGATIVE 06/20/2013 03:10 PM    COLORU YELLOW 11/04/2022 04:14 PM    WBCUA 53 03/16/2021 09:36 PM    RBCUA 29 03/16/2021 09:36 PM    MUCUS OCCASIONAL 03/16/2021 09:36 PM    TRICHOMONAS NONE SEEN 03/16/2021 09:36 PM    BACTERIA RARE 03/16/2021 09:36 PM    CLARITYU CLEAR 11/04/2022 04:14 PM    SPECGRAV 1.010 11/04/2022 04:14 PM    LEUKOCYTESUR NEGATIVE 11/04/2022 04:14 PM    UROBILINOGEN 0.2 11/04/2022 04:14 PM    BILIRUBINUR NEGATIVE 11/04/2022 04:14 PM    BLOODU NEGATIVE 11/04/2022 04:14 PM    GLUCOSEU neg 06/20/2013 03:10 PM    GLUCOSEU NEGATIVE 06/20/2013 03:10 PM    KETUA NEGATIVE 11/04/2022 04:14 PM    AMORPHOUS RARE 09/13/2020 09:30 PM     Urine Cultures: No results found for: LABURIN  Blood Cultures: No results found for: BC  No results found for: BLOODCULT2  Organism: No results found for: [facility]    Personally reviewed Lab Studies, Imaging, and discussed with     Electronically signed by Evelyn Harvey MD on 11/4/2022 at 8:34 PM

## 2022-11-05 NOTE — PROGRESS NOTES
LOVENOX PROPHYLAXIS EVALUATION    Wt Readings from Last 3 Encounters:   11/03/22 (!) 303 lb 1.6 oz (137.5 kg)   10/28/22 292 lb (132.5 kg)   10/21/22 291 lb 14.2 oz (132.4 kg)       Estimated Creatinine Clearance: 62 mL/min (A) (based on SCr of 1.7 mg/dL (H)).   Recent Labs     11/04/22  1532 11/04/22  2100   BUN 31*  --    CREATININE 1.7*  --      --    HGB 10.6*  --    HCT 36.5*  --    INR 1.09 1.21       Weight Range: 101-150.9 kg    CRCL = 30 or greater    50.9 kg   and below     .9  kg   101-150.9 kg   151-174.9  kg   175 kg  or greater     30mg subq  daily     40mg subq daily    (or 30mg subq BID orthopedic)     30mg subq  BID   40mg subq  BID   60mg subq BID       Per P/T protocol for appropriate subq anticoagulation by weight and CRCL change to:    Enoxaparin 30mg subq BID      ISAC Wills [facility] [place]  10:47 PM  11/04/22

## 2022-11-05 NOTE — PROGRESS NOTES
V2.0  [facility] [place]      Name:  Tiffany Balbuena /Age/Sex: 1980  (38 y.o. female)   MRN & CSN:  [id number] & [id number] Encounter Date/Time: 2022 8:42 AM EDT    Location:  -A PCP: Sami Downing, [address] [facility] Day: 2    Assessment and Plan:   Tiffany Balbuena is a 38 y.o. female with pmh of MRSA endocarditis history, HFpEF, hypertension, HCV, morbid obesity, recent admission with acute on chronic diastolic heart failure requiring Lasix drip and eventually discharged home with oxygen, pulmonary hypertension and chronic hypercapnic respiratory failure who presents with Cardiac arrest [facility])    Plan:  Acute on chronic hypoxic respiratory failure secondary to cardiac arrest  Pulmonary edema versus aspiration pneumonia on CXR  -Continue ventilator support  -On broad-spectrum antibiotics cefepime and vancomycin  -Pulmonary toilet  -Wean ventilator as tolerated    Anoxic encephalopathy status post cardiac arrest.  -seen on imaging  -Currently undergoing hypothermia protocol  - Follow-up mental status and neuro exam after patient is done with the hypothermia protocol and rewarmed    Acute on chronic diastolic heart failure  Cardiogenic shock  -On pressor support  -Wean as tolerated    Prognosis extremely guarded    Diet Diet NPO   DVT Prophylaxis [x] Lovenox, []  Heparin, [] SCDs, [] Ambulation,  [] Eliquis, [] Xarelto  [] Coumadin   Code Status Full Code   Disposition From: Home  Expected Disposition: TBD  Estimated Date of Discharge: TBD  Patient requires continued admission due to cardiac arrest management         Subjective:     Chief Complaint: No chief complaint on file. Patient intubated and sedated         Review of Systems:    Review of Systems   Unable to perform ROS: Intubated       Objective:      Intake/Output Summary (Last 24 hours) at 2022 0842  Last data filed at 2022 0700  Gross per 24 hour   Intake 2194.57 ml   Output 1200 ml   Net 994.57 ml Vitals:   Vitals:    11/05/22 0800   BP: (!) 115/93   Pulse: 82   Resp: 22   Temp: 96.8 °F (36 °C)   SpO2: 100%       Physical Exam:   Physical Exam  Vitals and nursing note reviewed. Constitutional:       General: She is not in acute distress. Appearance: She is ill-appearing and toxic-appearing. HENT:      Head: Normocephalic and atraumatic. Nose: Nose normal.      Mouth/Throat:      Mouth: Mucous membranes are moist.   Eyes:      Extraocular Movements: Extraocular movements intact. Pupils: Pupils are equal, round, and reactive to light. Cardiovascular:      Rate and Rhythm: Normal rate and regular rhythm. Pulses: Normal pulses. Heart sounds: Normal heart sounds. Pulmonary:      Effort: No respiratory distress. Comments: Bilateral mechanical breath sounds  Abdominal:      Palpations: Abdomen is soft. Musculoskeletal:         General: Normal range of motion. Cervical back: Normal range of motion. Skin:     General: Skin is warm. Capillary Refill: Capillary refill takes less than 2 seconds.    Neurological:      Comments: Intubated and sedated          Medications:   Medications:    ipratropium-albuterol  1 ampule Inhalation Q4H    vancomycin (VANCOCIN) intermittent dosing (placeholder)   Other RX Placeholder    calcium gluconate  2,000 mg IntraVENous Once    sodium chloride  1,000 mL IntraVENous Once    sodium chloride  1,000 mL IntraVENous Once    sodium chloride flush  5-40 mL IntraVENous 2 times per day    chlorhexidine  15 mL Mouth/Throat BID    famotidine (PEPCID) injection  20 mg IntraVENous BID    enoxaparin  30 mg SubCUTAneous BID    insulin lispro  0-4 Units SubCUTAneous Q4H    insulin lispro  0-4 Units SubCUTAneous TID     cefepime  2,000 mg IntraVENous Q8H      Infusions:    norepinephrine 8 mcg/min (11/05/22 0149)    lactated ringers 100 mL/hr at 11/04/22 1641    sodium chloride 100 mL/hr at 11/05/22 0057    fentaNYL 12.5 mcg/hr (11/04/22 1227) dextrose      propofol 15 mcg/kg/min (11/05/22 0640)     PRN Meds: busPIRone, 15 mg, Q8H PRN  sodium chloride flush, 5-40 mL, PRN  polyethylene glycol, 17 g, Daily PRN  acetaminophen, 650 mg, Q6H PRN   Or  acetaminophen, 650 mg, Q6H PRN  ondansetron, 4 mg, Q8H PRN   Or  ondansetron, 4 mg, Q6H PRN  potassium chloride, 10 mEq, PRN  magnesium sulfate, 2,000 mg, PRN  meperidine, 25 mg, Q30 Min PRN  sodium phosphate IVPB, 0.16 mmol/kg, PRN   Or  sodium phosphate IVPB, 0.32 mmol/kg, PRN  glucose, 4 tablet, PRN  dextrose bolus, 125 mL, PRN   Or  dextrose bolus, 250 mL, PRN  dextrose, , Continuous PRN  glucagon (rDNA), 1 mg, PRN        Labs      Recent Results (from the past 24 hour(s))   EKG 12 Lead    Collection Time: 11/04/22  3:05 PM   Result Value Ref Range    Ventricular Rate 79 BPM    P-R Interval 122 ms    QRS Duration 102 ms    Q-T Interval 416 ms    QTc Calculation (Bazett) 477 ms    P Axis 198 degrees    R Axis 161 degrees    T Axis -9 degrees    Diagnosis       Suspect Agonal rhythm  Abnormal ECG  When compared with ECG of 28-OCT-2022 22:32,  Significant changes have occurred  Confirmed by [facility] Sabine PARISI ([id number]) on 11/4/2022 6:10:47 PM     Brain Natriuretic Peptide    Collection Time: 11/04/22  3:32 PM   Result Value Ref Range    Pro-.5 (H) <300 PG/ML   CBC with Auto Differential    Collection Time: 11/04/22  3:32 PM   Result Value Ref Range    WBC 15.6 (H) 4.0 - 10.5 K/CU MM    RBC 3.74 (L) 4.2 - 5.4 M/CU MM    Hemoglobin 10.6 (L) 12.5 - 16.0 GM/DL    Hematocrit 36.5 (L) 37 - 47 %    MCV 97.6 78 - 100 FL    MCH 28.3 27 - 31 PG    MCHC 29.0 (L) 32.0 - 36.0 %    RDW 18.5 (H) 11.7 - 14.9 %    Platelets 173 759 - 906 K/CU MM    MPV 10.3 7.5 - 11.1 FL    Myelocyte Percent 2 (H) 0.0 %    Metamyelocytes Relative 3 (H) 0.0 %    Bands Relative 10 5 - 11 %    Segs Relative 70.0 (H) 36 - 66 %    Eosinophils % 2.0 0 - 3 %    Lymphocytes % 13.0 (L) 24 - 44 %    Myelocytes Absolute 0.31 K/CU MM    Metamyelocytes Absolute 0.47 K/CU MM    Bands Absolute 1.56 K/CU MM    Segs Absolute 11.0 K/CU MM    Eosinophils Absolute 0.3 K/CU MM    Lymphocytes Absolute 2.0 K/CU MM    Differential Type MANUAL DIFFERENTIAL     Polychromasia 1+    Comprehensive Metabolic Panel w/ Reflex to MG    Collection Time: 11/04/22  3:32 PM   Result Value Ref Range    Sodium 135 135 - 145 MMOL/L    Potassium 3.3 (L) 3.5 - 5.1 MMOL/L    Chloride 88 (L) 99 - 110 mMol/L    CO2 21 21 - 32 MMOL/L    BUN 31 (H) 6 - 23 MG/DL    Creatinine 1.7 (H) 0.6 - 1.1 MG/DL    Est, Glom Filt Rate 39 (L) >60 mL/min/1.73m2    Glucose 323 (H) 70 - 99 MG/DL    Calcium 8.2 (L) 8.3 - 10.6 MG/DL    Albumin 2.8 (L) 3.4 - 5.0 GM/DL    Total Protein 6.5 6.4 - 8.2 GM/DL    Total Bilirubin 0.3 0.0 - 1.0 MG/DL    ALT 53 (H) 10 - 40 U/L    AST 97 (H) 15 - 37 IU/L    Alkaline Phosphatase 175 (H) 40 - 129 IU/L    Anion Gap 26 (H) 4 - 16   Lipase    Collection Time: 11/04/22  3:32 PM   Result Value Ref Range    Lipase 36 13 - 60 IU/L   Troponin    Collection Time: 11/04/22  3:32 PM   Result Value Ref Range    Troponin T 0.014 (H) <0.01 NG/ML   Lactic Acid    Collection Time: 11/04/22  3:32 PM   Result Value Ref Range    Lactate 12.2 (HH) 0.4 - 2.0 mMOL/L   Magnesium    Collection Time: 11/04/22  3:32 PM   Result Value Ref Range    Magnesium 2.1 1.8 - 2.4 mg/dl   Protime/INR & PTT    Collection Time: 11/04/22  3:32 PM   Result Value Ref Range    Protime 14.1 11.7 - 14.5 SECONDS    INR 1.09 INDEX    aPTT 26.2 25.1 - 37.1 SECONDS   Triglyceride    Collection Time: 11/04/22  3:32 PM   Result Value Ref Range    Triglycerides 150 (H) <150 MG/DL   Blood Gas, Venous    Collection Time: 11/04/22  3:45 PM   Result Value Ref Range    pH, Ricci 7.13 (L) 7.32 - 7.42    pCO2, Ricci 70 (H) 38 - 52 mmHG    pO2, Ricci 38 28 - 48 mmHG    Base Excess 7 (H) 0 - 2.4    HCO3, Venous 23.3 19 - 25 MMOL/L    O2 Sat, Ricci 55.2 50 - 70 %    Comment VBG    Urine Drug Screen    Collection Time: 11/04/22  4:14 PM   Result Value Ref Range    Cannabinoid Scrn, Ur NEGATIVE NEGATIVE    Amphetamines NEGATIVE NEGATIVE    Cocaine Metabolite NEGATIVE NEGATIVE    Benzodiazepine Screen, Urine NEGATIVE NEGATIVE    Barbiturate Screen, Ur NEGATIVE NEGATIVE    Opiates, Urine NEGATIVE NEGATIVE    Phencyclidine, Urine NEGATIVE NEGATIVE    Oxycodone NEGATIVE NEGATIVE   Urinalysis with Reflex to Culture    Collection Time: 11/04/22  4:14 PM    Specimen: Urine   Result Value Ref Range    Color, UA YELLOW YELLOW    Clarity, UA CLEAR CLEAR    Glucose, Urine NEGATIVE NEGATIVE MG/DL    Bilirubin Urine NEGATIVE NEGATIVE MG/DL    Ketones, Urine NEGATIVE NEGATIVE MG/DL    Specific Gravity, UA 1.010 1.001 - 1.035    Blood, Urine NEGATIVE NEGATIVE    pH, Urine 7.0 5.0 - 8.0    Protein, UA NEGATIVE NEGATIVE MG/DL    Urobilinogen, Urine 0.2 0.2 - 1.0 MG/DL    Nitrite Urine, Quantitative NEGATIVE NEGATIVE    Leukocyte Esterase, Urine NEGATIVE NEGATIVE   Blood gas, arterial    Collection Time: 11/04/22  4:30 PM   Result Value Ref Range    pH, Bld 7.29 (L) 7.34 - 7.45    pCO2, Arterial 54.0 (H) 32 - 45 MMHG    pO2, Arterial 99 75 - 100 MMHG    Base Excess 1 0 - 2.4    HCO3, Arterial 26.0 (H) 18 - 23 MMOL/L    CO2 Content 27.7 (H) 19 - 24 MMOL/L    O2 Sat 92.3 (L) 96 - 97 %    Carbon Monoxide, Blood 4.1 0 - 5 %    Methemoglobin, Arterial 1.2 <1.5 %    Comment AC/VC 18 500 100% +10    Calcium, Ionized    Collection Time: 11/04/22  9:00 PM   Result Value Ref Range    Ionized Ca 1.03 (L) 1.12 - 1.32 mMOL/L    Calcium, Ionized 4.12 (L) 4.48 - 5.28 MG/DL   Magnesium    Collection Time: 11/04/22  9:00 PM   Result Value Ref Range    Magnesium 1.8 1.8 - 2.4 mg/dl   Phosphorus    Collection Time: 11/04/22  9:00 PM   Result Value Ref Range    Phosphorus 3.8 2.5 - 4.9 MG/DL   Protime-INR    Collection Time: 11/04/22  9:00 PM   Result Value Ref Range    Protime 15.7 (H) 11.7 - 14.5 SECONDS    INR 1.21 INDEX   Triglyceride    Collection Time: 11/04/22  9:00 PM   Result Value Ref Range    Triglycerides 89 <150 MG/DL   Lactic Acid    Collection Time: 11/04/22  9:00 PM   Result Value Ref Range    Lactate 2.1 (HH) 0.4 - 2.0 mMOL/L   Basic Metabolic Panel    Collection Time: 11/04/22  9:00 PM   Result Value Ref Range    Sodium 135 135 - 145 MMOL/L    Potassium 2.6 (LL) 3.5 - 5.1 MMOL/L    Chloride 92 (L) 99 - 110 mMol/L    CO2 28 21 - 32 MMOL/L    Anion Gap 15 4 - 16    BUN 37 (H) 6 - 23 MG/DL    Creatinine 2.0 (H) 0.6 - 1.1 MG/DL    Est, Glom Filt Rate 32 (L) >60 mL/min/1.73m2    Glucose 96 70 - 99 MG/DL    Calcium 8.3 8.3 - 10.6 MG/DL   APTT    Collection Time: 11/04/22  9:15 PM   Result Value Ref Range    aPTT 44.0 (H) 25.1 - 37.1 SECONDS   Blood Gas, Arterial    Collection Time: 11/04/22  9:15 PM   Result Value Ref Range    pH, Bld 7.38 7.34 - 7.45    pCO2, Arterial 54.0 (H) 32 - 45 MMHG    pO2, Arterial 88 75 - 100 MMHG    Base Exc, Mixed 5.3 (H) 0 - 2.3    HCO3, Arterial 31.9 (H) 18 - 23 MMOL/L    CO2 Content 33.6 (H) 19 - 24 MMOL/L    O2 Sat 93.5 (L) 96 - 97 %    Carbon Monoxide, Blood 2.4 0 - 5 %    Methemoglobin, Arterial 1.4 <1.5 %   POCT Glucose    Collection Time: 11/05/22  1:23 AM   Result Value Ref Range    POC Glucose 101 (H) 70 - 99 MG/DL   Basic Metabolic Panel    Collection Time: 11/05/22  3:00 AM   Result Value Ref Range    Sodium 136 135 - 145 MMOL/L    Potassium 2.8 (LL) 3.5 - 5.1 MMOL/L    Chloride 93 (L) 99 - 110 mMol/L    CO2 27 21 - 32 MMOL/L    Anion Gap 16 4 - 16    BUN 40 (H) 6 - 23 MG/DL    Creatinine 2.0 (H) 0.6 - 1.1 MG/DL    Est, Glom Filt Rate 32 (L) >60 mL/min/1.73m2    Glucose 142 (H) 70 - 99 MG/DL    Calcium 8.0 (L) 8.3 - 10.6 MG/DL   Calcium, Ionized    Collection Time: 11/05/22  3:00 AM   Result Value Ref Range    Ionized Ca 1.02 (L) 1.12 - 1.32 mMOL/L    Calcium, Ionized 4.08 (L) 4.48 - 5.28 MG/DL   Phosphorus    Collection Time: 11/05/22  3:00 AM   Result Value Ref Range    Phosphorus 4.3 2.5 - 4.9 MG/DL   Lactic Acid    Collection Time: 11/05/22  3:00 AM Result Value Ref Range    Lactate 1.7 0.4 - 2.0 mMOL/L   Drug screen multi urine    Collection Time: 11/05/22  3:00 AM   Result Value Ref Range    Cannabinoid Scrn, Ur NEGATIVE NEGATIVE    Amphetamines NEGATIVE NEGATIVE    Cocaine Metabolite NEGATIVE NEGATIVE    Benzodiazepine Screen, Urine NEGATIVE NEGATIVE    Barbiturate Screen, Ur NEGATIVE NEGATIVE    Opiates, Urine NEGATIVE NEGATIVE    Phencyclidine, Urine NEGATIVE NEGATIVE    Oxycodone NEGATIVE NEGATIVE   Hemoglobin A1c    Collection Time: 11/05/22  3:00 AM   Result Value Ref Range    Hemoglobin A1C 4.9 4.2 - 6.3 %    eAG 94 mg/dL   Magnesium    Collection Time: 11/05/22  3:00 AM   Result Value Ref Range    Magnesium 1.8 1.8 - 2.4 mg/dl   Troponin    Collection Time: 11/05/22  3:00 AM   Result Value Ref Range    Troponin T 0.096 (H) <0.01 NG/ML   POCT Glucose    Collection Time: 11/05/22  3:00 AM   Result Value Ref Range    POC Glucose 175 (H) 70 - 99 MG/DL   Blood Gas, Arterial    Collection Time: 11/05/22  3:15 AM   Result Value Ref Range    pH, Bld 7.37 7.34 - 7.45    pCO2, Arterial 53.0 (H) 32 - 45 MMHG    pO2, Arterial 59 (L) 75 - 100 MMHG    Base Exc, Mixed 4 (H) 0 - 2.3    HCO3, Arterial 30.6 (H) 18 - 23 MMOL/L    CO2 Content 32.2 (H) 19 - 24 MMOL/L    O2 Sat 87.6 (L) 96 - 97 %    Carbon Monoxide, Blood 2.3 0 - 5 %    Methemoglobin, Arterial 0.8 <1.5 %   POCT Glucose    Collection Time: 11/05/22  6:10 AM   Result Value Ref Range    POC Glucose 146 (H) 70 - 99 MG/DL        Imaging/Diagnostics Last 24 Hours   CT HEAD WO CONTRAST    Result Date: 11/4/2022  EXAMINATION: CT OF THE HEAD WITHOUT CONTRAST,  11/4/2022 6:01 pm TECHNIQUE: CT of the head was performed without the administration of intravenous contrast. Automated exposure control, iterative reconstruction, and/or weight based adjustment of the mA/kV was utilized to reduce the radiation dose to as low as reasonably achievable.  COMPARISON: None HISTORY: ORDERING SYSTEM PROVIDED HISTORY:  Fall unresponsive cardiac arrest. TECHNOLOGIST PROVIDED HISTORY: Reason for Exam:  Fall unresponsive cardiac arrest. Has a \"code stroke\" or \"stroke alert\" been called? No Decision Support Exception - unselect if not a suspected or confirmed emergency medical condition->Emergency Medical Condition (MA). Is the patient pregnant? No Reason for Exam:  Fall; unresponsive Additional signs and symptoms:  Cardiac Arrest (ROSC on arrival) Relevant Medical/Surgical History:  None Initial evaluation FINDINGS: BRAIN/VENTRICLES: The ventricles are normal in size, shape and configuration for the age of the patient. There is diffuse low attenuation throughout the entire brain parenchyma suggestive of diffuse anoxic injury. There is diffuse cerebral edema. There is effacement the fourth ventricle with mild downward herniation. The results were called and discussed with Dr. Amy Thomas 11/04/2022 at 8:11 p.m. ORBITS: The visualized portion of the orbits demonstrate no acute abnormality. SINUSES: The visualized paranasal sinuses and mastoid air cells are for the most part clear. SOFT TISSUES/SKULL:  No acute abnormality of the visualized skull or soft tissues. Diffuse low attenuation throughout the entire cerebral hemispheres consistent with edema and diffuse anoxic injury. The fourth ventricle is effaced and the cerebellar tonsils are downward herniating through the foramen magnum. The findings were called and discussed with Dr. Amy Thomas. CT CERVICAL SPINE WO CONTRAST    Result Date: 11/4/2022  EXAMINATION: CT OF THE CERVICAL SPINE WITHOUT CONTRAST 11/4/2022 6:01 pm TECHNIQUE: CT of the cervical spine was performed without the administration of intravenous contrast. Multiplanar reformatted images are provided for review. Automated exposure control, iterative reconstruction, and/or weight based adjustment of the mA/kV was utilized to reduce the radiation dose to as low as reasonably achievable.  COMPARISON: MRI cervical spine March 17, 2021; CT cervical spine June 1, 2020 HISTORY: ORDERING SYSTEM PROVIDED HISTORY: fall unresponsive TECHNOLOGIST PROVIDED HISTORY: Reason for exam:->fall unresponsive Decision Support Exception - unselect if not a suspected or confirmed emergency medical condition->Emergency Medical Condition (MA) Is the patient pregnant?->No Reason for Exam: fall unresponsive Additional signs and symptoms: Cardiac Arrest (ROSC on arrival) Relevant Medical/Surgical History: none FINDINGS: BONES/ALIGNMENT: Postoperative changes of cervical and thoracic fusion with hardware extending distally beyond the field-of-view. Pedicle screws present the C2 and C3 levels and at the C7 and T1 levels with corpectomy noted at the T2 level extending distally beyond the field-of-view. The imaged portions of the hardware appear grossly intact. No acute fracture identified. DEGENERATIVE CHANGES: Multilevel mild spondylosis of the cervical spine. Solid osseous fusion of the posterior elements of the cervical spine with fusion hardware in place as noted above. SOFT TISSUES: Visualized lung apices demonstrate patchy airspace opacities. Endotracheal tube and enteric tube are in place. 1. No acute fracture of the cervical spine identified. 2. Patchy airspace opacities noted at the lung apices. XR CHEST PORTABLE    Result Date: 11/5/2022  EXAMINATION: ONE XRAY VIEW OF THE CHEST 11/5/2022 7:38 am COMPARISON: Chest x-rays earlier today. HISTORY: ORDERING SYSTEM PROVIDED HISTORY: ett placement TECHNOLOGIST PROVIDED HISTORY: Reason for exam:->ett placement Reason for Exam: ETT placement FINDINGS: Overlying items external to the patient somewhat limit evaluation. Endotracheal tube appears retracted from prior exam with tip now 3.7 cm from the richie. Other lines and tubes remain in place. No change in the heart or lungs with persistent diffuse airspace and interstitial opacities bilaterally. No definite pneumothoraces are seen.  Cardiac and mediastinal silhouettes are stable. Stable appearance to bony structures. Endotracheal tube has been retracted with tip now 3.7 cm from the richie. Otherwise stable exam.     XR CHEST PORTABLE    Result Date: 11/5/2022  EXAMINATION: ONE XRAY VIEW OF THE CHEST 11/5/2022 5:06 am COMPARISON: November 5, 2022 HISTORY: ORDERING SYSTEM PROVIDED HISTORY: ett/OG placement TECHNOLOGIST PROVIDED HISTORY: Reason for exam:->ett/OG placement Reason for Exam: ett/OG placement FINDINGS: Orogastric tube tip terminates beyond the inferior margin of the radiograph. The endotracheal tube tip has been retracted and terminates approximately 5 mm above the richie. The cardiomediastinal silhouette is enlarged. Bilateral diffuse airspace opacities are noted. No pneumothorax. No new osseous abnormality extensive hardware is identified in the visualized thoracic and cervical spine. 1. Endotracheal tube tip terminates approximately 5 mm above the richie. Retraction by 2 cm would place it in the mid trachea. 2. Diffuse airspace opacities. 3. Nasogastric tube tip terminates off the inferior margin of the radiograph. The findings were sent to the Radiology Results [id number] at 5:59 am on 11/5/2022 to be communicated to a licensed caregiver. XR CHEST PORTABLE    Result Date: 11/5/2022  EXAMINATION: ONE XRAY VIEW OF THE CHEST 11/5/2022 1:26 am COMPARISON: 11/04/2022. HISTORY: ORDERING SYSTEM PROVIDED HISTORY: verify ett placement TECHNOLOGIST PROVIDED HISTORY: Reason for exam:->verify ett placement FINDINGS: Malpositioned enteric tube tip in the right mainstem bronchus. Enteric tube tip overlies the left upper quadrant with side port projecting in the region of the GE junction. Low lung volume. Progressed consolidation throughout the left lung. Heterogeneous opacities throughout the right lung. No definite pleural effusion or pneumothorax. Cardiomegaly. 1.  Malpositioned enteric tube tip in the right mainstem bronchus. Recommend repositioning. 2.  Enteric tube side port in the region of the GE junction, recommend 3-4 cm advancement into the stomach. 3.  Progressed consolidation throughout the left lung may represent atelectasis due to right mainstem bronchus intubation. 4.  Heterogeneous opacities throughout the right lung. Differential considerations include pulmonary edema and infection. The findings were sent to the Radiology Results [id number] at 3:48 am on 11/5/2022 to be communicated to a licensed caregiver. XR CHEST PORTABLE    Result Date: 11/4/2022  EXAMINATION: ONE XRAY VIEW OF THE CHEST 11/4/2022 3:56 pm COMPARISON: 10/28/2022. HISTORY: ORDERING SYSTEM PROVIDED HISTORY: tube and line placement TECHNOLOGIST PROVIDED HISTORY: Reason for exam:->tube and line placement Reason for Exam: tube and line placement AND NG PLACED FINDINGS: There are low lung volumes. The heart size is enlarged. The pulmonary vasculature is congested. There are bilateral infiltrates. There is an endotracheal tube with its tip above the richie. A nasogastric tube courses below the diaphragm. 1. Cardiomegaly with vascular congestion and bilateral infiltrates likely representing pulmonary edema. 2. Endotracheal tube in satisfactory position. CTA CHEST ABDOMEN PELVIS W CONTRAST    Addendum Date: 11/4/2022    ADDENDUM: Findings were discussed with Dr. Maci Mar at 8:49 pm on 11/4/2022. Result Date: 11/4/2022  EXAMINATION: CTA OF THE CHEST, ABDOMEN AND PELVIS WITH CONTRAST 11/4/2022 6:02 pm TECHNIQUE: CTA of the chest, abdomen and pelvis was performed after the administration of intravenous contrast.  Multiplanar reformatted images are provided for review. MIP images are provided for review. Automated exposure control, iterative reconstruction, and/or weight based adjustment of the mA/kV was utilized to reduce the radiation dose to as low as reasonably achievable.  COMPARISON: None CT chest, abdomen, and pelvis October 18, 2022 HISTORY: ORDERING SYSTEM PROVIDED HISTORY: cardiac arrest hypoxia hx  chf, TECHNOLOGIST PROVIDED HISTORY: Reason for exam:->cardiac arrest hypoxia hx  chf, Decision Support Exception - unselect if not a suspected or confirmed emergency medical condition->Emergency Medical Condition (MA) Reason for Exam: fall; unresponsive Additional signs and symptoms: Cardiac Arrest (ROSC on arrival) Relevant Medical/Surgical History: 80mL isovue 370 FINDINGS: Chest: Mediastinum: The heart and pericardium demonstrate no acute findings. Lungs/pleura: Multifocal airspace opacities throughout the upper and lower lobes and right middle lobe with atelectasis seen dependently at the left lung base and to a lesser extent the right upper lobe. Interlobular septal thickening also present. While findings may reflect pulmonary edema, superimposed aspiration/infection not excluded. No pneumothorax. Endotracheal tube present within the right mainstem bronchus. Recommend repositioning. Soft Tissues/Bones: There are multiple remote bilateral rib fractures. Superimposed acute fractures of the right anterior 2nd through 7th ribs and left 4th through 8th ribs. Contrast present within the subcutaneous tissues at the right antecubital fossa consistent with IV infiltration. Abdomen/Pelvis: Organs: The liver appears unremarkable. The gallbladder is surgically absent. The spleen demonstrates no acute abnormality. Fatty atrophy of the pancreas. The adrenal glands and kidneys demonstrate no acute findings. No hydronephrosis. GI/Bowel: No bowel obstruction. Moderate volume of stool throughout the colon. Normal appendix. Small bowel is normal in caliber. Enteric tube present with tip within the proximal stomach. Side port at the GE junction. Recommend advancement by approximately 5 cm. Pelvis: Angel catheter present within the bladder which is partially collapsed.  Peritoneum/Retroperitoneum: The abdominal aorta is normal in caliber. No free fluid or free air. Bones/Soft Tissues: Postoperative changes of cervicothoracic fusion. No acute spinal fracture identified. Left-sided femoral central venous catheter present with tip extending to the left common iliac vein. 1. Diffuse airspace disease bilaterally. Findings suggest pulmonary edema with superimposed aspiration/infection not excluded. 2. Acute fractures of the right anterior 2nd through 7th ribs and left 4th through 8th ribs (grade 2 chest wall injury). 3. Endotracheal tube tip within the right mainstem bronchus. Recommend repositioning. 4. Enteric tube side port at the GE junction. Recommend advancement. 5. No acute intraabdominal process identified. 6. Contrast present within the subcutaneous tissues at the right antecubital fossa consistent with IV infiltration. CT LUMBAR RECONSTRUCTION WO POST PROCESS    Result Date: 11/4/2022  EXAMINATION: CT OF THE LUMBAR SPINE WITHOUT CONTRAST; CT OF THE THORACIC SPINE WITHOUT CONTRAST 11/4/2022 TECHNIQUE: CT of the lumbar spine was performed without the administration of intravenous contrast. Multiplanar reformatted images are provided for review. Adjustment of mA and/or kV according to patient size was utilized. Automated exposure control, iterative reconstruction, and/or weight based adjustment of the mA/kV was utilized to reduce the radiation dose to as low as reasonably achievable.; CT of the thoracic spine was performed without the administration of intravenous contrast. Multiplanar reformatted images are provided for review. Automated exposure control, iterative reconstruction, and/or weight based adjustment of the mA/kV was utilized to reduce the radiation dose to as low as reasonably achievable. COMPARISON: None.  HISTORY: ORDERING SYSTEM PROVIDED HISTORY: fall, unresponsivre TECHNOLOGIST PROVIDED HISTORY: Reason for exam:->fall, unresponsivre Decision Support Exception - unselect if not a suspected or confirmed low as reasonably achievable. COMPARISON: None. HISTORY: ORDERING SYSTEM PROVIDED HISTORY: fall, unresponsivre TECHNOLOGIST PROVIDED HISTORY: Reason for exam:->fall, unresponsivre Decision Support Exception - unselect if not a suspected or confirmed emergency medical condition->Emergency Medical Condition (MA) Is the patient pregnant?->No Reason for Exam: fall; unresponsive Additional signs and symptoms: Cardiac Arrest (ROSC on arrival) Relevant Medical/Surgical History: none; ORDERING SYSTEM PROVIDED HISTORY: fall unresponsive TECHNOLOGIST PROVIDED HISTORY: Reason for exam:->fall unresponsive Decision Support Exception - unselect if not a suspected or confirmed emergency medical condition->Emergency Medical Condition (MA) Is the patient pregnant?->No Reason for Exam: fall; unresponsive Additional signs and symptoms: Cardiac Arrest (ROSC on arrival) Relevant Medical/Surgical History: none FINDINGS: BONES/ALIGNMENT: There are postsurgical changes of T2 corpectomy with posterior spinal fusion between C7 and T9. The alignment of the thoracic and lumbar spine is within normal limits. No acute fractures or dislocations are seen. DEGENERATIVE CHANGES: No significant degenerative changes of the thoracic or lumbar spine. SOFT TISSUES: No paraspinal mass is seen. 1.No acute thoracic or lumbar spine trauma. VL DUP LOWER EXTREMITY VENOUS RIGHT    Result Date: 11/3/2022  EXAMINATION: DUPLEX VENOUS ULTRASOUND OF THE RIGHT LOWER EXTREMITY 11/3/2022 12:44 pm TECHNIQUE: Duplex ultrasound using B-mode/gray scaled imaging and Doppler spectral analysis and color flow was obtained of the deep venous structures of the right extremity. COMPARISON: None.  HISTORY: ORDERING SYSTEM PROVIDED HISTORY: Peripheral edema TECHNOLOGIST PROVIDED HISTORY: Reason for exam:->recent hospital discharge, venous stasis with warmth Rt>Lt no hx. dvt positive Lutz Reason for Exam: rt leg warmth, edema FINDINGS: The visualized veins of the right lower extremity are patent and free of echogenic thrombus. The veins demonstrate good compressibility with normal color flow study and spectral analysis. No evidence of DVT in the right lower extremity.        Electronically signed by Brett Quinn MD on 11/5/2022 at 8:42 AM

## 2022-11-05 NOTE — CONSULTS
V2.0  [place] Consult Note      Name:  López Fitzgerald /Age/Sex: 1980  (38 y.o. female)   MRN & CSN:  [id number] & [id number] Encounter Date/Time: 2022 9:49 PM EDT   Location:  -A PCP: LEIGHTON Rogers CNP     Attending:Sandeep Triana MD  Consulting Provider: Devyn Orellana Day: 1  This is a telemedicine visit  Physician Location: [address] working from home   Patient Location: [facility] at [address] [facility] and Recommendations   López Fitzgerald is a 38 y.o. female who presents with Cardiac arrest [facility]    Hospital Problems             Last Modified POA    * (Principal) Cardiac arrest ([facility] [address] 2022 Yes       Assessment/plan:    53-year-old female with MRSA endocarditis history, HFpEF, hypertension, HCV, morbid obesity, recent admission with acute on chronic diastolic heart failure requiring Lasix drip and eventually discharged home with oxygen. Echo showed preserved ejection fraction. Cardiac catheterization without significant obstructive CAD. Had pulmonary hypertension and chronic hypercapnic respiratory failure, unable to tolerate BiPAP with suspicion of obesity hypoventilation syndrome. Has had multiple falls recently. Admitted today after asystolic and PEA arrest with down time of about 50 minutes. Intubated. On Levophed. On hypothermia protocol at 36°    CNS-head CT with evidence of anoxic encephalopathy status post cardiac arrest.  Continue targeted temperature management at 36°. Propofol and fentanyl. Follow-up mental status and neuro exam.    CVS-shock/hypotension after cardiac arrest.  Troponin elevation probably secondary to demand ischemia. Echo results pending. Preliminary EF 50%. Follow-up troponins. Wean off Levophed as tolerated. Pulmonary-acute hypoxic respiratory failure superimposed on chronic hypercapnic respiratory failure probably secondary to obesity hypoventilation syndrome.   Chest x-ray with evidence of bilateral infiltrates, pulmonary edema versus aspiration pneumonia. Wean O2 as tolerated. Pulmonary toilet. Follow-up chest x-rays. Continue full vent support. Renal-acute renal insufficiency probably secondary to hypotension and diuretics. Repeat low potassium. Follow-up electrolytes and urine output. GI-keep nothing by mouth. Pepcid prophylaxis. Heme-stable H&H. Lovenox prophylaxis. ID-Zosyn and vancomycin empirically for possible aspiration pneumonia and recently hospitalized patient. Check cultures. Follow-up WBC and temperatures. Endovascular scale insulin coverage. Critical/unstable  Critical care time, excluding procedures, 80 minutes. Discussed with nursing and with patient's mother/POA at bedside. Diet Diet NPO   DVT Prophylaxis [x] Lovenox, []  Heparin, [] SCDs, [] Ambulation,  [] Eliquis, [] Xarelto   Code Status Full Code   Surrogate Decision Maker/ POA  Mother   Hi  History Obtained From:    patient, family member , electronic medical record    History of Present Illness:     Chief Complaint: Cardiac arrest ([facility] [address]    Venkat Moe is a 47-year-old female with a history of MRSA endocarditis of the tricuspid valve, HFpEF, hypertension, HCV, morbid obesity, recently admitted with acute on chronic diastolic heart failure with 80 pounds of weight gain, improved with the Lasix drip and eventually discharged home with oxygen. She was seen by cardiology at the time, echo showed preserved ejection fraction with no evidence for endocarditis on RAFI. Cardiac catheterization did not show any significant obstructive CAD. Found  To have pulmonary hypertension with chronic hypercapnic respiratory failure. Patient was unable to tolerate BiPAP. She was discharged home with oxygen. Over the last several days since had multiple falls but has been a week and reportedly alert. Today had multiple falls and then was found apneic and pulseless. Family started CPR and EMS was called.   Found to have an asystolic and then PEA arrest.  Patient had multiple cardiac arrests in the ambulance and in the ER with total downtime of about 50 minutes. Intubated, started on Levophed, started on hypothermia protocol at 36° and admitted to the ICU. Review of Systems:    Review of Systems    Unavailable    Objective:   No intake or output data in the 24 hours ending 11/04/22 2149   Vitals:   Vitals:    11/04/22 1846 11/04/22 1933 11/04/22 2129 11/04/22 2138   BP:       Pulse: (!) 101 99 88 86   Resp: 18 18 22 22   Temp:       TempSrc:       SpO2: 100% 95% 99% 99%       Medications Prior to Admission     Prior to Admission medications    Medication Sig Start Date End Date Taking? Authorizing Provider   metroNIDAZOLE (METROGEL) 1 % gel Apply topically daily. 10/24/22   LEIGHTON Hugo CNP   methocarbamol (ROBAXIN) 500 MG tablet TAKE 1 TABLET BY MOUTH THREE TIMES DAILY AS NEEDED FOR LEG PAIN 10/21/22   LEIGHTON Hugo CNP   torsemide (DEMADEX) 20 MG tablet Take 1 tablet by mouth daily  Patient taking differently: Take 20 mg by mouth in the morning and at bedtime 10/22/22   Aaron Viveros MD   doxycycline hyclate (VIBRA-TABS) 100 MG tablet Take 1 tablet by mouth every 12 hours 10/21/22 12/20/22  Aaron Viveros MD   nortriptyline (PAMELOR) 75 MG capsule Take 2 capsules by mouth nightly Taken for back pain 9/14/22 11/3/22  LEIGHTON Hugo CNP   methadone (DOLOPHINE) 10 MG/ML solution Take 130 mg by mouth every morning. Historical Provider, MD   potassium chloride (KLOR-CON M) 20 MEQ extended release tablet Take 20 mEq by mouth 2 times daily Pt reports Dr. Anderson Memcarmen increased dosage to 2 tabs BID    Aurora Trent MD   metOLazone (ZAROXOLYN) 2.5 MG tablet Take 5 mg by mouth every morning    Aurora Trent MD   gabapentin (NEURONTIN) 300 MG capsule Take 2 capsules by mouth 3 times daily for 30 days.  Ortho surgeon 8/17/22 11/3/22  LEIGHTON Hugo CNP       Physical Exam: Need 8 Elements   Telemetry medicine consultation. Examination per ICU nurse    Morbidly obese. Intubated. HEENT-pupils fixed and dilated. CVS-S1 and S2 normal, regular rate and rhythm  Chest-bilateral breath sounds  Abdomen-soft, hypoactive bowel sounds. Extremities-+ edema  CNS-unresponsive to noxious stimuli. Past Medical History:   PMHx   Past Medical History:   Diagnosis Date    Hepatitis C     Liver disease     hepatitis    No significant medical problems      PSHX:  has a past surgical history that includes  section (, ); Hysterectomy (2008); Cholecystectomy; other surgical history (2013); incision and drainage (Right, 2020); Spinal fusion (2022); and Finger amputation (Right, 2019). Allergies: Allergies   Allergen Reactions    Buprenorphine-Naloxone Itching, Rash and Shortness Of Breath    Subutex [Buprenorphine] Nausea And Vomiting    Amoxicillin-Pot Clavulanate Rash     Fam HX:family history includes COPD in her paternal grandmother; Dementia in her paternal grandfather; Depression in her son; Mental Illness in her maternal aunt and son; Migraines in her maternal grandmother and mother; Obesity in her brother. Soc HX:   Social History     Socioeconomic History    Marital status: Single    Number of children: 2   Occupational History    Occupation: [employer]   Tobacco Use    Smoking status: Every Day     Packs/day: 0.50     Years: 12.00     Pack years: 6.00     Types: Cigarettes     Start date:     Smokeless tobacco: Never   Vaping Use    Vaping Use: Never used   Substance and Sexual Activity    Alcohol use: No    Drug use: Not Currently     Types: IV, Opiates      Comment: heroin-last used 2022    Sexual activity: Yes     Partners: Male   Social History Narrative    Do you donate blood or plasma? Yes    Caffeine intake? Moderate    Advance directive? No    Is blood transfusion acceptable in an emergency?  Yes             Social Determinants of Health     Financial Resource Strain: High Risk    Difficulty of Paying Living Expenses: Hard   Food Insecurity: No Food Insecurity    Worried About Running Out of Food in the Last Year: Never true    Ran Out of Food in the Last Year: Never true       Medications:   Medications:    metroNIDAZOLE  500 mg Oral Once    sodium chloride  1,000 mL IntraVENous Once    sodium chloride  1,000 mL IntraVENous Once    vancomycin  2,000 mg IntraVENous Once    sodium chloride flush  5-40 mL IntraVENous 2 times per day    chlorhexidine  15 mL Mouth/Throat BID    famotidine (PEPCID) injection  20 mg IntraVENous BID    propofol        chlorhexidine  15 mL Mouth/Throat BID      Infusions:    norepinephrine 10 mcg/min (11/04/22 2138)    lactated ringers 100 mL/hr at 11/04/22 1641     PRN Meds: sodium chloride flush, 5-40 mL, PRN  ondansetron, 4 mg, Q8H PRN   Or  ondansetron, 4 mg, Q6H PRN  polyethylene glycol, 17 g, Daily PRN  acetaminophen, 650 mg, Q6H PRN   Or  acetaminophen, 650 mg, Q6H PRN  ondansetron, 4 mg, Q8H PRN   Or  ondansetron, 4 mg, Q6H PRN        Labs and Imaging   CT HEAD WO CONTRAST    Result Date: 11/4/2022  EXAMINATION: CT OF THE HEAD WITHOUT CONTRAST,  11/4/2022 6:01 pm TECHNIQUE: CT of the head was performed without the administration of intravenous contrast. Automated exposure control, iterative reconstruction, and/or weight based adjustment of the mA/kV was utilized to reduce the radiation dose to as low as reasonably achievable. COMPARISON: None HISTORY: ORDERING SYSTEM PROVIDED HISTORY:  Fall unresponsive cardiac arrest. TECHNOLOGIST PROVIDED HISTORY: Reason for Exam:  Fall unresponsive cardiac arrest. Has a \"code stroke\" or \"stroke alert\" been called? No Decision Support Exception - unselect if not a suspected or confirmed emergency medical condition->Emergency Medical Condition (MA). Is the patient pregnant?   No Reason for Exam:  Fall; unresponsive Additional signs and symptoms:  Cardiac Arrest (ROSC on arrival) Relevant Medical/Surgical History:  None Initial evaluation FINDINGS: BRAIN/VENTRICLES: The ventricles are normal in size, shape and configuration for the age of the patient. There is diffuse low attenuation throughout the entire brain parenchyma suggestive of diffuse anoxic injury. There is diffuse cerebral edema. There is effacement the fourth ventricle with mild downward herniation. The results were called and discussed with Dr. Shivam Goins 11/04/2022 at 8:11 p.m. ORBITS: The visualized portion of the orbits demonstrate no acute abnormality. SINUSES: The visualized paranasal sinuses and mastoid air cells are for the most part clear. SOFT TISSUES/SKULL:  No acute abnormality of the visualized skull or soft tissues. Diffuse low attenuation throughout the entire cerebral hemispheres consistent with edema and diffuse anoxic injury. The fourth ventricle is effaced and the cerebellar tonsils are downward herniating through the foramen magnum. The findings were called and discussed with Dr. Shivam Goins. CT CERVICAL SPINE WO CONTRAST    Result Date: 11/4/2022  EXAMINATION: CT OF THE CERVICAL SPINE WITHOUT CONTRAST 11/4/2022 6:01 pm TECHNIQUE: CT of the cervical spine was performed without the administration of intravenous contrast. Multiplanar reformatted images are provided for review. Automated exposure control, iterative reconstruction, and/or weight based adjustment of the mA/kV was utilized to reduce the radiation dose to as low as reasonably achievable.  COMPARISON: MRI cervical spine March 17, 2021; CT cervical spine June 1, 2020 HISTORY: ORDERING SYSTEM PROVIDED HISTORY: fall unresponsive TECHNOLOGIST PROVIDED HISTORY: Reason for exam:->fall unresponsive Decision Support Exception - unselect if not a suspected or confirmed emergency medical condition->Emergency Medical Condition (MA) Is the patient pregnant?->No Reason for Exam: fall unresponsive Additional signs and symptoms: Cardiac Arrest (ROSC on arrival) Relevant Medical/Surgical History: none FINDINGS: BONES/ALIGNMENT: Postoperative changes of cervical and thoracic fusion with hardware extending distally beyond the field-of-view. Pedicle screws present the C2 and C3 levels and at the C7 and T1 levels with corpectomy noted at the T2 level extending distally beyond the field-of-view. The imaged portions of the hardware appear grossly intact. No acute fracture identified. DEGENERATIVE CHANGES: Multilevel mild spondylosis of the cervical spine. Solid osseous fusion of the posterior elements of the cervical spine with fusion hardware in place as noted above. SOFT TISSUES: Visualized lung apices demonstrate patchy airspace opacities. Endotracheal tube and enteric tube are in place. 1. No acute fracture of the cervical spine identified. 2. Patchy airspace opacities noted at the lung apices. XR CHEST PORTABLE    Result Date: 11/4/2022  EXAMINATION: ONE XRAY VIEW OF THE CHEST 11/4/2022 3:56 pm COMPARISON: 10/28/2022. HISTORY: ORDERING SYSTEM PROVIDED HISTORY: tube and line placement TECHNOLOGIST PROVIDED HISTORY: Reason for exam:->tube and line placement Reason for Exam: tube and line placement AND NG PLACED FINDINGS: There are low lung volumes. The heart size is enlarged. The pulmonary vasculature is congested. There are bilateral infiltrates. There is an endotracheal tube with its tip above the richie. A nasogastric tube courses below the diaphragm. 1. Cardiomegaly with vascular congestion and bilateral infiltrates likely representing pulmonary edema. 2. Endotracheal tube in satisfactory position. CTA CHEST ABDOMEN PELVIS W CONTRAST    Addendum Date: 11/4/2022    ADDENDUM: Findings were discussed with Dr. Jessi Bonner at 8:49 pm on 11/4/2022.      Result Date: 11/4/2022  EXAMINATION: CTA OF THE CHEST, ABDOMEN AND PELVIS WITH CONTRAST 11/4/2022 6:02 pm TECHNIQUE: CTA of the chest, abdomen and pelvis was performed after the administration of intravenous contrast. Multiplanar reformatted images are provided for review. MIP images are provided for review. Automated exposure control, iterative reconstruction, and/or weight based adjustment of the mA/kV was utilized to reduce the radiation dose to as low as reasonably achievable. COMPARISON: None CT chest, abdomen, and pelvis October 18, 2022 HISTORY: ORDERING SYSTEM PROVIDED HISTORY: cardiac arrest hypoxia hx  chf, TECHNOLOGIST PROVIDED HISTORY: Reason for exam:->cardiac arrest hypoxia hx  chf, Decision Support Exception - unselect if not a suspected or confirmed emergency medical condition->Emergency Medical Condition (MA) Reason for Exam: fall; unresponsive Additional signs and symptoms: Cardiac Arrest (ROSC on arrival) Relevant Medical/Surgical History: 80mL isovue 370 FINDINGS: Chest: Mediastinum: The heart and pericardium demonstrate no acute findings. Lungs/pleura: Multifocal airspace opacities throughout the upper and lower lobes and right middle lobe with atelectasis seen dependently at the left lung base and to a lesser extent the right upper lobe. Interlobular septal thickening also present. While findings may reflect pulmonary edema, superimposed aspiration/infection not excluded. No pneumothorax. Endotracheal tube present within the right mainstem bronchus. Recommend repositioning. Soft Tissues/Bones: There are multiple remote bilateral rib fractures. Superimposed acute fractures of the right anterior 2nd through 7th ribs and left 4th through 8th ribs. Contrast present within the subcutaneous tissues at the right antecubital fossa consistent with IV infiltration. Abdomen/Pelvis: Organs: The liver appears unremarkable. The gallbladder is surgically absent. The spleen demonstrates no acute abnormality. Fatty atrophy of the pancreas. The adrenal glands and kidneys demonstrate no acute findings. No hydronephrosis. GI/Bowel: No bowel obstruction. Moderate volume of stool throughout the colon.   Normal appendix. Small bowel is normal in caliber. Enteric tube present with tip within the proximal stomach. Side port at the GE junction. Recommend advancement by approximately 5 cm. Pelvis: Angel catheter present within the bladder which is partially collapsed. Peritoneum/Retroperitoneum: The abdominal aorta is normal in caliber. No free fluid or free air. Bones/Soft Tissues: Postoperative changes of cervicothoracic fusion. No acute spinal fracture identified. Left-sided femoral central venous catheter present with tip extending to the left common iliac vein. 1. Diffuse airspace disease bilaterally. Findings suggest pulmonary edema with superimposed aspiration/infection not excluded. 2. Acute fractures of the right anterior 2nd through 7th ribs and left 4th through 8th ribs (grade 2 chest wall injury). 3. Endotracheal tube tip within the right mainstem bronchus. Recommend repositioning. 4. Enteric tube side port at the GE junction. Recommend advancement. 5. No acute intraabdominal process identified. 6. Contrast present within the subcutaneous tissues at the right antecubital fossa consistent with IV infiltration. CT LUMBAR RECONSTRUCTION WO POST PROCESS    Result Date: 11/4/2022  EXAMINATION: CT OF THE LUMBAR SPINE WITHOUT CONTRAST; CT OF THE THORACIC SPINE WITHOUT CONTRAST 11/4/2022 TECHNIQUE: CT of the lumbar spine was performed without the administration of intravenous contrast. Multiplanar reformatted images are provided for review. Adjustment of mA and/or kV according to patient size was utilized. Automated exposure control, iterative reconstruction, and/or weight based adjustment of the mA/kV was utilized to reduce the radiation dose to as low as reasonably achievable.; CT of the thoracic spine was performed without the administration of intravenous contrast. Multiplanar reformatted images are provided for review.  Automated exposure control, iterative reconstruction, and/or weight based adjustment of the mA/kV was utilized to reduce the radiation dose to as low as reasonably achievable. COMPARISON: None. HISTORY: ORDERING SYSTEM PROVIDED HISTORY: fall, unresponsivre TECHNOLOGIST PROVIDED HISTORY: Reason for exam:->fall, unresponsivre Decision Support Exception - unselect if not a suspected or confirmed emergency medical condition->Emergency Medical Condition (MA) Is the patient pregnant?->No Reason for Exam: fall; unresponsive Additional signs and symptoms: Cardiac Arrest (ROSC on arrival) Relevant Medical/Surgical History: none; ORDERING SYSTEM PROVIDED HISTORY: fall unresponsive TECHNOLOGIST PROVIDED HISTORY: Reason for exam:->fall unresponsive Decision Support Exception - unselect if not a suspected or confirmed emergency medical condition->Emergency Medical Condition (MA) Is the patient pregnant?->No Reason for Exam: fall; unresponsive Additional signs and symptoms: Cardiac Arrest (ROSC on arrival) Relevant Medical/Surgical History: none FINDINGS: BONES/ALIGNMENT: There are postsurgical changes of T2 corpectomy with posterior spinal fusion between C7 and T9. The alignment of the thoracic and lumbar spine is within normal limits. No acute fractures or dislocations are seen. DEGENERATIVE CHANGES: No significant degenerative changes of the thoracic or lumbar spine. SOFT TISSUES: No paraspinal mass is seen. 1.No acute thoracic or lumbar spine trauma. CT THORACIC RECONSTRUCTION WO POST PROCESS    Result Date: 11/4/2022  EXAMINATION: CT OF THE LUMBAR SPINE WITHOUT CONTRAST; CT OF THE THORACIC SPINE WITHOUT CONTRAST 11/4/2022 TECHNIQUE: CT of the lumbar spine was performed without the administration of intravenous contrast. Multiplanar reformatted images are provided for review. Adjustment of mA and/or kV according to patient size was utilized.   Automated exposure control, iterative reconstruction, and/or weight based adjustment of the mA/kV was utilized to reduce the radiation dose to as structures of the right extremity. COMPARISON: None. HISTORY: ORDERING SYSTEM PROVIDED HISTORY: Peripheral edema TECHNOLOGIST PROVIDED HISTORY: Reason for exam:->recent hospital discharge, venous stasis with warmth Rt>Lt no hx. dvt positive wells Reason for Exam: rt leg warmth, edema FINDINGS: The visualized veins of the right lower extremity are patent and free of echogenic thrombus. The veins demonstrate good compressibility with normal color flow study and spectral analysis. No evidence of DVT in the right lower extremity. CBC:   Recent Labs     11/04/22  1532   WBC 15.6*   HGB 10.6*        BMP:    Recent Labs     11/04/22  1532      K 3.3*   CL 88*   CO2 21   BUN 31*   CREATININE 1.7*   GLUCOSE 323*     Hepatic:   Recent Labs     11/04/22  1532   AST 97*   ALT 53*   BILITOT 0.3   ALKPHOS 175*     Lipids:   Lab Results   Component Value Date/Time    TRIG 150 11/04/2022 03:32 PM     Hemoglobin A1C: No results found for: LABA1C  TSH: No results found for: TSH  Troponin:   Lab Results   Component Value Date/Time    TROPONINT 0.014 11/04/2022 03:32 PM    TROPONINT <0.010 10/28/2022 05:14 PM    TROPONINT <0.010 10/18/2022 03:06 AM     Lactic Acid: No results for input(s): LACTA in the last 72 hours.   BNP:   Recent Labs     11/04/22  1532   PROBNP 528.5*     UA:  Lab Results   Component Value Date/Time    NITRU NEGATIVE 11/04/2022 04:14 PM    NITRU NEGATIVE 06/20/2013 03:10 PM    COLORU YELLOW 11/04/2022 04:14 PM    WBCUA 53 03/16/2021 09:36 PM    RBCUA 29 03/16/2021 09:36 PM    MUCUS OCCASIONAL 03/16/2021 09:36 PM    TRICHOMONAS NONE SEEN 03/16/2021 09:36 PM    BACTERIA RARE 03/16/2021 09:36 PM    CLARITYU CLEAR 11/04/2022 04:14 PM    SPECGRAV 1.010 11/04/2022 04:14 PM    LEUKOCYTESUR NEGATIVE 11/04/2022 04:14 PM    UROBILINOGEN 0.2 11/04/2022 04:14 PM    BILIRUBINUR NEGATIVE 11/04/2022 04:14 PM    BLOODU NEGATIVE 11/04/2022 04:14 PM    GLUCOSEU neg 06/20/2013 03:10 PM    GLUCOSEU NEGATIVE 06/20/2013 03:10 PM    KETUA NEGATIVE 11/04/2022 04:14 PM    AMORPHOUS RARE 09/13/2020 09:30 PM     Urine Cultures: No results found for: LABURIN  Blood Cultures: No results found for: BC  No results found for: BLOODCULT2  Organism: No results found for: Health system    Personally reviewed Lab Studies, Imaging, and discussed with  nursing and patient's mother/POA    Electronically signed by Rosalee Mclean MD on 11/4/2022 at 9:49 PM

## 2022-11-05 NOTE — CONSULTS
Comprehensive Nutrition Assessment    Type and Reason for Visit:  Consult    Nutrition Recommendations/Plan:   EN Order: Vital HP @ 50 ml/hr which will provide ~1527 kcal (including kcal from propofol) and 105 g protein  Add Proteinex 1x daily to help meet estimated protein needs  Advance to goal rate as tolerated  Will closely monitor GI tolerance, nutrition status, respiratory status, poc     Malnutrition Assessment:  Malnutrition Status:  Insufficient data (11/05/22 8555)    Context:  Acute Illness       Nutrition Assessment:    Pt admitted for cardiac arrest, acute respiratory failure with hypoxia and hypercapnia, PMH: Liver disease, Hep C, Pt currently sedated on vent, +levophed ggt, MAP greater than 65, +hypothermia protocol, spoke with CC osiel corbin to initiate EN and advance to goal rate, will follow at high nutrition risk    Nutrition Related Findings:    lactate 2.7, K+ 3.2, BUN 43, Cr 2.1 Wound Type: None       Current Nutrition Intake & Therapies:    Average Meal Intake: NPO  Average Supplements Intake: NPO  Diet NPO  Additional Calorie Sources:  Pt receiving 327 kcal from current propofol rate    Anthropometric Measures:  Height: 5' 5\" (165.1 cm)  Ideal Body Weight (IBW): 125 lbs (57 kg)       Current Body Weight: 313 lb 15 oz (142.4 kg), 251.1 % IBW.  Weight Source: Bed Scale  Current BMI (kg/m2): 52.2        Weight Adjustment For: No Adjustment                 BMI Categories: Obese Class 3 (BMI 40.0 or greater)    Estimated Daily Nutrient Needs:  Energy Requirements Based On: Kcal/kg  Weight Used for Energy Requirements: Ideal  Energy (kcal/day): 4477-6428 (22-25 kcal/kg)  Weight Used for Protein Requirements: Ideal  Protein (g/day): 114 (2.0 g/kg)     Fluid (ml/day):      Nutrition Diagnosis:   Inadequate oral intake related to acute injury/trauma as evidenced by NPO or clear liquid status due to medical condition    Nutrition Interventions:   Food and/or Nutrient Delivery: Start Tube Feeding  Nutrition Education/Counseling: No recommendation at this time  Coordination of Nutrition Care: Continue to monitor while inpatient  Plan of Care discussed with: CC physician    Goals:     Goals: Initiate nutrition support, within 2 days       Nutrition Monitoring and Evaluation:   Behavioral-Environmental Outcomes: None Identified  Food/Nutrient Intake Outcomes: Enteral Nutrition Intake/Tolerance  Physical Signs/Symptoms Outcomes: Biochemical Data, GI Status, Hemodynamic Status, Fluid Status or Edema, Weight    Discharge Planning:     Too soon to determine     Gerardine Srinath Mcintyre [address], [address] [place] [address],   Contact: [phone]

## 2022-11-05 NOTE — CARE COORDINATION
Chart reviewed. Patient recently discharged (10/21) She is currently intubated and on [address]. CM will remain available for needs.  Sharon Burgos RN

## 2022-11-05 NOTE — PROGRESS NOTES
Daily Progress Note  Subjective:  Pt sedate on vent  CT head changes c/w diffuse anoxic injury    Attending Note:    ATTENDING NOTE    I have personally seen and examined this patient. I have fully participated in the care of this patient and I have reviewed and agree with all pertinent clinical information including history, physical exam, and plan. See my impression and plan below. Continue supportive care. She is being cooled. We will follow peripherally. May need to have some form of cardiac work-up when stable from neurological standpoint    Electronically signed by Nadege Bales DO on 22 at 3:04 PM EDT    Impression and Plan:     Acute on chronic resp failure secondary to cardiac arrest  Currrently on vent    Acute on chronic diastolic heart failure  Cardiogenic shock  On Levophed        PAST SURGICAL HISTORY:  History of , gallbladder surgery, right  hand finger amputation, hysterectomy, incision and drainage of the right  upper thigh, lap sera, _____. SOCIAL HISTORY:  History of smoking, and she had history of drug use  also. MEDICATIONS:  She is on Demadex 20 mg b.i.d. She is on Pamelor,  potassium, and Zaroxolyn. She is on Zaroxolyn and Demadex for pulmonary  hypertension. ALLERGIES:  _____ and AUGMENTIN    Radiology    CT Head 22  Diffuse low attenuation throughout the entire cerebral hemispheres consistent   with edema and diffuse anoxic injury. The fourth ventricle is effaced and   the cerebellar tonsils are downward herniating through the foramen magnum. CT Chest, abd pelvis  1. Diffuse airspace disease bilaterally. Findings suggest pulmonary edema   with superimposed aspiration/infection not excluded. 2. Acute fractures of the right anterior 2nd through 7th ribs and left 4th   through 8th ribs (grade 2 chest wall injury). 3. Endotracheal tube tip within the right mainstem bronchus. Recommend   repositioning.    4. Enteric tube side port at the GE junction. Recommend advancement. 5. No acute intraabdominal process identified. 6. Contrast present within the subcutaneous tissues at the right antecubital   fossa consistent with IV infiltration.      Objective:   /71   Pulse 83   Temp 96.8 °F (36 °C) (Bladder)   Resp 22   Ht 5' 5\" (1.651 m)   Wt (!) 313 lb 15 oz (142.4 kg)   SpO2 98%   BMI 52.24 kg/m²     Intake/Output Summary (Last 24 hours) at 11/5/2022 1238  Last data filed at 11/5/2022 0900  Gross per 24 hour   Intake 2194.57 ml   Output 1440 ml   Net 754.57 ml       Medications:   Scheduled Meds:   ipratropium-albuterol  1 ampule Inhalation Q4H    vancomycin (VANCOCIN) intermittent dosing (placeholder)   Other RX Placeholder    sodium chloride  1,000 mL IntraVENous Once    sodium chloride  1,000 mL IntraVENous Once    sodium chloride flush  5-40 mL IntraVENous 2 times per day    chlorhexidine  15 mL Mouth/Throat BID    famotidine (PEPCID) injection  20 mg IntraVENous BID    enoxaparin  30 mg SubCUTAneous BID    insulin lispro  0-4 Units SubCUTAneous Q4H    insulin lispro  0-4 Units SubCUTAneous TID WC    cefepime  2,000 mg IntraVENous Q8H      Infusions:   norepinephrine 8 mcg/min (11/05/22 1130)    lactated ringers 100 mL/hr at 11/04/22 1641    sodium chloride 100 mL/hr at 11/05/22 0057    fentaNYL 12.5 mcg/hr (11/04/22 2352)    dextrose      propofol 15 mcg/kg/min (11/05/22 0640)      PRN Meds:  busPIRone, potassium chloride **OR** potassium chloride, lubrifresh P.M., sodium chloride flush, polyethylene glycol, acetaminophen **OR** acetaminophen, ondansetron **OR** ondansetron, potassium chloride, magnesium sulfate, meperidine, sodium phosphate IVPB **OR** sodium phosphate IVPB, glucose, dextrose bolus **OR** dextrose bolus, dextrose, glucagon (rDNA)     Physical Exam:  Vitals:    11/05/22 1230   BP:    Pulse: 83   Resp: 22   Temp:    SpO2: 98%        General: sedate on vent  Chest: Nontender  Cardiac: First and Second Heart Sounds are Normal, No Murmurs or Gallops noted  Lungs:Clear to auscultation and percussion. Abdomen: Soft, NT, ND, +BS  Extremities: No clubbing,+ non pitting  edema  Vascular:  Equal 2+ peripheral pulses. Lab Data:  CBC:   Recent Labs     11/04/22  1532   WBC 15.6*   HGB 10.6*   HCT 36.5*   MCV 97.6        BMP:   Recent Labs     11/04/22  2100 11/05/22  0300 11/05/22  0835    136 138   K 2.6* 2.8* 3.2*   CL 92* 93* 95*   CO2 28 27 28   PHOS 3.8 4.3 4.9   BUN 37* 40* 43*   CREATININE 2.0* 2.0* 2.1*     LIVER PROFILE:   Recent Labs     11/04/22  1532   AST 97*   ALT 53*   LIPASE 36   BILITOT 0.3   ALKPHOS 175*     PT/INR:   Recent Labs     11/04/22  1532 11/04/22  2100   PROTIME 14.1 15.7*   INR 1.09 1.21     APTT:   Recent Labs     11/04/22  1532 11/04/22  2115   APTT 26.2 44.0*     BNP:  No results for input(s): BNP in the last 72 hours.       Assessment:  Patient Active Problem List    Diagnosis Date Noted    Cardiac arrest ([address] 11/04/2022    Acute decompensated heart failure ([address] 10/17/2022    Pulmonary hypertension ([address] 09/14/2022    Severe tricuspid regurgitation 09/14/2022    Status post thoracic spinal fusion 08/17/2022    Other insomnia 08/17/2022    Hypertensive disorder 08/17/2022    Long term (current) use of antibiotics 03/30/2022    Pseudarthrosis after fusion or arthrodesis 03/30/2022    Anemia 08/16/2020    Back pain 12/09/2020    MRSA infection     Heroin use     Tobacco abuse     Osteomyelitis ([address] 10/21/2020    Endocarditis 09/15/2020    Osteomyelitis of vertebra ([address] 09/15/2020    Right flank pain 09/14/2020    Staphylococcal arthritis of right hip ([address] 08/10/2020    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

## 2022-11-05 NOTE — CONSULTS
[address] [place] [address], [address] [place] [address]                                  CONSULTATION    PATIENT NAME: Abel Gatica                      :        1980  MED REC NO:   [id number]                          ROOM:       2109  ACCOUNT NO:   [de-identified]                           ADMIT DATE: 2022  PROVIDER:     Sammie Crane MD    CONSULT DATE:  2022    INDICATIONS:  Cardiac arrest.    HISTORY OF PRESENT ILLNESS:  This is a 68-year-old female patient, known  her. We just saw her in the office two days ago. She was awake and  alert at that time. Then, today, the EMS called around 01:50, now it is 04:15, and the  patient had a fall at home. At that time, she did not want to come to  the hospital prior to that. Then, I think, the EMS was called again. She had a cardiac arrest.  The patient was found down by a friend at  [place] [address] with a witnessed arrest per DONG BUSTILLOS. ROSC was achieved  three times per EMS on arrival to the ED. Blood sugar was 139. The  patient had an arrest at 1350, came to the ER at 03:19. At this time,  her pupils were fixed and dilated. She was on a vent. She was on  Levophed. Pressures were in the 100s. The patient received bicarb  also. She was in PEA. No V-tach or V-fib were noted. No shocks were  delivered at that time. The patient possibly overdosed also. I did do a RAFI on her recently in 10/2022. LV function was preserved. Tricuspid valve was normal.  No vegetation noted in the tricuspid valve,  pulmonic valve, or aortic valve. LV function was preserved. I did a  heart cath on her also. At that time, her left main was patent. LAD,  circ, and RCA had mild disease noted. EDP was normal.  Wedge was 16  mmHg. PA pressure mean was 34 mmHg noted. She was treated medically. The patient had a history of having drug use. She has been drug-free.    She has history of endocarditis in the past.  She was referred to [facility]. She has been doing better. She has been clean with her drugs  also. The patient has a history of hepatitis C, cirrhosis present, and has a  history of tricuspid valve endocarditis, which has been _____ and  history of pulmonary hypertension. Preserved LV function present. The patient was to have tricuspid valve surgery, but the plan was to  treat her pulmonary hypertension and then decide upon tricuspid valve. The patient has been drug-free also. PAST SURGICAL HISTORY:  History of , gallbladder surgery, right  hand finger amputation, hysterectomy, incision and drainage of the right  upper thigh, lap sera, _____. SOCIAL HISTORY:  History of smoking, and she had history of drug use  also. MEDICATIONS:  She is on Demadex 20 mg b.i.d. She is on Pamelor,  potassium, and Zaroxolyn. She is on Zaroxolyn and Demadex for pulmonary  hypertension. ALLERGIES:  _____ and AUGMENTIN. PHYSICAL EXAMINATION:  GENERAL:  The patient is on vent, sedated. HEENT:  Pupils are nonreactive, dilated. VITAL SIGNS:  Temperature is afebrile. Pulse is 70. Blood pressure is  100 on pressors. LUNGS:  Clear to auscultation. Decreased breath sounds. HEART:  Regular rhythm. ABDOMEN:  Soft and nontender. NEUROLOGIC:  Cranial nerves cannot be evaluated as she is not  responsive. LABORATORY DATA:  Her pH was 7.3. Rest of the labs are pending. DIAGNOSTIC DATA:  An EKG shows wide complex rhythm present. IMPRESSION AND PLAN:  This is a 62-year-old female patient with a  history of having drug use in the past.  She comes in with cardiac  arrest.  she has been down from _____ right now. She was in asystole  and PEA. At this time, I think, we will treat her conservatively and  make further recommendations based on the hospital course. Overall  prognosis is poor.   We will get a CT of the head, and I think we will  wait for that and reevaluate her.  We will discuss with the family  regarding further treatment.         Tyshawn Jimenez MD    D: 11/04/2022 16:19:18       T: 11/05/2022 0:21:07     [email or web address]  Job#: [id number]     Doc#: [id number]    CC:

## 2022-11-05 NOTE — PROGRESS NOTES
11/05/22 0414   Breath Sounds   Right Upper Lobe Expiratory Wheezes; Inspiratory Wheezes   Right Middle Lobe Expiratory Wheezes; Inspiratory Wheezes   Right Lower Lobe Expiratory Wheezes; Inspiratory Wheezes   Left Upper Lobe Expiratory Wheezes; Inspiratory Wheezes   Left Lower Lobe Expiratory Wheezes; Inspiratory Wheezes   Vent Information   Vent Mode AC/VC   Ventilator Settings   FiO2  100 %   Vt (Set, mL) 420 mL   Resp Rate (Set) 22 bmp   PEEP/CPAP (cmH2O) 12   Vent Patient Data (Readings)   Vt (Measured) 440 mL   Peak Inspiratory Pressure (cmH2O) 42 cmH2O   Rate Measured 22 br/min   Minute Volume (L/min) 9.75 Liters   Mean Airway Pressure (cmH2O) 22 cmH20   Plateau Pressure (cm H2O) 30 cm H2O   I:E Ratio 1:1.70   Flow Sensitivity 3 L/min   Backup Apnea On   Backup Rate 22 Breaths Per Minute   Vent Alarm Settings   High Pressure (cmH2O) 55 cmH2O   Low Minute Volume (lpm) 2.5 L/min   High Minute Volume (lpm) 20 L/min   Low Exhaled Vt (ml) 250 mL   High Exhaled Vt (ml) 1000 mL   RR High (bpm) 40 br/min   Apnea (secs) 20 secs   Additional Respiratoray Assessments   Humidification Source HME   Ambu Bag With Mask At Bedside Yes   Airway Clearance   Suction ET Tube   Subglottic Suction Done No   Suction Device Inline suction catheter   Sputum Method Obtained Endotracheal   Sputum Amount Small   Sputum Color/Odor Bloody   Sputum Consistency Thick   $Obtained Sample $Induced Sputum (charge not used for Bronchoscopy)   ETT    Placement Date/Time: 11/04/22 (c)    Present on Admission/Arrival: Yes  Placed By: (c) Other (comment)  Placement Verified By: Capnometry; Chest X-ray; Auscultation  Airway Type: Cuffed  Airway Tube Size: 8 mm  Location: Oral  Secured At: (c) 23 cm  Tanisha. ..    Secured At 26 cm   Measured From Lips   ETT Placement Right   Secured By Commercial tube franks   Site Assessment Dry   Cuff Pressure   (mlt)

## 2022-11-06 NOTE — PROGRESS NOTES
Perfect serve to Dr. Sylvester Miller -     Pt potassium is 3.4 - per [facility] order, give 40 mEq - pt still on hypothermia protocol (was cooled to 36 degrees)-currently rewarming and is 36.4. Protocol states to d/c potassium products before rewarming and during the rewarming phase. [facility] RN stated spoke to 7 [facility] Doctor and was told ok to give potassium replacement d/t only being cooled to 36. Wanted to clarify before giving replacement. Thank you! Response -     Yes we can give. Really the only time withhold it when they used to bring temp down to 33. But for 36 is ok to go ahead no problem at all .  Thanks for double checking !!

## 2022-11-06 NOTE — PROGRESS NOTES
Daily Progress Note  Subjective:  Pt sedate on vent  CT head changes c/w diffuse anoxic injury    Attending Note:      Impression and Plan:     Acute on chronic resp failure secondary to cardiac arrest  Currrently on vent     Acute on chronic diastolic heart failure  Cardiogenic shock  On Levophed           PAST SURGICAL HISTORY:  History of , gallbladder surgery, right  hand finger amputation, hysterectomy, incision and drainage of the right  upper thigh, lap chole_. SOCIAL HISTORY:  History of smoking, and she had history of drug use  also. MEDICATIONS:  She is on Demadex 20 mg b.i.d. She is on Pamelor,  potassium, and Zaroxolyn. She is on Zaroxolyn and Demadex for pulmonary  hypertension. ALLERGIES:  _____ and AUGMENTIN     Radiology     CT Head 22  Diffuse low attenuation throughout the entire cerebral hemispheres consistent   with edema and diffuse anoxic injury. The fourth ventricle is effaced and   the cerebellar tonsils are downward herniating through the foramen magnum. CT Chest, abd pelvis  1. Diffuse airspace disease bilaterally. Findings suggest pulmonary edema   with superimposed aspiration/infection not excluded. 2. Acute fractures of the right anterior 2nd through 7th ribs and left 4th   through 8th ribs (grade 2 chest wall injury). 3. Endotracheal tube tip within the right mainstem bronchus. Recommend   repositioning. 4. Enteric tube side port at the GE junction. Recommend advancement. 5. No acute intraabdominal process identified. 6. Contrast present within the subcutaneous tissues at the right antecubital   fossa consistent with IV infiltration.          Objective:   BP (!) 86/53   Pulse (!) 101   Temp 98.6 °F (37 °C) (Bladder)   Resp 22   Ht 5' 5\" (1.651 m)   Wt (!) 311 lb 8.2 oz (141.3 kg)   SpO2 95%   BMI 51.84 kg/m²     Intake/Output Summary (Last 24 hours) at 2022 1308  Last data filed at 2022 0800  Gross per 24 hour Intake 1871.41 ml   Output 1725 ml   Net 146.41 ml       Medications:   Scheduled Meds:   ipratropium-albuterol  1 ampule Inhalation Q4H    vancomycin (VANCOCIN) intermittent dosing (placeholder)   Other RX Placeholder    sodium chloride  1,000 mL IntraVENous Once    sodium chloride  1,000 mL IntraVENous Once    sodium chloride flush  5-40 mL IntraVENous 2 times per day    chlorhexidine  15 mL Mouth/Throat BID    famotidine (PEPCID) injection  20 mg IntraVENous BID    enoxaparin  30 mg SubCUTAneous BID    insulin lispro  0-4 Units SubCUTAneous Q4H    insulin lispro  0-4 Units SubCUTAneous TID     cefepime  2,000 mg IntraVENous Q8H      Infusions:   norepinephrine 22 mcg/min (11/06/22 1057)    lactated ringers 100 mL/hr at 11/04/22 1641    sodium chloride 100 mL/hr at 11/05/22 0057    fentaNYL 12.5 mcg/hr (11/04/22 2352)    dextrose      propofol 15 mcg/kg/min (11/06/22 1230)      PRN Meds:  busPIRone, potassium chloride **OR** potassium chloride, lubrifresh P.M., sodium chloride flush, polyethylene glycol, acetaminophen **OR** acetaminophen, ondansetron **OR** ondansetron, potassium chloride, magnesium sulfate, meperidine, sodium phosphate IVPB **OR** sodium phosphate IVPB, glucose, dextrose bolus **OR** dextrose bolus, dextrose, glucagon (rDNA)     Physical Exam:  Vitals:    11/06/22 1215   BP: (!) 86/53   Pulse: (!) 101   Resp: 22   Temp: 98.6 °F (37 °C)   SpO2: 95%        General: AAO, NAD  Chest: Nontender  Cardiac: First and Second Heart Sounds are Normal, No Murmurs or Gallops noted  Lungs:Clear to auscultation and percussion. Abdomen: Soft, NT, ND, +BS  Extremities: No clubbing, no edema  Vascular:  Equal 2+ peripheral pulses.     Lab Data:  CBC:   Recent Labs     11/04/22  1532 11/05/22 1954   WBC 15.6*  --    HGB 10.6* 11.2*   HCT 36.5* 33.0*   MCV 97.6  --      --      BMP:   Recent Labs     11/05/22  1540 11/05/22 1954 11/05/22 2055 11/06/22  0255    140 139 139   K 3.3* 3.8 4.5 3.7 CL 96*  --  98* 101   CO2 30 29 27 27   PHOS 5.0*  --  4.8 3.5   BUN 47*  --  47* 47*   CREATININE 2.4* 2.4* 2.5* 2.7*     LIVER PROFILE:   Recent Labs     11/04/22  1532 11/06/22  0255   AST 97* 208*   ALT 53* 147*   LIPASE 36  --    BILIDIR  --  0.2   BILITOT 0.3 0.2   ALKPHOS 175* 139*     PT/INR:   Recent Labs     11/04/22  1532 11/04/22  2100   PROTIME 14.1 15.7*   INR 1.09 1.21     APTT:   Recent Labs     11/04/22  1532 11/04/22  2115   APTT 26.2 44.0*     BNP:  No results for input(s): BNP in the last 72 hours.       Assessment:  Patient Active Problem List    Diagnosis Date Noted    Cardiac arrest ([address] 11/04/2022    Acute decompensated heart failure ([address] 10/17/2022    Pulmonary hypertension ([address] 09/14/2022    Severe tricuspid regurgitation 09/14/2022    Status post thoracic spinal fusion 08/17/2022    Other insomnia 08/17/2022    Hypertensive disorder 08/17/2022    Long term (current) use of antibiotics 03/30/2022    Pseudarthrosis after fusion or arthrodesis 03/30/2022    Anemia 08/16/2020    Back pain 12/09/2020    MRSA infection     Heroin use     Tobacco abuse     Osteomyelitis ([address] 10/21/2020    Endocarditis 09/15/2020    Osteomyelitis of vertebra ([address] 09/15/2020    Right flank pain 09/14/2020    Staphylococcal arthritis of right hip ([address] 08/10/2020    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

## 2022-11-06 NOTE — PROGRESS NOTES
[address] [facility]  consulted by Dr. Minh Delarosa for monitoring and adjustment. Indication for treatment: pneumonia  Goal trough: [] 10-15 mcg/mL or [x] 15-20 mcg/ml  AUC/RICCARDO: [] <500 or [x] 400-600    Pertinent Laboratory Values:   Temp Readings from Last 3 Encounters:   11/06/22 98.6 °F (37 °C) (Bladder)   10/28/22 97.5 °F (36.4 °C) (Oral)   10/21/22 97.5 °F (36.4 °C) (Oral)     Recent Labs     11/04/22  1532 11/04/22  2100 11/05/22  0300 11/05/22  0835 11/05/22  1540   WBC 15.6*  --   --   --   --    LACTATE 12.2*   < > 1.7 2.7* 1.9    < > = values in this interval not displayed. Recent Labs     11/05/22  1540 11/05/22  1954 11/05/22  2055 11/06/22  0255   BUN 47*  --  47* 47*   CREATININE 2.4* 2.4* 2.5* 2.7*     Estimated Creatinine Clearance: 40 mL/min (A) (based on SCr of 2.7 mg/dL (H)). Intake/Output Summary (Last 24 hours) at 11/6/2022 1406  Last data filed at 11/6/2022 1300  Gross per 24 hour   Intake 1871.41 ml   Output 1870 ml   Net 1.41 ml       Pertinent Cultures:  Date    Source    Results  11/4   blood    No growth at 48 hours  11/4   blood    No growth at 48 hours    Vancomycin level:   TROUGH:  No results for input(s): VANCOTROUGH in the last 72 hours. RANDOM:    Recent Labs     11/05/22  0835   VANCORANDOM 22.9       Assessment:  Currently in LISBETH; Scr trending upwards; BUN elevated  Day(s) of therapy: Day 2  Vancomycin concentration: 22.9 (~10 hour level)    Plan:  Vancomycin 2000 mg IVPB x 1 dose received. Intermittent dosing given current renal function  No dose received since 11/4  Ordering vancomycin 2 g IV x 1 dose  Pharmacy will continue to monitor patient and adjust therapy as indicated    Michaelle 3 11/7 @ 0600    Thank you for the consult.   Deepika Brower, [facility]  11/6/2022 2:06 PM

## 2022-11-06 NOTE — PROGRESS NOTES
Have not been able to get a good blood pressure reading since art line failed and was removed from left femoral artery. Intensivist Dr Marina Triana notified. Can palpate a post tibial pulse of +2, and a pedal pulse of +1, think blood pressure is higher than what it is registering due to good pulses in feet.

## 2022-11-06 NOTE — PROGRESS NOTES
V2.0  [facility] [facility]      Name:  Vivian Sharpe /Age/Sex: 1980  (38 y.o. female)   MRN & CSN:  [id number] & [id number] Encounter Date/Time: 2022 8:42 AM EDT    Location:  -A PCP: Giselle Croft [address] Day: 3    Assessment and Plan:   Vivian Sharpe is a 38 y.o. female with pmh of MRSA endocarditis history, HFpEF, hypertension, HCV, morbid obesity, recent admission with acute on chronic diastolic heart failure requiring Lasix drip and eventually discharged home with oxygen, pulmonary hypertension and chronic hypercapnic respiratory failure who presents with Cardiac arrest [facility])    Plan:  Acute on chronic hypoxic respiratory failure secondary to cardiac arrest  Pulmonary edema versus aspiration pneumonia on CXR  -Continue ventilator support  -On broad-spectrum antibiotics cefepime and vancomycin  -Pulmonary toilet  -Wean ventilator as tolerated    Anoxic encephalopathy status post cardiac arrest.  -seen on imaging  -Currently undergoing hypothermia protocol  - Follow-up mental status and neuro exam after patient is done with the hypothermia protocol and rewarmed    Acute on chronic diastolic heart failure  Cardiogenic shock  -On pressor support  -Wean as tolerated    Prognosis extremely guarded    Diet Diet NPO  ADULT TUBE FEEDING; Nasogastric; Peptide Based High Protein; Continuous; 10; Yes; 10; Q 4 hours; 50; 30; Q 4 hours; Protein; Add Proteinex 1x daily   DVT Prophylaxis [x] Lovenox, []  Heparin, [] SCDs, [] Ambulation,  [] Eliquis, [] Xarelto  [] Coumadin   Code Status Full Code   Disposition From: Home  Expected Disposition: TBD  Estimated Date of Discharge: TBD  Patient requires continued admission due to cardiac arrest management         Subjective:     Chief Complaint: No chief complaint on file. Patient intubated and sedated         Review of Systems:    Review of Systems   Unable to perform ROS: Intubated       Objective:      Intake/Output Summary (Last 24 hours) at 11/6/2022 0841  Last data filed at 11/6/2022 0800  Gross per 24 hour   Intake 1871.41 ml   Output 1965 ml   Net -93.59 ml          Vitals:   Vitals:    11/06/22 0807   BP:    Pulse: (!) 104   Resp: 24   Temp:    SpO2: 96%       Physical Exam:   Physical Exam  Vitals and nursing note reviewed. Constitutional:       General: She is not in acute distress. Appearance: She is ill-appearing and toxic-appearing. HENT:      Head: Normocephalic and atraumatic. Nose: Nose normal.      Mouth/Throat:      Mouth: Mucous membranes are moist.   Eyes:      Extraocular Movements: Extraocular movements intact. Pupils: Pupils are equal, round, and reactive to light. Cardiovascular:      Rate and Rhythm: Normal rate and regular rhythm. Pulses: Normal pulses. Heart sounds: Normal heart sounds. Pulmonary:      Effort: No respiratory distress. Comments: Bilateral mechanical breath sounds  Abdominal:      Palpations: Abdomen is soft. Musculoskeletal:         General: Normal range of motion. Cervical back: Normal range of motion. Skin:     General: Skin is warm. Capillary Refill: Capillary refill takes less than 2 seconds.    Neurological:      Comments: Intubated and sedated          Medications:   Medications:    ipratropium-albuterol  1 ampule Inhalation Q4H    vancomycin (VANCOCIN) intermittent dosing (placeholder)   Other RX Placeholder    sodium chloride  1,000 mL IntraVENous Once    sodium chloride  1,000 mL IntraVENous Once    sodium chloride flush  5-40 mL IntraVENous 2 times per day    chlorhexidine  15 mL Mouth/Throat BID    famotidine (PEPCID) injection  20 mg IntraVENous BID    enoxaparin  30 mg SubCUTAneous BID    insulin lispro  0-4 Units SubCUTAneous Q4H    insulin lispro  0-4 Units SubCUTAneous TID     cefepime  2,000 mg IntraVENous Q8H      Infusions:    norepinephrine 22 mcg/min (11/06/22 0622)    lactated ringers 100 mL/hr at 11/04/22 1641 sodium chloride 100 mL/hr at 11/05/22 0057    fentaNYL 12.5 mcg/hr (11/04/22 9472)    dextrose      propofol 15 mcg/kg/min (11/06/22 0143)     PRN Meds: busPIRone, 15 mg, Q8H PRN  potassium chloride, 20 mEq, PRN   Or  potassium chloride, 10 mEq, PRN  lubrifresh P.M., , Q2H PRN  sodium chloride flush, 5-40 mL, PRN  polyethylene glycol, 17 g, Daily PRN  acetaminophen, 650 mg, Q6H PRN   Or  acetaminophen, 650 mg, Q6H PRN  ondansetron, 4 mg, Q8H PRN   Or  ondansetron, 4 mg, Q6H PRN  potassium chloride, 10 mEq, PRN  magnesium sulfate, 2,000 mg, PRN  meperidine, 25 mg, Q30 Min PRN  sodium phosphate IVPB, 0.16 mmol/kg, PRN   Or  sodium phosphate IVPB, 0.32 mmol/kg, PRN  glucose, 4 tablet, PRN  dextrose bolus, 125 mL, PRN   Or  dextrose bolus, 250 mL, PRN  dextrose, , Continuous PRN  glucagon (rDNA), 1 mg, PRN      Labs      Recent Results (from the past 24 hour(s))   POCT Glucose    Collection Time: 11/05/22 11:24 AM   Result Value Ref Range    POC Glucose 108 (H) 70 - 99 MG/DL   Blood Gas, Arterial    Collection Time: 11/05/22  3:15 PM   Result Value Ref Range    pH, Bld 7.38 7.34 - 7.45    pCO2, Arterial 56.0 (H) 32 - 45 MMHG    pO2, Arterial 79 75 - 100 MMHG    Base Exc, Mixed 6.3 (H) 0 - 2.3    HCO3, Arterial 33.1 (H) 18 - 23 MMOL/L    CO2 Content 34.8 (H) 19 - 24 MMOL/L    O2 Sat 93.2 (L) 96 - 97 %    Carbon Monoxide, Blood 1.6 0 - 5 %    Methemoglobin, Arterial 1.3 <1.5 %    Comment AC 22 450 75% +12    POCT Glucose    Collection Time: 11/05/22  3:35 PM   Result Value Ref Range    POC Glucose 91 70 - 99 MG/DL   Basic Metabolic Panel    Collection Time: 11/05/22  3:40 PM   Result Value Ref Range    Sodium 139 135 - 145 MMOL/L    Potassium 3.3 (L) 3.5 - 5.1 MMOL/L    Chloride 96 (L) 99 - 110 mMol/L    CO2 30 21 - 32 MMOL/L    Anion Gap 13 4 - 16    BUN 47 (H) 6 - 23 MG/DL    Creatinine 2.4 (H) 0.6 - 1.1 MG/DL    Est, Glom Filt Rate 26 (L) >60 mL/min/1.73m2    Glucose 85 70 - 99 MG/DL    Calcium 8.1 (L) 8.3 - 10.6 MG/DL Magnesium    Collection Time: 11/05/22  3:40 PM   Result Value Ref Range    Magnesium 1.8 1.8 - 2.4 mg/dl   Calcium, Ionized    Collection Time: 11/05/22  3:40 PM   Result Value Ref Range    Ionized Ca 1.04 (L) 1.12 - 1.32 mMOL/L    Calcium, Ionized 4.16 (L) 4.48 - 5.28 MG/DL   Phosphorus    Collection Time: 11/05/22  3:40 PM   Result Value Ref Range    Phosphorus 5.0 (H) 2.5 - 4.9 MG/DL   Lactic Acid    Collection Time: 11/05/22  3:40 PM   Result Value Ref Range    Lactate 1.9 0.4 - 2.0 mMOL/L   POC CRITICAL CARE PROFILE    Collection Time: 11/05/22  7:54 PM   Result Value Ref Range    pH, Bld 7.34 7.34 - 7.45    pCO2, Arterial 50.4 (H) 32 - 45 MMHG    pO2, Arterial 90.4 75 - 100 MMHG    HCO3, Arterial 27.2 (H) 18 - 23 MMOL/L    Base Exc, Mixed 0.8 0 - 2.3    O2 Sat 96.3 96 - 97 %    CO2 29 21 - 32 MMOL/L    Sodium 140 138 - 146 MMOL/L    Potassium 3.8 3.5 - 4.5 MMOL/L    POC CALCIUM 1.06 (L) 1.12 - 1.32 MMOL/L    POC Glucose 91 70 - 99 MG/DL    Hematocrit 33.0 (L) 37 - 47 %    Hemoglobin 11.2 (L) 12.5 - 16.0 GM/DL    POC Chloride 101 98 - 109 MMOL/L    POC Creatinine 2.4 (H) 0.6 - 1.1 MG/DL    eGFR, POC 26 (L) >60 mL/min/1.73m2    Source: ARTERIAL    Basic Metabolic Panel    Collection Time: 11/05/22  8:55 PM   Result Value Ref Range    Sodium 139 135 - 145 MMOL/L    Potassium 4.5 3.5 - 5.1 MMOL/L    Chloride 98 (L) 99 - 110 mMol/L    CO2 27 21 - 32 MMOL/L    Anion Gap 14 4 - 16    BUN 47 (H) 6 - 23 MG/DL    Creatinine 2.5 (H) 0.6 - 1.1 MG/DL    Est, Glom Filt Rate 24 (L) >60 mL/min/1.73m2    Glucose 101 (H) 70 - 99 MG/DL    Calcium 8.2 (L) 8.3 - 10.6 MG/DL   Magnesium    Collection Time: 11/05/22  8:55 PM   Result Value Ref Range    Magnesium 1.7 (L) 1.8 - 2.4 mg/dl   Calcium, Ionized    Collection Time: 11/05/22  8:55 PM   Result Value Ref Range    Ionized Ca 1.10 (L) 1.12 - 1.32 mMOL/L    Calcium, Ionized 4.40 (L) 4.48 - 5.28 MG/DL   Phosphorus    Collection Time: 11/05/22  8:55 PM   Result Value Ref Range Phosphorus 4.8 2.5 - 4.9 MG/DL   Blood Gas, Arterial    Collection Time: 11/05/22  9:15 PM   Result Value Ref Range    pH, Bld 7.33 (L) 7.34 - 7.45    pCO2, Arterial 54.0 (H) 32 - 45 MMHG    pO2, Arterial 88 75 - 100 MMHG    Base Exc, Mixed 1.5 0 - 2.3    HCO3, Arterial 28.5 (H) 18 - 23 MMOL/L    CO2 Content 30.2 (H) 19 - 24 MMOL/L    O2 Sat 94.1 (L) 96 - 97 %    Carbon Monoxide, Blood 1.6 0 - 5 %    Methemoglobin, Arterial 0.8 <1.5 %    Comment ACVC 22    Hepatic Function Panel    Collection Time: 11/06/22  2:55 AM   Result Value Ref Range    Albumin 2.3 (L) 3.4 - 5.0 GM/DL    Total Bilirubin 0.2 0.0 - 1.0 MG/DL    Bilirubin, Direct 0.2 0.0 - 0.3 MG/DL    Bilirubin, Indirect 0.0 0 - 0.7 MG/DL    Alkaline Phosphatase 139 (H) 40 - 129 IU/L     (H) 15 - 37 IU/L     (H) 10 - 40 U/L    Total Protein 5.5 (L) 6.4 - 8.2 GM/DL   Basic Metabolic Panel    Collection Time: 11/06/22  2:55 AM   Result Value Ref Range    Sodium 139 135 - 145 MMOL/L    Potassium 3.7 3.5 - 5.1 MMOL/L    Chloride 101 99 - 110 mMol/L    CO2 27 21 - 32 MMOL/L    Anion Gap 11 4 - 16    BUN 47 (H) 6 - 23 MG/DL    Creatinine 2.7 (H) 0.6 - 1.1 MG/DL    Est, Glom Filt Rate 22 (L) >60 mL/min/1.73m2    Glucose 87 70 - 99 MG/DL    Calcium 8.2 (L) 8.3 - 10.6 MG/DL   Magnesium    Collection Time: 11/06/22  2:55 AM   Result Value Ref Range    Magnesium 1.5 (L) 1.8 - 2.4 mg/dl   Calcium, Ionized    Collection Time: 11/06/22  2:55 AM   Result Value Ref Range    Ionized Ca 1.17 1.12 - 1.32 mMOL/L    Calcium, Ionized 4.68 4.48 - 5.28 MG/DL   Phosphorus    Collection Time: 11/06/22  2:55 AM   Result Value Ref Range    Phosphorus 3.5 2.5 - 4.9 MG/DL   Blood Gas, Arterial    Collection Time: 11/06/22  3:15 AM   Result Value Ref Range    pH, Bld 7.30 (L) 7.34 - 7.45    pCO2, Arterial 60.0 (H) 32 - 45 MMHG    pO2, Arterial 68 (L) 75 - 100 MMHG    Base Exc, Mixed 1.6 0 - 2.3    HCO3, Arterial 29.5 (H) 18 - 23 MMOL/L    CO2 Content 31.3 (H) 19 - 24 MMOL/L O2 Sat 89.4 (L) 96 - 97 %    Carbon Monoxide, Blood 2.0 0 - 5 %    Methemoglobin, Arterial 0.9 <1.5 %    Comment ACVC 22         Imaging/Diagnostics Last 24 Hours   CT HEAD WO CONTRAST    Result Date: 11/4/2022  EXAMINATION: CT OF THE HEAD WITHOUT CONTRAST,  11/4/2022 6:01 pm TECHNIQUE: CT of the head was performed without the administration of intravenous contrast. Automated exposure control, iterative reconstruction, and/or weight based adjustment of the mA/kV was utilized to reduce the radiation dose to as low as reasonably achievable. COMPARISON: None HISTORY: ORDERING SYSTEM PROVIDED HISTORY:  Fall unresponsive cardiac arrest. TECHNOLOGIST PROVIDED HISTORY: Reason for Exam:  Fall unresponsive cardiac arrest. Has a \"code stroke\" or \"stroke alert\" been called? No Decision Support Exception - unselect if not a suspected or confirmed emergency medical condition->Emergency Medical Condition (MA). Is the patient pregnant? No Reason for Exam:  Fall; unresponsive Additional signs and symptoms:  Cardiac Arrest (ROSC on arrival) Relevant Medical/Surgical History:  None Initial evaluation FINDINGS: BRAIN/VENTRICLES: The ventricles are normal in size, shape and configuration for the age of the patient. There is diffuse low attenuation throughout the entire brain parenchyma suggestive of diffuse anoxic injury. There is diffuse cerebral edema. There is effacement the fourth ventricle with mild downward herniation. The results were called and discussed with Dr. Chas French 11/04/2022 at 8:11 p.m. ORBITS: The visualized portion of the orbits demonstrate no acute abnormality. SINUSES: The visualized paranasal sinuses and mastoid air cells are for the most part clear. SOFT TISSUES/SKULL:  No acute abnormality of the visualized skull or soft tissues. Diffuse low attenuation throughout the entire cerebral hemispheres consistent with edema and diffuse anoxic injury.   The fourth ventricle is effaced and the cerebellar tonsils are downward herniating through the foramen magnum. The findings were called and discussed with Dr. Ryan Younger. CT CERVICAL SPINE WO CONTRAST    Result Date: 11/4/2022  EXAMINATION: CT OF THE CERVICAL SPINE WITHOUT CONTRAST 11/4/2022 6:01 pm TECHNIQUE: CT of the cervical spine was performed without the administration of intravenous contrast. Multiplanar reformatted images are provided for review. Automated exposure control, iterative reconstruction, and/or weight based adjustment of the mA/kV was utilized to reduce the radiation dose to as low as reasonably achievable. COMPARISON: MRI cervical spine March 17, 2021; CT cervical spine June 1, 2020 HISTORY: ORDERING SYSTEM PROVIDED HISTORY: fall unresponsive TECHNOLOGIST PROVIDED HISTORY: Reason for exam:->fall unresponsive Decision Support Exception - unselect if not a suspected or confirmed emergency medical condition->Emergency Medical Condition (MA) Is the patient pregnant?->No Reason for Exam: fall unresponsive Additional signs and symptoms: Cardiac Arrest (ROSC on arrival) Relevant Medical/Surgical History: none FINDINGS: BONES/ALIGNMENT: Postoperative changes of cervical and thoracic fusion with hardware extending distally beyond the field-of-view. Pedicle screws present the C2 and C3 levels and at the C7 and T1 levels with corpectomy noted at the T2 level extending distally beyond the field-of-view. The imaged portions of the hardware appear grossly intact. No acute fracture identified. DEGENERATIVE CHANGES: Multilevel mild spondylosis of the cervical spine. Solid osseous fusion of the posterior elements of the cervical spine with fusion hardware in place as noted above. SOFT TISSUES: Visualized lung apices demonstrate patchy airspace opacities. Endotracheal tube and enteric tube are in place. 1. No acute fracture of the cervical spine identified. 2. Patchy airspace opacities noted at the lung apices.      XR CHEST PORTABLE    Result Date: 11/5/2022  EXAMINATION: ONE XRAY VIEW OF THE CHEST 11/5/2022 7:38 am COMPARISON: Chest x-rays earlier today. HISTORY: ORDERING SYSTEM PROVIDED HISTORY: ett placement TECHNOLOGIST PROVIDED HISTORY: Reason for exam:->ett placement Reason for Exam: ETT placement FINDINGS: Overlying items external to the patient somewhat limit evaluation. Endotracheal tube appears retracted from prior exam with tip now 3.7 cm from the richie. Other lines and tubes remain in place. No change in the heart or lungs with persistent diffuse airspace and interstitial opacities bilaterally. No definite pneumothoraces are seen. Cardiac and mediastinal silhouettes are stable. Stable appearance to bony structures. Endotracheal tube has been retracted with tip now 3.7 cm from the richie. Otherwise stable exam.     XR CHEST PORTABLE    Result Date: 11/5/2022  EXAMINATION: ONE XRAY VIEW OF THE CHEST 11/5/2022 5:06 am COMPARISON: November 5, 2022 HISTORY: ORDERING SYSTEM PROVIDED HISTORY: ett/OG placement TECHNOLOGIST PROVIDED HISTORY: Reason for exam:->ett/OG placement Reason for Exam: ett/OG placement FINDINGS: Orogastric tube tip terminates beyond the inferior margin of the radiograph. The endotracheal tube tip has been retracted and terminates approximately 5 mm above the richie. The cardiomediastinal silhouette is enlarged. Bilateral diffuse airspace opacities are noted. No pneumothorax. No new osseous abnormality extensive hardware is identified in the visualized thoracic and cervical spine. 1. Endotracheal tube tip terminates approximately 5 mm above the richie. Retraction by 2 cm would place it in the mid trachea. 2. Diffuse airspace opacities. 3. Nasogastric tube tip terminates off the inferior margin of the radiograph. The findings were sent to the Radiology Results [id number] at 5:59 am on 11/5/2022 to be communicated to a licensed caregiver.      XR CHEST PORTABLE    Result Date: 11/5/2022  EXAMINATION: ONE XRAY VIEW OF THE CHEST 11/5/2022 1:26 am COMPARISON: 11/04/2022. HISTORY: ORDERING SYSTEM PROVIDED HISTORY: verify ett placement TECHNOLOGIST PROVIDED HISTORY: Reason for exam:->verify ett placement FINDINGS: Malpositioned enteric tube tip in the right mainstem bronchus. Enteric tube tip overlies the left upper quadrant with side port projecting in the region of the GE junction. Low lung volume. Progressed consolidation throughout the left lung. Heterogeneous opacities throughout the right lung. No definite pleural effusion or pneumothorax. Cardiomegaly. 1.  Malpositioned enteric tube tip in the right mainstem bronchus. Recommend repositioning. 2.  Enteric tube side port in the region of the GE junction, recommend 3-4 cm advancement into the stomach. 3.  Progressed consolidation throughout the left lung may represent atelectasis due to right mainstem bronchus intubation. 4.  Heterogeneous opacities throughout the right lung. Differential considerations include pulmonary edema and infection. The findings were sent to the Radiology Results [id number] at 3:48 am on 11/5/2022 to be communicated to a licensed caregiver. XR CHEST PORTABLE    Result Date: 11/4/2022  EXAMINATION: ONE XRAY VIEW OF THE CHEST 11/4/2022 3:56 pm COMPARISON: 10/28/2022. HISTORY: ORDERING SYSTEM PROVIDED HISTORY: tube and line placement TECHNOLOGIST PROVIDED HISTORY: Reason for exam:->tube and line placement Reason for Exam: tube and line placement AND NG PLACED FINDINGS: There are low lung volumes. The heart size is enlarged. The pulmonary vasculature is congested. There are bilateral infiltrates. There is an endotracheal tube with its tip above the richie. A nasogastric tube courses below the diaphragm. 1. Cardiomegaly with vascular congestion and bilateral infiltrates likely representing pulmonary edema. 2. Endotracheal tube in satisfactory position.      CTA CHEST ABDOMEN PELVIS W CONTRAST    Addendum Date: 11/4/2022 ADDENDUM: Findings were discussed with Dr. Sharolyn Paget at 8:49 pm on 11/4/2022. Result Date: 11/4/2022  EXAMINATION: CTA OF THE CHEST, ABDOMEN AND PELVIS WITH CONTRAST 11/4/2022 6:02 pm TECHNIQUE: CTA of the chest, abdomen and pelvis was performed after the administration of intravenous contrast.  Multiplanar reformatted images are provided for review. MIP images are provided for review. Automated exposure control, iterative reconstruction, and/or weight based adjustment of the mA/kV was utilized to reduce the radiation dose to as low as reasonably achievable. COMPARISON: None CT chest, abdomen, and pelvis October 18, 2022 HISTORY: ORDERING SYSTEM PROVIDED HISTORY: cardiac arrest hypoxia hx  chf, TECHNOLOGIST PROVIDED HISTORY: Reason for exam:->cardiac arrest hypoxia hx  chf, Decision Support Exception - unselect if not a suspected or confirmed emergency medical condition->Emergency Medical Condition (MA) Reason for Exam: fall; unresponsive Additional signs and symptoms: Cardiac Arrest (ROSC on arrival) Relevant Medical/Surgical History: 80mL isovue 370 FINDINGS: Chest: Mediastinum: The heart and pericardium demonstrate no acute findings. Lungs/pleura: Multifocal airspace opacities throughout the upper and lower lobes and right middle lobe with atelectasis seen dependently at the left lung base and to a lesser extent the right upper lobe. Interlobular septal thickening also present. While findings may reflect pulmonary edema, superimposed aspiration/infection not excluded. No pneumothorax. Endotracheal tube present within the right mainstem bronchus. Recommend repositioning. Soft Tissues/Bones: There are multiple remote bilateral rib fractures. Superimposed acute fractures of the right anterior 2nd through 7th ribs and left 4th through 8th ribs. Contrast present within the subcutaneous tissues at the right antecubital fossa consistent with IV infiltration. Abdomen/Pelvis: Organs:  The liver appears unremarkable. The gallbladder is surgically absent. The spleen demonstrates no acute abnormality. Fatty atrophy of the pancreas. The adrenal glands and kidneys demonstrate no acute findings. No hydronephrosis. GI/Bowel: No bowel obstruction. Moderate volume of stool throughout the colon. Normal appendix. Small bowel is normal in caliber. Enteric tube present with tip within the proximal stomach. Side port at the GE junction. Recommend advancement by approximately 5 cm. Pelvis: Angel catheter present within the bladder which is partially collapsed. Peritoneum/Retroperitoneum: The abdominal aorta is normal in caliber. No free fluid or free air. Bones/Soft Tissues: Postoperative changes of cervicothoracic fusion. No acute spinal fracture identified. Left-sided femoral central venous catheter present with tip extending to the left common iliac vein. 1. Diffuse airspace disease bilaterally. Findings suggest pulmonary edema with superimposed aspiration/infection not excluded. 2. Acute fractures of the right anterior 2nd through 7th ribs and left 4th through 8th ribs (grade 2 chest wall injury). 3. Endotracheal tube tip within the right mainstem bronchus. Recommend repositioning. 4. Enteric tube side port at the GE junction. Recommend advancement. 5. No acute intraabdominal process identified. 6. Contrast present within the subcutaneous tissues at the right antecubital fossa consistent with IV infiltration. CT LUMBAR RECONSTRUCTION WO POST PROCESS    Result Date: 11/4/2022  EXAMINATION: CT OF THE LUMBAR SPINE WITHOUT CONTRAST; CT OF THE THORACIC SPINE WITHOUT CONTRAST 11/4/2022 TECHNIQUE: CT of the lumbar spine was performed without the administration of intravenous contrast. Multiplanar reformatted images are provided for review. Adjustment of mA and/or kV according to patient size was utilized.   Automated exposure control, iterative reconstruction, and/or weight based adjustment of the mA/kV was utilized to reduce the radiation dose to as low as reasonably achievable.; CT of the thoracic spine was performed without the administration of intravenous contrast. Multiplanar reformatted images are provided for review. Automated exposure control, iterative reconstruction, and/or weight based adjustment of the mA/kV was utilized to reduce the radiation dose to as low as reasonably achievable. COMPARISON: None. HISTORY: ORDERING SYSTEM PROVIDED HISTORY: fall, unresponsivre TECHNOLOGIST PROVIDED HISTORY: Reason for exam:->fall, unresponsivre Decision Support Exception - unselect if not a suspected or confirmed emergency medical condition->Emergency Medical Condition (MA) Is the patient pregnant?->No Reason for Exam: fall; unresponsive Additional signs and symptoms: Cardiac Arrest (ROSC on arrival) Relevant Medical/Surgical History: none; ORDERING SYSTEM PROVIDED HISTORY: fall unresponsive TECHNOLOGIST PROVIDED HISTORY: Reason for exam:->fall unresponsive Decision Support Exception - unselect if not a suspected or confirmed emergency medical condition->Emergency Medical Condition (MA) Is the patient pregnant?->No Reason for Exam: fall; unresponsive Additional signs and symptoms: Cardiac Arrest (ROSC on arrival) Relevant Medical/Surgical History: none FINDINGS: BONES/ALIGNMENT: There are postsurgical changes of T2 corpectomy with posterior spinal fusion between C7 and T9. The alignment of the thoracic and lumbar spine is within normal limits. No acute fractures or dislocations are seen. DEGENERATIVE CHANGES: No significant degenerative changes of the thoracic or lumbar spine. SOFT TISSUES: No paraspinal mass is seen. 1.No acute thoracic or lumbar spine trauma.      CT THORACIC RECONSTRUCTION WO POST PROCESS    Result Date: 11/4/2022  EXAMINATION: CT OF THE LUMBAR SPINE WITHOUT CONTRAST; CT OF THE THORACIC SPINE WITHOUT CONTRAST 11/4/2022 TECHNIQUE: CT of the lumbar spine was performed without the administration of intravenous contrast. Multiplanar reformatted images are provided for review. Adjustment of mA and/or kV according to patient size was utilized. Automated exposure control, iterative reconstruction, and/or weight based adjustment of the mA/kV was utilized to reduce the radiation dose to as low as reasonably achievable.; CT of the thoracic spine was performed without the administration of intravenous contrast. Multiplanar reformatted images are provided for review. Automated exposure control, iterative reconstruction, and/or weight based adjustment of the mA/kV was utilized to reduce the radiation dose to as low as reasonably achievable. COMPARISON: None. HISTORY: ORDERING SYSTEM PROVIDED HISTORY: fall, unresponsivre TECHNOLOGIST PROVIDED HISTORY: Reason for exam:->fall, unresponsivre Decision Support Exception - unselect if not a suspected or confirmed emergency medical condition->Emergency Medical Condition (MA) Is the patient pregnant?->No Reason for Exam: fall; unresponsive Additional signs and symptoms: Cardiac Arrest (ROSC on arrival) Relevant Medical/Surgical History: none; ORDERING SYSTEM PROVIDED HISTORY: fall unresponsive TECHNOLOGIST PROVIDED HISTORY: Reason for exam:->fall unresponsive Decision Support Exception - unselect if not a suspected or confirmed emergency medical condition->Emergency Medical Condition (MA) Is the patient pregnant?->No Reason for Exam: fall; unresponsive Additional signs and symptoms: Cardiac Arrest (ROSC on arrival) Relevant Medical/Surgical History: none FINDINGS: BONES/ALIGNMENT: There are postsurgical changes of T2 corpectomy with posterior spinal fusion between C7 and T9. The alignment of the thoracic and lumbar spine is within normal limits. No acute fractures or dislocations are seen. DEGENERATIVE CHANGES: No significant degenerative changes of the thoracic or lumbar spine. SOFT TISSUES: No paraspinal mass is seen.      1.No acute thoracic or lumbar spine trauma. VL DUP LOWER EXTREMITY VENOUS RIGHT    Result Date: 11/3/2022  EXAMINATION: DUPLEX VENOUS ULTRASOUND OF THE RIGHT LOWER EXTREMITY 11/3/2022 12:44 pm TECHNIQUE: Duplex ultrasound using B-mode/gray scaled imaging and Doppler spectral analysis and color flow was obtained of the deep venous structures of the right extremity. COMPARISON: None. HISTORY: ORDERING SYSTEM PROVIDED HISTORY: Peripheral edema TECHNOLOGIST PROVIDED HISTORY: Reason for exam:->recent hospital discharge, venous stasis with warmth Rt>Lt no hx. dvt positive [place] Reason for Exam: rt leg warmth, edema FINDINGS: The visualized veins of the right lower extremity are patent and free of echogenic thrombus. The veins demonstrate good compressibility with normal color flow study and spectral analysis. No evidence of DVT in the right lower extremity.        Electronically signed by Merlin Catalina, MD on 11/6/2022 at 8:41 AM

## 2022-11-07 PROBLEM — G40.89 OTHER SEIZURES (HCC): Status: ACTIVE | Noted: 2022-01-01

## 2022-11-07 NOTE — CARE COORDINATION
Chart reviewed. Pt remains critically ill. Pt on 3 pressors. Pt code status changed to [facility].  Cm will continue to follow and be available to assist as needed

## 2022-11-07 NOTE — PROGRESS NOTES
Inpatient Progress Note 11/6/2022        Laura Freitas  1980  [id number]      Assessment/Plan:    Laura Freitas is a 38 y.o. female with a history of MRSA endocarditis, HFpEF, hypertension, hepatitis C, morbid obesity, chronic hypercarbic respiratory failure, obesity hypoventilation, polysubstance abuse who presented to [facility] 11/4/2022 with in the field cardiac arrest with prolonged overall resuscitation admitted to the ICU for hypothermic protocol management. Problem list  Cardiac arrest  Anox brain injury  Cardiogenic shock  Acute respiratory failure  Acute renal injury  Lactic acidosis  Hypokalemia        Neuro: intubated sedated. Pupils fixed and dialated, TTM to 36 on cooling till tonight. CT show evidence of anoxic encephalopathy. Poor prognosis. DC all sedatives to prepare for brain death testing. Given hemodynamic instability, not able to tolerate apnea testing will require ancillary test.  Cardio: Status post cardiac arrest, total downtime about 60 minutes with multiple events with return of spontaneous circulation. Troponin elevated in setting of ischemia. Bedside echo pending, trend troponin. Titrate levo to MAP goal of greater than 65. Resp: Acute respiratory failure requiring mechanical ventilation. Chronic hypercarbic respiratory failure at baseline secondary to obesity hypoventilation syndrome versus obstructive sleep apnea. Chest x-ray showed bilateral infiltrates with pulmonary edema concern for aspiration in setting of cardiac arrest and poor airway protection. Broad-spectrum antibiotics started. Pulmonary toileting. GI: No acute issues. N.p.o. at this time. GI prophylaxis. Tube feeds  : LISBETH, elevated creatinine,Likely ATN, monitor lactic acidosis to 2.7. Monitor electrolytes as needed and replenish. Avoid aggressive resuscitation in setting of chronic heart failure. Heme:    ID: Sepsis? Possible pneumonia from aspiration broad-spectrum antibiotics.   Follow-up cultures. Narrow when able. Follow-up Pro-Moises and MRSA in nares. Endo: POC  to 180 mg/dL. Insulin sliding scale as needed. MSK: No acute issues. On TTM cooling protocol. Tubes/Lines/Drains: ET tube, femoral A-line, central line. Code Status: Full code. Life connections contacted. Subjective:    Patient see and evalauted at bedside, lost Purvi overnight. Otherwise no acute issues. Neuro exam unchanged. Noted slightly hypotensive requirng more vasocavtive support today. Physical Exam:            BP (!) 80/26   Pulse (!) 105   Temp 98.8 °F (37.1 °C) (Bladder)   Resp 22   Ht 5' 5\" (1.651 m)   Wt (!) 311 lb 8.2 oz (141.3 kg)   SpO2 97%   BMI 51.84 kg/m²     General: NAD  Eyes: fixed and dilated. ENT: neck supple  Cardiovascular: Regular rate. Respiratory: crackles  Gastrointestinal: Soft, non tender  Genitourinary: no suprapubic tenderness  Musculoskeletal: 2+ edema. Skin: cool and clamy  Neuro: GCS3T, no withdrawal to pain in all extremities.      Current Medications:   ipratropium-albuterol  1 ampule Inhalation Q4H    vancomycin (VANCOCIN) intermittent dosing (placeholder)   Other RX Placeholder    sodium chloride  1,000 mL IntraVENous Once    sodium chloride  1,000 mL IntraVENous Once    sodium chloride flush  5-40 mL IntraVENous 2 times per day    chlorhexidine  15 mL Mouth/Throat BID    famotidine (PEPCID) injection  20 mg IntraVENous BID    enoxaparin  30 mg SubCUTAneous BID    insulin lispro  0-4 Units SubCUTAneous Q4H    insulin lispro  0-4 Units SubCUTAneous TID WC    cefepime  2,000 mg IntraVENous Q8H       Labs, Imaging and Studies reviewed:    XR CHEST PORTABLE    Result Date: 11/5/2022  EXAMINATION: ONE XRAY VIEW OF THE CHEST 11/5/2022 5:06 am COMPARISON: November 5, 2022 HISTORY: ORDERING SYSTEM PROVIDED HISTORY: ett/OG placement TECHNOLOGIST PROVIDED HISTORY: Reason for exam:->ett/OG placement Reason for Exam: ett/OG placement FINDINGS: Orogastric tube tip terminates beyond the inferior margin of the radiograph. The endotracheal tube tip has been retracted and terminates approximately 5 mm above the richie. The cardiomediastinal silhouette is enlarged. Bilateral diffuse airspace opacities are noted. No pneumothorax. No new osseous abnormality extensive hardware is identified in the visualized thoracic and cervical spine. 1. Endotracheal tube tip terminates approximately 5 mm above the richie. Retraction by 2 cm would place it in the mid trachea. 2. Diffuse airspace opacities. 3. Nasogastric tube tip terminates off the inferior margin of the radiograph. The findings were sent to the Radiology Results [id number] at 5:59 am on 11/5/2022 to be communicated to a licensed caregiver. XR CHEST PORTABLE    Result Date: 11/5/2022  EXAMINATION: ONE XRAY VIEW OF THE CHEST 11/5/2022 7:38 am COMPARISON: Chest x-rays earlier today. HISTORY: ORDERING SYSTEM PROVIDED HISTORY: ett placement TECHNOLOGIST PROVIDED HISTORY: Reason for exam:->ett placement Reason for Exam: ETT placement FINDINGS: Overlying items external to the patient somewhat limit evaluation. Endotracheal tube appears retracted from prior exam with tip now 3.7 cm from the richie. Other lines and tubes remain in place. No change in the heart or lungs with persistent diffuse airspace and interstitial opacities bilaterally. No definite pneumothoraces are seen. Cardiac and mediastinal silhouettes are stable. Stable appearance to bony structures. Endotracheal tube has been retracted with tip now 3.7 cm from the richie. Otherwise stable exam.     XR CHEST PORTABLE    Result Date: 11/5/2022  EXAMINATION: ONE XRAY VIEW OF THE CHEST 11/5/2022 1:26 am COMPARISON: 11/04/2022. HISTORY: ORDERING SYSTEM PROVIDED HISTORY: verify ett placement TECHNOLOGIST PROVIDED HISTORY: Reason for exam:->verify ett placement FINDINGS: Malpositioned enteric tube tip in the right mainstem bronchus.   Enteric tube tip overlies the left upper quadrant with side port projecting in the region of the GE junction. Low lung volume. Progressed consolidation throughout the left lung. Heterogeneous opacities throughout the right lung. No definite pleural effusion or pneumothorax. Cardiomegaly. 1.  Malpositioned enteric tube tip in the right mainstem bronchus. Recommend repositioning. 2.  Enteric tube side port in the region of the GE junction, recommend 3-4 cm advancement into the stomach. 3.  Progressed consolidation throughout the left lung may represent atelectasis due to right mainstem bronchus intubation. 4.  Heterogeneous opacities throughout the right lung. Differential considerations include pulmonary edema and infection. The findings were sent to the Radiology Results [id number] at 3:48 am on 11/5/2022 to be communicated to a licensed caregiver.        Recent Results (from the past 24 hour(s))   POC CRITICAL CARE PROFILE    Collection Time: 11/06/22 12:45 AM   Result Value Ref Range    pH, Bld 7.32 (L) 7.34 - 7.45    pCO2, Arterial 53.9 (H) 32 - 45 MMHG    pO2, Arterial 81.1 75 - 100 MMHG    HCO3, Arterial 27.5 (H) 18 - 23 MMOL/L    Base Exc, Mixed 0.5 0 - 2.3    O2 Sat 94.5 (L) 96 - 97 %    CO2 29 21 - 32 MMOL/L    Sodium 142 138 - 146 MMOL/L    Potassium 4.0 3.5 - 4.5 MMOL/L    POC CALCIUM 1.16 1.12 - 1.32 MMOL/L    POC Glucose 87 70 - 99 MG/DL    Hematocrit 33.0 (L) 37 - 47 %    Hemoglobin 11.3 (L) 12.5 - 16.0 GM/DL    POC Chloride 103 98 - 109 MMOL/L    POC Creatinine 2.3 (H) 0.6 - 1.1 MG/DL    eGFR, POC 27 (L) >60 mL/min/1.73m2    Source: ARTERIAL    POC CRITICAL CARE PROFILE    Collection Time: 11/06/22  2:52 AM   Result Value Ref Range    pH, Bld 7.30 (L) 7.34 - 7.45    pCO2, Arterial 60.0 (H) 32 - 45 MMHG    pO2, Arterial 76.9 75 - 100 MMHG    HCO3, Arterial 29.2 (H) 18 - 23 MMOL/L    Base Exc, Mixed 1.5 0 - 2.3    O2 Sat 93.2 (L) 96 - 97 %    CO2 31 21 - 32 MMOL/L    Sodium 143 138 - 146 MMOL/L Potassium 3.6 3.5 - 4.5 MMOL/L    POC CALCIUM 1.15 1.12 - 1.32 MMOL/L    POC Glucose 84 70 - 99 MG/DL    Hematocrit 37.0 37 - 47 %    Hemoglobin 12.5 12.5 - 16.0 GM/DL    POC Chloride 104 98 - 109 MMOL/L    POC Creatinine 2.6 (H) 0.6 - 1.1 MG/DL    eGFR, POC 23 (L) >60 mL/min/1.73m2    Source: ARTERIAL    Hepatic Function Panel    Collection Time: 11/06/22  2:55 AM   Result Value Ref Range    Albumin 2.3 (L) 3.4 - 5.0 GM/DL    Total Bilirubin 0.2 0.0 - 1.0 MG/DL    Bilirubin, Direct 0.2 0.0 - 0.3 MG/DL    Bilirubin, Indirect 0.0 0 - 0.7 MG/DL    Alkaline Phosphatase 139 (H) 40 - 129 IU/L     (H) 15 - 37 IU/L     (H) 10 - 40 U/L    Total Protein 5.5 (L) 6.4 - 8.2 GM/DL   Basic Metabolic Panel    Collection Time: 11/06/22  2:55 AM   Result Value Ref Range    Sodium 139 135 - 145 MMOL/L    Potassium 3.7 3.5 - 5.1 MMOL/L    Chloride 101 99 - 110 mMol/L    CO2 27 21 - 32 MMOL/L    Anion Gap 11 4 - 16    BUN 47 (H) 6 - 23 MG/DL    Creatinine 2.7 (H) 0.6 - 1.1 MG/DL    Est, Glom Filt Rate 22 (L) >60 mL/min/1.73m2    Glucose 87 70 - 99 MG/DL    Calcium 8.2 (L) 8.3 - 10.6 MG/DL   Magnesium    Collection Time: 11/06/22  2:55 AM   Result Value Ref Range    Magnesium 1.5 (L) 1.8 - 2.4 mg/dl   Calcium, Ionized    Collection Time: 11/06/22  2:55 AM   Result Value Ref Range    Ionized Ca 1.17 1.12 - 1.32 mMOL/L    Calcium, Ionized 4.68 4.48 - 5.28 MG/DL   Phosphorus    Collection Time: 11/06/22  2:55 AM   Result Value Ref Range    Phosphorus 3.5 2.5 - 4.9 MG/DL   Blood Gas, Arterial    Collection Time: 11/06/22  3:15 AM   Result Value Ref Range    pH, Bld 7.30 (L) 7.34 - 7.45    pCO2, Arterial 60.0 (H) 32 - 45 MMHG    pO2, Arterial 68 (L) 75 - 100 MMHG    Base Exc, Mixed 1.6 0 - 2.3    HCO3, Arterial 29.5 (H) 18 - 23 MMOL/L    CO2 Content 31.3 (H) 19 - 24 MMOL/L    O2 Sat 89.4 (L) 96 - 97 %    Carbon Monoxide, Blood 2.0 0 - 5 %    Methemoglobin, Arterial 0.9 <1.5 %    Comment ACVC 22    POC CRITICAL CARE PROFILE Collection Time: 11/06/22  6:38 AM   Result Value Ref Range    pH, Bld 7.26 (L) 7.34 - 7.45    pCO2, Arterial 66.7 (H) 32 - 45 MMHG    pO2, Arterial 38.9 (L) 75 - 100 MMHG    HCO3, Arterial 29.9 (H) 18 - 23 MMOL/L    Base Exc, Mixed 1.5 0 - 2.3    O2 Sat 63.2 (L) 96 - 97 %    CO2 32 21 - 32 MMOL/L    Sodium 142 138 - 146 MMOL/L    Potassium 3.5 3.5 - 4.5 MMOL/L    POC CALCIUM 1.12 1.12 - 1.32 MMOL/L    POC Glucose 77 70 - 99 MG/DL    Hematocrit 33.0 (L) 37 - 47 %    Hemoglobin 11.3 (L) 12.5 - 16.0 GM/DL    POC Chloride 102 98 - 109 MMOL/L    POC Creatinine 2.4 (H) 0.6 - 1.1 MG/DL    eGFR, POC 26 (L) >60 mL/min/1.73m2    Source: VENOUS    POCT Glucose    Collection Time: 11/06/22 12:09 PM   Result Value Ref Range    POC Glucose 90 70 - 99 MG/DL   POCT Glucose    Collection Time: 11/06/22  4:51 PM   Result Value Ref Range    POC Glucose 81 70 - 99 MG/DL   Blood gas, arterial    Collection Time: 11/06/22  5:00 PM   Result Value Ref Range    pH, Bld 7.26 (L) 7.34 - 7.45    pCO2, Arterial 55.0 (H) 32 - 45 MMHG    pO2, Arterial 95 75 - 100 MMHG    Base Excess 3 (H) 0 - 2.4    HCO3, Arterial 24.7 (H) 18 - 23 MMOL/L    CO2 Content 26.4 (H) 19 - 24 MMOL/L    O2 Sat 94.0 (L) 96 - 97 %    Carbon Monoxide, Blood 1.7 0 - 5 %    Methemoglobin, Arterial 0.9 <1.5 %    Comment 420 22 .7 12P    POCT Glucose    Collection Time: 11/06/22  8:14 PM   Result Value Ref Range    POC Glucose 104 (H) 70 - 99 MG/DL     Critical care attestation:    I spent 45 cumulative minutes (excluding procedures), in full attention to this critically ill patient. I have personally reviewed the patient's history and interval events in the EMR, along with vitals, labs and radiology imaging. Critical care time was spent personally providing care for this patient, excluding billable procedures, and no overlapping with any other provider.   This includes documenting my assessment and plan of care and developing the care plan to treat the problems below.    The high probability of deterioration required my full and direct attention, intervention, and personal management due to do to underlying diagnosis and clinical problems including the treatment of active or impending:  [x] Neurological monitoring and treatment  [x] Respiratory failure -assessment of ventilator support, adjustment, ventilator weaning  [x] Hemodynamic and volume assessment with volume resuscitation  [x] Mechanical and/or chemical support of the circulation,  [x] Frequent vasoactive agent adjustment,  [] Renal replacement therapy,  [] For rapid decompensation,  [] Electrolyte instability  [] Suctioning every 2 hours  [] Every hour neuro checks  [] Every hour neurovascular checks  [] Frequent evaluation of patient's response to treatment and titration of therapies,  [] Interpretation of laboratory and radiological data,  [] Application and interpretation of advanced monitoring technologies,  [] Extensive interpretation of multiple databases,  [] Development of treatment plan with patient, surrogate, or consultants.   [] Others:     Electronically signed by Gosia Robertson MD on 11/6/2022 at 9:59 PM

## 2022-11-07 NOTE — PROCEDURES
Arterial Catheter Insertion Procedure Note   Procedure: Insertion of Arterial Line   Procedure Clinician: Jaret Weber MD   Procedure Assistant: None   Indications: Accurate BP monitoring   Size and location: 20g Left femoral artery  Attempts: 2  Procedure Details:   Informed consent was obtained for the procedure, including sedation. Risks of hemorrhage, ischemia, infection, and adverse drug reaction were discussed. Under sterile conditions the skin above the Left femoral was prepped with chlorhexidine. Local anesthesia was applied to the skin and subcutaneous tissues. An Arrow kit was used to insert a 20-gauge needle into the artery. A guide wire was then passed easily through the needle without resistance. A 20 gauge catheter was then inserted into the vessel over the guide wire. The guidewire was then removed with the tip intact. The catheter was then secured into place. Findings: There were no changes to vital signs. Catheter was flushed with 20 mL NS. Patient tolerated the procedure well. Other attempt in the Left and right radial artery thought. Too small to access.

## 2022-11-07 NOTE — PROGRESS NOTES
Spoke to Dr. Rod Aleman ([facility] critical care doctor)    Use fixed dose vasopressin in conjunction with other pressors like levophed.  Change the vasopressin order in MAR to do 0.04 fixed dose for now and can go up with levophed as needed for MAP 65

## 2022-11-07 NOTE — PROGRESS NOTES
Daily Progress Note  Subjective:    Pt on vent this am  HR stable, NSR, BP low on vasopressin but MAP is stable    Attending Note:  Remains on vent   No neuro response  Awaiting nuclear scan of head   Respiratory arrest leading to cardiac arrest-leading to hypoxic brain injury  Hx of pulmonary HTN  Acute renal injury   She is on pressors -levo and vaso  Repeat echo showed improved EF  She had myocardial stunning   Prognosis is poor  Suggest terminal wean had a prolonged CPR  Hypothermia protocol      Impression and Plan:     Cardiac Arrest    Likely d/t acute respiratory Failure    She had Rt. Sided HF secondary to severe PAH and mod-severe TR    Also concern for aspiration PNA per notes-she is on ABx    Per father she collapsed at home and would not respond- could not find heart beat so CPR was started and squad called    She was down for 60 minutes before ROSC was achieved    Currently on pressors to maintain BP    Prognosis is poor     NM brain scan is to be done today for neuro activity and concern of anoxic brain injury    Will follow  Long discussion with family today and questions answered       Most Recent Echo  RAFI-10/19/22  Summary   No evidence of thrombus within the left atrial appendage. Mild mitral regurgitation. Mild-moderate tricuspid regurgitation. No evidence of any pericardial effusion. No evidence of endocarditis. normal EF noted   TV vegetation resolved    TTE-10/18/22  IMPRESSION:  1. No vegetation noted on the tricuspid valve, pulmonic valve, aortic  valve, and mitral valve. 2.  Moderate tricuspid regurgitation noted. 3.  LV function is preserved, greater than 55%. 4.  No clot or thrombus noted in the left atrium or left atrial  appendage. 5.  No pericardial effusion noted. Most Recent Heart Cath  10/20/22  IMPRESSION:  1. Left main is patent. 2.  LAD, circ, and RCA has mild disease present. 3.  EDP is around 20 mmHg present.     Radiology  CT Head 11/4/22  Diffuse low attenuation throughout the entire cerebral hemispheres consistent   with edema and diffuse anoxic injury. The fourth ventricle is effaced and   the cerebellar tonsils are downward herniating through the foramen magnum. CT Chest, abd pelvis  1. Diffuse airspace disease bilaterally. Findings suggest pulmonary edema   with superimposed aspiration/infection not excluded. 2. Acute fractures of the right anterior 2nd through 7th ribs and left 4th   through 8th ribs (grade 2 chest wall injury). 3. Endotracheal tube tip within the right mainstem bronchus. Recommend   repositioning. 4. Enteric tube side port at the GE junction. Recommend advancement. 5. No acute intraabdominal process identified. 6. Contrast present within the subcutaneous tissues at the right antecubital   fossa consistent with IV infiltration. The patient had a history of having drug use. She has been drug-free. She has history of endocarditis in the past.  She was [facility] to [facility]. She has been doing better. She has been clean with her drugs  also. The patient has a history of hepatitis C, cirrhosis present, and has a  history of tricuspid valve endocarditis, which has been _____ and  history of pulmonary hypertension. Preserved LV function present. The patient was to have tricuspid valve surgery, but the plan was to  treat her pulmonary hypertension and then decide upon tricuspid valve. The patient has been drug-free also. PAST SURGICAL HISTORY:  History of , gallbladder surgery, right  hand finger amputation, hysterectomy, incision and drainage of the right  upper thigh, lap sera, _____. SOCIAL HISTORY:  History of smoking, and she had history of drug use  also. MEDICATIONS:  She is on Demadex 20 mg b.i.d. She is on Pamelor,  potassium, and Zaroxolyn. She is on Zaroxolyn and Demadex for pulmonary  hypertension. ALLERGIES:  _____ and AUGMENTIN.   Objective:   BP (!) 72/62 Pulse (!) 107 Comment: Simultaneous filing. User may not have seen previous data. Temp 98.6 °F (37 °C) (Bladder)   Resp 22 Comment: Simultaneous filing. User may not have seen previous data.   Ht 5' 5\" (1.651 m)   Wt (!) 311 lb 8.2 oz (141.3 kg)   SpO2 98%   BMI 51.84 kg/m²     Intake/Output Summary (Last 24 hours) at 11/7/2022 1009  Last data filed at 11/7/2022 0640  Gross per 24 hour   Intake 1851.81 ml   Output 1570 ml   Net 281.81 ml       Medications:   Scheduled Meds:   ipratropium-albuterol  1 ampule Inhalation Q4H    vancomycin (VANCOCIN) intermittent dosing (placeholder)   Other RX Placeholder    sodium chloride  1,000 mL IntraVENous Once    sodium chloride  1,000 mL IntraVENous Once    sodium chloride flush  5-40 mL IntraVENous 2 times per day    chlorhexidine  15 mL Mouth/Throat BID    famotidine (PEPCID) injection  20 mg IntraVENous BID    enoxaparin  30 mg SubCUTAneous BID    insulin lispro  0-4 Units SubCUTAneous Q4H    cefepime  2,000 mg IntraVENous Q8H      Infusions:   dextrose      phenylephrine (KINZA-SYNEPHRINE) 50mg/250mL infusion      vasopressin (Septic Shock) infusion 0.04 Units/min (11/07/22 0844)    norepinephrine 47 mcg/min (11/07/22 0908)    lactated ringers 100 mL/hr at 11/04/22 1641    sodium chloride 100 mL/hr at 11/05/22 0057    fentaNYL Stopped (11/07/22 0642)    dextrose      propofol Stopped (11/07/22 0500)      PRN Meds:  potassium chloride **OR** potassium chloride, lubrifresh P.M., sodium chloride flush, polyethylene glycol, acetaminophen **OR** acetaminophen, ondansetron **OR** ondansetron, potassium chloride, magnesium sulfate, sodium phosphate IVPB **OR** sodium phosphate IVPB, glucose, dextrose bolus **OR** dextrose bolus, dextrose, glucagon (rDNA)     Physical Exam:  Vitals:    11/07/22 0749   BP: (!) 72/62   Pulse: (!) 107   Resp: 22   Temp: 98.6 °F (37 °C)   SpO2: 98%        General: AAO, NAD  Chest: Nontender  Cardiac: First and Second Heart Sounds are Normal, No Murmurs or Gallops noted  Lungs:Clear to auscultation and percussion. Abdomen: Soft, NT, ND, +BS  Extremities: No clubbing, no edema  Vascular:  Equal 2+ peripheral pulses. Lab Data:  CBC:   Recent Labs     11/04/22 1532 11/05/22 1954 11/06/22 0252 11/06/22  0638 11/07/22  0320   WBC 15.6*  --   --   --  20.1*   HGB 10.6*   < > 12.5 11.3* 11.3*   HCT 36.5*   < > 37.0 33.0* 38.0   MCV 97.6  --   --   --  93.8     --   --   --  214    < > = values in this interval not displayed. BMP:   Recent Labs     11/05/22 2055 11/05/22 2250 11/06/22 0255 11/06/22  0638 11/07/22  0320      < > 139 142 138   K 4.5   < > 3.7 3.5 4.6   CL 98*  --  101  --  100   CO2 27   < > 27 32 23   PHOS 4.8  --  3.5  --  5.5*   BUN 47*  --  47*  --  52*   CREATININE 2.5*   < > 2.7* 2.4* 2.9*    < > = values in this interval not displayed. LIVER PROFILE:   Recent Labs     11/04/22 1532 11/06/22 0255   AST 97* 208*   ALT 53* 147*   LIPASE 36  --    BILIDIR  --  0.2   BILITOT 0.3 0.2   ALKPHOS 175* 139*     PT/INR:   Recent Labs     11/04/22  1532 11/04/22  2100   PROTIME 14.1 15.7*   INR 1.09 1.21     APTT:   Recent Labs     11/04/22 1532 11/04/22  2115   APTT 26.2 44.0*     BNP:  No results for input(s): BNP in the last 72 hours.       Assessment:  Patient Active Problem List    Diagnosis Date Noted    [facility] 11/04/2022    Acute decompensated heart failure ([facility] 10/17/2022    Pulmonary hypertension ([facility] 09/14/2022    Severe tricuspid regurgitation 09/14/2022    Status post thoracic spinal fusion 08/17/2022    Other insomnia 08/17/2022    Hypertensive disorder 08/17/2022    Long term (current) use of antibiotics 03/30/2022    Pseudarthrosis after fusion or arthrodesis 03/30/2022    Anemia 08/16/2020    Back pain 12/09/2020    MRSA infection     Heroin use     Tobacco abuse     Osteomyelitis ([facility] 10/21/2020    Endocarditis 09/15/2020    Osteomyelitis of vertebra ([facility] 09/15/2020    Right flank pain 09/14/2020    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

## 2022-11-07 NOTE — PROGRESS NOTES
11/07/22 0749   Patient Observation   Heart Rate (!) 107   Resp 22   SpO2 98 %   Vent Information   Vent Mode AC/VC   $Ventilation $Subsequent Day   Ventilator Settings   FiO2  (S)  60 %  (weaned from 70% to 60%)   Vt (Set, mL) 420 mL   Resp Rate (Set) 22 bmp   PEEP/CPAP (cmH2O) 12   Vent Patient Data (Readings)   Vt (Measured) 437 mL   Peak Inspiratory Pressure (cmH2O) 42 cmH2O   Rate Measured 22 br/min   Minute Volume (L/min) 9.63 Liters   Mean Airway Pressure (cmH2O) 22 cmH20   Plateau Pressure (cm H2O) 30 cm H2O   I:E Ratio 1:1.70   Flow Sensitivity 3 L/min   Vent Alarm Settings   High Pressure (cmH2O) 50 cmH2O   Low Minute Volume (lpm) 2.5 L/min   High Minute Volume (lpm) 20 L/min   Low Exhaled Vt (ml) 250 mL   High Exhaled Vt (ml) 1000 mL   RR High (bpm) 40 br/min   Apnea (secs) 20 secs   Additional Respiratoray Assessments   Humidification Source HME   ETT    Placement Date/Time: 11/04/22 (c)    Present on Admission/Arrival: Yes  Placed By: (c) Other (comment)  Placement Verified By: Capnometry; Chest X-ray; Auscultation  Airway Type: Cuffed  Airway Tube Size: 8 mm  Location: Oral  Secured At: (c) 23 cm  Tanisha. ..    Secured At 21 cm   Measured From Lips   ETT Placement Right   Secured By Commercial tube franks   Site Assessment Dry   Cuff Pressure   (mov)

## 2022-11-07 NOTE — CODE DOCUMENTATION
I met with the patient's family at bedside. Patient's children and other family members also present in the room. I explained patient's current critical medical condition in detail. I explained patient's poor prognosis in the setting of her cardiac arrest and extended downtime before ROSC was achieved. I explained concern for anoxic brain injury in detail. I conveyed to the family that patient's current clinical condition (unresponsive, no appreciable reflexes noted. Pressor support to maintain cardiac function) confers a very poor prognosis in terms of any meaningful neurological recovery. Patient currently is not stable enough to undergo brain flow studies to determine brain death. Family states that the patient was very independent and would never wish for her to be bedridden. They stated patient would 'do things her own way'. Family believes patient would not want to live connected to machines and ventilators. However family states that they would not like to ' leave any stone unturned' if there is any chance of neurological recovery. I explained that the medical team is providing the best possible care. We will consult neurology and obtain EEG, however its unlikely that this is contributing to patient's current critical condition. FAMILY STATES IN THE EVENT THAT  IF, AFTER WORKUP/EVALUATION, THERE IS NO REALISTIC CHANCE OF ANY MEANINGFUL NEUROLOGICAL RECOVERY THAT THE FAMILY WOULD BE OPEN TO PALLIATIVE EXTUBATION. I discussed patient's CODE STATUS with the family. I explained to the family that if the patient suffers another cardiac arrest, there is a very high chance that she would not survive it, and subjecting her to resuscitation would only prolong her misery and we would not be fixing her underlying issue, in fact it would further worsen her anoxic brain injury.   Patient's family state they want the patient to not be resuscitated in the event she suffers another cardiac arrest.  Patient

## 2022-11-07 NOTE — PLAN OF CARE
This technologist spoke with nurse Forrest Childers about the timing of the brain death study. She is concerned about the ventilator setting and traveling to [facility] for the study. She will have day nurse contact the ordering physician and then let [facility] know the plan of care.  A radioactive dose will then need to be ordered for the test.    JEAN Shah, RT(N)

## 2022-11-07 NOTE — PROGRESS NOTES
V2.0  [facility] [place]      Name:  Seamus Zuniga /Age/Sex: 1980  (38 y.o. female)   MRN & CSN:  [id number] & [id number] Encounter Date/Time: 2022 8:42 AM EDT    Location:  -A PCP: Keyla Schafer APRN - [id number] [facility] Day: 4    Assessment and Plan:   Seamus Zuniga is a 38 y.o. female with pmh of MRSA endocarditis history, HFpEF, hypertension, HCV, morbid obesity, recent admission with acute on chronic diastolic heart failure requiring Lasix drip and eventually discharged home with oxygen, pulmonary hypertension and chronic hypercapnic respiratory failure who presents with Cardiac arrest ([facility] [address]    Plan:    Anoxic encephalopathy status post cardiac arrest.  -seen on imaging  -Currently undergoing hypothermia protocol  -Poor mental status post hypothermia protocol.  -Patient is unresponsive, no reflexes appreciated.  -Not stable for brain flow studies  -Apnea testing not possible in the setting of ongoing metabolic derangements (worsening renal function)  -Neurology consulted for EEG for possible seizure activity, however unlikely in my opinion. Acute on chronic hypoxic respiratory failure secondary to cardiac arrest  Pulmonary edema versus aspiration pneumonia on CXR  -Continue ventilator support  -On broad-spectrum antibiotics cefepime and vancomycin  -Pulmonary toilet  -Wean ventilator as tolerated      Acute on chronic diastolic heart failure  Cardiogenic shock  -On pressor support  -Wean as tolerated    Prognosis extremely guarded    Diet Diet NPO  ADULT TUBE FEEDING; Nasogastric; Peptide Based High Protein; Continuous; 10; Yes; 10; Q 4 hours; 50; 30; Q 4 hours; Protein;  Add Proteinex 1x daily   DVT Prophylaxis [x] Lovenox, []  Heparin, [] SCDs, [] Ambulation,  [] Eliquis, [] Xarelto  [] Coumadin   Code Status DNR-CCA   Disposition From: Home  Expected Disposition: TBD  Estimated Date of Discharge: TBD  Patient requires continued admission due to cardiac arrest management         Subjective:     Chief Complaint: No chief complaint on file. Patient intubated and sedated         Review of Systems:    Review of Systems   Unable to perform ROS: Intubated       Objective: Intake/Output Summary (Last 24 hours) at 11/7/2022 1516  Last data filed at 11/7/2022 0640  Gross per 24 hour   Intake 1851.81 ml   Output 1425 ml   Net 426.81 ml          Vitals:   Vitals:    11/07/22 1200   BP: (!) 79/62   Pulse:    Resp:    Temp: 98.6 °F (37 °C)   SpO2:        Physical Exam:   Physical Exam  Vitals and nursing note reviewed. Constitutional:       General: She is not in acute distress. Appearance: She is ill-appearing and toxic-appearing. HENT:      Head: Normocephalic and atraumatic. Nose: Nose normal.      Mouth/Throat:      Mouth: Mucous membranes are moist.   Eyes:      Extraocular Movements: Extraocular movements intact. Pupils: Pupils are equal, round, and reactive to light. Cardiovascular:      Rate and Rhythm: Normal rate and regular rhythm. Pulses: Normal pulses. Heart sounds: Normal heart sounds. Pulmonary:      Effort: No respiratory distress. Comments: Bilateral mechanical breath sounds  Abdominal:      Palpations: Abdomen is soft. Musculoskeletal:         General: Normal range of motion. Cervical back: Normal range of motion. Skin:     General: Skin is warm. Capillary Refill: Capillary refill takes less than 2 seconds.    Neurological:      Comments: Intubated and sedated          Medications:   Medications:    ipratropium-albuterol  1 ampule Inhalation Q4H    sodium chloride  1,000 mL IntraVENous Once    sodium chloride  1,000 mL IntraVENous Once    sodium chloride flush  5-40 mL IntraVENous 2 times per day    chlorhexidine  15 mL Mouth/Throat BID    famotidine (PEPCID) injection  20 mg IntraVENous BID    enoxaparin  30 mg SubCUTAneous BID    insulin lispro  0-4 Units SubCUTAneous Q4H    cefepime  2,000 mg IntraVENous Q8H      Infusions:    dextrose 75 mL/hr at 11/07/22 1014    phenylephrine (KINZA-SYNEPHRINE) 50mg/250mL infusion 30 mcg/min (11/07/22 1058)    vasopressin (Septic Shock) infusion 0.04 Units/min (11/07/22 0844)    norepinephrine 42 mcg/min (11/07/22 1358)    lactated ringers 100 mL/hr at 11/04/22 1641    sodium chloride 100 mL/hr at 11/05/22 0057    dextrose       PRN Meds: potassium chloride, 20 mEq, PRN   Or  potassium chloride, 10 mEq, PRN  lubrifresh P.M., , Q2H PRN  sodium chloride flush, 5-40 mL, PRN  polyethylene glycol, 17 g, Daily PRN  acetaminophen, 650 mg, Q6H PRN   Or  acetaminophen, 650 mg, Q6H PRN  ondansetron, 4 mg, Q8H PRN   Or  ondansetron, 4 mg, Q6H PRN  potassium chloride, 10 mEq, PRN  magnesium sulfate, 2,000 mg, PRN  sodium phosphate IVPB, 0.16 mmol/kg, PRN   Or  sodium phosphate IVPB, 0.32 mmol/kg, PRN  glucose, 4 tablet, PRN  dextrose bolus, 125 mL, PRN   Or  dextrose bolus, 250 mL, PRN  dextrose, , Continuous PRN  glucagon (rDNA), 1 mg, PRN      Labs      Recent Results (from the past 24 hour(s))   POCT Glucose    Collection Time: 11/06/22  4:51 PM   Result Value Ref Range    POC Glucose 81 70 - 99 MG/DL   Blood gas, arterial    Collection Time: 11/06/22  5:00 PM   Result Value Ref Range    pH, Bld 7.26 (L) 7.34 - 7.45    pCO2, Arterial 55.0 (H) 32 - 45 MMHG    pO2, Arterial 95 75 - 100 MMHG    Base Excess 3 (H) 0 - 2.4    HCO3, Arterial 24.7 (H) 18 - 23 MMOL/L    CO2 Content 26.4 (H) 19 - 24 MMOL/L    O2 Sat 94.0 (L) 96 - 97 %    Carbon Monoxide, Blood 1.7 0 - 5 %    Methemoglobin, Arterial 0.9 <1.5 %    Comment 420 22 .7 12P    POCT Glucose    Collection Time: 11/06/22  8:14 PM   Result Value Ref Range    POC Glucose 104 (H) 70 - 99 MG/DL   POCT Glucose    Collection Time: 11/06/22 10:48 PM   Result Value Ref Range    POC Glucose 93 70 - 99 MG/DL   POCT Glucose    Collection Time: 11/07/22  2:14 AM   Result Value Ref Range    POC Glucose 96 70 - 99 MG/DL   Vancomycin Level, Random Collection Time: 11/07/22  3:20 AM   Result Value Ref Range    Vancomycin Rm 31.8 UG/ML    DOSE AMOUNT DOSE AMT.  GIVEN - 2000     DOSE TIME DOSE TIME GIVEN - 1500    Procalcitonin    Collection Time: 11/07/22  3:20 AM   Result Value Ref Range    Procalcitonin 17.36    CBC    Collection Time: 11/07/22  3:20 AM   Result Value Ref Range    WBC 20.1 (H) 4.0 - 10.5 K/CU MM    RBC 4.05 (L) 4.2 - 5.4 M/CU MM    Hemoglobin 11.3 (L) 12.5 - 16.0 GM/DL    Hematocrit 38.0 37 - 47 %    MCV 93.8 78 - 100 FL    MCH 27.9 27 - 31 PG    MCHC 29.7 (L) 32.0 - 36.0 %    RDW 19.1 (H) 11.7 - 14.9 %    Platelets 309 307 - 267 K/CU MM    MPV 10.8 7.5 - 11.1 FL   Basic Metabolic Panel    Collection Time: 11/07/22  3:20 AM   Result Value Ref Range    Sodium 138 135 - 145 MMOL/L    Potassium 4.6 3.5 - 5.1 MMOL/L    Chloride 100 99 - 110 mMol/L    CO2 23 21 - 32 MMOL/L    Anion Gap 15 4 - 16    BUN 52 (H) 6 - 23 MG/DL    Creatinine 2.9 (H) 0.6 - 1.1 MG/DL    Est, Glom Filt Rate 20 (L) >60 mL/min/1.73m2    Glucose 84 70 - 99 MG/DL    Calcium 8.2 (L) 8.3 - 10.6 MG/DL   Magnesium    Collection Time: 11/07/22  3:20 AM   Result Value Ref Range    Magnesium 1.6 (L) 1.8 - 2.4 mg/dl   Phosphorus    Collection Time: 11/07/22  3:20 AM   Result Value Ref Range    Phosphorus 5.5 (H) 2.5 - 4.9 MG/DL   Blood gas, arterial    Collection Time: 11/07/22  6:00 AM   Result Value Ref Range    pH, Bld 7.27 (L) 7.34 - 7.45    pCO2, Arterial 56.0 (H) 32 - 45 MMHG    pO2, Arterial 86 75 - 100 MMHG    Base Excess 2 0 - 2.4    HCO3, Arterial 25.7 (H) 18 - 23 MMOL/L    CO2 Content 27.4 (H) 19 - 24 MMOL/L    O2 Sat 93.9 (L) 96 - 97 %    Carbon Monoxide, Blood 2.1 0 - 5 %    Methemoglobin, Arterial 0.8 <1.5 %    Comment AC22 420 70% 12peep    POCT Glucose    Collection Time: 11/07/22  6:58 AM   Result Value Ref Range    POC Glucose 93 70 - 99 MG/DL   POCT Glucose    Collection Time: 11/07/22  7:53 AM   Result Value Ref Range    POC Glucose 53 (L) 70 - 99 MG/DL   POCT Glucose    Collection Time: 11/07/22  8:32 AM   Result Value Ref Range    POC Glucose 79 70 - 99 MG/DL   POCT Glucose    Collection Time: 11/07/22  9:11 AM   Result Value Ref Range    POC Glucose 95 70 - 99 MG/DL   POCT Glucose    Collection Time: 11/07/22 10:11 AM   Result Value Ref Range    POC Glucose 76 70 - 99 MG/DL   POCT Glucose    Collection Time: 11/07/22 11:47 AM   Result Value Ref Range    POC Glucose 95 70 - 99 MG/DL   POCT Glucose    Collection Time: 11/07/22  1:56 PM   Result Value Ref Range    POC Glucose 81 70 - 99 MG/DL        Imaging/Diagnostics Last 24 Hours   CT HEAD WO CONTRAST    Result Date: 11/4/2022  EXAMINATION: CT OF THE HEAD WITHOUT CONTRAST,  11/4/2022 6:01 pm TECHNIQUE: CT of the head was performed without the administration of intravenous contrast. Automated exposure control, iterative reconstruction, and/or weight based adjustment of the mA/kV was utilized to reduce the radiation dose to as low as reasonably achievable. COMPARISON: None HISTORY: ORDERING SYSTEM PROVIDED HISTORY:  Fall unresponsive cardiac arrest. TECHNOLOGIST PROVIDED HISTORY: Reason for Exam:  Fall unresponsive cardiac arrest. Has a \"code stroke\" or \"stroke alert\" been called? No Decision Support Exception - unselect if not a suspected or confirmed emergency medical condition->Emergency Medical Condition (MA). Is the patient pregnant? No Reason for Exam:  Fall; unresponsive Additional signs and symptoms:  Cardiac Arrest (ROSC on arrival) Relevant Medical/Surgical History:  None Initial evaluation FINDINGS: BRAIN/VENTRICLES: The ventricles are normal in size, shape and configuration for the age of the patient. There is diffuse low attenuation throughout the entire brain parenchyma suggestive of diffuse anoxic injury. There is diffuse cerebral edema. There is effacement the fourth ventricle with mild downward herniation. The results were called and discussed with Dr. Vincenzo Nathan 11/04/2022 at 8:11 p.m.  ORBITS: The visualized portion of the orbits demonstrate no acute abnormality. SINUSES: The visualized paranasal sinuses and mastoid air cells are for the most part clear. SOFT TISSUES/SKULL:  No acute abnormality of the visualized skull or soft tissues. Diffuse low attenuation throughout the entire cerebral hemispheres consistent with edema and diffuse anoxic injury. The fourth ventricle is effaced and the cerebellar tonsils are downward herniating through the foramen magnum. The findings were called and discussed with Dr. Dony Garcia. CT CERVICAL SPINE WO CONTRAST    Result Date: 11/4/2022  EXAMINATION: CT OF THE CERVICAL SPINE WITHOUT CONTRAST 11/4/2022 6:01 pm TECHNIQUE: CT of the cervical spine was performed without the administration of intravenous contrast. Multiplanar reformatted images are provided for review. Automated exposure control, iterative reconstruction, and/or weight based adjustment of the mA/kV was utilized to reduce the radiation dose to as low as reasonably achievable. COMPARISON: MRI cervical spine March 17, 2021; CT cervical spine June 1, 2020 HISTORY: ORDERING SYSTEM PROVIDED HISTORY: fall unresponsive TECHNOLOGIST PROVIDED HISTORY: Reason for exam:->fall unresponsive Decision Support Exception - unselect if not a suspected or confirmed emergency medical condition->Emergency Medical Condition (MA) Is the patient pregnant?->No Reason for Exam: fall unresponsive Additional signs and symptoms: Cardiac Arrest (ROSC on arrival) Relevant Medical/Surgical History: none FINDINGS: BONES/ALIGNMENT: Postoperative changes of cervical and thoracic fusion with hardware extending distally beyond the field-of-view. Pedicle screws present the C2 and C3 levels and at the C7 and T1 levels with corpectomy noted at the T2 level extending distally beyond the field-of-view. The imaged portions of the hardware appear grossly intact. No acute fracture identified.  DEGENERATIVE CHANGES: Multilevel mild spondylosis of the cervical spine. Solid osseous fusion of the posterior elements of the cervical spine with fusion hardware in place as noted above. SOFT TISSUES: Visualized lung apices demonstrate patchy airspace opacities. Endotracheal tube and enteric tube are in place. 1. No acute fracture of the cervical spine identified. 2. Patchy airspace opacities noted at the lung apices. XR CHEST PORTABLE    Result Date: 11/5/2022  EXAMINATION: ONE XRAY VIEW OF THE CHEST 11/5/2022 7:38 am COMPARISON: Chest x-rays earlier today. HISTORY: ORDERING SYSTEM PROVIDED HISTORY: ett placement TECHNOLOGIST PROVIDED HISTORY: Reason for exam:->ett placement Reason for Exam: ETT placement FINDINGS: Overlying items external to the patient somewhat limit evaluation. Endotracheal tube appears retracted from prior exam with tip now 3.7 cm from the richie. Other lines and tubes remain in place. No change in the heart or lungs with persistent diffuse airspace and interstitial opacities bilaterally. No definite pneumothoraces are seen. Cardiac and mediastinal silhouettes are stable. Stable appearance to bony structures. Endotracheal tube has been retracted with tip now 3.7 cm from the richie. Otherwise stable exam.     XR CHEST PORTABLE    Result Date: 11/5/2022  EXAMINATION: ONE XRAY VIEW OF THE CHEST 11/5/2022 5:06 am COMPARISON: November 5, 2022 HISTORY: ORDERING SYSTEM PROVIDED HISTORY: ett/OG placement TECHNOLOGIST PROVIDED HISTORY: Reason for exam:->ett/OG placement Reason for Exam: ett/OG placement FINDINGS: Orogastric tube tip terminates beyond the inferior margin of the radiograph. The endotracheal tube tip has been retracted and terminates approximately 5 mm above the richie. The cardiomediastinal silhouette is enlarged. Bilateral diffuse airspace opacities are noted. No pneumothorax. No new osseous abnormality extensive hardware is identified in the visualized thoracic and cervical spine.      1. Endotracheal tube tip terminates approximately 5 mm above the richie. Retraction by 2 cm would place it in the mid trachea. 2. Diffuse airspace opacities. 3. Nasogastric tube tip terminates off the inferior margin of the radiograph. The findings were sent to the Radiology Results [id number] at 5:59 am on 11/5/2022 to be communicated to a licensed caregiver. XR CHEST PORTABLE    Result Date: 11/5/2022  EXAMINATION: ONE XRAY VIEW OF THE CHEST 11/5/2022 1:26 am COMPARISON: 11/04/2022. HISTORY: ORDERING SYSTEM PROVIDED HISTORY: verify ett placement TECHNOLOGIST PROVIDED HISTORY: Reason for exam:->verify ett placement FINDINGS: Malpositioned enteric tube tip in the right mainstem bronchus. Enteric tube tip overlies the left upper quadrant with side port projecting in the region of the GE junction. Low lung volume. Progressed consolidation throughout the left lung. Heterogeneous opacities throughout the right lung. No definite pleural effusion or pneumothorax. Cardiomegaly. 1.  Malpositioned enteric tube tip in the right mainstem bronchus. Recommend repositioning. 2.  Enteric tube side port in the region of the GE junction, recommend 3-4 cm advancement into the stomach. 3.  Progressed consolidation throughout the left lung may represent atelectasis due to right mainstem bronchus intubation. 4.  Heterogeneous opacities throughout the right lung. Differential considerations include pulmonary edema and infection. The findings were sent to the Radiology Results [id number] at 3:48 am on 11/5/2022 to be communicated to a licensed caregiver. XR CHEST PORTABLE    Result Date: 11/4/2022  EXAMINATION: ONE XRAY VIEW OF THE CHEST 11/4/2022 3:56 pm COMPARISON: 10/28/2022. HISTORY: ORDERING SYSTEM PROVIDED HISTORY: tube and line placement TECHNOLOGIST PROVIDED HISTORY: Reason for exam:->tube and line placement Reason for Exam: tube and line placement AND NG PLACED FINDINGS: There are low lung volumes. The heart size is enlarged. The pulmonary vasculature is congested. There are bilateral infiltrates. There is an endotracheal tube with its tip above the richie. A nasogastric tube courses below the diaphragm. 1. Cardiomegaly with vascular congestion and bilateral infiltrates likely representing pulmonary edema. 2. Endotracheal tube in satisfactory position. CTA CHEST ABDOMEN PELVIS W CONTRAST    Addendum Date: 11/4/2022    ADDENDUM: Findings were discussed with Dr. Keturah Tidwell at 8:49 pm on 11/4/2022. Result Date: 11/4/2022  EXAMINATION: CTA OF THE CHEST, ABDOMEN AND PELVIS WITH CONTRAST 11/4/2022 6:02 pm TECHNIQUE: CTA of the chest, abdomen and pelvis was performed after the administration of intravenous contrast.  Multiplanar reformatted images are provided for review. MIP images are provided for review. Automated exposure control, iterative reconstruction, and/or weight based adjustment of the mA/kV was utilized to reduce the radiation dose to as low as reasonably achievable. COMPARISON: None CT chest, abdomen, and pelvis October 18, 2022 HISTORY: ORDERING SYSTEM PROVIDED HISTORY: cardiac arrest hypoxia hx  chf, TECHNOLOGIST PROVIDED HISTORY: Reason for exam:->cardiac arrest hypoxia hx  chf, Decision Support Exception - unselect if not a suspected or confirmed emergency medical condition->Emergency Medical Condition (MA) Reason for Exam: fall; unresponsive Additional signs and symptoms: Cardiac Arrest (ROSC on arrival) Relevant Medical/Surgical History: 80mL isovue 370 FINDINGS: Chest: Mediastinum: The heart and pericardium demonstrate no acute findings. Lungs/pleura: Multifocal airspace opacities throughout the upper and lower lobes and right middle lobe with atelectasis seen dependently at the left lung base and to a lesser extent the right upper lobe. Interlobular septal thickening also present. While findings may reflect pulmonary edema, superimposed aspiration/infection not excluded. No pneumothorax. Endotracheal tube present within the right mainstem bronchus. Recommend repositioning. Soft Tissues/Bones: There are multiple remote bilateral rib fractures. Superimposed acute fractures of the right anterior 2nd through 7th ribs and left 4th through 8th ribs. Contrast present within the subcutaneous tissues at the right antecubital fossa consistent with IV infiltration. Abdomen/Pelvis: Organs: The liver appears unremarkable. The gallbladder is surgically absent. The spleen demonstrates no acute abnormality. Fatty atrophy of the pancreas. The adrenal glands and kidneys demonstrate no acute findings. No hydronephrosis. GI/Bowel: No bowel obstruction. Moderate volume of stool throughout the colon. Normal appendix. Small bowel is normal in caliber. Enteric tube present with tip within the proximal stomach. Side port at the GE junction. Recommend advancement by approximately 5 cm. Pelvis: Angel catheter present within the bladder which is partially collapsed. Peritoneum/Retroperitoneum: The abdominal aorta is normal in caliber. No free fluid or free air. Bones/Soft Tissues: Postoperative changes of cervicothoracic fusion. No acute spinal fracture identified. Left-sided femoral central venous catheter present with tip extending to the left common iliac vein. 1. Diffuse airspace disease bilaterally. Findings suggest pulmonary edema with superimposed aspiration/infection not excluded. 2. Acute fractures of the right anterior 2nd through 7th ribs and left 4th through 8th ribs (grade 2 chest wall injury). 3. Endotracheal tube tip within the right mainstem bronchus. Recommend repositioning. 4. Enteric tube side port at the GE junction. Recommend advancement. 5. No acute intraabdominal process identified. 6. Contrast present within the subcutaneous tissues at the right antecubital fossa consistent with IV infiltration.      CT LUMBAR RECONSTRUCTION WO POST PROCESS    Result Date: 11/4/2022  EXAMINATION: CT OF THE LUMBAR SPINE WITHOUT CONTRAST; CT OF THE THORACIC SPINE WITHOUT CONTRAST 11/4/2022 TECHNIQUE: CT of the lumbar spine was performed without the administration of intravenous contrast. Multiplanar reformatted images are provided for review. Adjustment of mA and/or kV according to patient size was utilized. Automated exposure control, iterative reconstruction, and/or weight based adjustment of the mA/kV was utilized to reduce the radiation dose to as low as reasonably achievable.; CT of the thoracic spine was performed without the administration of intravenous contrast. Multiplanar reformatted images are provided for review. Automated exposure control, iterative reconstruction, and/or weight based adjustment of the mA/kV was utilized to reduce the radiation dose to as low as reasonably achievable. COMPARISON: None. HISTORY: ORDERING SYSTEM PROVIDED HISTORY: fall, unresponsivre TECHNOLOGIST PROVIDED HISTORY: Reason for exam:->fall, unresponsivre Decision Support Exception - unselect if not a suspected or confirmed emergency medical condition->Emergency Medical Condition (MA) Is the patient pregnant?->No Reason for Exam: fall; unresponsive Additional signs and symptoms: Cardiac Arrest (ROSC on arrival) Relevant Medical/Surgical History: none; ORDERING SYSTEM PROVIDED HISTORY: fall unresponsive TECHNOLOGIST PROVIDED HISTORY: Reason for exam:->fall unresponsive Decision Support Exception - unselect if not a suspected or confirmed emergency medical condition->Emergency Medical Condition (MA) Is the patient pregnant?->No Reason for Exam: fall; unresponsive Additional signs and symptoms: Cardiac Arrest (ROSC on arrival) Relevant Medical/Surgical History: none FINDINGS: BONES/ALIGNMENT: There are postsurgical changes of T2 corpectomy with posterior spinal fusion between C7 and T9. The alignment of the thoracic and lumbar spine is within normal limits. No acute fractures or dislocations are seen.  DEGENERATIVE CHANGES: No significant degenerative changes of the thoracic or lumbar spine. SOFT TISSUES: No paraspinal mass is seen. 1.No acute thoracic or lumbar spine trauma. CT THORACIC RECONSTRUCTION WO POST PROCESS    Result Date: 11/4/2022  EXAMINATION: CT OF THE LUMBAR SPINE WITHOUT CONTRAST; CT OF THE THORACIC SPINE WITHOUT CONTRAST 11/4/2022 TECHNIQUE: CT of the lumbar spine was performed without the administration of intravenous contrast. Multiplanar reformatted images are provided for review. Adjustment of mA and/or kV according to patient size was utilized. Automated exposure control, iterative reconstruction, and/or weight based adjustment of the mA/kV was utilized to reduce the radiation dose to as low as reasonably achievable.; CT of the thoracic spine was performed without the administration of intravenous contrast. Multiplanar reformatted images are provided for review. Automated exposure control, iterative reconstruction, and/or weight based adjustment of the mA/kV was utilized to reduce the radiation dose to as low as reasonably achievable. COMPARISON: None.  HISTORY: ORDERING SYSTEM PROVIDED HISTORY: fall, unresponsivre TECHNOLOGIST PROVIDED HISTORY: Reason for exam:->fall, unresponsivre Decision Support Exception - unselect if not a suspected or confirmed emergency medical condition->Emergency Medical Condition (MA) Is the patient pregnant?->No Reason for Exam: fall; unresponsive Additional signs and symptoms: Cardiac Arrest (ROSC on arrival) Relevant Medical/Surgical History: none; ORDERING SYSTEM PROVIDED HISTORY: fall unresponsive TECHNOLOGIST PROVIDED HISTORY: Reason for exam:->fall unresponsive Decision Support Exception - unselect if not a suspected or confirmed emergency medical condition->Emergency Medical Condition (MA) Is the patient pregnant?->No Reason for Exam: fall; unresponsive Additional signs and symptoms: Cardiac Arrest (ROSC on arrival) Relevant Medical/Surgical History: none FINDINGS: BONES/ALIGNMENT: There are postsurgical changes of T2 corpectomy with posterior spinal fusion between C7 and T9. The alignment of the thoracic and lumbar spine is within normal limits. No acute fractures or dislocations are seen. DEGENERATIVE CHANGES: No significant degenerative changes of the thoracic or lumbar spine. SOFT TISSUES: No paraspinal mass is seen. 1.No acute thoracic or lumbar spine trauma. VL DUP LOWER EXTREMITY VENOUS RIGHT    Result Date: 11/3/2022  EXAMINATION: DUPLEX VENOUS ULTRASOUND OF THE RIGHT LOWER EXTREMITY 11/3/2022 12:44 pm TECHNIQUE: Duplex ultrasound using B-mode/gray scaled imaging and Doppler spectral analysis and color flow was obtained of the deep venous structures of the right extremity. COMPARISON: None. HISTORY: ORDERING SYSTEM PROVIDED HISTORY: Peripheral edema TECHNOLOGIST PROVIDED HISTORY: Reason for exam:->recent hospital discharge, venous stasis with warmth Rt>Lt no hx. dvt positive [place] Reason for Exam: rt leg warmth, edema FINDINGS: The visualized veins of the right lower extremity are patent and free of echogenic thrombus. The veins demonstrate good compressibility with normal color flow study and spectral analysis. No evidence of DVT in the right lower extremity.        Electronically signed by Collin Steven MD on 11/7/2022 at 3:16 PM

## 2022-11-07 NOTE — PROCEDURES
ROUTINE ELECTROENCEPHALOGRAM    Identifying Information:  Name: Rakel Quinteros  MRN: [id number]  : 1980  Interpreting Physician: Africa Nobles DO  Referring Provider: Courtland Kussmaul, APRN - CNP  Date of EE22  Procedure Location: Inpatient      Clinical History:  Rakel Quinteros is a 38 y.o. female with cardiac arrest concerning for seizure. Current Medications:    ipratropium-albuterol  1 ampule Inhalation Q4H    sodium chloride  1,000 mL IntraVENous Once    sodium chloride  1,000 mL IntraVENous Once    sodium chloride flush  5-40 mL IntraVENous 2 times per day    chlorhexidine  15 mL Mouth/Throat BID    famotidine (PEPCID) injection  20 mg IntraVENous BID    enoxaparin  30 mg SubCUTAneous BID    insulin lispro  0-4 Units SubCUTAneous Q4H    cefepime  2,000 mg IntraVENous Q8H        Indication:  Rule out seizure/seizure disorder     Technical Summary:  28 channels of EEG were recorded in a digital format on a patient who is reported to be intubated and unresponsive during the recording. The patient was not sleep deprived prior to the EEG. The background consists of diffuse suppression. It is symmetric and continuous. Posterior dominant rhythm (PDR) is absent and the background is not reactive to stimulation. Photic stimulation was performed and did not produce any abnormalities. During the recording stage II sleep  was not seen. The EKG lead revealed no rhythm abnormalties. EEG Interpretation:   The EEG was abnormal due to the presence of:   Diffuse suppression. This is a non-specific finding but is consistent with a generalized disturbance of cerebral function. It may be seen in a variety of conditions, such as toxic, metabolic, post-anoxic, multi-focal or diffuse structural abnormalities. No electrographic seizures or non-convulsive status epilepticus was seen over the entire monitoring period. Clinical correlation recommended.      Africa Nobles DO Epileptologist  11/7/2022 4:39 PM

## 2022-11-07 NOTE — PROGRESS NOTES
Inpatient Progress Note 11/7/2022        Ranulfo Colon  1980  [id number]      Assessment/Plan:    Ranulfo Colon is a 38 y.o. female with a history of MRSA endocarditis, HFpEF, hypertension, hepatitis C, morbid obesity, chronic hypercarbic respiratory failure, obesity hypoventilation, polysubstance abuse who presented to [facility] 11/4/2022 with in the field cardiac arrest with prolonged overall resuscitation admitted to the ICU for hypothermic protocol management. Now with anoxic brain injury on exam and CTH        Problem list  Cardiac arrest  Anox brain injury  Cardiogenic shock  Acute respiratory failure  Acute renal injury  Lactic acidosis  Hypokalemia        Neuro: intubated, off sedation since this AM. Pupils fixed and dilated. Normothermic since 11/6 AM. CT showed evidence of anoxic encephalopathy. Poor prognosis. Given hemodynamic instability, not able to tolerate apnea testing will require ancillary test. Nuclear scan ordered   Cardio: Status post cardiac arrest, total downtime about 60 minutes with multiple events thereafter with return of spontaneous circulation. Troponin elevated in setting of ischemia. Bedside echo pending, trend troponin. Titrate levo/corine to MAP goal of greater than 65. Continue Vaso  Resp: Acute respiratory failure requiring mechanical ventilation. Chronic hypercarbic respiratory failure at baseline secondary to obesity hypoventilation syndrome versus obstructive sleep apnea. Chest x-ray showed bilateral infiltrates with pulmonary edema concern for aspiration in setting of cardiac arrest and poor airway protection. Broad-spectrum antibiotics started. Pulmonary toileting. Still on high vent settings  GI: No acute issues. N.p.o. at this time (triple pressors). GI prophylaxis. : LISBETH, elevated creatinine,Likely ATN, monitor lactic acidosis to 2.7. Monitor electrolytes as needed and replenish. Avoid aggressive resuscitation in setting of chronic heart failure.   Heme: ID: Sepsis? Possible pneumonia from aspiration broad-spectrum antibiotics. Follow-up cultures. Narrow when able. Follow-up Pro-Moises and MRSA in nares. Endo: POC  to 180 mg/dL. Insulin sliding scale as needed. MSK: No acute issues. Tubes/Lines/Drains: ET tube, femoral A-line, central line. Code Status: DNR CCA. Life connections contacted. Subjective: On triple pressors (added corine for up-trending levo requirements). Unable to get NM scan due to instability. Per family discussion with Dr. Gertrude Hutchison (see separate note), code status changed of DNR CCA. Leaning towards withdrawal if no evidence of brain activity on imaging. Physical Exam:            BP (!) 72/62   Pulse (!) 107 Comment: Simultaneous filing. User may not have seen previous data. Temp 98.6 °F (37 °C) (Bladder)   Resp 22 Comment: Simultaneous filing. User may not have seen previous data. Ht 5' 5\" (1.651 m)   Wt (!) 311 lb 8.2 oz (141.3 kg)   SpO2 98%   BMI 51.84 kg/m²     General: NAD  Eyes: fixed and dilated. ENT: neck supple  Cardiovascular: Regular rate. Respiratory: crackles  Gastrointestinal: Soft, non tender  Genitourinary: no suprapubic tenderness  Musculoskeletal: 2+ edema. Skin: cool and clamy  Neuro: GCS3T, no withdrawal to pain in all extremities.      Current Medications:   ipratropium-albuterol  1 ampule Inhalation Q4H    vancomycin (VANCOCIN) intermittent dosing (placeholder)   Other RX Placeholder    sodium chloride  1,000 mL IntraVENous Once    sodium chloride  1,000 mL IntraVENous Once    sodium chloride flush  5-40 mL IntraVENous 2 times per day    chlorhexidine  15 mL Mouth/Throat BID    famotidine (PEPCID) injection  20 mg IntraVENous BID    enoxaparin  30 mg SubCUTAneous BID    insulin lispro  0-4 Units SubCUTAneous Q4H    cefepime  2,000 mg IntraVENous Q8H       Labs, Imaging and Studies reviewed:    XR CHEST PORTABLE    Result Date: 11/5/2022  EXAMINATION: ONE XRAY VIEW OF THE CHEST 11/5/2022 5:06 am COMPARISON: November 5, 2022 HISTORY: ORDERING SYSTEM PROVIDED HISTORY: ett/OG placement TECHNOLOGIST PROVIDED HISTORY: Reason for exam:->ett/OG placement Reason for Exam: ett/OG placement FINDINGS: Orogastric tube tip terminates beyond the inferior margin of the radiograph. The endotracheal tube tip has been retracted and terminates approximately 5 mm above the richie. The cardiomediastinal silhouette is enlarged. Bilateral diffuse airspace opacities are noted. No pneumothorax. No new osseous abnormality extensive hardware is identified in the visualized thoracic and cervical spine. 1. Endotracheal tube tip terminates approximately 5 mm above the richie. Retraction by 2 cm would place it in the mid trachea. 2. Diffuse airspace opacities. 3. Nasogastric tube tip terminates off the inferior margin of the radiograph. The findings were sent to the Radiology Results [address] at 5:59 am on 11/5/2022 to be communicated to a licensed caregiver. XR CHEST PORTABLE    Result Date: 11/5/2022  EXAMINATION: ONE XRAY VIEW OF THE CHEST 11/5/2022 7:38 am COMPARISON: Chest x-rays earlier today. HISTORY: ORDERING SYSTEM PROVIDED HISTORY: ett placement TECHNOLOGIST PROVIDED HISTORY: Reason for exam:->ett placement Reason for Exam: ETT placement FINDINGS: Overlying items external to the patient somewhat limit evaluation. Endotracheal tube appears retracted from prior exam with tip now 3.7 cm from the richie. Other lines and tubes remain in place. No change in the heart or lungs with persistent diffuse airspace and interstitial opacities bilaterally. No definite pneumothoraces are seen. Cardiac and mediastinal silhouettes are stable. Stable appearance to bony structures. Endotracheal tube has been retracted with tip now 3.7 cm from the richie.  Otherwise stable exam.     XR CHEST PORTABLE    Result Date: 11/5/2022  EXAMINATION: ONE XRAY VIEW OF THE CHEST 11/5/2022 1:26 am COMPARISON: 11/04/2022. HISTORY: ORDERING SYSTEM PROVIDED HISTORY: verify ett placement TECHNOLOGIST PROVIDED HISTORY: Reason for exam:->verify ett placement FINDINGS: Malpositioned enteric tube tip in the right mainstem bronchus. Enteric tube tip overlies the left upper quadrant with side port projecting in the region of the GE junction. Low lung volume. Progressed consolidation throughout the left lung. Heterogeneous opacities throughout the right lung. No definite pleural effusion or pneumothorax. Cardiomegaly. 1.  Malpositioned enteric tube tip in the right mainstem bronchus. Recommend repositioning. 2.  Enteric tube side port in the region of the GE junction, recommend 3-4 cm advancement into the stomach. 3.  Progressed consolidation throughout the left lung may represent atelectasis due to right mainstem bronchus intubation. 4.  Heterogeneous opacities throughout the right lung. Differential considerations include pulmonary edema and infection. The findings were sent to the Radiology Results [id number] at 3:48 am on 11/5/2022 to be communicated to a licensed caregiver.        Recent Results (from the past 24 hour(s))   POCT Glucose    Collection Time: 11/06/22 12:09 PM   Result Value Ref Range    POC Glucose 90 70 - 99 MG/DL   POCT Glucose    Collection Time: 11/06/22  4:51 PM   Result Value Ref Range    POC Glucose 81 70 - 99 MG/DL   Blood gas, arterial    Collection Time: 11/06/22  5:00 PM   Result Value Ref Range    pH, Bld 7.26 (L) 7.34 - 7.45    pCO2, Arterial 55.0 (H) 32 - 45 MMHG    pO2, Arterial 95 75 - 100 MMHG    Base Excess 3 (H) 0 - 2.4    HCO3, Arterial 24.7 (H) 18 - 23 MMOL/L    CO2 Content 26.4 (H) 19 - 24 MMOL/L    O2 Sat 94.0 (L) 96 - 97 %    Carbon Monoxide, Blood 1.7 0 - 5 %    Methemoglobin, Arterial 0.9 <1.5 %    Comment 420 22 .7 12P    POCT Glucose    Collection Time: 11/06/22  8:14 PM   Result Value Ref Range    POC Glucose 104 (H) 70 - 99 MG/DL   POCT Glucose    Collection Time: 11/06/22 10:48 PM   Result Value Ref Range    POC Glucose 93 70 - 99 MG/DL   POCT Glucose    Collection Time: 11/07/22  2:14 AM   Result Value Ref Range    POC Glucose 96 70 - 99 MG/DL   Vancomycin Level, Random    Collection Time: 11/07/22  3:20 AM   Result Value Ref Range    Vancomycin Rm 31.8 UG/ML    DOSE AMOUNT DOSE AMT.  GIVEN - 2000     DOSE TIME DOSE TIME GIVEN - 1500    Procalcitonin    Collection Time: 11/07/22  3:20 AM   Result Value Ref Range    Procalcitonin 17.36    CBC    Collection Time: 11/07/22  3:20 AM   Result Value Ref Range    WBC 20.1 (H) 4.0 - 10.5 K/CU MM    RBC 4.05 (L) 4.2 - 5.4 M/CU MM    Hemoglobin 11.3 (L) 12.5 - 16.0 GM/DL    Hematocrit 38.0 37 - 47 %    MCV 93.8 78 - 100 FL    MCH 27.9 27 - 31 PG    MCHC 29.7 (L) 32.0 - 36.0 %    RDW 19.1 (H) 11.7 - 14.9 %    Platelets 167 573 - 601 K/CU MM    MPV 10.8 7.5 - 11.1 FL   Basic Metabolic Panel    Collection Time: 11/07/22  3:20 AM   Result Value Ref Range    Sodium 138 135 - 145 MMOL/L    Potassium 4.6 3.5 - 5.1 MMOL/L    Chloride 100 99 - 110 mMol/L    CO2 23 21 - 32 MMOL/L    Anion Gap 15 4 - 16    BUN 52 (H) 6 - 23 MG/DL    Creatinine 2.9 (H) 0.6 - 1.1 MG/DL    Est, Glom Filt Rate 20 (L) >60 mL/min/1.73m2    Glucose 84 70 - 99 MG/DL    Calcium 8.2 (L) 8.3 - 10.6 MG/DL   Magnesium    Collection Time: 11/07/22  3:20 AM   Result Value Ref Range    Magnesium 1.6 (L) 1.8 - 2.4 mg/dl   Phosphorus    Collection Time: 11/07/22  3:20 AM   Result Value Ref Range    Phosphorus 5.5 (H) 2.5 - 4.9 MG/DL   Blood gas, arterial    Collection Time: 11/07/22  6:00 AM   Result Value Ref Range    pH, Bld 7.27 (L) 7.34 - 7.45    pCO2, Arterial 56.0 (H) 32 - 45 MMHG    pO2, Arterial 86 75 - 100 MMHG    Base Excess 2 0 - 2.4    HCO3, Arterial 25.7 (H) 18 - 23 MMOL/L    CO2 Content 27.4 (H) 19 - 24 MMOL/L    O2 Sat 93.9 (L) 96 - 97 %    Carbon Monoxide, Blood 2.1 0 - 5 %    Methemoglobin, Arterial 0.8 <1.5 %    Comment AC22 420 70% 12peep    POCT Glucose Collection Time: 11/07/22  6:58 AM   Result Value Ref Range    POC Glucose 93 70 - 99 MG/DL   POCT Glucose    Collection Time: 11/07/22  7:53 AM   Result Value Ref Range    POC Glucose 53 (L) 70 - 99 MG/DL   POCT Glucose    Collection Time: 11/07/22  8:32 AM   Result Value Ref Range    POC Glucose 79 70 - 99 MG/DL   POCT Glucose    Collection Time: 11/07/22  9:11 AM   Result Value Ref Range    POC Glucose 95 70 - 99 MG/DL     Critical care attestation:    I spent 35 cumulative minutes (excluding procedures), in full attention to this critically ill patient. I have personally reviewed the patient's history and interval events in the EMR, along with vitals, labs and radiology imaging. Critical care time was spent personally providing care for this patient, excluding billable procedures, and no overlapping with any other provider. This includes documenting my assessment and plan of care and developing the care plan to treat the problems below.     The high probability of deterioration required my full and direct attention, intervention, and personal management due to do to underlying diagnosis and clinical problems including the treatment of active or impending:  [x] Neurological monitoring and treatment  [x] Respiratory failure -assessment of ventilator support, adjustment, ventilator weaning  [x] Hemodynamic and volume assessment with volume resuscitation  [x] Mechanical and/or chemical support of the circulation,  [x] Frequent vasoactive agent adjustment,  [] Renal replacement therapy,  [] For rapid decompensation,  [] Electrolyte instability  [] Suctioning every 2 hours  [] Every hour neuro checks  [] Every hour neurovascular checks  [] Frequent evaluation of patient's response to treatment and titration of therapies,  [] Interpretation of laboratory and radiological data,  [] Application and interpretation of advanced monitoring technologies,  [] Extensive interpretation of multiple databases,  [] Development of treatment plan with patient, surrogate, or consultants.   [] Others:     Electronically signed by Zheng Nichols MD on 11/7/2022 at 9:42 AM

## 2022-11-08 NOTE — SIGNIFICANT EVENT
Neurologic Evaluation for Brain Death    Evaluation performed at bedside. Prerequisites for brain death evaluation  - Known cause of irreversible brain injury: anoxic injury.  - No severe metabolic derangements which could explain absence of neurologic reflexes. - Patient has been off from all sedation for greater than 24 hours with no improvement or change in exam  - Pt is normothermic.  - SBP >100    Neurologic Examination  Pupils mid-position and fixed. Oculocephalic and corneal reflexes absent, and there is no significant ocular edema or facial trauma which might limit this evaluation. Cold caloric testing resulted in no eye movement. Gag/cough reflex absent with stimulation and deep suction. No spontaneous motor activity noted. No motor response to noxious stimulus in any extremity. No spontaneous breathing over vent. Apnea testing was unable to be completed due to hemodynamic instability. Ancillary Testing  Nuclear flow study consistent with brain death. Based on the above findings, pt meets clinical criteria for brain death. Time of death called at [phone] on 11/8/2022 after completion of full bedside neurological examination. Nuclear flow study has been completed just prior and results consistent with brain death. Results of nuclear flow as well as conclusion of exam discussed with multiple family members at bedside as well as critical care.     Sarwat Milan DO 11/8/2022

## 2022-11-08 NOTE — PROGRESS NOTES
Neurology Service Progress Note  [address]   Patient Name: Jigar Mack  : 1980        Subjective:   Reason for consult: Anoxic brain injury  Chart was reviewed in detail. Patient was seen and assessed. Multiple family members at bedside today. Plan for brain flow study at 11:00 am today. Neurologically no change today. No evidence for cortical function and absence of brain stem reflexes. Patient remains on 3 vasopressors for hypotension. EEG completed, no evidence for non-convulsive status, does note diffuse suppression. All questions answered all questions as able. Family states they are trying to gather as much information as possible in the event they need to make a decision. Express that the patient would want to be kept alive in this state. Family has many questions about the possibility of organ donation. I have deferred to the appropriate team but did share appreciation for their thoughtfulness at this difficult time. Emotional support provided to family.      Past Medical History:   Diagnosis Date    Hepatitis C     Liver disease     hepatitis    No significant medical problems     :   Past Surgical History:   Procedure Laterality Date     SECTION  , 2007    x 2    CHOLECYSTECTOMY      FINGER AMPUTATION Right 2019    right hand 3rd finger    HYSTERECTOMY (CERVIX STATUS UNKNOWN)  2008    [facility]    INCISION AND DRAINAGE Right 2020    RIGHT UPPER THIGH INCISION AND DRAINAGE performed by Adrian Escalona MD at [facility]  2013    lap sera    SPINAL FUSION  2022    pt unsure exact location     Medications:  Scheduled Meds:   ipratropium-albuterol  1 ampule Inhalation Q4H    sodium chloride  1,000 mL IntraVENous Once    sodium chloride  1,000 mL IntraVENous Once    sodium chloride flush  5-40 mL IntraVENous 2 times per day    chlorhexidine  15 mL Mouth/Throat BID    famotidine (PEPCID) injection  20 mg IntraVENous BID enoxaparin  30 mg SubCUTAneous BID    insulin lispro  0-4 Units SubCUTAneous Q4H    cefepime  2,000 mg IntraVENous Q8H     Continuous Infusions:   dextrose 75 mL/hr at 11/08/22 8091    phenylephrine (KINZA-SYNEPHRINE) 50mg/250mL infusion 80 mcg/min (11/08/22 0951)    vasopressin (Septic Shock) infusion 0.04 Units/min (11/08/22 0610)    norepinephrine 21 mcg/min (11/08/22 0944)    lactated ringers 100 mL/hr at 11/04/22 1641    sodium chloride 100 mL/hr at 11/05/22 0057    dextrose       PRN Meds:.potassium chloride **OR** potassium chloride, lubrifresh P.M., sodium chloride flush, polyethylene glycol, acetaminophen **OR** acetaminophen, ondansetron **OR** ondansetron, potassium chloride, magnesium sulfate, sodium phosphate IVPB **OR** sodium phosphate IVPB, glucose, dextrose bolus **OR** dextrose bolus, dextrose, glucagon (rDNA)    Allergies   Allergen Reactions    Buprenorphine-Naloxone Itching, Rash and Shortness Of Breath    Subutex [Buprenorphine] Nausea And Vomiting    Amoxicillin-Pot Clavulanate Rash     Social History     Socioeconomic History    Marital status: Single     Spouse name: Not on file    Number of children: 2    Years of education: Not on file    Highest education level: Not on file   Occupational History    Occupation:    Tobacco Use    Smoking status: Every Day     Packs/day: 0.50     Years: 12.00     Pack years: 6.00     Types: Cigarettes     Start date: 1998    Smokeless tobacco: Never   Vaping Use    Vaping Use: Never used   Substance and Sexual Activity    Alcohol use: No    Drug use: Not Currently     Types: IV, Opiates      Comment: heroin-last used 1/2022    Sexual activity: Yes     Partners: Male   Other Topics Concern    Not on file   Social History Narrative    Do you donate blood or plasma? Yes    Caffeine intake? Moderate    Advance directive? No    Is blood transfusion acceptable in an emergency?  Yes             Social Determinants of Health     Financial Resource Strain: High Risk    Difficulty of Paying Living Expenses: Hard   Food Insecurity: No Food Insecurity    Worried About Running Out of Food in the Last Year: Never true    Ran Out of Food in the Last Year: Never true   Transportation Needs: Not on file   Physical Activity: Not on file   Stress: Not on file   Social Connections: Not on file   Intimate Partner Violence: Not on file   Housing Stability: Not on file      Family History   Problem Relation Age of Onset    Migraines Mother     Obesity Brother     Migraines Maternal Grandmother     COPD Paternal Grandmother     Dementia Paternal Grandfather     Depression Son     Mental Illness Son         Bipole, ADHD, Adjustment disorder    Mental Illness Maternal Aunt     Colon Cancer Neg Hx          ROS (10 systems)  Unable to complete, intubated/unresponsive    Physical Exam:      Wt Readings from Last 3 Encounters:   11/06/22 (!) 311 lb 8.2 oz (141.3 kg)   11/03/22 (!) 303 lb 1.6 oz (137.5 kg)   10/28/22 292 lb (132.5 kg)     Temp Readings from Last 3 Encounters:   11/08/22 98.8 °F (37.1 °C) (Bladder)   10/28/22 97.5 °F (36.4 °C) (Oral)   10/21/22 97.5 °F (36.4 °C) (Oral)     BP Readings from Last 3 Encounters:   11/08/22 (!) 102/52   11/03/22 114/64   10/28/22 91/71     Pulse Readings from Last 3 Encounters:   11/08/22 (!) 106   11/03/22 85   10/28/22 89        Gen: Intubated, ventilated, not sedated, unresponsive  HEENT: NC/AT, no Oculocephalic response, pupils 6 mm bilaterally and non responsive, no cough/gag, negative corneal, mmm, neck supple, no meningeal signs;    Heart: ST  Lungs: Intubated/ventilated  Ext: no edema, no calf tenderness b/l  Psych: Unresponsive  Skin: no rashes or lesions    NEUROLOGIC EXAM:    Mental Status: Unresponsive, intubated/not sedated    Cranial Nerve Exam:   CN II-XII: Pupils 6 mm bilaterally and non responsive, no nystagmus, no gaze paresis, no Oculocephalic response, no corneal response,  unable to assess V1-V3 b/l, muscles of facial expression symmetric; No response to sound, no cough/gag    Motor Exam:       Strength 0/5 UE's/LE's b/l  Tone and bulk normal   No spontaneous movement throughout    Deep Tendon Reflexes: 2/4 biceps, triceps, brachioradialis, 0/4 patellar, and achilles b/l; mute plantar responses b/l    Sensation: No response to light touch/pinprick/vibration UE's/LE's b/l. No response to noxious stimuli throughout    Coordination/Cerebellum:       Tremors--none      Rapidly alternating movements: DAYSI      Heel-to-Shin: DAYSI      Finger-to-Nose: DAYSI    Gait and stance:      Gait: deferred      LABS:     Recent Labs     11/06/22  0255 11/06/22  0638 11/07/22  0320 11/08/22  0838   WBC  --   --  20.1*  --     142 138 134*   K 3.7 3.5 4.6 4.2     --  100 98*   CO2 27 32 23 25   BUN 47*  --  52* 44*   CREATININE 2.7* 2.4* 2.9* 2.2*   GLUCOSE 87  --  84 99         IMAGING:    CT head w/o contrast:  Impression   Diffuse low attenuation throughout the entire cerebral hemispheres consistent   with edema and diffuse anoxic injury. The fourth ventricle is effaced and   the cerebellar tonsils are downward herniating through the foramen magnum. The findings were called and discussed with Dr. Shivam Goins. RAFI--IMPRESSION:10/22  1. No vegetation noted on the tricuspid valve, pulmonic valve, aortic  valve, and mitral valve. 2.  Moderate tricuspid regurgitation noted. 3.  LV function is preserved, greater than 55%. 4.  No clot or thrombus noted in the left atrium or left atrial  appendage. 5.  No pericardial effusion noted. Cath-10/22--IMPRESSION:  1. Left main is patent. 2.  LAD, circ, and RCA has mild disease present. 3.  EDP is around 20 mmHg present. All imaging was personally reviewed    Routine EEG:  EEG Interpretation:   The EEG was abnormal due to the presence of:   Diffuse suppression. This is a non-specific finding but is consistent with a generalized disturbance of cerebral function.  It may be seen in a variety of conditions, such as toxic, metabolic, post-anoxic, multi-focal or diffuse structural abnormalities. No electrographic seizures or non-convulsive status epilepticus was seen over the entire monitoring period. All imaging was personally reviewed    ASSESSMENT/PLAN:   38year old female with prolonged cardiac arrest. Patient is seen today intubated and not sedated. She is non responsive to voice or tactile stimulation. Pupils are 6 mm and fixed bilaterally. No oculocephalic response,  corneal reflexes are absent. There is no cough and gag with inline suction. She has no response to painful stimulation in the upper or lower extremities. Babinski is negative. Patient has been off of sedation for greater than 24 hours at time of exam.  Anoxic brain injury in the setting of prolonged cardiac arrest.   CT head as above  Brain flow study has been ordered and is pending  Plan for 11 am today  Sedation/Fentanyl discontinued at 0642 11/7  EEG completed as above - notes diffuse suppression  Metabolic derangements - management per CC  Bun 44, Creatinine 2.2, pH 7.32, WBC 20.1  Remains on 3 vasopressors  Overall poor prognosis for meaningful neurologic recovery. Will continue to follow for serial neurological exams    > 45 minutes of time spent included chart review, obtaining history, patient examination, developing plan of care, and documentation. Patient was discussed with attending neurologist Dr. Ruby Roy. Thank you for allowing us to participate in the care of your patient. If there are any questions regarding evaluation please feel free to contact us. LEIGHTON Abraham CNP 11/8/2022 10:04 AM  Neurology     Attending Note:  I have rounded on this patient with LEIGHTON Abraham CNP. I have reviewed the chart and we have discussed this case in detail. The patient was seen and examined by myself. Pertinent labs and imaging have been personally reviewed.  Changes were made to the note as appropriate. I concur with the above assessment and plan. Patient seen and examined this afternoon. She had just returned from nuclear flow study. Multiple family members are present. We discussed our previous as well as current exam findings which are consistent with brain death. We also discussed the results of the nuclear flow study which demonstrates absent blood flow to the brain. This coupled with her unimproving exam and anoxic injury are consistent with clinical brain death which was determined after repeat clinical exam today, please see separate brain death note. Family was updated at bedside.     Lashae Luna DO 11/8/2022 2:33 PM  Neurology

## 2022-11-08 NOTE — DISCHARGE SUMMARY
Date: 11/7/2022  EXAMINATION: CT OF THE HEAD WITHOUT CONTRAST,  11/4/2022 6:01 pm TECHNIQUE: CT of the head was performed without the administration of intravenous contrast. Automated exposure control, iterative reconstruction, and/or weight based adjustment of the mA/kV was utilized to reduce the radiation dose to as low as reasonably achievable. COMPARISON: None HISTORY: ORDERING SYSTEM PROVIDED HISTORY:  Fall unresponsive cardiac arrest. TECHNOLOGIST PROVIDED HISTORY: Reason for Exam:  Fall unresponsive cardiac arrest. Has a \"code stroke\" or \"stroke alert\" been called? No Decision Support Exception - unselect if not a suspected or confirmed emergency medical condition->Emergency Medical Condition ([place]). Is the patient pregnant? No Reason for Exam:  Fall; unresponsive Additional signs and symptoms:  Cardiac Arrest (ROSC on arrival) Relevant Medical/Surgical History:  None Initial evaluation FINDINGS: BRAIN/VENTRICLES: The ventricles are normal in size, shape and configuration for the age of the patient. There is diffuse low attenuation throughout the entire brain parenchyma suggestive of diffuse anoxic injury. There is diffuse cerebral edema. There is effacement the fourth ventricle with mild downward herniation. The results were called and discussed with Dr. Damaso Sorto 11/04/2022 at 8:11 p.m. ORBITS: The visualized portion of the orbits demonstrate no acute abnormality. SINUSES: The visualized paranasal sinuses and mastoid air cells are for the most part clear. SOFT TISSUES/SKULL:  No acute abnormality of the visualized skull or soft tissues. Diffuse low attenuation throughout the entire cerebral hemispheres consistent with edema and diffuse anoxic injury. The fourth ventricle is effaced and the cerebellar tonsils are downward herniating through the foramen magnum. The findings were called and discussed with Dr. Damaso Sorto.      CT CERVICAL SPINE WO CONTRAST    Result Date: 11/4/2022  EXAMINATION: CT OF THE CERVICAL SPINE WITHOUT CONTRAST 11/4/2022 6:01 pm TECHNIQUE: CT of the cervical spine was performed without the administration of intravenous contrast. Multiplanar reformatted images are provided for review. Automated exposure control, iterative reconstruction, and/or weight based adjustment of the mA/kV was utilized to reduce the radiation dose to as low as reasonably achievable. COMPARISON: MRI cervical spine March 17, 2021; CT cervical spine June 1, 2020 HISTORY: ORDERING SYSTEM PROVIDED HISTORY: fall unresponsive TECHNOLOGIST PROVIDED HISTORY: Reason for exam:->fall unresponsive Decision Support Exception - unselect if not a suspected or confirmed emergency medical condition->Emergency Medical Condition ([place]) Is the patient pregnant?->No Reason for Exam: fall unresponsive Additional signs and symptoms: Cardiac Arrest (ROSC on arrival) Relevant Medical/Surgical History: none FINDINGS: BONES/ALIGNMENT: Postoperative changes of cervical and thoracic fusion with hardware extending distally beyond the field-of-view. Pedicle screws present the C2 and C3 levels and at the C7 and T1 levels with corpectomy noted at the T2 level extending distally beyond the field-of-view. The imaged portions of the hardware appear grossly intact. No acute fracture identified. DEGENERATIVE CHANGES: Multilevel mild spondylosis of the cervical spine. Solid osseous fusion of the posterior elements of the cervical spine with fusion hardware in place as noted above. SOFT TISSUES: Visualized lung apices demonstrate patchy airspace opacities. Endotracheal tube and enteric tube are in place. 1. No acute fracture of the cervical spine identified. 2. Patchy airspace opacities noted at the lung apices.      NM BRAIN SCAN COMPLETE W VASCULAR FLOW (MIN 4 VIEWS)    Result Date: 11/8/2022  EXAMINATION: NUCLEAR MEDICINE BRAIN FLOW SCAN 11/8/2022 12:13 pm COMPARISON: CT scan of the head 11/04/2022 HISTORY: ORDERING SYSTEM PROVIDED HISTORY: brain death TECHNOLOGIST PROVIDED HISTORY: Reason for exam:->brain death Is the patient pregnant?->No Reason for Exam: to determine brain death TECHNIQUE: 47.1 millicuries technetium 49S Neurolite was administered intravenously. Dynamic images of the head are obtained. Delayed static images are obtained. Myriam Dupont FINDINGS: Good bolus of radiotracer seen within the carotid arteries. No perfusion to the brain is demonstrated. No activity is seen within the brain on the delayed images. Absent blood flow to the brain. This supports a clinical diagnosis of brain death. Critical results were called by Dr. Gretchen Lira. Rebecca Adan to Dr. Keily Dickinson on 11/8/2022 at 12:49. XR CHEST PORTABLE    Result Date: 11/5/2022  EXAMINATION: ONE XRAY VIEW OF THE CHEST 11/5/2022 5:06 am COMPARISON: November 5, 2022 HISTORY: ORDERING SYSTEM PROVIDED HISTORY: ett/OG placement TECHNOLOGIST PROVIDED HISTORY: Reason for exam:->ett/OG placement Reason for Exam: ett/OG placement FINDINGS: Orogastric tube tip terminates beyond the inferior margin of the radiograph. The endotracheal tube tip has been retracted and terminates approximately 5 mm above the richie. The cardiomediastinal silhouette is enlarged. Bilateral diffuse airspace opacities are noted. No pneumothorax. No new osseous abnormality extensive hardware is identified in the visualized thoracic and cervical spine. 1. Endotracheal tube tip terminates approximately 5 mm above the richie. Retraction by 2 cm would place it in the mid trachea. 2. Diffuse airspace opacities. 3. Nasogastric tube tip terminates off the inferior margin of the radiograph. The findings were sent to the Radiology Results [id number] at 5:59 am on 11/5/2022 to be communicated to a licensed caregiver. XR CHEST PORTABLE    Result Date: 11/5/2022  EXAMINATION: ONE XRAY VIEW OF THE CHEST 11/5/2022 7:38 am COMPARISON: Chest x-rays earlier today.  HISTORY: ORDERING SYSTEM PROVIDED HISTORY: ett placement TECHNOLOGIST PROVIDED HISTORY: Reason for exam:->ett placement Reason for Exam: ETT placement FINDINGS: Overlying items external to the patient somewhat limit evaluation. Endotracheal tube appears retracted from prior exam with tip now 3.7 cm from the richie. Other lines and tubes remain in place. No change in the heart or lungs with persistent diffuse airspace and interstitial opacities bilaterally. No definite pneumothoraces are seen. Cardiac and mediastinal silhouettes are stable. Stable appearance to bony structures. Endotracheal tube has been retracted with tip now 3.7 cm from the richie. Otherwise stable exam.     XR CHEST PORTABLE    Result Date: 11/5/2022  EXAMINATION: ONE XRAY VIEW OF THE CHEST 11/5/2022 1:26 am COMPARISON: 11/04/2022. HISTORY: ORDERING SYSTEM PROVIDED HISTORY: verify ett placement TECHNOLOGIST PROVIDED HISTORY: Reason for exam:->verify ett placement FINDINGS: Malpositioned enteric tube tip in the right mainstem bronchus. Enteric tube tip overlies the left upper quadrant with side port projecting in the region of the GE junction. Low lung volume. Progressed consolidation throughout the left lung. Heterogeneous opacities throughout the right lung. No definite pleural effusion or pneumothorax. Cardiomegaly. 1.  Malpositioned enteric tube tip in the right mainstem bronchus. Recommend repositioning. 2.  Enteric tube side port in the region of the GE junction, recommend 3-4 cm advancement into the stomach. 3.  Progressed consolidation throughout the left lung may represent atelectasis due to right mainstem bronchus intubation. 4.  Heterogeneous opacities throughout the right lung. Differential considerations include pulmonary edema and infection. The findings were sent to the Radiology Results [id number] at 3:48 am on 11/5/2022 to be communicated to a licensed caregiver.      XR CHEST PORTABLE    Result Date: 11/4/2022  EXAMINATION: ONE XRAY VIEW OF THE CHEST 11/4/2022 3:56 pm COMPARISON: 10/28/2022. HISTORY: ORDERING SYSTEM PROVIDED HISTORY: tube and line placement TECHNOLOGIST PROVIDED HISTORY: Reason for exam:->tube and line placement Reason for Exam: tube and line placement AND NG PLACED FINDINGS: There are low lung volumes. The heart size is enlarged. The pulmonary vasculature is congested. There are bilateral infiltrates. There is an endotracheal tube with its tip above the richie. A nasogastric tube courses below the diaphragm. 1. Cardiomegaly with vascular congestion and bilateral infiltrates likely representing pulmonary edema. 2. Endotracheal tube in satisfactory position. CTA CHEST ABDOMEN PELVIS W CONTRAST    Addendum Date: 11/4/2022    ADDENDUM: Findings were discussed with Dr. Janine Chowdhury at 8:49 pm on 11/4/2022. Result Date: 11/4/2022  EXAMINATION: CTA OF THE CHEST, ABDOMEN AND PELVIS WITH CONTRAST 11/4/2022 6:02 pm TECHNIQUE: CTA of the chest, abdomen and pelvis was performed after the administration of intravenous contrast.  Multiplanar reformatted images are provided for review. MIP images are provided for review. Automated exposure control, iterative reconstruction, and/or weight based adjustment of the mA/kV was utilized to reduce the radiation dose to as low as reasonably achievable. COMPARISON: None CT chest, abdomen, and pelvis October 18, 2022 HISTORY: ORDERING SYSTEM PROVIDED HISTORY: cardiac arrest hypoxia hx  chf, TECHNOLOGIST PROVIDED HISTORY: Reason for exam:->cardiac arrest hypoxia hx  chf, Decision Support Exception - unselect if not a suspected or confirmed emergency medical condition->Emergency Medical Condition ([place]) Reason for Exam: fall; unresponsive Additional signs and symptoms: Cardiac Arrest (ROSC on arrival) Relevant Medical/Surgical History: 80mL isovue 370 FINDINGS: Chest: Mediastinum: The heart and pericardium demonstrate no acute findings.  Lungs/pleura: Multifocal airspace opacities throughout the upper and lower lobes and right middle lobe with atelectasis seen dependently at the left lung base and to a lesser extent the right upper lobe. Interlobular septal thickening also present. While findings may reflect pulmonary edema, superimposed aspiration/infection not excluded. No pneumothorax. Endotracheal tube present within the right mainstem bronchus. Recommend repositioning. Soft Tissues/Bones: There are multiple remote bilateral rib fractures. Superimposed acute fractures of the right anterior 2nd through 7th ribs and left 4th through 8th ribs. Contrast present within the subcutaneous tissues at the right antecubital fossa consistent with IV infiltration. Abdomen/Pelvis: Organs: The liver appears unremarkable. The gallbladder is surgically absent. The spleen demonstrates no acute abnormality. Fatty atrophy of the pancreas. The adrenal glands and kidneys demonstrate no acute findings. No hydronephrosis. GI/Bowel: No bowel obstruction. Moderate volume of stool throughout the colon. Normal appendix. Small bowel is normal in caliber. Enteric tube present with tip within the proximal stomach. Side port at the GE junction. Recommend advancement by approximately 5 cm. Pelvis: Angel catheter present within the bladder which is partially collapsed. Peritoneum/Retroperitoneum: The abdominal aorta is normal in caliber. No free fluid or free air. Bones/Soft Tissues: Postoperative changes of cervicothoracic fusion. No acute spinal fracture identified. Left-sided femoral central venous catheter present with tip extending to the left common iliac vein. 1. Diffuse airspace disease bilaterally. Findings suggest pulmonary edema with superimposed aspiration/infection not excluded. 2. Acute fractures of the right anterior 2nd through 7th ribs and left 4th through 8th ribs (grade 2 chest wall injury). 3. Endotracheal tube tip within the right mainstem bronchus. Recommend repositioning.  4. Enteric tube side port at the GE junction. Recommend advancement. 5. No acute intraabdominal process identified. 6. Contrast present within the subcutaneous tissues at the right antecubital fossa consistent with IV infiltration. CT LUMBAR RECONSTRUCTION WO POST PROCESS    Result Date: 11/4/2022  EXAMINATION: CT OF THE LUMBAR SPINE WITHOUT CONTRAST; CT OF THE THORACIC SPINE WITHOUT CONTRAST 11/4/2022 TECHNIQUE: CT of the lumbar spine was performed without the administration of intravenous contrast. Multiplanar reformatted images are provided for review. Adjustment of mA and/or kV according to patient size was utilized. Automated exposure control, iterative reconstruction, and/or weight based adjustment of the mA/kV was utilized to reduce the radiation dose to as low as reasonably achievable.; CT of the thoracic spine was performed without the administration of intravenous contrast. Multiplanar reformatted images are provided for review. Automated exposure control, iterative reconstruction, and/or weight based adjustment of the mA/kV was utilized to reduce the radiation dose to as low as reasonably achievable. COMPARISON: None.  HISTORY: ORDERING SYSTEM PROVIDED HISTORY: fall, unresponsivre TECHNOLOGIST PROVIDED HISTORY: Reason for exam:->fall, unresponsivre Decision Support Exception - unselect if not a suspected or confirmed emergency medical condition->Emergency Medical Condition ([place]) Is the patient pregnant?->No Reason for Exam: fall; unresponsive Additional signs and symptoms: Cardiac Arrest (ROSC on arrival) Relevant Medical/Surgical History: none; ORDERING SYSTEM PROVIDED HISTORY: fall unresponsive TECHNOLOGIST PROVIDED HISTORY: Reason for exam:->fall unresponsive Decision Support Exception - unselect if not a suspected or confirmed emergency medical condition->Emergency Medical Condition ([place]) Is the patient pregnant?->No Reason for Exam: fall; unresponsive Additional signs and symptoms: Cardiac Arrest (ROSC on arrival) Relevant Medical/Surgical History: none FINDINGS: BONES/ALIGNMENT: There are postsurgical changes of T2 corpectomy with posterior spinal fusion between C7 and T9. The alignment of the thoracic and lumbar spine is within normal limits. No acute fractures or dislocations are seen. DEGENERATIVE CHANGES: No significant degenerative changes of the thoracic or lumbar spine. SOFT TISSUES: No paraspinal mass is seen. 1.No acute thoracic or lumbar spine trauma. CT THORACIC RECONSTRUCTION WO POST PROCESS    Result Date: 11/4/2022  EXAMINATION: CT OF THE LUMBAR SPINE WITHOUT CONTRAST; CT OF THE THORACIC SPINE WITHOUT CONTRAST 11/4/2022 TECHNIQUE: CT of the lumbar spine was performed without the administration of intravenous contrast. Multiplanar reformatted images are provided for review. Adjustment of mA and/or kV according to patient size was utilized. Automated exposure control, iterative reconstruction, and/or weight based adjustment of the mA/kV was utilized to reduce the radiation dose to as low as reasonably achievable.; CT of the thoracic spine was performed without the administration of intravenous contrast. Multiplanar reformatted images are provided for review. Automated exposure control, iterative reconstruction, and/or weight based adjustment of the mA/kV was utilized to reduce the radiation dose to as low as reasonably achievable. COMPARISON: None.  HISTORY: ORDERING SYSTEM PROVIDED HISTORY: fall, unresponsivre TECHNOLOGIST PROVIDED HISTORY: Reason for exam:->fall, unresponsivre Decision Support Exception - unselect if not a suspected or confirmed emergency medical condition->Emergency Medical Condition ([place]) Is the patient pregnant?->No Reason for Exam: fall; unresponsive Additional signs and symptoms: Cardiac Arrest (ROSC on arrival) Relevant Medical/Surgical History: none; ORDERING SYSTEM PROVIDED HISTORY: fall unresponsive TECHNOLOGIST PROVIDED HISTORY: Reason for exam:->fall unresponsive Decision Support Exception - unselect if not a suspected or confirmed emergency medical condition->Emergency Medical Condition ([place]) Is the patient pregnant?->No Reason for Exam: fall; unresponsive Additional signs and symptoms: Cardiac Arrest (ROSC on arrival) Relevant Medical/Surgical History: none FINDINGS: BONES/ALIGNMENT: There are postsurgical changes of T2 corpectomy with posterior spinal fusion between C7 and T9. The alignment of the thoracic and lumbar spine is within normal limits. No acute fractures or dislocations are seen. DEGENERATIVE CHANGES: No significant degenerative changes of the thoracic or lumbar spine. SOFT TISSUES: No paraspinal mass is seen. 1.No acute thoracic or lumbar spine trauma. VL DUP LOWER EXTREMITY VENOUS RIGHT    Result Date: 11/3/2022  EXAMINATION: DUPLEX VENOUS ULTRASOUND OF THE RIGHT LOWER EXTREMITY 11/3/2022 12:44 pm TECHNIQUE: Duplex ultrasound using B-mode/gray scaled imaging and Doppler spectral analysis and color flow was obtained of the deep venous structures of the right extremity. COMPARISON: None. HISTORY: ORDERING SYSTEM PROVIDED HISTORY: Peripheral edema TECHNOLOGIST PROVIDED HISTORY: Reason for exam:->recent hospital discharge, venous stasis with warmth Rt>Lt no hx. dvt positive wells Reason for Exam: rt leg warmth, edema FINDINGS: The visualized veins of the right lower extremity are patent and free of echogenic thrombus. The veins demonstrate good compressibility with normal color flow study and spectral analysis. No evidence of DVT in the right lower extremity.        CBC:   Recent Labs     11/06/22 0252 11/06/22  0638 11/07/22  0320   WBC  --   --  20.1*   HGB 12.5 11.3* 11.3*   PLT  --   --  214     BMP:    Recent Labs     11/06/22  0255 11/06/22  0638 11/07/22  0320 11/08/22  0838    142 138 134*   K 3.7 3.5 4.6 4.2     --  100 98*   CO2 27 32 23 25   BUN 47*  --  52* 44*   CREATININE 2.7* 2.4* 2.9* 2.2*   GLUCOSE 87  --  84 99     Hepatic:   Recent Labs     11/06/22  0255   *   *   BILITOT 0.2   ALKPHOS 139*     Lipids:   Lab Results   Component Value Date/Time    TRIG 89 11/04/2022 09:00 PM     Hemoglobin A1C:   Lab Results   Component Value Date/Time    LABA1C 4.9 11/05/2022 03:00 AM     TSH: No results found for: TSH  Troponin:   Lab Results   Component Value Date/Time    TROPONINT 0.096 11/05/2022 03:00 AM    TROPONINT 0.014 11/04/2022 03:32 PM    TROPONINT <0.010 10/28/2022 05:14 PM     Lactic Acid: No results for input(s): LACTA in the last 72 hours. BNP: No results for input(s): PROBNP in the last 72 hours.   UA:  Lab Results   Component Value Date/Time    NITRU NEGATIVE 11/04/2022 04:14 PM    NITRU NEGATIVE 06/20/2013 03:10 PM    COLORU YELLOW 11/04/2022 04:14 PM    WBCUA 53 03/16/2021 09:36 PM    RBCUA 29 03/16/2021 09:36 PM    MUCUS OCCASIONAL 03/16/2021 09:36 PM    TRICHOMONAS NONE SEEN 03/16/2021 09:36 PM    BACTERIA RARE 03/16/2021 09:36 PM    CLARITYU CLEAR 11/04/2022 04:14 PM    SPECGRAV 1.010 11/04/2022 04:14 PM    LEUKOCYTESUR NEGATIVE 11/04/2022 04:14 PM    UROBILINOGEN 0.2 11/04/2022 04:14 PM    BILIRUBINUR NEGATIVE 11/04/2022 04:14 PM    BLOODU NEGATIVE 11/04/2022 04:14 PM    GLUCOSEU neg 06/20/2013 03:10 PM    GLUCOSEU NEGATIVE 06/20/2013 03:10 PM    KETUA NEGATIVE 11/04/2022 04:14 PM    AMORPHOUS RARE 09/13/2020 09:30 PM     Urine Cultures: No results found for: LABURIN  Blood Cultures: No results found for: BC  No results found for: BLOODCULT2  Organism: No results found for: ORG    Time Spent Discharging patient 31 minutes    Electronically signed by Marely Shukla MD on 11/8/2022 at 3:09 PM

## 2022-11-08 NOTE — PROGRESS NOTES
Daily Progress Note  Subjective:    Pt on vent this am  HR stable, NSR, BP low on pressors    Attending Note:    On vent sedated  On three pressors -levo/corine and vaso  Plan for nuclear brain scan  today  Hx of pul HTN   Hypoxia related to cardiac arrest leading to hypoxic brain injury  Hypothermia now back to baseline  Seen by neuro await above test and neuro input  Discussed with family --overall prognosis remains guarded   Echo yesterday EF is normal   Consider organ donation -family interested     Impression and Plan:     Cardiac Arrest    Likely d/t acute respiratory Failure    She had Rt. Sided HF secondary to severe PAH and mod-severe TR    Also concern for aspiration PNA per notes-she is on ABx    Per father she collapsed at home and would not respond- could not find heart beat so CPR was started and squad called    She was down for 60 minutes before ROSC was achieved    Currently on pressors to maintain BP    S/p TTM as well    EEG done yesterday concerning for anoxic injury, no sign of neuro activity    Reviewed neuro note    Prognosis is poor     NM brain scan is to be done today for neuro activity and concern of anoxic brain injury     Will follow  Discussed with family today    Most Recent Echo  RAFI-10/19/22  Summary   No evidence of thrombus within the left atrial appendage. Mild mitral regurgitation. Mild-moderate tricuspid regurgitation. No evidence of any pericardial effusion. No evidence of endocarditis. normal EF noted   TV vegetation resolved     TTE-10/18/22  IMPRESSION:  1. No vegetation noted on the tricuspid valve, pulmonic valve, aortic  valve, and mitral valve. 2.  Moderate tricuspid regurgitation noted. 3.  LV function is preserved, greater than 55%. 4.  No clot or thrombus noted in the left atrium or left atrial  appendage. 5.  No pericardial effusion noted. Most Recent Heart Cath  10/20/22  IMPRESSION:  1. Left main is patent.   2.  LAD, circ, and RCA has mild disease present. 3.  EDP is around 20 mmHg present. Radiology  CT Head 22  Diffuse low attenuation throughout the entire cerebral hemispheres consistent   with edema and diffuse anoxic injury. The fourth ventricle is effaced and   the cerebellar tonsils are downward herniating through the foramen magnum. CT Chest, abd pelvis  1. Diffuse airspace disease bilaterally. Findings suggest pulmonary edema   with superimposed aspiration/infection not excluded. 2. Acute fractures of the right anterior 2nd through 7th ribs and left 4th   through 8th ribs (grade 2 chest wall injury). 3. Endotracheal tube tip within the right mainstem bronchus. Recommend   repositioning. 4. Enteric tube side port at the GE junction. Recommend advancement. 5. No acute intraabdominal process identified. 6. Contrast present within the subcutaneous tissues at the right antecubital   fossa consistent with IV infiltration. The patient had a history of having drug use. She has been drug-free. She has history of endocarditis in the past.  She was referred to [facility]. She has been doing better. She has been clean with her drugs  also. The patient has a history of hepatitis C, cirrhosis present, and has a  history of tricuspid valve endocarditis, which has been _____ and  history of pulmonary hypertension. Preserved LV function present. The patient was to have tricuspid valve surgery, but the plan was to  treat her pulmonary hypertension and then decide upon tricuspid valve. The patient has been drug-free also. PAST SURGICAL HISTORY:  History of , gallbladder surgery, right  hand finger amputation, hysterectomy, incision and drainage of the right  upper thigh, lap sera, _____. SOCIAL HISTORY:  History of smoking, and she had history of drug use  also. MEDICATIONS:  She is on Demadex 20 mg b.i.d. She is on Pamelor,  potassium, and Zaroxolyn.   She is on Zaroxolyn and  for pulmonary  hypertension. ALLERGIES:  _____ and AUGMENTIN. Objective:   BP (!) 70/61   Pulse (!) 105   Temp 98.6 °F (37 °C) (Bladder)   Resp 22   Ht 5' 5\" (1.651 m)   Wt (!) 311 lb 8.2 oz (141.3 kg)   SpO2 96%   BMI 51.84 kg/m²     Intake/Output Summary (Last 24 hours) at 11/8/2022 0939  Last data filed at 11/8/2022 1746  Gross per 24 hour   Intake 3119.25 ml   Output 2860 ml   Net 259.25 ml       Medications:   Scheduled Meds:   ipratropium-albuterol  1 ampule Inhalation Q4H    sodium chloride  1,000 mL IntraVENous Once    sodium chloride  1,000 mL IntraVENous Once    sodium chloride flush  5-40 mL IntraVENous 2 times per day    chlorhexidine  15 mL Mouth/Throat BID    famotidine (PEPCID) injection  20 mg IntraVENous BID    enoxaparin  30 mg SubCUTAneous BID    insulin lispro  0-4 Units SubCUTAneous Q4H    cefepime  2,000 mg IntraVENous Q8H      Infusions:   dextrose 75 mL/hr at 11/08/22 0938    phenylephrine (KINZA-SYNEPHRINE) 50mg/250mL infusion 70 mcg/min (11/08/22 0047)    vasopressin (Septic Shock) infusion 0.04 Units/min (11/08/22 0610)    norepinephrine 29 mcg/min (11/08/22 0048)    lactated ringers 100 mL/hr at 11/04/22 1641    sodium chloride 100 mL/hr at 11/05/22 0057    dextrose        PRN Meds:  potassium chloride **OR** potassium chloride, lubrifresh P.M., sodium chloride flush, polyethylene glycol, acetaminophen **OR** acetaminophen, ondansetron **OR** ondansetron, potassium chloride, magnesium sulfate, sodium phosphate IVPB **OR** sodium phosphate IVPB, glucose, dextrose bolus **OR** dextrose bolus, dextrose, glucagon (rDNA)     Physical Exam:  Vitals:    11/08/22 0816   BP:    Pulse: (!) 105   Resp: 22   Temp:    SpO2: 96%        General: AAO, NAD  Chest: Nontender  Cardiac: First and Second Heart Sounds are Normal, No Murmurs or Gallops noted  Lungs:Clear to auscultation and percussion.   Abdomen: Soft, NT, ND, +BS  Extremities: No clubbing, no edema  Vascular:  Equal 2+ peripheral signed by Donna Gutierrez MD on 11/8/22 at 10:21 AM EST

## 2022-11-08 NOTE — PROGRESS NOTES
V2.0  [facility]      Name:  Rebeka Comer /Age/Sex: 1980  (38 y.o. female)   MRN & CSN:  [id number] & [id number] Encounter Date/Time: 2022 1:58 PM EST    Location:  -A PCP: Roxi Rubio, APRN - [facility] Day: 5    Assessment and Plan:   Rebeka Comer is a 38 y.o. female with pmh of MRSA endocarditis history, HFpEF, hypertension, HCV, morbid obesity, recent admission with acute on chronic diastolic heart failure requiring Lasix drip and eventually discharged home with oxygen, pulmonary hypertension and chronic hypercapnic respiratory failure  who presents with Cardiac arrest [facility])      Plan:    Anoxic encephalopathy status post cardiac arrest.  Patient with evidence of compartment anoxic brain injury. She did have a nuclear study that confirmed this. Neurology following     Cardiac arrest  Patient with a cardiac arrest and underwent multiple rounds of CPR in the field. Postarrest care in the ICU    Acute on chronic hypoxic respiratory failure secondary to cardiac arrest  Mechanical ventilator support      Acute on chronic diastolic heart failure  Cardiogenic shock  On pressor support  Wean as tolerated    Diet Diet NPO   DVT Prophylaxis [x] Lovenox, []  Heparin, [] SCDs, [] Ambulation,    [] Eliquis, [] Xarelto  [] Coumadin [] other   Code Status DNR-CCA   Disposition From: home  Expected Disposition: TBD  Estimated Date of Discharge: TBD  Patient requires continued admission due to cardiac arrest   Surrogate Decision Maker/ POA      Subjective:     Chief Complaint: No chief complaint on file. Patient have nuclear study dilated show absence of blood flow to the brain, which is consistent with brain death    Review of Systems:    Review of Systems   Unable to perform ROS: Intubated       Objective:      Intake/Output Summary (Last 24 hours) at 2022 1358  Last data filed at 2022 0622  Gross per 24 hour   Intake 3119.25 ml   Output 2860 ml   Net 259.25 ml        Vitals:   Vitals:    11/08/22 1334   BP:    Pulse:    Resp:    Temp: 99.1 °F (37.3 °C)   SpO2:        Physical Exam:     Physical Exam  Vitals reviewed. Constitutional:       Appearance: Normal appearance. She is normal weight. HENT:      Head: Normocephalic and atraumatic. Mouth/Throat:      Mouth: Mucous membranes are moist.      Pharynx: Oropharynx is clear. Eyes:      Extraocular Movements: Extraocular movements intact. Conjunctiva/sclera: Conjunctivae normal.      Pupils: Pupils are equal, round, and reactive to light. Cardiovascular:      Rate and Rhythm: Normal rate and regular rhythm. Pulses: Normal pulses. Heart sounds: Normal heart sounds. Pulmonary:      Effort: Pulmonary effort is normal.      Breath sounds: Normal breath sounds. Abdominal:      General: Abdomen is flat. Bowel sounds are normal.      Palpations: Abdomen is soft. Musculoskeletal:         General: No swelling. Normal range of motion. Cervical back: Normal range of motion and neck supple. Skin:     General: Skin is warm and dry. Neurological:      Mental Status: She is alert. Mental status is at baseline. She is disoriented. Cranial Nerves: Cranial nerve deficit present. Motor: Weakness present.       Deep Tendon Reflexes: Reflexes abnormal.       Medications:   Medications:    ipratropium-albuterol  1 ampule Inhalation Q4H    sodium chloride  1,000 mL IntraVENous Once    sodium chloride  1,000 mL IntraVENous Once    sodium chloride flush  5-40 mL IntraVENous 2 times per day    chlorhexidine  15 mL Mouth/Throat BID    famotidine (PEPCID) injection  20 mg IntraVENous BID    enoxaparin  30 mg SubCUTAneous BID    insulin lispro  0-4 Units SubCUTAneous Q4H    cefepime  2,000 mg IntraVENous Q8H      Infusions:    dextrose 75 mL/hr at 11/08/22 0938    phenylephrine (KINZA-SYNEPHRINE) 50mg/250mL infusion 250 mcg/min (11/08/22 1320)    vasopressin (Septic Shock) infusion 0.04 Units/min (11/08/22 1100)    norepinephrine 26 mcg/min (11/08/22 1321)    dextrose       PRN Meds: potassium chloride, 20 mEq, PRN   Or  potassium chloride, 10 mEq, PRN  lubrifresh P.M., , Q2H PRN  sodium chloride flush, 5-40 mL, PRN  polyethylene glycol, 17 g, Daily PRN  acetaminophen, 650 mg, Q6H PRN   Or  acetaminophen, 650 mg, Q6H PRN  ondansetron, 4 mg, Q8H PRN   Or  ondansetron, 4 mg, Q6H PRN  potassium chloride, 10 mEq, PRN  magnesium sulfate, 2,000 mg, PRN  sodium phosphate IVPB, 0.16 mmol/kg, PRN   Or  sodium phosphate IVPB, 0.32 mmol/kg, PRN  glucose, 4 tablet, PRN  dextrose bolus, 125 mL, PRN   Or  dextrose bolus, 250 mL, PRN  dextrose, , Continuous PRN  glucagon (rDNA), 1 mg, PRN        Labs      Recent Results (from the past 24 hour(s))   POCT Glucose    Collection Time: 11/07/22  3:35 PM   Result Value Ref Range    POC Glucose 96 70 - 99 MG/DL   POCT Glucose    Collection Time: 11/07/22  6:54 PM   Result Value Ref Range    POC Glucose 86 70 - 99 MG/DL   POCT Glucose    Collection Time: 11/07/22  8:55 PM   Result Value Ref Range    POC Glucose 91 70 - 99 MG/DL   POCT Glucose    Collection Time: 11/07/22 10:50 PM   Result Value Ref Range    POC Glucose 87 70 - 99 MG/DL   POCT Glucose    Collection Time: 11/08/22  2:31 AM   Result Value Ref Range    POC Glucose 92 70 - 99 MG/DL   Blood gas, arterial    Collection Time: 11/08/22  6:00 AM   Result Value Ref Range    pH, Bld 7.32 (L) 7.34 - 7.45    pCO2, Arterial 53.0 (H) 32 - 45 MMHG    pO2, Arterial 78 75 - 100 MMHG    Base Exc, Mixed . 3 0 - 2.3    HCO3, Arterial 27.3 (H) 18 - 23 MMOL/L    CO2 Content 28.9 (H) 19 - 24 MMOL/L    O2 Sat 93.0 (L) 96 - 97 %    Carbon Monoxide, Blood 2.3 0 - 5 %    Methemoglobin, Arterial 0.5 <1.5 %    Comment AC/VC, 22, 420, 60%, +7    POCT Glucose    Collection Time: 11/08/22  6:32 AM   Result Value Ref Range    POC Glucose 77 70 - 99 MG/DL   Basic Metabolic Panel    Collection Time: 11/08/22  8:38 AM   Result Value Ref Range Sodium 134 (L) 135 - 145 MMOL/L    Potassium 4.2 3.5 - 5.1 MMOL/L    Chloride 98 (L) 99 - 110 mMol/L    CO2 25 21 - 32 MMOL/L    Anion Gap 11 4 - 16    BUN 44 (H) 6 - 23 MG/DL    Creatinine 2.2 (H) 0.6 - 1.1 MG/DL    Est, Glom Filt Rate 28 (L) >60 mL/min/1.73m2    Glucose 99 70 - 99 MG/DL    Calcium 8.5 8.3 - 10.6 MG/DL   Magnesium    Collection Time: 11/08/22  8:38 AM   Result Value Ref Range    Magnesium 1.8 1.8 - 2.4 mg/dl   Phosphorus    Collection Time: 11/08/22  8:38 AM   Result Value Ref Range    Phosphorus 3.5 2.5 - 4.9 MG/DL   POCT Glucose    Collection Time: 11/08/22  9:48 AM   Result Value Ref Range    POC Glucose 99 70 - 99 MG/DL        Imaging/Diagnostics Last 24 Hours   NM BRAIN SCAN COMPLETE W VASCULAR FLOW (MIN 4 VIEWS)    Result Date: 11/8/2022  EXAMINATION: NUCLEAR MEDICINE BRAIN FLOW SCAN 11/8/2022 12:13 pm COMPARISON: CT scan of the head 11/04/2022 HISTORY: ORDERING SYSTEM PROVIDED HISTORY: brain death TECHNOLOGIST PROVIDED HISTORY: Reason for exam:->brain death Is the patient pregnant?->No Reason for Exam: to determine brain death TECHNIQUE: 66.3 millicuries technetium 16W Neurolite was administered intravenously. Dynamic images of the head are obtained. Delayed static images are obtained. Quiroga Potters FINDINGS: Good bolus of radiotracer seen within the carotid arteries. No perfusion to the brain is demonstrated. No activity is seen within the brain on the delayed images. Absent blood flow to the brain. This supports a clinical diagnosis of brain death. Critical results were called by Dr. Epifanio Suarez. Rita Koch to Dr. Rose Marie Roger on 11/8/2022 at 12:49. Comment: Please note this report has been produced using speech recognition software and may contain errors related to that system including errors in grammar, punctuation, and spelling, as well as words and phrases that may be inappropriate. If there are any questions or concerns please feel free to contact the dictating provider for clarification. Electronically signed by Ela Perez MD on 11/8/2022 at 1:58 PM

## 2022-11-09 ENCOUNTER — APPOINTMENT (OUTPATIENT)
Dept: ULTRASOUND IMAGING | Age: 42
DRG: 130 | End: 2022-11-09
Payer: MEDICAID

## 2022-11-09 ENCOUNTER — APPOINTMENT (OUTPATIENT)
Dept: GENERAL RADIOLOGY | Age: 42
DRG: 130 | End: 2022-11-09
Payer: MEDICAID

## 2022-11-09 LAB
ALBUMIN SERPL-MCNC: 1.7 GM/DL (ref 3.4–5)
ALBUMIN SERPL-MCNC: 1.7 GM/DL (ref 3.4–5)
ALBUMIN SERPL-MCNC: 1.8 GM/DL (ref 3.4–5)
ALP BLD-CCNC: 136 IU/L (ref 40–128)
ALP BLD-CCNC: 136 IU/L (ref 40–128)
ALP BLD-CCNC: 144 IU/L (ref 40–128)
ALT SERPL-CCNC: 53 U/L (ref 10–40)
ALT SERPL-CCNC: 60 U/L (ref 10–40)
ALT SERPL-CCNC: 64 U/L (ref 10–40)
ANION GAP SERPL CALCULATED.3IONS-SCNC: 10 MMOL/L (ref 4–16)
ANION GAP SERPL CALCULATED.3IONS-SCNC: 11 MMOL/L (ref 4–16)
ANION GAP SERPL CALCULATED.3IONS-SCNC: 11 MMOL/L (ref 4–16)
APTT: 35.7 SECONDS (ref 25.1–37.1)
APTT: 37.2 SECONDS (ref 25.1–37.1)
AST SERPL-CCNC: 35 IU/L (ref 15–37)
AST SERPL-CCNC: 41 IU/L (ref 15–37)
AST SERPL-CCNC: 49 IU/L (ref 15–37)
BACTERIA: ABNORMAL /HPF
BASOPHILS ABSOLUTE: 0.1 K/CU MM
BASOPHILS RELATIVE PERCENT: 0.4 % (ref 0–1)
BILIRUB SERPL-MCNC: 0.2 MG/DL (ref 0–1)
BILIRUB SERPL-MCNC: 0.2 MG/DL (ref 0–1)
BILIRUB SERPL-MCNC: 0.3 MG/DL (ref 0–1)
BILIRUBIN DIRECT: 0.2 MG/DL (ref 0–0.3)
BILIRUBIN DIRECT: 0.2 MG/DL (ref 0–0.3)
BILIRUBIN URINE: NEGATIVE MG/DL
BILIRUBIN, INDIRECT: 0 MG/DL (ref 0–0.7)
BILIRUBIN, INDIRECT: 0.1 MG/DL (ref 0–0.7)
BLOOD, URINE: ABNORMAL
BUN BLDV-MCNC: 41 MG/DL (ref 6–23)
BUN BLDV-MCNC: 42 MG/DL (ref 6–23)
BUN BLDV-MCNC: 44 MG/DL (ref 6–23)
CALCIUM OXALATE CRYSTALS: ABNORMAL /HPF
CALCIUM SERPL-MCNC: 8 MG/DL (ref 8.3–10.6)
CALCIUM SERPL-MCNC: 8.1 MG/DL (ref 8.3–10.6)
CALCIUM SERPL-MCNC: 8.2 MG/DL (ref 8.3–10.6)
CAST TYPE: ABNORMAL /HPF
CHLORIDE BLD-SCNC: 93 MMOL/L (ref 99–110)
CHLORIDE BLD-SCNC: 96 MMOL/L (ref 99–110)
CHLORIDE BLD-SCNC: 98 MMOL/L (ref 99–110)
CLARITY: ABNORMAL
CO2: 24 MMOL/L (ref 21–32)
CO2: 24 MMOL/L (ref 21–32)
CO2: 25 MMOL/L (ref 21–32)
COLOR: YELLOW
CREAT SERPL-MCNC: 1.8 MG/DL (ref 0.6–1.1)
CREAT SERPL-MCNC: 1.8 MG/DL (ref 0.6–1.1)
CREAT SERPL-MCNC: 1.9 MG/DL (ref 0.6–1.1)
CULTURE: NORMAL
CULTURE: NORMAL
DIFFERENTIAL TYPE: ABNORMAL
EOSINOPHILS ABSOLUTE: 0.5 K/CU MM
EOSINOPHILS RELATIVE PERCENT: 3.8 % (ref 0–3)
GFR SERPL CREATININE-BSD FRML MDRD: 34 ML/MIN/1.73M2
GFR SERPL CREATININE-BSD FRML MDRD: 36 ML/MIN/1.73M2
GFR SERPL CREATININE-BSD FRML MDRD: 36 ML/MIN/1.73M2
GLUCOSE BLD-MCNC: 132 MG/DL (ref 70–99)
GLUCOSE BLD-MCNC: 158 MG/DL (ref 70–99)
GLUCOSE BLD-MCNC: 164 MG/DL (ref 70–99)
GLUCOSE BLD-MCNC: 166 MG/DL (ref 70–99)
GLUCOSE BLD-MCNC: 170 MG/DL (ref 70–99)
GLUCOSE BLD-MCNC: 170 MG/DL (ref 70–99)
GLUCOSE BLD-MCNC: 178 MG/DL (ref 70–99)
GLUCOSE BLD-MCNC: 179 MG/DL (ref 70–99)
GLUCOSE BLD-MCNC: 80 MG/DL (ref 70–99)
GLUCOSE, URINE: NEGATIVE MG/DL
HCT VFR BLD CALC: 29.4 % (ref 37–47)
HCT VFR BLD CALC: 31.1 % (ref 37–47)
HCT VFR BLD CALC: 34.2 % (ref 37–47)
HEMOGLOBIN: 10.4 GM/DL (ref 12.5–16)
HEMOGLOBIN: 9 GM/DL (ref 12.5–16)
HEMOGLOBIN: 9.4 GM/DL (ref 12.5–16)
HYALINE CASTS: 5 /LPF
IMMATURE NEUTROPHIL %: 0.9 % (ref 0–0.43)
INR BLD: 1.36 INDEX
INR BLD: 1.36 INDEX
KETONES, URINE: NEGATIVE MG/DL
LEUKOCYTE ESTERASE, URINE: NEGATIVE
LYMPHOCYTES ABSOLUTE: 1.1 K/CU MM
LYMPHOCYTES RELATIVE PERCENT: 7.7 % (ref 24–44)
Lab: NORMAL
Lab: NORMAL
MAGNESIUM: 1.4 MG/DL (ref 1.8–2.4)
MAGNESIUM: 1.7 MG/DL (ref 1.8–2.4)
MAGNESIUM: 1.8 MG/DL (ref 1.8–2.4)
MAGNESIUM: 2.1 MG/DL (ref 1.8–2.4)
MCH RBC QN AUTO: 28 PG (ref 27–31)
MCH RBC QN AUTO: 28.1 PG (ref 27–31)
MCH RBC QN AUTO: 28.3 PG (ref 27–31)
MCHC RBC AUTO-ENTMCNC: 30.2 % (ref 32–36)
MCHC RBC AUTO-ENTMCNC: 30.4 % (ref 32–36)
MCHC RBC AUTO-ENTMCNC: 30.6 % (ref 32–36)
MCV RBC AUTO: 91.9 FL (ref 78–100)
MCV RBC AUTO: 92.6 FL (ref 78–100)
MCV RBC AUTO: 92.9 FL (ref 78–100)
MONOCYTES ABSOLUTE: 1.3 K/CU MM
MONOCYTES RELATIVE PERCENT: 9 % (ref 0–4)
MUCUS: ABNORMAL HPF
NITRITE URINE, QUANTITATIVE: NEGATIVE
NUCLEATED RBC %: 0 %
PDW BLD-RTO: 17.8 % (ref 11.7–14.9)
PDW BLD-RTO: 18.1 % (ref 11.7–14.9)
PDW BLD-RTO: 18.6 % (ref 11.7–14.9)
PH, URINE: 5.5 (ref 5–8)
PHOSPHORUS: 3 MG/DL (ref 2.5–4.9)
PHOSPHORUS: 3.2 MG/DL (ref 2.5–4.9)
PLATELET # BLD: 152 K/CU MM (ref 140–440)
PLATELET # BLD: 152 K/CU MM (ref 140–440)
PLATELET # BLD: 177 K/CU MM (ref 140–440)
PMV BLD AUTO: 10 FL (ref 7.5–11.1)
PMV BLD AUTO: 10.4 FL (ref 7.5–11.1)
PMV BLD AUTO: 10.9 FL (ref 7.5–11.1)
POTASSIUM SERPL-SCNC: 3.6 MMOL/L (ref 3.5–5.1)
POTASSIUM SERPL-SCNC: 3.8 MMOL/L (ref 3.5–5.1)
POTASSIUM SERPL-SCNC: 4 MMOL/L (ref 3.5–5.1)
PROTEIN UA: 30 MG/DL
PROTHROMBIN TIME: 17.6 SECONDS (ref 11.7–14.5)
PROTHROMBIN TIME: 17.6 SECONDS (ref 11.7–14.5)
RBC # BLD: 3.2 M/CU MM (ref 4.2–5.4)
RBC # BLD: 3.36 M/CU MM (ref 4.2–5.4)
RBC # BLD: 3.68 M/CU MM (ref 4.2–5.4)
RBC URINE: 5 /HPF (ref 0–6)
SEGMENTED NEUTROPHILS ABSOLUTE COUNT: 11.1 K/CU MM
SEGMENTED NEUTROPHILS RELATIVE PERCENT: 78.2 % (ref 36–66)
SODIUM BLD-SCNC: 128 MMOL/L (ref 135–145)
SODIUM BLD-SCNC: 131 MMOL/L (ref 135–145)
SODIUM BLD-SCNC: 133 MMOL/L (ref 135–145)
SPECIFIC GRAVITY UA: >1.03 (ref 1–1.03)
SPECIMEN: NORMAL
SPECIMEN: NORMAL
SQUAMOUS EPITHELIAL: 5 /HPF
TOTAL IMMATURE NEUTOROPHIL: 0.13 K/CU MM
TOTAL NUCLEATED RBC: 0 K/CU MM
TOTAL PROTEIN: 4.7 GM/DL (ref 6.4–8.2)
TOTAL PROTEIN: 4.8 GM/DL (ref 6.4–8.2)
TOTAL PROTEIN: 4.9 GM/DL (ref 6.4–8.2)
TRANSITIONAL EPITHELIAL: 1 /HPF
TRICHOMONAS: ABNORMAL /HPF
UROBILINOGEN, URINE: 0.2 MG/DL (ref 0.2–1)
WBC # BLD: 11.2 K/CU MM (ref 4–10.5)
WBC # BLD: 14.2 K/CU MM (ref 4–10.5)
WBC # BLD: 7.4 K/CU MM (ref 4–10.5)
WBC UA: 9 /HPF (ref 0–5)
YEAST: ABNORMAL /HPF

## 2022-11-09 PROCEDURE — 2000000000 HC ICU R&B

## 2022-11-09 PROCEDURE — 94003 VENT MGMT INPAT SUBQ DAY: CPT

## 2022-11-09 PROCEDURE — 2700000000 HC OXYGEN THERAPY PER DAY

## 2022-11-09 PROCEDURE — 85730 THROMBOPLASTIN TIME PARTIAL: CPT

## 2022-11-09 PROCEDURE — 36415 COLL VENOUS BLD VENIPUNCTURE: CPT

## 2022-11-09 PROCEDURE — 36620 INSERTION CATHETER ARTERY: CPT

## 2022-11-09 PROCEDURE — 87086 URINE CULTURE/COLONY COUNT: CPT

## 2022-11-09 PROCEDURE — 82248 BILIRUBIN DIRECT: CPT

## 2022-11-09 PROCEDURE — 85027 COMPLETE CBC AUTOMATED: CPT

## 2022-11-09 PROCEDURE — 80053 COMPREHEN METABOLIC PANEL: CPT

## 2022-11-09 PROCEDURE — 71045 X-RAY EXAM CHEST 1 VIEW: CPT

## 2022-11-09 PROCEDURE — 2580000003 HC RX 258: Performed by: STUDENT IN AN ORGANIZED HEALTH CARE EDUCATION/TRAINING PROGRAM

## 2022-11-09 PROCEDURE — 83735 ASSAY OF MAGNESIUM: CPT

## 2022-11-09 PROCEDURE — 51702 INSERT TEMP BLADDER CATH: CPT

## 2022-11-09 PROCEDURE — 6360000002 HC RX W HCPCS: Performed by: INTERNAL MEDICINE

## 2022-11-09 PROCEDURE — 84100 ASSAY OF PHOSPHORUS: CPT

## 2022-11-09 PROCEDURE — 2500000003 HC RX 250 WO HCPCS

## 2022-11-09 PROCEDURE — 94761 N-INVAS EAR/PLS OXIMETRY MLT: CPT

## 2022-11-09 PROCEDURE — 6360000002 HC RX W HCPCS: Performed by: STUDENT IN AN ORGANIZED HEALTH CARE EDUCATION/TRAINING PROGRAM

## 2022-11-09 PROCEDURE — 76705 ECHO EXAM OF ABDOMEN: CPT

## 2022-11-09 PROCEDURE — 82247 BILIRUBIN TOTAL: CPT

## 2022-11-09 PROCEDURE — 82962 GLUCOSE BLOOD TEST: CPT

## 2022-11-09 PROCEDURE — 87040 BLOOD CULTURE FOR BACTERIA: CPT

## 2022-11-09 PROCEDURE — 2580000003 HC RX 258

## 2022-11-09 PROCEDURE — C9113 INJ PANTOPRAZOLE SODIUM, VIA: HCPCS | Performed by: STUDENT IN AN ORGANIZED HEALTH CARE EDUCATION/TRAINING PROGRAM

## 2022-11-09 PROCEDURE — 81001 URINALYSIS AUTO W/SCOPE: CPT

## 2022-11-09 PROCEDURE — 89220 SPUTUM SPECIMEN COLLECTION: CPT

## 2022-11-09 PROCEDURE — 2500000003 HC RX 250 WO HCPCS: Performed by: STUDENT IN AN ORGANIZED HEALTH CARE EDUCATION/TRAINING PROGRAM

## 2022-11-09 PROCEDURE — 85610 PROTHROMBIN TIME: CPT

## 2022-11-09 RX ORDER — SODIUM CHLORIDE 9 MG/ML
INJECTION, SOLUTION INTRAVENOUS CONTINUOUS
Status: DISCONTINUED | OUTPATIENT
Start: 2022-11-09 | End: 2022-11-10

## 2022-11-09 RX ORDER — MAGNESIUM SULFATE IN WATER 40 MG/ML
2000 INJECTION, SOLUTION INTRAVENOUS ONCE
Status: COMPLETED | OUTPATIENT
Start: 2022-11-09 | End: 2022-11-09

## 2022-11-09 RX ADMIN — METHYLPREDNISOLONE SODIUM SUCCINATE 1000 MG: 1 INJECTION, POWDER, LYOPHILIZED, FOR SOLUTION INTRAMUSCULAR; INTRAVENOUS at 20:01

## 2022-11-09 RX ADMIN — CEFEPIME HYDROCHLORIDE 2000 MG: 2 INJECTION, POWDER, FOR SOLUTION INTRAVENOUS at 08:23

## 2022-11-09 RX ADMIN — MAGNESIUM SULFATE HEPTAHYDRATE 2000 MG: 2 INJECTION, SOLUTION INTRAVENOUS at 04:15

## 2022-11-09 RX ADMIN — PHENYLEPHRINE HYDROCHLORIDE 250 MCG/MIN: 10 INJECTION INTRAVENOUS at 12:12

## 2022-11-09 RX ADMIN — METHYLPREDNISOLONE SODIUM SUCCINATE 1000 MG: 1 INJECTION, POWDER, LYOPHILIZED, FOR SOLUTION INTRAMUSCULAR; INTRAVENOUS at 08:21

## 2022-11-09 RX ADMIN — PHENYLEPHRINE HYDROCHLORIDE 250 MCG/MIN: 10 INJECTION INTRAVENOUS at 08:27

## 2022-11-09 RX ADMIN — PHENYLEPHRINE HYDROCHLORIDE 250 MCG/MIN: 10 INJECTION INTRAVENOUS at 04:56

## 2022-11-09 RX ADMIN — PHENYLEPHRINE HYDROCHLORIDE 175 MCG/MIN: 10 INJECTION INTRAVENOUS at 17:02

## 2022-11-09 RX ADMIN — VASOPRESSIN 0.04 UNITS/MIN: 20 INJECTION INTRAVENOUS at 17:03

## 2022-11-09 RX ADMIN — PHENYLEPHRINE HYDROCHLORIDE 175 MCG/MIN: 10 INJECTION INTRAVENOUS at 22:07

## 2022-11-09 RX ADMIN — DEXTROSE MONOHYDRATE: 50 INJECTION, SOLUTION INTRAVENOUS at 05:59

## 2022-11-09 RX ADMIN — ENOXAPARIN SODIUM 30 MG: 100 INJECTION SUBCUTANEOUS at 20:59

## 2022-11-09 RX ADMIN — PANTOPRAZOLE SODIUM 40 MG: 40 INJECTION, POWDER, FOR SOLUTION INTRAVENOUS at 08:18

## 2022-11-09 RX ADMIN — CEFEPIME HYDROCHLORIDE 2000 MG: 2 INJECTION, POWDER, FOR SOLUTION INTRAVENOUS at 20:55

## 2022-11-09 RX ADMIN — ENOXAPARIN SODIUM 30 MG: 100 INJECTION SUBCUTANEOUS at 08:18

## 2022-11-09 RX ADMIN — PHENYLEPHRINE HYDROCHLORIDE 225 MCG/MIN: 10 INJECTION INTRAVENOUS at 01:04

## 2022-11-09 RX ADMIN — MAGNESIUM SULFATE HEPTAHYDRATE 2000 MG: 2 INJECTION, SOLUTION INTRAVENOUS at 17:59

## 2022-11-09 RX ADMIN — SODIUM CHLORIDE: 9 INJECTION, SOLUTION INTRAVENOUS at 18:45

## 2022-11-09 RX ADMIN — LEVOTHYROXINE SODIUM ANHYDROUS 30 MCG/HR: 100 INJECTION, POWDER, LYOPHILIZED, FOR SOLUTION INTRAVENOUS at 17:06

## 2022-11-09 RX ADMIN — VASOPRESSIN 0.04 UNITS/MIN: 20 INJECTION INTRAVENOUS at 07:40

## 2022-11-09 ASSESSMENT — PULMONARY FUNCTION TESTS
PIF_VALUE: 40
PIF_VALUE: 39
PIF_VALUE: 40
PIF_VALUE: 39
PIF_VALUE: 39
PIF_VALUE: 40
PIF_VALUE: 38
PIF_VALUE: 39
PIF_VALUE: 41
PIF_VALUE: 39
PIF_VALUE: 40
PIF_VALUE: 41
PIF_VALUE: 38
PIF_VALUE: 40
PIF_VALUE: 38
PIF_VALUE: 39
PIF_VALUE: 40
PIF_VALUE: 39
PIF_VALUE: 40
PIF_VALUE: 39
PIF_VALUE: 38
PIF_VALUE: 40
PIF_VALUE: 42
PIF_VALUE: 39
PIF_VALUE: 40
PIF_VALUE: 39
PIF_VALUE: 41
PIF_VALUE: 40
PIF_VALUE: 39
PIF_VALUE: 42
PIF_VALUE: 40
PIF_VALUE: 40

## 2022-11-09 ASSESSMENT — PAIN SCALES - GENERAL
PAINLEVEL_OUTOF10: 0

## 2022-11-09 NOTE — PROGRESS NOTES
Nutrition Note    Pt remains intubated, +pressors, NPO, noted plan for organ donation, will follow loosely    Electronically signed by Abena Bertrand MS, RD, LD on 11/9/22 at 5:34 PM EST    Contact: [phone]

## 2022-11-09 NOTE — PROGRESS NOTES
Patient seen today. She was declared brain dead yesterday and the plan is to move forward with organ donation. Hospital medicine will sign off at this time. Please contact if needed.     Johanna Willson MD  Internal medicine

## 2022-11-09 NOTE — PROGRESS NOTES
Daily Progress Note      Subjective:  Patient is brain dead  Planning for organ donation and harvesting  Remain in sinus rate stable  She is on Kinza and vaso  Has good urine output  Will sign off   Hx of pulmonary HTN  She has preserved EF  Hypoxia related cardio-pulmonary arrest with hypoxic sunshine injury  Had a prolong  CPR  She was no hypothermia protocol also     Radiology    Objective:   BP (!) 55/32   Pulse 86   Temp 99.1 °F (37.3 °C) (Bladder)   Resp 22   Ht 5' 5\" (1.651 m)   Wt (!) 311 lb 8.2 oz (141.3 kg)   SpO2 98%   BMI 51.84 kg/m²     Intake/Output Summary (Last 24 hours) at 11/9/2022 1451  Last data filed at 11/9/2022 1415  Gross per 24 hour   Intake 4149.11 ml   Output 2150 ml   Net 1999.11 ml       Medications:   Scheduled Meds:   methylPREDNISolone  1,000 mg IntraVENous Q12H    enoxaparin  30 mg SubCUTAneous BID    pantoprazole  40 mg IntraVENous Daily    cefepime  2,000 mg IntraVENous Q12H      Infusions:   levothyroxine (SYNTHROID) infusion 25 mcg/hr (11/09/22 1411)    dextrose 50 mL/hr at 11/09/22 1204    norepinephrine Stopped (11/09/22 0136)    phenylephrine (KINZA-SYNEPHRINE) 50mg/250mL infusion 175 mcg/min (11/09/22 1411)    vasopressin (Septic Shock) infusion 0.04 Units/min (11/09/22 1204)      PRN Meds:  acetaminophen     Physical Exam:  Vitals:    11/09/22 1400   BP:    Pulse: 86   Resp: 22   Temp: 99.1 °F (37.3 °C)   SpO2:         General: AAO, NAD  Chest: Nontender  Cardiac: First and Second Heart Sounds are Normal, No Murmurs or Gallops noted  Lungs:Clear to auscultation and percussion. Abdomen: Soft, NT, ND, +BS  Extremities: No clubbing, no edema  Vascular:  Equal 2+ peripheral pulses.     Lab Data:  CBC:   Recent Labs     11/07/22  0320 11/08/22  2040 11/09/22  0811   WBC 20.1* 14.2* 11.2*   HGB 11.3* 10.4* 9.4*   HCT 38.0 34.2* 31.1*   MCV 93.8 92.9 92.6    177 152     BMP:   Recent Labs     11/08/22  2040 11/09/22 0210 11/09/22  0811   * 133* 131*   K 3.4* 4.0 3.8   CL 96* 98* 96*   CO2 26 24 24   PHOS 2.6 3.0 3.2   BUN 43* 41* 42*   CREATININE 2.0* 1.9* 1.8*     LIVER PROFILE:   Recent Labs     11/08/22 2040 11/09/22  0210 11/09/22  0629 11/09/22  0811 11/09/22  1212   AST 56* 49*  --  41*  --    ALT 70* 64*  --  60*  --    LIPASE 31  --   --   --   --    BILIDIR 0.3  --  0.2  --  0.2   BILITOT 0.4  0.5 0.3 0.3 0.3 0.2   ALKPHOS 190* 144*  --  136*  --      PT/INR:   Recent Labs     11/08/22 2040 11/09/22  0811   PROTIME 18.3* 17.6*   INR 1.41 1.36     APTT:   Recent Labs     11/08/22 2040 11/09/22  0811   APTT 38.1* 37.2*     BNP:  No results for input(s): BNP in the last 72 hours.       Assessment:  Patient Active Problem List    Diagnosis Date Noted    Other seizures ([address] 11/07/2022    Cardiac arrest ([address] 11/04/2022    Acute decompensated heart failure ([address] 10/17/2022    Pulmonary hypertension ([address] 09/14/2022    Severe tricuspid regurgitation 09/14/2022    Status post thoracic spinal fusion 08/17/2022    Other insomnia 08/17/2022    Hypertensive disorder 08/17/2022    Long term (current) use of antibiotics 03/30/2022    Pseudarthrosis after fusion or arthrodesis 03/30/2022    Anemia 08/16/2020    Back pain 12/09/2020    MRSA infection     Heroin use     Tobacco abuse     Osteomyelitis ([address] 10/21/2020    Endocarditis 09/15/2020    Osteomyelitis of vertebra ([address] 09/15/2020    Right flank pain 09/14/2020    Staphylococcal arthritis of right hip ([address] 08/10/2020    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

## 2022-11-09 NOTE — PLAN OF CARE
Spoke with MI, Dallas Vick, at [facility]. Pt will not be a  case. [facility] has full permission for organ, tissue and eye donation at this time with no restrictions per Dr. Maria Ines Lubin.

## 2022-11-09 NOTE — PROGRESS NOTES
Initiated phone call to [facility] - spoke with Litzy Smith regarding patient's current status.

## 2022-11-10 LAB
ALBUMIN SERPL-MCNC: 2 GM/DL (ref 3.4–5)
ALBUMIN SERPL-MCNC: 2.2 GM/DL (ref 3.4–5)
ALBUMIN SERPL-MCNC: 2.2 GM/DL (ref 3.4–5)
ALBUMIN SERPL-MCNC: 2.3 GM/DL (ref 3.4–5)
ALBUMIN SERPL-MCNC: 2.4 GM/DL (ref 3.4–5)
ALP BLD-CCNC: 107 IU/L (ref 40–128)
ALP BLD-CCNC: 111 IU/L (ref 40–129)
ALP BLD-CCNC: 120 IU/L (ref 40–128)
ALP BLD-CCNC: 121 IU/L (ref 40–129)
ALP BLD-CCNC: 122 IU/L (ref 40–129)
ALT SERPL-CCNC: 35 U/L (ref 10–40)
ALT SERPL-CCNC: 39 U/L (ref 10–40)
ALT SERPL-CCNC: 44 U/L (ref 10–40)
ALT SERPL-CCNC: 44 U/L (ref 10–40)
ALT SERPL-CCNC: 49 U/L (ref 10–40)
ANION GAP SERPL CALCULATED.3IONS-SCNC: 10 MMOL/L (ref 4–16)
ANION GAP SERPL CALCULATED.3IONS-SCNC: 11 MMOL/L (ref 4–16)
ANION GAP SERPL CALCULATED.3IONS-SCNC: 12 MMOL/L (ref 4–16)
ANION GAP SERPL CALCULATED.3IONS-SCNC: 13 MMOL/L (ref 4–16)
ANION GAP SERPL CALCULATED.3IONS-SCNC: 9 MMOL/L (ref 4–16)
APTT: 30.6 SECONDS (ref 25.1–37.1)
APTT: 31.3 SECONDS (ref 25.1–37.1)
AST SERPL-CCNC: 18 IU/L (ref 15–37)
AST SERPL-CCNC: 19 IU/L (ref 15–37)
AST SERPL-CCNC: 24 IU/L (ref 15–37)
AST SERPL-CCNC: 25 IU/L (ref 15–37)
AST SERPL-CCNC: 27 IU/L (ref 15–37)
BACTERIA: ABNORMAL /HPF
BACTERIA: NEGATIVE /HPF
BASE EXCESS MIXED: 0.5 (ref 0–2.3)
BILIRUB SERPL-MCNC: 0.2 MG/DL (ref 0–1)
BILIRUB SERPL-MCNC: 0.3 MG/DL (ref 0–1)
BILIRUBIN DIRECT: 0.2 MG/DL (ref 0–0.3)
BILIRUBIN URINE: NEGATIVE MG/DL
BILIRUBIN URINE: NEGATIVE MG/DL
BILIRUBIN, INDIRECT: 0 MG/DL (ref 0–0.7)
BLOOD, URINE: ABNORMAL
BLOOD, URINE: ABNORMAL
BUN BLDV-MCNC: 49 MG/DL (ref 6–23)
BUN BLDV-MCNC: 51 MG/DL (ref 6–23)
BUN BLDV-MCNC: 52 MG/DL (ref 6–23)
BUN BLDV-MCNC: 54 MG/DL (ref 6–23)
BUN BLDV-MCNC: 55 MG/DL (ref 6–23)
CALCIUM SERPL-MCNC: 8 MG/DL (ref 8.3–10.6)
CALCIUM SERPL-MCNC: 8.1 MG/DL (ref 8.3–10.6)
CALCIUM SERPL-MCNC: 8.3 MG/DL (ref 8.3–10.6)
CALCIUM SERPL-MCNC: 8.4 MG/DL (ref 8.3–10.6)
CALCIUM SERPL-MCNC: 8.5 MG/DL (ref 8.3–10.6)
CARBON MONOXIDE, BLOOD: 1.3 % (ref 0–5)
CELLULAR CASTS: 3 /LPF
CHLORIDE BLD-SCNC: 91 MMOL/L (ref 99–110)
CHLORIDE BLD-SCNC: 92 MMOL/L (ref 99–110)
CHLORIDE BLD-SCNC: 93 MMOL/L (ref 99–110)
CHLORIDE BLD-SCNC: 94 MMOL/L (ref 99–110)
CHLORIDE BLD-SCNC: 95 MMOL/L (ref 99–110)
CLARITY: ABNORMAL
CLARITY: CLEAR
CO2 CONTENT: 28.5 MMOL/L (ref 19–24)
CO2: 23 MMOL/L (ref 21–32)
CO2: 24 MMOL/L (ref 21–32)
CO2: 25 MMOL/L (ref 21–32)
COLOR: YELLOW
COLOR: YELLOW
COMMENT: ABNORMAL
CREAT SERPL-MCNC: 1.7 MG/DL (ref 0.6–1.1)
CREAT SERPL-MCNC: 1.8 MG/DL (ref 0.6–1.1)
CREAT SERPL-MCNC: 1.9 MG/DL (ref 0.6–1.1)
CULTURE: NORMAL
GFR SERPL CREATININE-BSD FRML MDRD: 34 ML/MIN/1.73M2
GFR SERPL CREATININE-BSD FRML MDRD: 36 ML/MIN/1.73M2
GFR SERPL CREATININE-BSD FRML MDRD: 39 ML/MIN/1.73M2
GLUCOSE BLD-MCNC: 153 MG/DL (ref 70–99)
GLUCOSE BLD-MCNC: 154 MG/DL (ref 70–99)
GLUCOSE BLD-MCNC: 155 MG/DL (ref 70–99)
GLUCOSE BLD-MCNC: 162 MG/DL (ref 70–99)
GLUCOSE BLD-MCNC: 165 MG/DL (ref 70–99)
GLUCOSE BLD-MCNC: 172 MG/DL (ref 70–99)
GLUCOSE BLD-MCNC: 176 MG/DL (ref 70–99)
GLUCOSE BLD-MCNC: 178 MG/DL (ref 70–99)
GLUCOSE BLD-MCNC: 182 MG/DL (ref 70–99)
GLUCOSE BLD-MCNC: 189 MG/DL (ref 70–99)
GLUCOSE BLD-MCNC: 192 MG/DL (ref 70–99)
GLUCOSE, URINE: NEGATIVE MG/DL
GLUCOSE, URINE: NEGATIVE MG/DL
GRANULAR CASTS: 2 /LPF
HCO3 ARTERIAL: 27 MMOL/L (ref 18–23)
HCT VFR BLD CALC: 26.8 % (ref 37–47)
HCT VFR BLD CALC: 29.5 % (ref 37–47)
HEMOGLOBIN: 8.3 GM/DL (ref 12.5–16)
HEMOGLOBIN: 9 GM/DL (ref 12.5–16)
INR BLD: 1.32 INDEX
INR BLD: 1.39 INDEX
KETONES, URINE: NEGATIVE MG/DL
KETONES, URINE: NEGATIVE MG/DL
LEUKOCYTE ESTERASE, URINE: NEGATIVE
LEUKOCYTE ESTERASE, URINE: NEGATIVE
Lab: NORMAL
MAGNESIUM: 1.7 MG/DL (ref 1.8–2.4)
MAGNESIUM: 1.9 MG/DL (ref 1.8–2.4)
MAGNESIUM: 2 MG/DL (ref 1.8–2.4)
MAGNESIUM: 2 MG/DL (ref 1.8–2.4)
MCH RBC QN AUTO: 27.8 PG (ref 27–31)
MCH RBC QN AUTO: 28.1 PG (ref 27–31)
MCHC RBC AUTO-ENTMCNC: 30.5 % (ref 32–36)
MCHC RBC AUTO-ENTMCNC: 31 % (ref 32–36)
MCV RBC AUTO: 89.6 FL (ref 78–100)
MCV RBC AUTO: 92.2 FL (ref 78–100)
METHEMOGLOBIN ARTERIAL: 1 %
MUCUS: ABNORMAL HPF
MUCUS: ABNORMAL HPF
NITRITE URINE, QUANTITATIVE: NEGATIVE
NITRITE URINE, QUANTITATIVE: NEGATIVE
O2 SATURATION: 94.7 % (ref 96–97)
OSMOLALITY URINE: 421 MOS/L (ref 292–1090)
OSMOLALITY: 291 MOS/L (ref 280–300)
PCO2 ARTERIAL: 50 MMHG (ref 32–45)
PDW BLD-RTO: 17.3 % (ref 11.7–14.9)
PDW BLD-RTO: 17.7 % (ref 11.7–14.9)
PH BLOOD: 7.34 (ref 7.34–7.45)
PH, URINE: 5.5 (ref 5–8)
PH, URINE: 5.5 (ref 5–8)
PHOSPHORUS: 2.6 MG/DL (ref 2.5–4.9)
PHOSPHORUS: 2.7 MG/DL (ref 2.5–4.9)
PHOSPHORUS: 2.8 MG/DL (ref 2.5–4.9)
PHOSPHORUS: 2.8 MG/DL (ref 2.5–4.9)
PLATELET # BLD: 114 K/CU MM (ref 140–440)
PLATELET # BLD: 134 K/CU MM (ref 140–440)
PMV BLD AUTO: 10.7 FL (ref 7.5–11.1)
PMV BLD AUTO: 11.4 FL (ref 7.5–11.1)
PO2 ARTERIAL: 90 MMHG (ref 75–100)
POTASSIUM SERPL-SCNC: 3 MMOL/L (ref 3.5–5.1)
POTASSIUM SERPL-SCNC: 3.2 MMOL/L (ref 3.5–5.1)
POTASSIUM SERPL-SCNC: 3.4 MMOL/L (ref 3.5–5.1)
POTASSIUM SERPL-SCNC: 3.5 MMOL/L (ref 3.5–5.1)
POTASSIUM SERPL-SCNC: 3.9 MMOL/L (ref 3.5–5.1)
PROTEIN UA: 100 MG/DL
PROTEIN UA: 30 MG/DL
PROTHROMBIN TIME: 17.1 SECONDS (ref 11.7–14.5)
PROTHROMBIN TIME: 18 SECONDS (ref 11.7–14.5)
RBC # BLD: 2.99 M/CU MM (ref 4.2–5.4)
RBC # BLD: 3.2 M/CU MM (ref 4.2–5.4)
RBC URINE: 1 /HPF (ref 0–6)
RBC URINE: 5 /HPF (ref 0–6)
RENAL EPITHELIAL, UA: <1 /HPF
SODIUM BLD-SCNC: 126 MMOL/L (ref 135–145)
SODIUM BLD-SCNC: 127 MMOL/L (ref 135–145)
SODIUM BLD-SCNC: 128 MMOL/L (ref 135–145)
SODIUM BLD-SCNC: 131 MMOL/L (ref 135–145)
SPECIFIC GRAVITY UA: 1.02 (ref 1–1.03)
SPECIFIC GRAVITY UA: 1.02 (ref 1–1.03)
SPECIMEN: NORMAL
TOTAL PROTEIN: 5.1 GM/DL (ref 6.4–8.2)
TOTAL PROTEIN: 5.3 GM/DL (ref 6.4–8.2)
TOTAL PROTEIN: 5.3 GM/DL (ref 6.4–8.2)
TOTAL PROTEIN: 5.4 GM/DL (ref 6.4–8.2)
TOTAL PROTEIN: 5.6 GM/DL (ref 6.4–8.2)
TRANSITIONAL EPITHELIAL: <1 /HPF
TRANSITIONAL EPITHELIAL: <1 /HPF
TRICHOMONAS: ABNORMAL /HPF
TRICHOMONAS: ABNORMAL /HPF
UROBILINOGEN, URINE: 0.2 MG/DL (ref 0.2–1)
UROBILINOGEN, URINE: 0.2 MG/DL (ref 0.2–1)
WBC # BLD: 3.7 K/CU MM (ref 4–10.5)
WBC # BLD: 5.9 K/CU MM (ref 4–10.5)
WBC UA: 1 /HPF (ref 0–5)
WBC UA: ABNORMAL /HPF (ref 0–5)

## 2022-11-10 PROCEDURE — 82248 BILIRUBIN DIRECT: CPT

## 2022-11-10 PROCEDURE — 6360000002 HC RX W HCPCS: Performed by: STUDENT IN AN ORGANIZED HEALTH CARE EDUCATION/TRAINING PROGRAM

## 2022-11-10 PROCEDURE — 2500000003 HC RX 250 WO HCPCS: Performed by: STUDENT IN AN ORGANIZED HEALTH CARE EDUCATION/TRAINING PROGRAM

## 2022-11-10 PROCEDURE — 84295 ASSAY OF SERUM SODIUM: CPT

## 2022-11-10 PROCEDURE — 81001 URINALYSIS AUTO W/SCOPE: CPT

## 2022-11-10 PROCEDURE — 80053 COMPREHEN METABOLIC PANEL: CPT

## 2022-11-10 PROCEDURE — 82962 GLUCOSE BLOOD TEST: CPT

## 2022-11-10 PROCEDURE — 94761 N-INVAS EAR/PLS OXIMETRY MLT: CPT

## 2022-11-10 PROCEDURE — 2580000003 HC RX 258

## 2022-11-10 PROCEDURE — 83735 ASSAY OF MAGNESIUM: CPT

## 2022-11-10 PROCEDURE — 85730 THROMBOPLASTIN TIME PARTIAL: CPT

## 2022-11-10 PROCEDURE — P9047 ALBUMIN (HUMAN), 25%, 50ML: HCPCS | Performed by: STUDENT IN AN ORGANIZED HEALTH CARE EDUCATION/TRAINING PROGRAM

## 2022-11-10 PROCEDURE — 82247 BILIRUBIN TOTAL: CPT

## 2022-11-10 PROCEDURE — 82803 BLOOD GASES ANY COMBINATION: CPT

## 2022-11-10 PROCEDURE — 2500000003 HC RX 250 WO HCPCS

## 2022-11-10 PROCEDURE — 84100 ASSAY OF PHOSPHORUS: CPT

## 2022-11-10 PROCEDURE — 83935 ASSAY OF URINE OSMOLALITY: CPT

## 2022-11-10 PROCEDURE — 2580000003 HC RX 258: Performed by: STUDENT IN AN ORGANIZED HEALTH CARE EDUCATION/TRAINING PROGRAM

## 2022-11-10 PROCEDURE — 37799 UNLISTED PX VASCULAR SURGERY: CPT

## 2022-11-10 PROCEDURE — 2000000000 HC ICU R&B

## 2022-11-10 PROCEDURE — 83930 ASSAY OF BLOOD OSMOLALITY: CPT

## 2022-11-10 PROCEDURE — 81003 URINALYSIS AUTO W/O SCOPE: CPT

## 2022-11-10 PROCEDURE — 89220 SPUTUM SPECIMEN COLLECTION: CPT

## 2022-11-10 PROCEDURE — 2700000000 HC OXYGEN THERAPY PER DAY

## 2022-11-10 PROCEDURE — 85610 PROTHROMBIN TIME: CPT

## 2022-11-10 PROCEDURE — 2580000003 HC RX 258: Performed by: INTERNAL MEDICINE

## 2022-11-10 PROCEDURE — C9113 INJ PANTOPRAZOLE SODIUM, VIA: HCPCS | Performed by: STUDENT IN AN ORGANIZED HEALTH CARE EDUCATION/TRAINING PROGRAM

## 2022-11-10 PROCEDURE — 85027 COMPLETE CBC AUTOMATED: CPT

## 2022-11-10 RX ORDER — 3% SODIUM CHLORIDE 3 G/100ML
40 INJECTION, SOLUTION INTRAVENOUS CONTINUOUS
Status: DISCONTINUED | OUTPATIENT
Start: 2022-11-11 | End: 2022-11-11

## 2022-11-10 RX ORDER — POTASSIUM CHLORIDE 29.8 MG/ML
20 INJECTION INTRAVENOUS PRN
Status: DISCONTINUED | OUTPATIENT
Start: 2022-11-10 | End: 2022-11-12 | Stop reason: HOSPADM

## 2022-11-10 RX ORDER — 3% SODIUM CHLORIDE 3 G/100ML
200 INJECTION, SOLUTION INTRAVENOUS ONCE
Status: COMPLETED | OUTPATIENT
Start: 2022-11-10 | End: 2022-11-10

## 2022-11-10 RX ORDER — POTASSIUM CHLORIDE 29.8 MG/ML
20 INJECTION INTRAVENOUS ONCE
Status: COMPLETED | OUTPATIENT
Start: 2022-11-10 | End: 2022-11-10

## 2022-11-10 RX ORDER — MAGNESIUM SULFATE IN WATER 40 MG/ML
2000 INJECTION, SOLUTION INTRAVENOUS PRN
Status: DISCONTINUED | OUTPATIENT
Start: 2022-11-10 | End: 2022-11-12 | Stop reason: HOSPADM

## 2022-11-10 RX ORDER — POTASSIUM CHLORIDE 7.45 MG/ML
10 INJECTION INTRAVENOUS PRN
Status: DISCONTINUED | OUTPATIENT
Start: 2022-11-10 | End: 2022-11-12 | Stop reason: HOSPADM

## 2022-11-10 RX ORDER — ALBUMIN (HUMAN) 12.5 G/50ML
12.5 SOLUTION INTRAVENOUS ONCE
Status: COMPLETED | OUTPATIENT
Start: 2022-11-10 | End: 2022-11-10

## 2022-11-10 RX ORDER — BUMETANIDE 0.25 MG/ML
2 INJECTION, SOLUTION INTRAMUSCULAR; INTRAVENOUS ONCE
Status: COMPLETED | OUTPATIENT
Start: 2022-11-10 | End: 2022-11-10

## 2022-11-10 RX ADMIN — VASOPRESSIN 0.04 UNITS/MIN: 20 INJECTION INTRAVENOUS at 18:59

## 2022-11-10 RX ADMIN — LEVOTHYROXINE SODIUM ANHYDROUS 25 MCG/HR: 100 INJECTION, POWDER, LYOPHILIZED, FOR SOLUTION INTRAVENOUS at 23:34

## 2022-11-10 RX ADMIN — ALBUMIN (HUMAN) 12.5 G: 0.25 INJECTION, SOLUTION INTRAVENOUS at 10:19

## 2022-11-10 RX ADMIN — ENOXAPARIN SODIUM 30 MG: 100 INJECTION SUBCUTANEOUS at 21:13

## 2022-11-10 RX ADMIN — VASOPRESSIN 0.04 UNITS/MIN: 20 INJECTION INTRAVENOUS at 02:41

## 2022-11-10 RX ADMIN — BUMETANIDE 2 MG: 0.25 INJECTION, SOLUTION INTRAMUSCULAR; INTRAVENOUS at 12:22

## 2022-11-10 RX ADMIN — CEFEPIME HYDROCHLORIDE 2000 MG: 2 INJECTION, POWDER, FOR SOLUTION INTRAVENOUS at 09:07

## 2022-11-10 RX ADMIN — LEVOTHYROXINE SODIUM ANHYDROUS 30 MCG/HR: 100 INJECTION, POWDER, LYOPHILIZED, FOR SOLUTION INTRAVENOUS at 00:30

## 2022-11-10 RX ADMIN — LEVOTHYROXINE SODIUM ANHYDROUS 30 MCG/HR: 100 INJECTION, POWDER, LYOPHILIZED, FOR SOLUTION INTRAVENOUS at 08:01

## 2022-11-10 RX ADMIN — LEVOTHYROXINE SODIUM ANHYDROUS 10 MCG/HR: 100 INJECTION, POWDER, LYOPHILIZED, FOR SOLUTION INTRAVENOUS at 15:30

## 2022-11-10 RX ADMIN — POTASSIUM CHLORIDE 20 MEQ: 29.8 INJECTION, SOLUTION INTRAVENOUS at 18:43

## 2022-11-10 RX ADMIN — VASOPRESSIN 0.04 UNITS/MIN: 20 INJECTION INTRAVENOUS at 12:00

## 2022-11-10 RX ADMIN — PANTOPRAZOLE SODIUM 40 MG: 40 INJECTION, POWDER, FOR SOLUTION INTRAVENOUS at 08:59

## 2022-11-10 RX ADMIN — ENOXAPARIN SODIUM 30 MG: 100 INJECTION SUBCUTANEOUS at 08:59

## 2022-11-10 RX ADMIN — SODIUM CHLORIDE 200 ML/HR: 3 INJECTION, SOLUTION INTRAVENOUS at 02:07

## 2022-11-10 RX ADMIN — SODIUM CHLORIDE: 9 INJECTION, SOLUTION INTRAVENOUS at 13:36

## 2022-11-10 RX ADMIN — METHYLPREDNISOLONE SODIUM SUCCINATE 1000 MG: 1 INJECTION, POWDER, LYOPHILIZED, FOR SOLUTION INTRAMUSCULAR; INTRAVENOUS at 20:58

## 2022-11-10 RX ADMIN — METHYLPREDNISOLONE SODIUM SUCCINATE 1000 MG: 1 INJECTION, POWDER, LYOPHILIZED, FOR SOLUTION INTRAMUSCULAR; INTRAVENOUS at 08:57

## 2022-11-10 RX ADMIN — PHENYLEPHRINE HYDROCHLORIDE 175 MCG/MIN: 10 INJECTION INTRAVENOUS at 02:55

## 2022-11-10 RX ADMIN — PHENYLEPHRINE HYDROCHLORIDE 175 MCG/MIN: 10 INJECTION INTRAVENOUS at 08:23

## 2022-11-10 RX ADMIN — CEFEPIME HYDROCHLORIDE 2000 MG: 2 INJECTION, POWDER, FOR SOLUTION INTRAVENOUS at 21:32

## 2022-11-10 RX ADMIN — PHENYLEPHRINE HYDROCHLORIDE 55 MCG/MIN: 10 INJECTION INTRAVENOUS at 16:29

## 2022-11-10 RX ADMIN — SODIUM CHLORIDE 1 MG/HR: 900 INJECTION, SOLUTION INTRAVENOUS at 17:47

## 2022-11-10 ASSESSMENT — PULMONARY FUNCTION TESTS
PIF_VALUE: 37
PIF_VALUE: 39
PIF_VALUE: 38
PIF_VALUE: 37
PIF_VALUE: 38
PIF_VALUE: 36
PIF_VALUE: 37
PIF_VALUE: 38
PIF_VALUE: 36
PIF_VALUE: 37
PIF_VALUE: 36
PIF_VALUE: 39
PIF_VALUE: 38
PIF_VALUE: 38
PIF_VALUE: 36
PIF_VALUE: 39
PIF_VALUE: 37
PIF_VALUE: 37
PIF_VALUE: 39
PIF_VALUE: 37
PIF_VALUE: 38
PIF_VALUE: 39
PIF_VALUE: 38
PIF_VALUE: 39
PIF_VALUE: 37

## 2022-11-10 ASSESSMENT — PAIN SCALES - GENERAL
PAINLEVEL_OUTOF10: 0

## 2022-11-10 NOTE — PROGRESS NOTES
11/10/22 0308   Patient Observation   Heart Rate 89   Resp 17   SpO2 97 %   Breath Sounds   Right Upper Lobe Diminished   Right Middle Lobe Diminished   Right Lower Lobe Diminished   Left Upper Lobe Diminished   Left Lower Lobe Diminished   Vent Information   Vent Mode AC/VC   Ventilator Settings   FiO2  80 %   Vt (Set, mL) 450 mL   Resp Rate (Set) 22 bmp   PEEP/CPAP (cmH2O) 10   Pressure Support (cm H2O) 0 cm H2O   Vent Patient Data (Readings)   Vt (Measured) 469 mL   Peak Inspiratory Pressure (cmH2O) 39 cmH2O   Rate Measured 22 br/min   Minute Volume (L/min) 10.3 Liters   Mean Airway Pressure (cmH2O) 20 cmH20   Plateau Pressure (cm H2O) 37 cm H2O   I:E Ratio 1:1.50   Flow Sensitivity 3 L/min   Backup Apnea On   Backup Rate 22 Breaths Per Minute   Vent Alarm Settings   High Pressure (cmH2O) 50 cmH2O   Low Minute Volume (lpm) 2.5 L/min   High Minute Volume (lpm) 20 L/min   Low Exhaled Vt (ml) 250 mL   High Exhaled Vt (ml) 1000 mL   RR High (bpm) 40 br/min   Apnea (secs) 20 secs   Additional Respiratoray Assessments   Humidification Source HME   Ambu Bag With Mask At Bedside Yes   Airway Clearance   Suction ET Tube   Subglottic Suction Done No   Suction Device Inline suction catheter   Sputum Method Obtained Endotracheal   Sputum Amount Scant   Sputum Color/Odor Clear   Sputum Consistency Thin   $Obtained Sample $Induced Sputum (charge not used for Bronchoscopy)   ETT    Placement Date/Time: 11/04/22 (c)    Present on Admission/Arrival: Yes  Placed By: (c) Other (comment)  Placement Verified By: Capnometry; Chest X-ray; Auscultation  Airway Type: Cuffed  Airway Tube Size: 8 mm  Location: Oral  Secured At: (c) 23 cm  Tanisha. ..    Secured At 21 cm   Measured From Lips   ETT Placement Right   Secured By Commercial tube franks   Site Assessment Dry   Cuff Pressure   (mt)

## 2022-11-11 ENCOUNTER — ANESTHESIA (OUTPATIENT)
Dept: OPERATING ROOM | Age: 42
DRG: 130 | End: 2022-11-11
Payer: MEDICAID

## 2022-11-11 ENCOUNTER — ANESTHESIA EVENT (OUTPATIENT)
Dept: OPERATING ROOM | Age: 42
DRG: 130 | End: 2022-11-11
Payer: MEDICAID

## 2022-11-11 VITALS
OXYGEN SATURATION: 96 % | HEIGHT: 65 IN | HEART RATE: 83 BPM | SYSTOLIC BLOOD PRESSURE: 105 MMHG | DIASTOLIC BLOOD PRESSURE: 64 MMHG | TEMPERATURE: 98.8 F | WEIGHT: 293 LBS | RESPIRATION RATE: 22 BRPM | BODY MASS INDEX: 48.82 KG/M2

## 2022-11-11 LAB
ALBUMIN SERPL-MCNC: 2.2 GM/DL (ref 3.4–5)
ALBUMIN SERPL-MCNC: 2.4 GM/DL (ref 3.4–5)
ALBUMIN SERPL-MCNC: 2.4 GM/DL (ref 3.4–5)
ALP BLD-CCNC: 88 IU/L (ref 40–129)
ALP BLD-CCNC: 90 IU/L (ref 40–129)
ALP BLD-CCNC: 99 IU/L (ref 40–128)
ALT SERPL-CCNC: 28 U/L (ref 10–40)
ALT SERPL-CCNC: 30 U/L (ref 10–40)
ALT SERPL-CCNC: 32 U/L (ref 10–40)
ANION GAP SERPL CALCULATED.3IONS-SCNC: 10 MMOL/L (ref 4–16)
ANION GAP SERPL CALCULATED.3IONS-SCNC: 11 MMOL/L (ref 4–16)
ANION GAP SERPL CALCULATED.3IONS-SCNC: 14 MMOL/L (ref 4–16)
APTT: 29.1 SECONDS (ref 25.1–37.1)
APTT: 29.6 SECONDS (ref 25.1–37.1)
APTT: 30.9 SECONDS (ref 25.1–37.1)
AST SERPL-CCNC: 13 IU/L (ref 15–37)
AST SERPL-CCNC: 16 IU/L (ref 15–37)
AST SERPL-CCNC: 17 IU/L (ref 15–37)
BACTERIA: NEGATIVE /HPF
BANDED NEUTROPHILS ABSOLUTE COUNT: 0.03 K/CU MM
BANDED NEUTROPHILS RELATIVE PERCENT: 1 % (ref 5–11)
BASE EXCESS MIXED: 1.7 (ref 0–2.3)
BILIRUB SERPL-MCNC: 0.2 MG/DL (ref 0–1)
BILIRUBIN DIRECT: 0.2 MG/DL (ref 0–0.3)
BILIRUBIN URINE: NEGATIVE MG/DL
BILIRUBIN, INDIRECT: 0 MG/DL (ref 0–0.7)
BLOOD, URINE: ABNORMAL
BUN BLDV-MCNC: 57 MG/DL (ref 6–23)
BUN BLDV-MCNC: 59 MG/DL (ref 6–23)
BUN BLDV-MCNC: 63 MG/DL (ref 6–23)
CALCIUM SERPL-MCNC: 8.1 MG/DL (ref 8.3–10.6)
CALCIUM SERPL-MCNC: 8.3 MG/DL (ref 8.3–10.6)
CALCIUM SERPL-MCNC: 8.3 MG/DL (ref 8.3–10.6)
CARBON MONOXIDE, BLOOD: 1.2 % (ref 0–5)
CHLORIDE BLD-SCNC: 102 MMOL/L (ref 99–110)
CHLORIDE BLD-SCNC: 96 MMOL/L (ref 99–110)
CHLORIDE BLD-SCNC: 97 MMOL/L (ref 99–110)
CLARITY: CLEAR
CO2 CONTENT: 28.6 MMOL/L (ref 19–24)
CO2: 23 MMOL/L (ref 21–32)
CO2: 24 MMOL/L (ref 21–32)
CO2: 24 MMOL/L (ref 21–32)
COLOR: YELLOW
COMMENT: ABNORMAL
CREAT SERPL-MCNC: 1.7 MG/DL (ref 0.6–1.1)
CREAT SERPL-MCNC: 1.7 MG/DL (ref 0.6–1.1)
CREAT SERPL-MCNC: 1.8 MG/DL (ref 0.6–1.1)
DIFFERENTIAL TYPE: ABNORMAL
GFR SERPL CREATININE-BSD FRML MDRD: 36 ML/MIN/1.73M2
GFR SERPL CREATININE-BSD FRML MDRD: 39 ML/MIN/1.73M2
GFR SERPL CREATININE-BSD FRML MDRD: 39 ML/MIN/1.73M2
GLUCOSE BLD-MCNC: 148 MG/DL (ref 70–99)
GLUCOSE BLD-MCNC: 150 MG/DL (ref 70–99)
GLUCOSE BLD-MCNC: 158 MG/DL (ref 70–99)
GLUCOSE, URINE: NEGATIVE MG/DL
HCO3 ARTERIAL: 27.2 MMOL/L (ref 18–23)
HCT VFR BLD CALC: 25 % (ref 37–47)
HEMOGLOBIN: 7.9 GM/DL (ref 12.5–16)
INR BLD: 1.36 INDEX
INR BLD: 1.41 INDEX
INR BLD: 1.44 INDEX
KETONES, URINE: NEGATIVE MG/DL
LEUKOCYTE ESTERASE, URINE: NEGATIVE
LYMPHOCYTES ABSOLUTE: 0.4 K/CU MM
LYMPHOCYTES RELATIVE PERCENT: 13 % (ref 24–44)
MAGNESIUM: 1.6 MG/DL (ref 1.8–2.4)
MAGNESIUM: 1.6 MG/DL (ref 1.8–2.4)
MAGNESIUM: 2 MG/DL (ref 1.8–2.4)
MCH RBC QN AUTO: 28.5 PG (ref 27–31)
MCHC RBC AUTO-ENTMCNC: 31.6 % (ref 32–36)
MCV RBC AUTO: 90.3 FL (ref 78–100)
METAMYELOCYTES ABSOLUTE COUNT: 0.03 K/CU MM
METAMYELOCYTES PERCENT: 1 %
METHEMOGLOBIN ARTERIAL: 0.5 %
MONOCYTES ABSOLUTE: 0.1 K/CU MM
MONOCYTES RELATIVE PERCENT: 4 % (ref 0–4)
MUCUS: ABNORMAL HPF
NITRITE URINE, QUANTITATIVE: NEGATIVE
NUCLEATED RED BLOOD CELLS: 3
O2 SATURATION: 92.5 % (ref 96–97)
PCO2 ARTERIAL: 45 MMHG (ref 32–45)
PDW BLD-RTO: 17.4 % (ref 11.7–14.9)
PH BLOOD: 7.39 (ref 7.34–7.45)
PH, URINE: 5.5 (ref 5–8)
PHOSPHORUS: 2.1 MG/DL (ref 2.5–4.9)
PHOSPHORUS: 2.4 MG/DL (ref 2.5–4.9)
PHOSPHORUS: 2.5 MG/DL (ref 2.5–4.9)
PLATELET # BLD: 90 K/CU MM (ref 140–440)
PMV BLD AUTO: 10.6 FL (ref 7.5–11.1)
PO2 ARTERIAL: 69 MMHG (ref 75–100)
POTASSIUM SERPL-SCNC: 2.8 MMOL/L (ref 3.5–5.1)
POTASSIUM SERPL-SCNC: 3.1 MMOL/L (ref 3.5–5.1)
POTASSIUM SERPL-SCNC: 3.2 MMOL/L (ref 3.5–5.1)
PROTEIN UA: 30 MG/DL
PROTHROMBIN TIME: 17.6 SECONDS (ref 11.7–14.5)
PROTHROMBIN TIME: 18.2 SECONDS (ref 11.7–14.5)
PROTHROMBIN TIME: 18.6 SECONDS (ref 11.7–14.5)
RBC # BLD: 2.77 M/CU MM (ref 4.2–5.4)
RBC URINE: <1 /HPF (ref 0–6)
SEGMENTED NEUTROPHILS ABSOLUTE COUNT: 2.1 K/CU MM
SEGMENTED NEUTROPHILS RELATIVE PERCENT: 81 % (ref 36–66)
SODIUM BLD-SCNC: 132 MMOL/L (ref 135–145)
SODIUM BLD-SCNC: 133 MMOL/L (ref 135–145)
SODIUM BLD-SCNC: 136 MMOL/L (ref 135–145)
SPECIFIC GRAVITY UA: 1.02 (ref 1–1.03)
TOTAL PROTEIN: 5.3 GM/DL (ref 6.4–8.2)
TOTAL PROTEIN: 5.4 GM/DL (ref 6.4–8.2)
TOTAL PROTEIN: 5.4 GM/DL (ref 6.4–8.2)
TRICHOMONAS: ABNORMAL /HPF
UROBILINOGEN, URINE: 0.2 MG/DL (ref 0.2–1)
WBC # BLD: 2.7 K/CU MM (ref 4–10.5)
WBC UA: <1 /HPF (ref 0–5)

## 2022-11-11 PROCEDURE — 81001 URINALYSIS AUTO W/SCOPE: CPT

## 2022-11-11 PROCEDURE — C9113 INJ PANTOPRAZOLE SODIUM, VIA: HCPCS | Performed by: STUDENT IN AN ORGANIZED HEALTH CARE EDUCATION/TRAINING PROGRAM

## 2022-11-11 PROCEDURE — 6360000002 HC RX W HCPCS: Performed by: STUDENT IN AN ORGANIZED HEALTH CARE EDUCATION/TRAINING PROGRAM

## 2022-11-11 PROCEDURE — 3700000001 HC ADD 15 MINUTES (ANESTHESIA)

## 2022-11-11 PROCEDURE — 94761 N-INVAS EAR/PLS OXIMETRY MLT: CPT

## 2022-11-11 PROCEDURE — 84100 ASSAY OF PHOSPHORUS: CPT

## 2022-11-11 PROCEDURE — 83735 ASSAY OF MAGNESIUM: CPT

## 2022-11-11 PROCEDURE — 37799 UNLISTED PX VASCULAR SURGERY: CPT

## 2022-11-11 PROCEDURE — 2500000003 HC RX 250 WO HCPCS: Performed by: STUDENT IN AN ORGANIZED HEALTH CARE EDUCATION/TRAINING PROGRAM

## 2022-11-11 PROCEDURE — 88307 TISSUE EXAM BY PATHOLOGIST: CPT | Performed by: PATHOLOGY

## 2022-11-11 PROCEDURE — C1894 INTRO/SHEATH, NON-LASER: HCPCS

## 2022-11-11 PROCEDURE — 2580000003 HC RX 258: Performed by: INTERNAL MEDICINE

## 2022-11-11 PROCEDURE — 85007 BL SMEAR W/DIFF WBC COUNT: CPT

## 2022-11-11 PROCEDURE — 85610 PROTHROMBIN TIME: CPT

## 2022-11-11 PROCEDURE — 2580000003 HC RX 258

## 2022-11-11 PROCEDURE — 82247 BILIRUBIN TOTAL: CPT

## 2022-11-11 PROCEDURE — 6360000002 HC RX W HCPCS

## 2022-11-11 PROCEDURE — 2709999900 HC NON-CHARGEABLE SUPPLY

## 2022-11-11 PROCEDURE — 89220 SPUTUM SPECIMEN COLLECTION: CPT

## 2022-11-11 PROCEDURE — 80053 COMPREHEN METABOLIC PANEL: CPT

## 2022-11-11 PROCEDURE — 3700000000 HC ANESTHESIA ATTENDED CARE

## 2022-11-11 PROCEDURE — 2500000003 HC RX 250 WO HCPCS

## 2022-11-11 PROCEDURE — 3600000002 HC SURGERY LEVEL 2 BASE

## 2022-11-11 PROCEDURE — 88313 SPECIAL STAINS GROUP 2: CPT | Performed by: PATHOLOGY

## 2022-11-11 PROCEDURE — 3600000012 HC SURGERY LEVEL 2 ADDTL 15MIN

## 2022-11-11 PROCEDURE — 82803 BLOOD GASES ANY COMBINATION: CPT

## 2022-11-11 PROCEDURE — 85730 THROMBOPLASTIN TIME PARTIAL: CPT

## 2022-11-11 PROCEDURE — 2700000000 HC OXYGEN THERAPY PER DAY

## 2022-11-11 PROCEDURE — 2580000003 HC RX 258: Performed by: STUDENT IN AN ORGANIZED HEALTH CARE EDUCATION/TRAINING PROGRAM

## 2022-11-11 PROCEDURE — 88312 SPECIAL STAINS GROUP 1: CPT | Performed by: PATHOLOGY

## 2022-11-11 PROCEDURE — 88305 TISSUE EXAM BY PATHOLOGIST: CPT | Performed by: PATHOLOGY

## 2022-11-11 PROCEDURE — 85027 COMPLETE CBC AUTOMATED: CPT

## 2022-11-11 PROCEDURE — 82248 BILIRUBIN DIRECT: CPT

## 2022-11-11 RX ORDER — ROCURONIUM BROMIDE 10 MG/ML
INJECTION, SOLUTION INTRAVENOUS PRN
Status: DISCONTINUED | OUTPATIENT
Start: 2022-11-11 | End: 2022-11-11 | Stop reason: SDUPTHER

## 2022-11-11 RX ORDER — HEPARIN SODIUM 1000 [USP'U]/ML
INJECTION, SOLUTION INTRAVENOUS; SUBCUTANEOUS PRN
Status: DISCONTINUED | OUTPATIENT
Start: 2022-11-11 | End: 2022-11-11 | Stop reason: SDUPTHER

## 2022-11-11 RX ORDER — LABETALOL HYDROCHLORIDE 5 MG/ML
INJECTION, SOLUTION INTRAVENOUS PRN
Status: DISCONTINUED | OUTPATIENT
Start: 2022-11-11 | End: 2022-11-11 | Stop reason: SDUPTHER

## 2022-11-11 RX ORDER — PHENYLEPHRINE HCL IN 0.9% NACL 1 MG/10 ML
SYRINGE (ML) INTRAVENOUS PRN
Status: DISCONTINUED | OUTPATIENT
Start: 2022-11-11 | End: 2022-11-11 | Stop reason: SDUPTHER

## 2022-11-11 RX ORDER — SODIUM CHLORIDE 9 MG/ML
INJECTION, SOLUTION INTRAVENOUS CONTINUOUS PRN
Status: DISCONTINUED | OUTPATIENT
Start: 2022-11-11 | End: 2022-11-11 | Stop reason: SDUPTHER

## 2022-11-11 RX ADMIN — VASOPRESSIN 0.02 UNITS/MIN: 20 INJECTION INTRAVENOUS at 05:12

## 2022-11-11 RX ADMIN — Medication 0.2 MG: at 19:10

## 2022-11-11 RX ADMIN — POTASSIUM CHLORIDE 20 MEQ: 29.8 INJECTION, SOLUTION INTRAVENOUS at 01:24

## 2022-11-11 RX ADMIN — PANTOPRAZOLE SODIUM 40 MG: 40 INJECTION, POWDER, FOR SOLUTION INTRAVENOUS at 09:24

## 2022-11-11 RX ADMIN — POTASSIUM CHLORIDE 20 MEQ: 29.8 INJECTION, SOLUTION INTRAVENOUS at 10:19

## 2022-11-11 RX ADMIN — LABETALOL HYDROCHLORIDE 5 MG: 5 INJECTION, SOLUTION INTRAVENOUS at 18:50

## 2022-11-11 RX ADMIN — LEVOTHYROXINE SODIUM ANHYDROUS 25 MCG/HR: 100 INJECTION, POWDER, LYOPHILIZED, FOR SOLUTION INTRAVENOUS at 17:43

## 2022-11-11 RX ADMIN — POTASSIUM CHLORIDE 20 MEQ: 29.8 INJECTION, SOLUTION INTRAVENOUS at 00:22

## 2022-11-11 RX ADMIN — ROCURONIUM BROMIDE 100 MG: 10 INJECTION, SOLUTION INTRAVENOUS at 18:50

## 2022-11-11 RX ADMIN — POTASSIUM CHLORIDE 20 MEQ: 29.8 INJECTION, SOLUTION INTRAVENOUS at 02:27

## 2022-11-11 RX ADMIN — POTASSIUM CHLORIDE: 2 INJECTION, SOLUTION, CONCENTRATE INTRAVENOUS at 13:51

## 2022-11-11 RX ADMIN — SODIUM CHLORIDE 40 ML/HR: 3 INJECTION, SOLUTION INTRAVENOUS at 00:23

## 2022-11-11 RX ADMIN — ENOXAPARIN SODIUM 30 MG: 100 INJECTION SUBCUTANEOUS at 09:23

## 2022-11-11 RX ADMIN — LEVOTHYROXINE SODIUM ANHYDROUS 25 MCG/HR: 100 INJECTION, POWDER, LYOPHILIZED, FOR SOLUTION INTRAVENOUS at 08:18

## 2022-11-11 RX ADMIN — CEFEPIME HYDROCHLORIDE 2000 MG: 2 INJECTION, POWDER, FOR SOLUTION INTRAVENOUS at 09:25

## 2022-11-11 RX ADMIN — METHYLPREDNISOLONE SODIUM SUCCINATE 1000 MG: 1 INJECTION, POWDER, LYOPHILIZED, FOR SOLUTION INTRAMUSCULAR; INTRAVENOUS at 07:54

## 2022-11-11 RX ADMIN — HEPARIN SODIUM 30000 UNITS: 1000 INJECTION, SOLUTION INTRAVENOUS; SUBCUTANEOUS at 19:35

## 2022-11-11 RX ADMIN — POTASSIUM CHLORIDE 20 MEQ: 29.8 INJECTION, SOLUTION INTRAVENOUS at 12:30

## 2022-11-11 RX ADMIN — POTASSIUM CHLORIDE 20 MEQ: 29.8 INJECTION, SOLUTION INTRAVENOUS at 11:24

## 2022-11-11 RX ADMIN — SODIUM CHLORIDE: 9 INJECTION, SOLUTION INTRAVENOUS at 18:32

## 2022-11-11 RX ADMIN — MAGNESIUM SULFATE HEPTAHYDRATE 2000 MG: 2 INJECTION, SOLUTION INTRAVENOUS at 10:18

## 2022-11-11 RX ADMIN — Medication 0.2 MG: at 19:16

## 2022-11-11 ASSESSMENT — PULMONARY FUNCTION TESTS
PIF_VALUE: 37
PIF_VALUE: 35
PIF_VALUE: 37
PIF_VALUE: 37
PIF_VALUE: 36
PIF_VALUE: 37
PIF_VALUE: 36
PIF_VALUE: 37
PIF_VALUE: 37
PIF_VALUE: 38
PIF_VALUE: 37
PIF_VALUE: 36
PIF_VALUE: 37
PIF_VALUE: 35
PIF_VALUE: 37
PIF_VALUE: 40
PIF_VALUE: 36

## 2022-11-11 ASSESSMENT — PAIN SCALES - GENERAL
PAINLEVEL_OUTOF10: 0

## 2022-11-11 NOTE — PROGRESS NOTES
1900 urine output 225ml.  Per MD, adjust rate of bumetadine from 1mg/hr to 0.5mg/hr and inform MD of next hour urine output if less than goal of 100cc/hr

## 2022-11-11 NOTE — PROGRESS NOTES
Pt urine output dropped to 45ml this past hour. Bobbi from [employer] informed and orders received to administer vasopressin at 0.01units/min.

## 2022-11-11 NOTE — PROGRESS NOTES
11/11/22 0716   Patient Observation   Heart Rate 85   Resp 22   SpO2 93 %   Vent Information   Equipment Changed HME   Vent Mode AC/VC   $Ventilation $Subsequent Day   Ventilator Settings   FiO2  80 %   Vt (Set, mL) 450 mL   Resp Rate (Set) 22 bmp   PEEP/CPAP (cmH2O) 10   Pressure Support (cm H2O) 0 cm H2O   Vent Patient Data (Readings)   Vt (Measured) 451 mL   Peak Inspiratory Pressure (cmH2O) 38 cmH2O   Rate Measured 22 br/min   Minute Volume (L/min) 9.9 Liters   Mean Airway Pressure (cmH2O) 20 cmH20   Plateau Pressure (cm H2O) 37 cm H2O   I:E Ratio 1:1.50   Flow Sensitivity 3 L/min   Backup Apnea On   Backup Rate 22 Breaths Per Minute   Backup Vt   (450)   Vent Alarm Settings   High Pressure (cmH2O) 50 cmH2O   Low Minute Volume (lpm) 2.5 L/min   High Minute Volume (lpm) 20 L/min   Low Exhaled Vt (ml) 250 mL   High Exhaled Vt (ml) 1000 mL   RR High (bpm) 40 br/min   Apnea (secs) 20 secs   Additional Respiratoray Assessments   Humidification Source HME   Ambu Bag With Mask At Bedside Yes   Airway Clearance   Suction ET Tube   Subglottic Suction Done No   Suction Device Inline suction catheter   Sputum Method Obtained Endotracheal   Sputum Amount Small   Sputum Color/Odor Clear   Sputum Consistency Thin   $Obtained Sample $Induced Sputum (charge not used for Bronchoscopy)   ETT    Placement Date/Time: 11/04/22 (c)    Present on Admission/Arrival: Yes  Placed By: (c) Other (comment)  Placement Verified By: Capnometry; Chest X-ray; Auscultation  Airway Type: Cuffed  Airway Tube Size: 8 mm  Location: Oral  Secured At: (c) 23 cm  Tanisha. ..    Secured At 20 cm   Measured From Lips   ETT Placement Right   Secured By Commercial tube franks   Site Assessment Dry

## 2022-11-11 NOTE — ANESTHESIA PRE PROCEDURE
Department of Anesthesiology  Preprocedure Note       Name:  Latricia Butler   Age:  38 y.o.  :  1980                                          MRN:  [id number]         Date:  2022      Surgeon: Claudette Melton):  [facility]    Procedure: Procedure(s):  ORGAN PROCUREMENT LIVER AND BOTH KIDNEYS    Medications prior to admission:   Prior to Admission medications    Medication Sig Start Date End Date Taking? Authorizing Provider   metroNIDAZOLE (METROGEL) 1 % gel Apply topically daily. 10/24/22   LEIGHTON Simon CNP   methocarbamol (ROBAXIN) 500 MG tablet TAKE 1 TABLET BY MOUTH THREE TIMES DAILY AS NEEDED FOR LEG PAIN 10/21/22   LEIGHTON Simon CNP   torsemide (DEMADEX) 20 MG tablet Take 1 tablet by mouth daily  Patient taking differently: Take 20 mg by mouth in the morning and at bedtime 10/22/22   Bud Barth MD   doxycycline hyclate (VIBRA-TABS) 100 MG tablet Take 1 tablet by mouth every 12 hours 10/21/22 12/20/22  Bud Barth MD   nortriptyline (PAMELOR) 75 MG capsule Take 2 capsules by mouth nightly Taken for back pain 9/14/22 11/3/22  LEIGHTON Simon CNP   methadone (DOLOPHINE) 10 MG/ML solution Take 130 mg by mouth every morning. Historical Provider, MD   potassium chloride (KLOR-CON M) 20 MEQ extended release tablet Take 20 mEq by mouth 2 times daily Pt reports Dr. Dorothy Red increased dosage to 2 tabs BID    Aurora Trent MD   metOLazone (ZAROXOLYN) 2.5 MG tablet Take 5 mg by mouth every morning    Aurora Trent MD   gabapentin (NEURONTIN) 300 MG capsule Take 2 capsules by mouth 3 times daily for 30 days.  Ortho surgeon 8/17/22 11/3/22  LEIGHTON Simon CNP       Current medications:    Current Facility-Administered Medications   Medication Dose Route Frequency Provider Last Rate Last Admin    bumetanide (BUMEX) 12.5 mg in sodium chloride 0.9 % 125 mL infusion  0.25 mg/hr IntraVENous Continuous Brooke Maxwell MD   Stopped at 22 0204    potassium chloride 20 mEq/50 mL IVPB (Central Line)  20 mEq IntraVENous PRN Phoenix Ricci MD   Stopped at 11/11/22 0345    Or    potassium chloride 10 mEq/100 mL IVPB (Peripheral Line)  10 mEq IntraVENous PRN Phoenix Ricci MD        magnesium sulfate 2000 mg in 50 mL IVPB premix  2,000 mg IntraVENous PRN Phoenix Ricci MD        sodium chloride 3 % solution  40 mL/hr IntraVENous Continuous Derrell José MD 40 mL/hr at 11/11/22 0700 Rate Verify at 11/11/22 0700    levothyroxine (SYNTHROID) 200 mcg in sodium chloride 0.9 % 500 mL infusion  10 mcg/hr IntraVENous Continuous Rosy Castillo MD 62.5 mL/hr at 11/11/22 0818 25 mcg/hr at 11/11/22 0818    methylPREDNISolone sodium (SOLU-MEDROL) 1,000 mg in sodium chloride 0.9 % 250 mL IVPB  1,000 mg IntraVENous Q12H Rosy Castillo MD   Stopped at 11/11/22 0900    enoxaparin Sodium (LOVENOX) injection 30 mg  30 mg SubCUTAneous BID Rosy Castillo MD   30 mg at 11/10/22 2113    pantoprazole (PROTONIX) injection 40 mg  40 mg IntraVENous Daily Rosy Castillo MD   40 mg at 11/10/22 0859    cefepime (MAXIPIME) 2000 mg IVPB minibag  2,000 mg IntraVENous Q12H Ozzie Singh MD   Stopped at 11/10/22 2202    norepinephrine (LEVOPHED) 16 mg in sodium chloride 0.9 % 250 mL infusion  1-100 mcg/min IntraVENous Continuous Ozzie Singh MD   Stopped at 11/09/22 0136    phenylephrine (KINZA-SYNEPHRINE) 50 mg in dextrose 5 % 250 mL infusion   mcg/min IntraVENous Continuous Ozzie Singh MD   Paused at 11/11/22 0106    acetaminophen (TYLENOL) tablet 650 mg  650 mg Oral Q4H PRN Ozzie Singh MD   650 mg at 11/08/22 1852    vasopressin 20 Units in dextrose 5 % 100 mL infusion  0.01-0.04 Units/min IntraVENous Continuous Levorn Feroz Reina RCP 6 mL/hr at 11/11/22 0700 0.02 Units/min at 11/11/22 0700       Allergies:     Allergies   Allergen Reactions    Buprenorphine-Naloxone Itching, Rash and Shortness Of Breath    Subutex [Buprenorphine] Nausea And Vomiting    Amoxicillin-Pot Clavulanate Rash Problem List:    Patient Active Problem List   Diagnosis Code    CCC (chronic calculous cholecystitis) K80.10    MRSA bacteremia R78.81, B95.62    Amphetamine user F15.90    Chronic hepatitis C without hepatic coma (HCC) B18.2    Acute septic pulmonary embolism without acute cor pulmonale (HCC) I26.90    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, 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    Staphylococcal arthritis of right hip ([address] M00.051    Right flank pain R10.9    Endocarditis I38    Osteomyelitis of vertebra (HCC) M46.20    Osteomyelitis ([address] M86.9    MRSA infection A49.02    Heroin use F11.90    Tobacco abuse Z72.0    Back pain M54.9    Anemia D64.9    Status post thoracic spinal fusion Z98.1    Other insomnia G47.09    Long term (current) use of antibiotics Z79.2    Pseudarthrosis after fusion or arthrodesis M96.0    Hypertensive disorder I10    Pulmonary hypertension ([address] I27.20    Severe tricuspid regurgitation I07.1    Acute decompensated heart failure (HCC) I50.9    Cardiac arrest (HCC) I46.9    Other seizures (HCC) G40.89       Past Medical History:        Diagnosis Date    Hepatitis C     Liver disease     hepatitis    No significant medical problems        Past Surgical History:        Procedure Laterality Date     SECTION  , 2007    x 2    CHOLECYSTECTOMY      FINGER AMPUTATION Right 2019    right hand 3rd finger    HYSTERECTOMY (CERVIX STATUS UNKNOWN)  2008    MARLIN    INCISION AND DRAINAGE Right 2020    RIGHT UPPER THIGH INCISION AND DRAINAGE performed by Rolando Maynard MD at [facility]  2013    lap sera    SPINAL FUSION  2022    pt unsure exact location       Social History:    Social History     Tobacco Use    Smoking status: Every Day     Packs/day: 0.50     Years: 12.00     Pack years: 6.00     Types: Cigarettes     Start date:     Smokeless tobacco: Never   Substance Use Topics    Alcohol use:  No Ready to quit: Not Answered  Counseling given: Not Answered      Vital Signs (Current):   Vitals:    11/11/22 0716 11/11/22 0717 11/11/22 0800 11/11/22 0900   BP:   127/80 107/65   Pulse: 85 85 85 82   Resp: 22 22 22 22   Temp:   98.4 °F (36.9 °C) 98.4 °F (36.9 °C)   TempSrc:   Bladder Bladder   SpO2: 93% 93% 96% 96%   Weight:       Height:                                                  BP Readings from Last 3 Encounters:   11/11/22 107/65   11/03/22 114/64   10/28/22 91/71       NPO Status:                                                                                 BMI:   Wt Readings from Last 3 Encounters:   11/06/22 (!) 311 lb 8.2 oz (141.3 kg)   11/03/22 (!) 303 lb 1.6 oz (137.5 kg)   10/28/22 292 lb (132.5 kg)     Body mass index is 51.84 kg/m².     CBC:   Lab Results   Component Value Date/Time    WBC 2.7 11/11/2022 08:30 AM    RBC 2.77 11/11/2022 08:30 AM    HGB 7.9 11/11/2022 08:30 AM    HCT 25.0 11/11/2022 08:30 AM    MCV 90.3 11/11/2022 08:30 AM    RDW 17.4 11/11/2022 08:30 AM    PLT 90 11/11/2022 08:30 AM       CMP:   Lab Results   Component Value Date/Time     11/11/2022 02:20 AM    K 3.1 11/11/2022 02:20 AM    CL 96 11/11/2022 02:20 AM    CO2 23 11/11/2022 02:20 AM    BUN 57 11/11/2022 02:20 AM    CREATININE 1.7 11/11/2022 02:20 AM    GFRAA >60 09/14/2022 03:16 PM    LABGLOM 39 11/11/2022 02:20 AM    GLUCOSE 158 11/11/2022 02:20 AM    PROT 5.4 11/11/2022 02:20 AM    PROT 7.1 10/19/2012 11:45 PM    CALCIUM 8.3 11/11/2022 02:20 AM    BILITOT 0.2 11/11/2022 02:20 AM    BILITOT 0.2 11/11/2022 02:20 AM    ALKPHOS 99 11/11/2022 02:20 AM    AST 17 11/11/2022 02:20 AM    ALT 32 11/11/2022 02:20 AM       POC Tests:   Recent Labs     11/10/22  2039   POCGLU 189*       Coags:   Lab Results   Component Value Date/Time    PROTIME 18.6 11/11/2022 08:30 AM    INR 1.44 11/11/2022 08:30 AM    APTT 29.6 11/11/2022 08:30 AM       HCG (If Applicable):   Lab Results   Component Value Date PREGTESTUR NEGATIVE 11/08/2022        ABGs:   Lab Results   Component Value Date/Time    PO2ART 69 11/11/2022 02:00 AM    HLI5LEE 45.0 11/11/2022 02:00 AM    RKI0LFD 27.2 11/11/2022 02:00 AM        Type & Screen (If Applicable):  No results found for: LABABO, LABRH    Drug/Infectious Status (If Applicable):  Lab Results   Component Value Date/Time    HEPCAB REPEATEDLY REACTIVE 02/18/2017 11:00 AM       COVID-19 Screening (If Applicable):   Lab Results   Component Value Date/Time    COVID19 NOT DETECTED 10/17/2022 06:55 PM    COVID19 NOT DETECTED 08/13/2020 01:00 PM           Anesthesia Evaluation  Patient summary reviewed and Nursing notes reviewed no history of anesthetic complications:   Airway: Mallampati: Unable to assess / NA         Intubated Dental:          Pulmonary:Negative Pulmonary ROS                              Cardiovascular:  Exercise tolerance: poor (<4 METS),   (+) hypertension:,          Beta Blocker:  Not on Beta Blocker      ROS comment: S/p cardiac arrest     Neuro/Psych:                ROS comment: Brain dead GI/Hepatic/Renal:   (+) hepatitis: C, liver disease:, morbid obesity          Endo/Other:                     Abdominal:             Vascular: negative vascular ROS. Other Findings:           Anesthesia Plan      general     ASA 6                 Plan discussed with SHAISTA Rose DO   11/11/2022

## 2022-11-11 NOTE — PROGRESS NOTES
Care being managed mostly by [facility] with assistance from [address] [place] [address]. No further CC care at this time. Continue supportive care per [facility] pending organ procurement surgery.

## 2022-11-12 LAB
ABO/RH: NORMAL
ANTIBODY SCREEN: NEGATIVE
COMPONENT: NORMAL
COMPONENT: NORMAL
CROSSMATCH RESULT: NORMAL
CROSSMATCH RESULT: NORMAL
STATUS: NORMAL
STATUS: NORMAL
TRANSFUSION STATUS: NORMAL
TRANSFUSION STATUS: NORMAL
UNIT DIVISION: 0
UNIT DIVISION: 0
UNIT NUMBER: NORMAL
UNIT NUMBER: NORMAL

## 2022-11-12 NOTE — ANESTHESIA POSTPROCEDURE EVALUATION
Department of Anesthesiology  Postprocedure Note    Patient: Zafar Agustin  MRN: [id number]  YOB: 1980  Date of evaluation: 2022      Procedure Summary     Date: 22 Room / Location: [address]    Anesthesia Start: [address] Anesthesia Stop:     Procedure: ORGAN PROCUREMENT LIVER AND BOTH KIDNEYS (Abdomen) Diagnosis:       Organ donation      (Organ donation [Z52.9])    Surgeons: [facility] Responsible Provider: Lisa Ibarra DO    Anesthesia Type: general ASA Status: 6          Anesthesia Type: No value filed. Dominga Phase I:      Dominga Phase II:        Anesthesia Post Evaluation    Complications: no  Comments: Patient is . Organ harvest in process.

## 2022-11-14 LAB
CULTURE: NORMAL
Lab: NORMAL
SPECIMEN: NORMAL

## 2022-11-14 NOTE — PROGRESS NOTES
Physician Progress Note      PATIENT:               Shiraz Bennett  CSN #:                  [id number]  :                       1980  ADMIT DATE:       2022 2:51 PM  [address] DATE:        2022 10:06 PM  RESPONDING  PROVIDER #:        Jeannene Riedel MD          QUERY TEXT:    Internal Medicine,    Patient admitted with cardiac arrest due to respiratory failure, sepsis and   suspected aspiration PNA. Noted aspiration PNA and sepsis dropped from   documentation in the discharge summary. If possible, please document in the   progress notes and discharge summary if  sepsis and aspiration PNA was: The medical record reflects the following:  Risk Factors: cardiac arrest  Clinical Indicators: sepsis and aspiration PNA dropped from Documentation in   the DC summary, Per documentation of Dr. Lesly Maloney on 22: Geetha Harrell on chronic   hypoxic respiratory failure secondary to cardiac arrest, Pulmonary edema   versus aspiration pneumonia on CXR . \"; +SIRS, resp failure, cardiogenic shock  Treatment: labs, imaging, Pulmonology, Neuro, Nephrology and Cardiology   consults, IVF, vent, IV atb    Thank you,  Elvis Singh RN CDS  [phone]  Options provided:  -- Sepsis and aspiration PNA confirmed after study  -- Aspiration PNA confirmed and sepsis ruled out  -- Sepsis confirmed and aspiration PNA ruled out  -- Both sepsis and aspiration PNA ruled out  -- Other - I will add my own diagnosis  -- Disagree - Not applicable / Not valid  -- Disagree - Clinically unable to determine / Unknown  -- Refer to Clinical Documentation Reviewer    PROVIDER RESPONSE TEXT:    Sepsis and aspiration PNA confirmed after study.     Query created by: Marycruz Simmons on 2022 3:12 PM      Electronically signed by:  Jeannene Riedel MD 2022 6:48 AM

## 2022-11-15 NOTE — PROGRESS NOTES
Physician Progress Note      PATIENT:               Lizbet Puentes  CSN #:                  [id number]  :                       1980  ADMIT DATE:       2022 2:51 PM  [address] DATE:        2022 10:06 PM  RESPONDING  PROVIDER #:        Livia Tolbert MD          QUERY TEXT:    Internal Medicine,    Patient admitted following cardiac arrest due to respiratory failure. If   possible, please document in progress notes and discharge summary the cause of   cardiac arrest:    The medical record reflects the following:  Risk Factors: Multi  Clinical Indicators: per Cardiology documentation: \"Cardiac arrest likely due   to respiratory failure. \", sepsis , aspiration PNA, CHF exacerbation, per   Internal Med documentation: \"Respiratory failure due to cardiac arrest.\";; No   cause for the cardiac arrest is specified by Internal Medicine; elevated   troponin and ischemia is documented  Treatment: labs, imaging, resp ventilation, telemetry, consults, ATB, RT,   pressure support    Thank you,  Shayy Joyner RN CDS  [phone]  Options provided:  -- Cardiac Arrest due to AMI  -- Cardiac Arrest due to VFib  -- Cardiac Arrest due to Acute Respiratory Failure  -- Cardiac Arrest due to sepsis  -- Cardiac arrest due to aspiration PNA  -- Cardiac arrest due to CHF exacerbation  -- Cardiac arrest due to ##please specify, please specify. -- Other - I will add my own diagnosis  -- Disagree - Not applicable / Not valid  -- Disagree - Clinically unable to determine / Unknown  -- Refer to Clinical Documentation Reviewer    PROVIDER RESPONSE TEXT:    This patient had cardiac arrest due to acute respiratory failure.     Query created by: Royal Burnett on 2022 3:26 PM      Electronically signed by:  Livia Tolbert MD 11/15/2022 5:07 PM

## 2023-12-10 NOTE — PROGRESS NOTES
Hospitalist Progress Note      Name:  Tahira Waggoner /Age/Sex: 1980  (47 y.o. female)   MRN & CSN:  [id number] & [id number] Admission Date/Time: 2020 12:42 AM   Location:  [address] [place] [address]- PCP: [address] Day: 8    Assessment and Plan:      Sepsis (HCC)Abscess of right thigh/MRSA bacteremia   Continue vancomycin and cefepime per ID. S/p I&D bedside.  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 Continue antibiotics as per above  Pain control with Dilaudid IV and  Percocet. Continue metoprolol for tachycardia. - Right Hip Septic Endocarditis- IR consulted for drain; continue abx as per above. Appreciate ID recs   Hyponatremia/acute kidney injury improved with IVF. Stable.  Anemia likely due to anemia of chronic disease. Anemia panel confirming AOCD   S/p 1 unit PRBC transfusion. Current Hb 7.3 with no evidence of active blood loss.  Polysubstance drug abuse including heroine IV counseling provided. On Ultram, clonidine, Restoril to prevent withdrawal symptoms. Negative for RPR, HIV, chlamydia and gonorrhea. ID on board   Chronic hepatitis B ([facility]   Chronic hepatitis C without hepatic coma (HCC)   Acute septic pulmonary embolism without acute cor pulmonale (HCC)  Tobacco abuse smoking cessation counseling provided. On NicoDerm patch        VTE prophylaxis LMWH. CM working on placement to [address] [place] [address]; Daily Fluid Restriction: 1800 ml   DVT Prophylaxis [x] Lovenox, []  Heparin, [] SCDs, [] Ambulation   GI Prophylaxis [] PPI,  [] H2 Blocker,  [] Carafate,  [x] Diet/Tube Feeds   Code Status Full Code   Disposition Patient requires continued admission due to sepsis   MDM [] Low, [x] Moderate,[]  High  Patient's risk as above due to sepsis with multiple sources, medical comorbidities     History of Present Illness:   Lower extremity pain this AM.  Denies any nausea or vomiting, fevers.   Does have some chills this AM.  Denies any drainage from the dressings overnight. No chest pain or shortness of breath. Objective: Intake/Output Summary (Last 24 hours) at 8/11/2020 1324  Last data filed at 8/11/2020 0929  Gross per 24 hour   Intake 2070 ml   Output 2600 ml   Net -530 ml      Vitals:   Vitals:    08/11/20 1042   BP: 118/74   Pulse: 127   Resp: 18   Temp: 99.1 °F (37.3 °C)   SpO2:      Physical Exam:   GEN Awake female, sitting upright in bed in moderate distress. Appears given age. EYES Pupils are equally round. No scleral erythema, discharge, or conjunctivitis. HENT Mucous membranes are moist. Oral pharynx without exudates, no evidence of thrush. NECK Supple, no apparent thyromegaly or masses. RESP Clear to auscultation, no wheezes, rales or rhonchi. Symmetric chest movement while on room air. CARDIO/VASC S1/S2 auscultated. Regular rate without appreciable murmurs, rubs, or gallops. No JVD   GI Abdomen is soft without significant tenderness, masses, or guarding. Rectal exam deferred.  No costovertebral angle tenderness. Normal appearing external genitalia. Angel catheter is present. MSK No gross joint deformities. SKIN Normal coloration, warm, dry, lower extremity dressings are c/d/i, no obvious discharge  NEURO Cranial nerves appear grossly intact, normal speech  PSYCH Awake, alert, oriented x 4. Affect appropriate.     Medications:   Medications:    vancomycin  1,500 mg Intravenous Q12H    metoprolol tartrate  12.5 mg Oral BID    cefepime  2 g Intravenous Q8H    collagenase   Topical Daily    sodium chloride flush  10 mL Intravenous 2 times per day    nicotine  1 patch Transdermal Daily    heparin (porcine)  5,000 Units Subcutaneous 3 times per day      Infusions:   PRN Meds: oxyCODONE, 10 mg, Q4H PRN  HYDROmorphone, 0.5 mg, Q4H PRN  potassium chloride, 40 mEq, PRN    Or  potassium alternative oral replacement, 40 mEq, PRN    Or  potassium chloride, 10 mEq, PRN  LORazepam, 1 mg, Q6H PRN  sodium chloride flush, 10 mL, No

## 2024-02-02 NOTE — TELEPHONE ENCOUNTER
[facility] Transitions Initial Follow Up Call    Outreach made within 2 business days of discharge: Yes    Patient: Cheyanne Juares Patient : 1980   MRN: [id number]  Reason for Admission: There are no discharge diagnoses documented for the most recent discharge. Discharge Date: 10/21/22       Spoke with: PT    Discharge department/facility: [facility]    TCM Interactive Patient Contact:  Was patient able to fill all prescriptions: Yes  Was patient instructed to bring all medications to the follow-up visit: Yes  Is patient taking all medications as directed in the discharge summary?  Yes  Does patient understand their discharge instructions: Yes  Does patient have questions or concerns that need addressed prior to 7-14 day follow up office visit: no    Scheduled appointment with PCP within 7-14 days    Follow Up  Future Appointments   Date Time Provider Bertin Fonseca   2022  2:30 PM LEIGHTON Flynn - CNP [address]gee LPN [facility]

## 2024-07-18 NOTE — ED NOTES
Bed: 10  Expected date:   Expected time:   Means of arrival:   Comments:  Triage   PICC NURSE AT [address]  10/17/22 6619

## 2025-01-16 NOTE — TELEPHONE ENCOUNTER
----- Message from LEIGHTON Madden CNP sent at 10/27/2022  2:56 PM EDT -----  Regarding: FW: Question   Can you please reach out to Junior Patel and clarify what is going on with her?  ----- Message -----  From: Gale Coronado MA  Sent: 10/27/2022   2:55 PM EDT  To: LEIGHTON Madden CNP  Subject: FW: Question                                       ----- Message -----  From: Emily Aponte  Sent: 10/27/2022  11:17 AM EDT  To: [facility] Clinical Staff  Subject: Question                                         Patricia balderas Resulted

## 2025-03-25 NOTE — CONSULTS
[facility] Service Consult Note  [facility]    Patient Name: Diomedes Coto  : 1980        Subjective:   Reason for consult: Anoxic brain injury  38 y.o.  female with history of hepatitis C, IV drug use, MRSA endocarditis, HTN, presenting to [place] [id number] on 22 after a witnessed cardiac arrest outside of the hospital. She received ACLS for approximately 30 minutes by EMS prior to transport. On arrival to ED patient had a total of 7 rounds of epi and 4 of narcan. Total arrest time was approximately 50-60 minutes. Patient had a recent admission for acute on chronic diastolic heart failure. Currently the patient is requiring multiple vasopressors and remains hypotensive. Chart was reviewed in detail. Patient was seen and assessed. She is intubated, not on sedation. She is not responsive. Parents, son and friend are at bedside today.      Past Medical History:   Diagnosis Date    Hepatitis C     Liver disease     hepatitis    No significant medical problems     :   Past Surgical History:   Procedure Laterality Date     SECTION  , 2007    x 2    CHOLECYSTECTOMY      FINGER AMPUTATION Right 2019    right hand 3rd finger    HYSTERECTOMY (CERVIX STATUS UNKNOWN)  2008    MARLIN    INCISION AND DRAINAGE Right 2020    RIGHT UPPER THIGH INCISION AND DRAINAGE performed by Jerod Wright MD at [facility] 6  2013    lap sera    SPINAL FUSION  2022    pt unsure exact location     Medications:  Scheduled Meds:   ipratropium-albuterol  1 ampule Inhalation Q4H    vancomycin (VANCOCIN) intermittent dosing (placeholder)   Other RX Placeholder    sodium chloride  1,000 mL IntraVENous Once    sodium chloride  1,000 mL IntraVENous Once    sodium chloride flush  5-40 mL IntraVENous 2 times per day    chlorhexidine  15 mL Mouth/Throat BID    famotidine (PEPCID) injection  20 mg IntraVENous BID    enoxaparin  30 mg SubCUTAneous BID    insulin lispro  0-4 Units SubCUTAneous Q4H    cefepime  2,000 mg IntraVENous Q8H     Continuous Infusions:   dextrose 75 mL/hr at 11/07/22 1014    phenylephrine (KINZA-SYNEPHRINE) 50mg/250mL infusion      vasopressin (Septic Shock) infusion 0.04 Units/min (11/07/22 0844)    norepinephrine 47 mcg/min (11/07/22 0908)    lactated ringers 100 mL/hr at 11/04/22 1641    sodium chloride 100 mL/hr at 11/05/22 0057    fentaNYL Stopped (11/07/22 0642)    dextrose      propofol Stopped (11/07/22 0500)     PRN Meds:.potassium chloride **OR** potassium chloride, lubrifresh P.M., sodium chloride flush, polyethylene glycol, acetaminophen **OR** acetaminophen, ondansetron **OR** ondansetron, potassium chloride, magnesium sulfate, sodium phosphate IVPB **OR** sodium phosphate IVPB, glucose, dextrose bolus **OR** dextrose bolus, dextrose, glucagon (rDNA)    Allergies   Allergen Reactions    Buprenorphine-Naloxone Itching, Rash and Shortness Of Breath    Subutex [Buprenorphine] Nausea And Vomiting    Amoxicillin-Pot Clavulanate Rash     Social History     Socioeconomic History    Marital status: Single     Spouse name: Not on file    Number of children: 2    Years of education: Not on file    Highest education level: Not on file   Occupational History    Occupation:    Tobacco Use    Smoking status: Every Day     Packs/day: 0.50     Years: 12.00     Pack years: 6.00     Types: Cigarettes     Start date: 1998    Smokeless tobacco: Never   Vaping Use    Vaping Use: Never used   Substance and Sexual Activity    Alcohol use: No    Drug use: Not Currently     Types: IV, Opiates      Comment: heroin-last used 1/2022    Sexual activity: Yes     Partners: Male   Other Topics Concern    Not on file   Social History Narrative    Do you donate blood or plasma? Yes    Caffeine intake? Moderate    Advance directive? No    Is blood transfusion acceptable in an emergency?  Yes             Social Determinants of Health     Financial Resource Strain: High Risk    Difficulty of Paying Living Expenses: Hard   Food Insecurity: No Food Insecurity    Worried About Running Out of Food in the Last Year: Never true    Ran Out of Food in the Last Year: Never true   Transportation Needs: Not on file   Physical Activity: Not on file   Stress: Not on file   Social Connections: Not on file   Intimate Partner Violence: Not on file   Housing Stability: Not on file      Family History   Problem Relation Age of Onset    Migraines Mother     Obesity Brother     Migraines Maternal Grandmother     COPD Paternal Grandmother     Dementia Paternal Grandfather     Depression Son     Mental Illness Son         Bipole, ADHD, Adjustment disorder    Mental Illness Maternal Aunt     Colon Cancer Neg Hx          ROS (10 systems)  Unable to complete, intubated/unresponsive    Physical Exam:      Wt Readings from Last 3 Encounters:   11/06/22 (!) 311 lb 8.2 oz (141.3 kg)   11/03/22 (!) 303 lb 1.6 oz (137.5 kg)   10/28/22 292 lb (132.5 kg)     Temp Readings from Last 3 Encounters:   11/07/22 98.6 °F (37 °C) (Bladder)   10/28/22 97.5 °F (36.4 °C) (Oral)   10/21/22 97.5 °F (36.4 °C) (Oral)     BP Readings from Last 3 Encounters:   11/07/22 (!) 72/62   11/03/22 114/64   10/28/22 91/71     Pulse Readings from Last 3 Encounters:   11/07/22 (!) 107   11/03/22 85   10/28/22 89        Gen: Intubated, ventilated, not sedated, unresponsive  HEENT: NC/AT, no Oculocephalic response, pupils 6 mm bilaterally and non responsive, no cough/gag, negative corneal, mmm, neck supple, no meningeal signs;    Heart: ST  Lungs: Intubated/ventilated  Ext: no edema, no calf tenderness b/l  Psych: Unresponsive  Skin: no rashes or lesions    NEUROLOGIC EXAM:    Mental Status: Unresponsive, intubated/not sedated    Cranial Nerve Exam:   CN II-XII: Pupils 6 mm bilaterally and non responsive, no nystagmus, no gaze paresis, no Oculocephalic response, no corneal response,  unable to assess V1-V3 intact b/l, muscles of facial expression symmetric; No response to sound, no cough/gag    Motor Exam:       Strength 0/5 UE's/LE's b/l  Tone and bulk normal   No spontaneous movement throughout    Deep Tendon Reflexes: 2/4 biceps, triceps, brachioradialis, 0/4 patellar, and achilles b/l; mute plantar responses b/l    Sensation: No response to light touch/pinprick/vibration UE's/LE's b/l. No response to noxious stimuli throughout    Coordination/Cerebellum:       Tremors--none      Rapidly alternating movements: DAYSI      Heel-to-Shin: DAYSI      Finger-to-Nose: DAYSI    Gait and stance:      Gait: deferred      LABS:     Recent Labs     11/04/22  1532 11/04/22  2100 11/05/22  0300 11/05/22  2055 11/05/22  2250 11/06/22  0255 11/06/22  0638 11/07/22  0320   WBC 15.6*  --   --   --   --   --   --  20.1*    135   < > 139   < > 139 142 138   K 3.3* 2.6*   < > 4.5   < > 3.7 3.5 4.6   CL 88* 92*   < > 98*  --  101  --  100   CO2 21 28   < > 27   < > 27 32 23   BUN 31* 37*   < > 47*  --  47*  --  52*   CREATININE 1.7* 2.0*   < > 2.5*   < > 2.7* 2.4* 2.9*   GLUCOSE 323* 96   < > 101*  --  87  --  84   INR 1.09 1.21  --   --   --   --   --   --     < > = values in this interval not displayed. IMAGING:    CT head w/o contrast:  Impression   Diffuse low attenuation throughout the entire cerebral hemispheres consistent   with edema and diffuse anoxic injury. The fourth ventricle is effaced and   the cerebellar tonsils are downward herniating through the foramen magnum. The findings were called and discussed with Dr. Ryan Younger. RAFI--IMPRESSION:10/22  1. No vegetation noted on the tricuspid valve, pulmonic valve, aortic  valve, and mitral valve. 2.  Moderate tricuspid regurgitation noted. 3.  LV function is preserved, greater than 55%. 4.  No clot or thrombus noted in the left atrium or left atrial  appendage. 5.  No pericardial effusion noted. Cath-10/22--IMPRESSION:  1. Left main is patent.   2.  LAD, circ, and RCA has mild disease present. 3.  EDP is around 20 mmHg present. All imaging was personally reviewed    ASSESSMENT/PLAN:   38year old female with prolonged cardiac arrest. Patient is seen today intubated and not sedated. She is non responsive to voice or tactile stimulation. Pupils are 6 mm and fixed bilaterally. No oculocephalic response,  corneal reflexes are absent. There is no cough and gag with inline suction. She has no response to painful stimulation in the upper or lower extremities. Babinski is negative. Anoxic brain injury in the setting of prolonged cardiac arrest.   CT head as above  Brain flow study has been ordered and is pending  Currently patient not stable enough to transport for exam.   Routine EEG has been ordered to rule out non convulsive status  Sedation/Fentanyl discontinued at 1029 09/4  Metabolic derangements - management per CC  Bun 52, Creatinine 2.9, pH 7.27, WBC 20.1  Overall poor prognosis for meaningful neurologic recovery. Will continue to follow for serial neurological exams    > 75 minutes of time spent included chart review, obtaining history, patient examination, developing plan of care, and documentation. Patient was discussed with attending neurologist Dr. Brianna Vigil. Thank you for allowing us to participate in the care of your patient. If there are any questions regarding evaluation please feel free to contact us. LEIGHTON Gambino CNP, 11/7/2022     Attending Note:  I have rounded on this patient with LEIGHTON Gambino CNP. I have reviewed the chart and we have discussed this case in detail. The patient was seen and examined by myself. Pertinent labs and imaging have been personally reviewed. Changes were made to the note as appropriate. I concur with the above assessment and plan. Patient presenting with cardiac arrest and suspected anoxic injury in this setting.   Her initial CT head demonstrates cerebral edema and herniation that would be consistent with profound hypoxic anoxic injury. She has had sedation, propofol, shut off at 0500 this morning. Her exam today is certainly concerning for progression to brain death given the absent cortical as well as absent cranial nerve response. We are somewhat limited in our ability to approach confirmatory ancillary testing, she is currently on 3 pressors to maintain cardiopulmonary status. For any brain death testing, I would prefer that she be off of sedation for greater than 24 hours with serial neurological exams demonstrating no improvement from a clinical standpoint. Does not appear that she has been breathing over the ventilator, we will continue to monitor for this while sedation is now completely off. I did discuss the CT head as well as current exam findings with the family, I expressed my concern to them that she has exam findings consistent with injury to both cortical and brainstem which is seen in clinical brain death. As previously stated, cardiopulmonary stability somewhat limiting to ancillary testing, however nuclear flow study would be reasonable to attempt tomorrow if okay with critical care as we would then have subsequent neurological exams, greater than 24 hours off of sedation, and an acceptable ancillary test for brain death confirmation if she remains clinically appropriate to do so. We will check EEG today in order to ensure no evidence of nonconvulsive status epilepticus although suspicion of that remains low. I am not anticipating confirmatory findings of brain death with an EEG in the ICU. Pressors and ventilator management per critical care, please continue to hold all sedating medications, we will continue to follow for serial neuro exams tomorrow. Meaningful neuro prognosis is grim.     Nadir Spaulding,  11/7/2022 4:05 PM  Neurology No

## (undated) DEVICE — SPONGE GZ W4XL8IN COT WVN 12 PLY

## (undated) DEVICE — SUTURE PERMAHAND SZ 2-0 L30IN NONABSORBABLE BLK SILK W/O A305H

## (undated) DEVICE — SUTURE PERMA-HAND 1 L30IN NONABSORBABLE BLK SILK BRAID W/O SA87G

## (undated) DEVICE — SPONGE LAP W18XL18IN WHT COT 4 PLY FLD STRUNG RADPQ DISP ST

## (undated) DEVICE — SOLUTION IV IRRIG POUR BRL 0.9% SODIUM CHL 2F7124

## (undated) DEVICE — Z INACTIVE USE 2641838 CLIP INT L ORNG TI TRNSVRS GRV CHEVRON SHP W/ PRECIS TIP TO

## (undated) DEVICE — SUTURE PERMAHAND SZ 3-0 L30IN NONABSORBABLE BLK SH L26MM K832H

## (undated) DEVICE — COUNTER NDL 30 COUNT FOAM STRP SGL MAG

## (undated) DEVICE — INTENDED FOR TISSUE SEPARATION, AND OTHER PROCEDURES THAT REQUIRE A SHARP SURGICAL BLADE TO PUNCTURE OR CUT.: Brand: BARD-PARKER ® STAINLESS STEEL BLADES

## (undated) DEVICE — SUTURE ETHLN SZ 2-0 L20IN NONABSORBABLE BLK L75MM LR 3/8 460T

## (undated) DEVICE — PROVIDES A STERILE INTERFACE BETWEEN THE OPERATING ROOM SURGICAL LAMPS (NON-STERILE) AND THE SURGEON OR NURSE (STERILE).: Brand: STERION®CLAMP COVER FABRIC

## (undated) DEVICE — PACK,BASIC,SIRUS,V: Brand: MEDLINE

## (undated) DEVICE — SUTURE PERMAHAND SZ 0 L30IN NONABSORBABLE BLK L26MM SH 1/2 K834H

## (undated) DEVICE — ELECTRODE ES L4IN PTFE INSUL BLDE W/ SFTY SL DISP EDGE

## (undated) DEVICE — GAUZE,SPONGE,4"X4",16PLY,XRAY,STRL,LF: Brand: MEDLINE

## (undated) DEVICE — GOWN,ECLIPSE,POLYRNF,BRTHSLV,L,30/CS: Brand: MEDLINE

## (undated) DEVICE — TOWEL,OR,DSP,ST,BLUE,STD,6/PK,12PK/CS: Brand: MEDLINE

## (undated) DEVICE — TRAY PREP DRY W/ PREM GLV 2 APPL 6 SPNG 2 UNDPD 1 OVERWRAP

## (undated) DEVICE — BANDAGE,GAUZE,BULKEE II,4.5"X4.1YD,STRL: Brand: MEDLINE

## (undated) DEVICE — SUTURE PROL SZ 5-0 L36IN NONABSORBABLE BLU L13MM C-1 3/8 8720H

## (undated) DEVICE — DRAPE,UTILTY,TAPE,15X26, 4EA/PK: Brand: MEDLINE

## (undated) DEVICE — DRAPE SHEET ULTRAGARD: Brand: MEDLINE

## (undated) DEVICE — GLOVE SURG SZ 6 CRM LTX FREE POLYISOPRENE POLYMER BEAD ANTI

## (undated) DEVICE — GLOVE SURG SZ 65 CRM LTX FREE POLYISOPRENE POLYMER BEAD ANTI

## (undated) DEVICE — COVER,MAYO STAND,STERILE: Brand: MEDLINE

## (undated) DEVICE — ELECTRODE ES AD CRDLSS PT RET REM POLYHESIVE

## (undated) DEVICE — GUIDE ENDO STEER

## (undated) DEVICE — SUTURE PERMA-HAND SZ 2-0 L30IN NONABSORBABLE BLK L26MM SH K833H

## (undated) DEVICE — SYRINGE IRRIG 60ML SFT PLIABLE BLB EZ TO GRP 1 HND USE W/

## (undated) DEVICE — PENCIL ES CRD L10FT HND SWCHING ROCK SWCH W/ EDGE COAT BLDE

## (undated) DEVICE — PAD,NON-ADHERENT,3X8,STERILE,LF,1/PK: Brand: MEDLINE

## (undated) DEVICE — YANKAUER,BULB TIP,W/O VENT,RIGID,STERILE: Brand: MEDLINE

## (undated) DEVICE — GOWN,ECLIPSE,POLYRNF,BRTHSLV,XL,30/CS: Brand: MEDLINE

## (undated) DEVICE — SUTURE PERMAHAND SZ 3-0 L30IN NONABSORBABLE BLK SILK BRAID A304H

## (undated) DEVICE — YANKAUER,FLEXIBLE HANDLE,REGLR CAPACITY: Brand: MEDLINE INDUSTRIES, INC.

## (undated) DEVICE — MARKER SURG SKIN UTIL REGULAR/FINE 2 TIP W/ RUL AND 9 LBL

## (undated) DEVICE — DRESSING TRNSPAR W6XL8IN FLM SURESITE 123

## (undated) DEVICE — SUTURE PERMAHAND SZ 0 L30IN NONABSORBABLE BLK SILK BRAID A306H

## (undated) DEVICE — GLOVE SURG SZ 65 L12IN FNGR THK79MIL GRN LTX FREE

## (undated) DEVICE — PACK SURG ABD SVMMC

## (undated) DEVICE — TAPE,CLOTH/SILK,CURAD,3"X10YD,LF,40/CS: Brand: CURAD

## (undated) DEVICE — TUBING, SUCTION, 9/32" X 10', STRAIGHT: Brand: MEDLINE

## (undated) DEVICE — Device

## (undated) DEVICE — CONTAINER,SPECIMEN,OR STERILE,4OZ: Brand: MEDLINE

## (undated) DEVICE — DRAPE HUSH SLUSH 2.0 112CM X 168CM

## (undated) DEVICE — ASPIRATOR FLR L72IN SUCT TB W/ 1 DETACH FISH STK PUSH HNDL

## (undated) DEVICE — DRAPE SLUSH DISC W44XL66IN ST FOR RND BSIN HUSH SLUSH SYS

## (undated) DEVICE — SHEET,DRAPE,53X77,STERILE: Brand: MEDLINE

## (undated) DEVICE — COVER,TABLE,44X90,STERILE: Brand: MEDLINE

## (undated) DEVICE — GLOVE ORANGE PI 7   MSG9070

## (undated) DEVICE — ELECTRODE BLDE L6.5IN CAUT EXT DISP

## (undated) DEVICE — SUTURE NONABSORBABLE  MERSILINE TP1 SIZE 5

## (undated) DEVICE — PENCIL ES L3M BTTN SWCH HOLSTER W/ BLDE ELECTRD EDGE

## (undated) DEVICE — DRAPE,ABDOMINAL,MAJOR,STERILE: Brand: MEDLINE

## (undated) DEVICE — INTENDED FOR TISSUE SEPARATION, AND OTHER PROCEDURES THAT REQUIRE A SHARP SURGICAL BLADE TO PUNCTURE OR CUT.: Brand: BARD-PARKER ® CARBON RIB-BACK BLADES